# Patient Record
Sex: FEMALE | Race: WHITE | NOT HISPANIC OR LATINO | Employment: OTHER | URBAN - NONMETROPOLITAN AREA
[De-identification: names, ages, dates, MRNs, and addresses within clinical notes are randomized per-mention and may not be internally consistent; named-entity substitution may affect disease eponyms.]

---

## 2017-03-01 ENCOUNTER — HISTORY (OUTPATIENT)
Dept: FAMILY MEDICINE | Facility: OTHER | Age: 57
End: 2017-03-01

## 2017-03-01 ENCOUNTER — OFFICE VISIT - GICH (OUTPATIENT)
Dept: FAMILY MEDICINE | Facility: OTHER | Age: 57
End: 2017-03-01

## 2017-03-01 DIAGNOSIS — G47.00 INSOMNIA: ICD-10-CM

## 2017-03-01 DIAGNOSIS — J45.40 MODERATE PERSISTENT ASTHMA, UNCOMPLICATED: ICD-10-CM

## 2017-03-01 DIAGNOSIS — Z79.899 OTHER LONG TERM (CURRENT) DRUG THERAPY: ICD-10-CM

## 2017-03-01 LAB
HEMOGLOBIN: 14.4 G/DL (ref 12–16)
MCV RBC AUTO: 87 FL (ref 80–100)

## 2017-03-13 LAB
6-MONOACETYL MORPHINE - HISTORICAL: NORMAL NG/ML
AMPHETAMINE URINE - HISTORICAL: NORMAL NG/ML
BARBITURATE URINE - HISTORICAL: NORMAL NG/ML
BENZODIAZEPINE URINE - HISTORICAL: NORMAL NG/ML
BUPRENORPHRINE URINE - HISTORICAL: NORMAL NG/ML
COCAINE METAB URINE - HISTORICAL: NORMAL NG/ML
CREAT UR - HISTORICAL: 125 MG/DL
ETHYLGLUCURONIDE URINE - HISTORICAL: NORMAL NG/ML
FENTANYL URINE - HISTORICAL: NORMAL NG/ML
MASS SPECTROMETRY URINE - HISTORICAL: NORMAL
METHADONE URINE - HISTORICAL: NORMAL NG/ML
OPIATES URINE - HISTORICAL: NORMAL NG/ML
OXYCODONE URINE - HISTORICAL: NORMAL NG/ML
PH URINE - HISTORICAL: 6.4
PROPOXYPHENE URINE - HISTORICAL: NORMAL NG/ML
THC 50 URINE - HISTORICAL: NORMAL NG/ML
TRAMADOL - HISTORICAL: NORMAL NG/ML

## 2017-05-16 ENCOUNTER — COMMUNICATION - GICH (OUTPATIENT)
Dept: FAMILY MEDICINE | Facility: OTHER | Age: 57
End: 2017-05-16

## 2017-05-16 DIAGNOSIS — I10 ESSENTIAL (PRIMARY) HYPERTENSION: ICD-10-CM

## 2017-06-01 ENCOUNTER — AMBULATORY - GICH (OUTPATIENT)
Dept: FAMILY MEDICINE | Facility: OTHER | Age: 57
End: 2017-06-01

## 2017-06-01 DIAGNOSIS — I10 ESSENTIAL (PRIMARY) HYPERTENSION: ICD-10-CM

## 2017-06-01 DIAGNOSIS — R73.01 IMPAIRED FASTING GLUCOSE: ICD-10-CM

## 2017-06-01 DIAGNOSIS — Z79.899 OTHER LONG TERM (CURRENT) DRUG THERAPY: ICD-10-CM

## 2017-06-02 ENCOUNTER — COMMUNICATION - GICH (OUTPATIENT)
Dept: FAMILY MEDICINE | Facility: OTHER | Age: 57
End: 2017-06-02

## 2017-06-02 DIAGNOSIS — I10 ESSENTIAL (PRIMARY) HYPERTENSION: ICD-10-CM

## 2017-06-08 ENCOUNTER — AMBULATORY - GICH (OUTPATIENT)
Dept: FAMILY MEDICINE | Facility: OTHER | Age: 57
End: 2017-06-08

## 2017-06-15 ENCOUNTER — COMMUNICATION - GICH (OUTPATIENT)
Dept: FAMILY MEDICINE | Facility: OTHER | Age: 57
End: 2017-06-15

## 2017-06-15 DIAGNOSIS — J45.21 MILD INTERMITTENT ASTHMA WITH ACUTE EXACERBATION: ICD-10-CM

## 2017-06-15 DIAGNOSIS — J45.909 UNCOMPLICATED ASTHMA: ICD-10-CM

## 2017-06-19 ENCOUNTER — AMBULATORY - GICH (OUTPATIENT)
Dept: LAB | Facility: OTHER | Age: 57
End: 2017-06-19

## 2017-06-19 ENCOUNTER — AMBULATORY - GICH (OUTPATIENT)
Dept: SCHEDULING | Facility: OTHER | Age: 57
End: 2017-06-19

## 2017-06-19 ENCOUNTER — AMBULATORY - GICH (OUTPATIENT)
Dept: RADIOLOGY | Facility: OTHER | Age: 57
End: 2017-06-19

## 2017-06-19 ENCOUNTER — HOSPITAL ENCOUNTER (OUTPATIENT)
Dept: RADIOLOGY | Facility: OTHER | Age: 57
End: 2017-06-19
Attending: FAMILY MEDICINE

## 2017-06-19 DIAGNOSIS — R73.01 IMPAIRED FASTING GLUCOSE: ICD-10-CM

## 2017-06-19 DIAGNOSIS — I10 ESSENTIAL (PRIMARY) HYPERTENSION: ICD-10-CM

## 2017-06-19 DIAGNOSIS — Z12.31 ENCOUNTER FOR SCREENING MAMMOGRAM FOR MALIGNANT NEOPLASM OF BREAST: ICD-10-CM

## 2017-06-19 DIAGNOSIS — Z79.899 OTHER LONG TERM (CURRENT) DRUG THERAPY: ICD-10-CM

## 2017-06-19 LAB
A/G RATIO - HISTORICAL: 1.4 (ref 1–2)
ALBUMIN SERPL-MCNC: 4.2 G/DL (ref 3.5–5.7)
ALP SERPL-CCNC: 58 IU/L (ref 34–104)
ALT (SGPT) - HISTORICAL: 16 IU/L (ref 7–52)
ANION GAP - HISTORICAL: 11 (ref 5–18)
AST SERPL-CCNC: 15 IU/L (ref 13–39)
BACTERIA URINE: ABNORMAL BACTERIA/HPF
BILIRUB SERPL-MCNC: 0.5 MG/DL (ref 0.3–1)
BILIRUB UR QL: ABNORMAL
BUN SERPL-MCNC: 17 MG/DL (ref 7–25)
BUN/CREAT RATIO - HISTORICAL: 19
CALCIUM SERPL-MCNC: 9.6 MG/DL (ref 8.6–10.3)
CHLORIDE SERPLBLD-SCNC: 102 MMOL/L (ref 98–107)
CHOL/HDL RATIO - HISTORICAL: 3.08
CHOLESTEROL TOTAL: 197 MG/DL
CLARITY, URINE: CLEAR CLARITY
CO2 SERPL-SCNC: 26 MMOL/L (ref 21–31)
COLOR UR: YELLOW COLOR
CREAT SERPL-MCNC: 0.9 MG/DL (ref 0.7–1.3)
EPITHELIAL CELLS: ABNORMAL EPI/HPF
ESTIMATED AVERAGE GLUCOSE: 91 MG/DL
GFR IF NOT AFRICAN AMERICAN - HISTORICAL: >60 ML/MIN/1.73M2
GLOBULIN - HISTORICAL: 2.9 G/DL (ref 2–3.7)
GLUCOSE SERPL-MCNC: 126 MG/DL (ref 70–105)
GLUCOSE URINE: NEGATIVE MG/DL
HDLC SERPL-MCNC: 64 MG/DL (ref 23–92)
HEMOGLOBIN A1C MONITORING (POCT) - HISTORICAL: 4.8 % (ref 4–6.2)
KETONES UR QL: 15 MG/DL
LDLC SERPL CALC-MCNC: 100 MG/DL
LEUKOCYTE ESTERASE URINE: NEGATIVE
NITRITE UR QL STRIP: NEGATIVE
NON-HDL CHOLESTEROL - HISTORICAL: 133 MG/DL
OCCULT BLOOD,URINE - HISTORICAL: NEGATIVE
OTHER: ABNORMAL
PATIENT STATUS - HISTORICAL: ABNORMAL
PH UR: 6 [PH]
POTASSIUM SERPL-SCNC: 2.9 MMOL/L (ref 3.5–5.1)
PROT SERPL-MCNC: 7.1 G/DL (ref 6.4–8.9)
PROTEIN QUALITATIVE,URINE - HISTORICAL: NEGATIVE MG/DL
RBC - HISTORICAL: ABNORMAL /HPF
SODIUM SERPL-SCNC: 139 MMOL/L (ref 133–143)
SP GR UR STRIP: 1.02
TRIGL SERPL-MCNC: 166 MG/DL
UROBILINOGEN,QUALITATIVE - HISTORICAL: NORMAL EU/DL
WBC - HISTORICAL: ABNORMAL /HPF

## 2017-06-20 ENCOUNTER — AMBULATORY - GICH (OUTPATIENT)
Dept: RADIOLOGY | Facility: OTHER | Age: 57
End: 2017-06-20

## 2017-06-20 DIAGNOSIS — M53.3 SACROCOCCYGEAL DISORDERS, NOT ELSEWHERE CLASSIFIED: ICD-10-CM

## 2017-06-23 LAB
6-MONOACETYL MORPHINE - HISTORICAL: NORMAL NG/ML
AMPHETAMINE URINE - HISTORICAL: NORMAL NG/ML
BARBITURATE URINE - HISTORICAL: NORMAL NG/ML
BENZODIAZEPINE URINE - HISTORICAL: NORMAL NG/ML
BUPRENORPHRINE URINE - HISTORICAL: NORMAL NG/ML
COCAINE METAB URINE - HISTORICAL: NORMAL NG/ML
CREAT UR - HISTORICAL: 276 MG/DL
ETHYLGLUCURONIDE URINE - HISTORICAL: NORMAL NG/ML
FENTANYL URINE - HISTORICAL: NORMAL NG/ML
MASS SPECTROMETRY URINE - HISTORICAL: NORMAL
METHADONE URINE - HISTORICAL: NORMAL NG/ML
OPIATES URINE - HISTORICAL: NORMAL NG/ML
OXYCODONE URINE - HISTORICAL: NORMAL NG/ML
PH URINE - HISTORICAL: 6.1
PROPOXYPHENE URINE - HISTORICAL: NORMAL NG/ML
THC 50 URINE - HISTORICAL: NORMAL NG/ML
TRAMADOL - HISTORICAL: NORMAL NG/ML

## 2017-06-30 ENCOUNTER — AMBULATORY - GICH (OUTPATIENT)
Dept: FAMILY MEDICINE | Facility: OTHER | Age: 57
End: 2017-06-30

## 2017-06-30 DIAGNOSIS — E87.6 HYPOKALEMIA: ICD-10-CM

## 2017-07-07 ENCOUNTER — OFFICE VISIT - GICH (OUTPATIENT)
Dept: FAMILY MEDICINE | Facility: OTHER | Age: 57
End: 2017-07-07

## 2017-07-07 ENCOUNTER — HOSPITAL ENCOUNTER (OUTPATIENT)
Dept: RADIOLOGY | Facility: OTHER | Age: 57
End: 2017-07-07

## 2017-07-07 ENCOUNTER — AMBULATORY - GICH (OUTPATIENT)
Dept: LAB | Facility: OTHER | Age: 57
End: 2017-07-07

## 2017-07-07 DIAGNOSIS — E87.6 HYPOKALEMIA: ICD-10-CM

## 2017-07-07 DIAGNOSIS — N39.3 STRESS INCONTINENCE: ICD-10-CM

## 2017-07-07 DIAGNOSIS — J45.909 UNCOMPLICATED ASTHMA: ICD-10-CM

## 2017-07-07 DIAGNOSIS — M53.3 SACROCOCCYGEAL DISORDERS, NOT ELSEWHERE CLASSIFIED: ICD-10-CM

## 2017-07-07 DIAGNOSIS — J45.40 MODERATE PERSISTENT ASTHMA, UNCOMPLICATED: ICD-10-CM

## 2017-07-07 DIAGNOSIS — Z79.899 OTHER LONG TERM (CURRENT) DRUG THERAPY: ICD-10-CM

## 2017-07-07 DIAGNOSIS — Z71.89 OTHER SPECIFIED COUNSELING: Chronic | ICD-10-CM

## 2017-07-07 DIAGNOSIS — G43.809 OTHER MIGRAINE, NOT INTRACTABLE, WITHOUT STATUS MIGRAINOSUS: ICD-10-CM

## 2017-07-07 DIAGNOSIS — F33.40 RECURRENT MAJOR DEPRESSIVE DISORDER IN REMISSION (H): ICD-10-CM

## 2017-07-07 DIAGNOSIS — I10 ESSENTIAL (PRIMARY) HYPERTENSION: ICD-10-CM

## 2017-07-07 DIAGNOSIS — G47.00 INSOMNIA: ICD-10-CM

## 2017-07-07 DIAGNOSIS — J45.21 MILD INTERMITTENT ASTHMA WITH ACUTE EXACERBATION: ICD-10-CM

## 2017-07-07 DIAGNOSIS — T63.441A TOXIC EFFECT OF VENOM OF BEES, UNINTENTIONAL: ICD-10-CM

## 2017-07-07 DIAGNOSIS — Z00.00 ENCOUNTER FOR GENERAL ADULT MEDICAL EXAMINATION WITHOUT ABNORMAL FINDINGS: ICD-10-CM

## 2017-07-07 DIAGNOSIS — R73.01 IMPAIRED FASTING GLUCOSE: ICD-10-CM

## 2017-07-07 LAB — POTASSIUM SERPL-SCNC: 3.9 MMOL/L (ref 3.5–5.1)

## 2017-07-17 ENCOUNTER — HISTORY (OUTPATIENT)
Dept: UROLOGY | Facility: OTHER | Age: 57
End: 2017-07-17

## 2017-07-17 ENCOUNTER — OFFICE VISIT - GICH (OUTPATIENT)
Dept: UROLOGY | Facility: OTHER | Age: 57
End: 2017-07-17

## 2017-07-17 DIAGNOSIS — N39.46 MIXED INCONTINENCE: ICD-10-CM

## 2017-07-17 DIAGNOSIS — R31.9 HEMATURIA: ICD-10-CM

## 2017-07-20 ENCOUNTER — OFFICE VISIT - GICH (OUTPATIENT)
Dept: FAMILY MEDICINE | Facility: OTHER | Age: 57
End: 2017-07-20

## 2017-07-20 ENCOUNTER — HISTORY (OUTPATIENT)
Dept: FAMILY MEDICINE | Facility: OTHER | Age: 57
End: 2017-07-20

## 2017-07-20 DIAGNOSIS — R31.9 HEMATURIA: ICD-10-CM

## 2017-07-20 DIAGNOSIS — R39.89 OTHER SYMPTOMS AND SIGNS INVOLVING THE GENITOURINARY SYSTEM: ICD-10-CM

## 2017-07-20 LAB
BILIRUB UR QL: NEGATIVE
CLARITY, URINE: CLEAR CLARITY
COLOR UR: YELLOW COLOR
GLUCOSE URINE: NEGATIVE MG/DL
KETONES UR QL: NEGATIVE MG/DL
LEUKOCYTE ESTERASE URINE: NEGATIVE
NITRITE UR QL STRIP: NEGATIVE
OCCULT BLOOD,URINE - HISTORICAL: NEGATIVE
PH UR: 7.5 [PH]
PROTEIN QUALITATIVE,URINE - HISTORICAL: NEGATIVE MG/DL
SP GR UR STRIP: 1.01
UROBILINOGEN,QUALITATIVE - HISTORICAL: NORMAL EU/DL

## 2017-08-15 ENCOUNTER — AMBULATORY - GICH (OUTPATIENT)
Dept: UROLOGY | Facility: OTHER | Age: 57
End: 2017-08-15

## 2017-08-15 DIAGNOSIS — N39.46 MIXED INCONTINENCE: ICD-10-CM

## 2017-08-16 ENCOUNTER — AMBULATORY - GICH (OUTPATIENT)
Dept: UROLOGY | Facility: OTHER | Age: 57
End: 2017-08-16

## 2017-08-16 ENCOUNTER — HISTORY (OUTPATIENT)
Dept: UROLOGY | Facility: OTHER | Age: 57
End: 2017-08-16

## 2017-08-16 ENCOUNTER — HOSPITAL ENCOUNTER (OUTPATIENT)
Dept: RADIOLOGY | Facility: OTHER | Age: 57
End: 2017-08-16
Attending: UROLOGY

## 2017-08-16 DIAGNOSIS — R31.9 HEMATURIA: ICD-10-CM

## 2017-08-16 DIAGNOSIS — N39.46 MIXED INCONTINENCE: ICD-10-CM

## 2017-08-16 DIAGNOSIS — N39.3 STRESS INCONTINENCE: ICD-10-CM

## 2017-08-17 ENCOUNTER — COMMUNICATION - GICH (OUTPATIENT)
Dept: UROLOGY | Facility: OTHER | Age: 57
End: 2017-08-17

## 2017-09-26 ENCOUNTER — OFFICE VISIT - GICH (OUTPATIENT)
Dept: OBGYN | Facility: OTHER | Age: 57
End: 2017-09-26

## 2017-09-26 ENCOUNTER — HISTORY (OUTPATIENT)
Dept: OBGYN | Facility: OTHER | Age: 57
End: 2017-09-26

## 2017-09-26 DIAGNOSIS — N39.46 MIXED INCONTINENCE: ICD-10-CM

## 2017-09-26 DIAGNOSIS — N39.3 STRESS INCONTINENCE: ICD-10-CM

## 2017-09-27 ENCOUNTER — HISTORY (OUTPATIENT)
Dept: FAMILY MEDICINE | Facility: OTHER | Age: 57
End: 2017-09-27

## 2017-09-27 ENCOUNTER — OFFICE VISIT - GICH (OUTPATIENT)
Dept: FAMILY MEDICINE | Facility: OTHER | Age: 57
End: 2017-09-27

## 2017-09-27 DIAGNOSIS — E87.6 HYPOKALEMIA: ICD-10-CM

## 2017-09-27 DIAGNOSIS — J45.20 MILD INTERMITTENT ASTHMA, UNCOMPLICATED: ICD-10-CM

## 2017-09-27 DIAGNOSIS — M23.204 DERANGEMENT OF MEDIAL MENISCUS OF LEFT KNEE DUE TO OLD INJURY: ICD-10-CM

## 2017-09-27 DIAGNOSIS — Z01.818 ENCOUNTER FOR OTHER PREPROCEDURAL EXAMINATION: ICD-10-CM

## 2017-09-27 DIAGNOSIS — Z23 ENCOUNTER FOR IMMUNIZATION: ICD-10-CM

## 2017-09-27 DIAGNOSIS — N39.3 STRESS INCONTINENCE: ICD-10-CM

## 2017-09-27 DIAGNOSIS — E34.9 ENDOCRINE DISORDER: ICD-10-CM

## 2017-09-27 DIAGNOSIS — Z79.899 OTHER LONG TERM (CURRENT) DRUG THERAPY: ICD-10-CM

## 2017-09-27 DIAGNOSIS — I10 ESSENTIAL (PRIMARY) HYPERTENSION: ICD-10-CM

## 2017-09-27 LAB
ANION GAP - HISTORICAL: 11 (ref 5–18)
BUN SERPL-MCNC: 16 MG/DL (ref 7–25)
BUN/CREAT RATIO - HISTORICAL: 18
CALCIUM SERPL-MCNC: 9.1 MG/DL (ref 8.6–10.3)
CHLORIDE SERPLBLD-SCNC: 103 MMOL/L (ref 98–107)
CO2 SERPL-SCNC: 27 MMOL/L (ref 21–31)
CREAT SERPL-MCNC: 0.88 MG/DL (ref 0.7–1.3)
GFR IF NOT AFRICAN AMERICAN - HISTORICAL: >60 ML/MIN/1.73M2
GLUCOSE SERPL-MCNC: 107 MG/DL (ref 70–105)
POTASSIUM SERPL-SCNC: 3.3 MMOL/L (ref 3.5–5.1)
SODIUM SERPL-SCNC: 141 MMOL/L (ref 133–143)

## 2017-10-04 ENCOUNTER — AMBULATORY - GICH (OUTPATIENT)
Dept: LAB | Facility: OTHER | Age: 57
End: 2017-10-04

## 2017-10-04 ENCOUNTER — COMMUNICATION - GICH (OUTPATIENT)
Dept: FAMILY MEDICINE | Facility: OTHER | Age: 57
End: 2017-10-04

## 2017-10-04 DIAGNOSIS — E87.6 HYPOKALEMIA: ICD-10-CM

## 2017-10-04 LAB — POTASSIUM SERPL-SCNC: 4 MMOL/L (ref 3.5–5.1)

## 2017-10-13 ENCOUNTER — COMMUNICATION - GICH (OUTPATIENT)
Dept: FAMILY MEDICINE | Facility: OTHER | Age: 57
End: 2017-10-13

## 2017-10-13 DIAGNOSIS — E87.6 HYPOKALEMIA: ICD-10-CM

## 2017-10-18 ENCOUNTER — HISTORY (OUTPATIENT)
Dept: SURGERY | Facility: OTHER | Age: 57
End: 2017-10-18

## 2017-10-25 ENCOUNTER — HISTORY (OUTPATIENT)
Dept: SURGERY | Facility: OTHER | Age: 57
End: 2017-10-25

## 2017-10-25 ENCOUNTER — COMMUNICATION - GICH (OUTPATIENT)
Dept: FAMILY MEDICINE | Facility: OTHER | Age: 57
End: 2017-10-25

## 2017-10-25 ENCOUNTER — SURGERY (OUTPATIENT)
Dept: SURGERY | Facility: OTHER | Age: 57
End: 2017-10-25

## 2017-10-25 ENCOUNTER — HOSPITAL ENCOUNTER (OUTPATIENT)
Dept: SURGERY | Facility: OTHER | Age: 57
Discharge: HOME | End: 2017-10-25
Attending: OBSTETRICS & GYNECOLOGY | Admitting: OBSTETRICS & GYNECOLOGY

## 2017-10-25 DIAGNOSIS — N39.3 STRESS INCONTINENCE: ICD-10-CM

## 2017-10-25 DIAGNOSIS — J45.40 MODERATE PERSISTENT ASTHMA, UNCOMPLICATED: ICD-10-CM

## 2017-12-06 ENCOUNTER — AMBULATORY - GICH (OUTPATIENT)
Dept: PHYSICAL THERAPY | Facility: OTHER | Age: 57
End: 2017-12-06

## 2017-12-06 ENCOUNTER — AMBULATORY - GICH (OUTPATIENT)
Dept: SCHEDULING | Facility: OTHER | Age: 57
End: 2017-12-06

## 2017-12-07 ENCOUNTER — AMBULATORY - GICH (OUTPATIENT)
Dept: PHYSICAL THERAPY | Facility: OTHER | Age: 57
End: 2017-12-07

## 2017-12-07 DIAGNOSIS — M53.3 SACROCOCCYGEAL DISORDERS, NOT ELSEWHERE CLASSIFIED: ICD-10-CM

## 2017-12-07 DIAGNOSIS — M79.10 MYALGIA: ICD-10-CM

## 2017-12-22 ENCOUNTER — COMMUNICATION - GICH (OUTPATIENT)
Dept: FAMILY MEDICINE | Facility: OTHER | Age: 57
End: 2017-12-22

## 2017-12-22 DIAGNOSIS — G47.00 INSOMNIA: ICD-10-CM

## 2017-12-27 NOTE — PROGRESS NOTES
Patient Information     Patient Name MRN Sex Yolie Sahu [id number] Female 1960      Progress Notes by Rocio Reaves at 2017  9:21 AM     Author:  Rocio Reaves Service:  (none) Author Type:  (none)     Filed:  2017  9:21 AM Date of Service:  2017  9:21 AM Status:  Signed     :  Rocio Reaves            Falls Risk Criteria:    Age 65 and older or under age 4        Sensory deficits    Poor vision    Use of ambulatory aides    Impaired judgment    Unable to walk independently    Meets High Risk criteria for falls:  no

## 2017-12-27 NOTE — PROGRESS NOTES
Patient Information     Patient Name MRN Sex Yolie Sahu [id number] Female 1960      Progress Notes by Zaida Guillermo R.T. (ARRT) at 2017 11:55 AM     Author:  Zaida Guillermo R.T. (ARRT) Service:  (none) Author Type:  [employer] - Registered Radiologic Technologist     Filed:  2017 11:55 AM Date of Service:  2017 11:55 AM Status:  Signed     :  Zaida Guillermo R.T. (ARRT) (Formerly [facility], [facility] - Registered Radiologic Technologist)            RECOVERY TIME  You may experience numbness and/or relief of your pain for up to 4-6 hours after the injection.  Your usual symptoms may return the night of the procedure and may possible be more severe than usual a day or two following.  Please keep track of your pain over the next several days and report how long the relief lasts to the doctor who referred you for this procedure.    The beneficial effects of the steroids usually require 2 to 3 days to take effect, buy may take as long as 5 to 7 days.  If there is no change in the pain, then investigation can be focused on other possible sources of your pain.  In either case, the information is useful to the doctor who referred you for this procedure.    POSSIBLE SIDE EFFECTS  Facial flushing (redness), occasional low grade fevers of 99.5F or less, hiccups, insomnia, headaches, increased heart rate, abdominal cramping, and/or a bloating feeling are side effects of the steroid medications and will go away 3 to 4 days after the injection.    Diabetic Patients  The steroids you have received may significantly increase your blood sugar levels.  Monitor your blood sugar level closely (4-6 times per day) for a period of 4 days or until your blood sugar level normalizes.  If your blood sugar level elevates significantly or you experience confusion, dizziness, sweating, please notify our primary physician and make him/her aware that you have received steroids.

## 2017-12-27 NOTE — PROGRESS NOTES
"Patient Information     Patient Name MRN Sex     Yolie Bedoya [id number] Female 1960      Progress Notes by Vickie Alfaro NP at 2017 11:45 AM     Author:  Vickie Alfaro NP Service:  (none) Author Type:  PHYS- Nurse Practitioner     Filed:  2017  1:58 PM Encounter Date:  2017 Status:  Signed     :  Vickie Alfaro NP (PHYS- Nurse Practitioner)            HPI:    Yolie Bedoya is a 56 y.o. female who presents to clinic today for hematuria. She reports blood in urine and on toilet paper last night. She has had some frequency/mild burning. She was in urologist last week due to incontinence. No fevers. No back pain or abdominal pain. No n/v. She felt \"dizziness\" in afternoon last 2 days, has changed her eating habits lately, less intake. Hx of hysterectomy, complete.     Past Medical History:     Diagnosis  Date     Allergic rhinitis due to animal (cat) (dog) hair and dander 1970    aspen, birch, maple, oak, grass, dust mite, ragwee, cocklebur, mugwart, lambs quarter, pigweed, fungus, molds       Cholelithiasis      Chronic insomnia     contract signed 2014      Controlled substance agreement signed 2014    ambien #30/month      Menorrhagia     s/p CLARA      Obesity      Postmenopausal HRT (hormone replacement therapy)     started ERT      Pyelonephritis      Venous insufficiency      Past Surgical History:      Procedure  Laterality Date     COLONOSCOPY SCREENING  2004     because of change in bowel habits       COLONOSCOPY SCREENING  2014    Extensive diverticulosis throughout the colon - follow up 10 years       ENDOMETRIAL ABLATION  3/05     CLARA AND BSO  3/30/10    CLARA/BSO/Vasquez/Posterior Repair       WISDOM TEETH EXTRACTION       Social History       Substance Use Topics         Smoking status:   Never Smoker     Smokeless tobacco:   Never Used     Alcohol use   Yes      Comment: occ      Current Outpatient Prescriptions       Medication  Sig Dispense Refill     " albuterol HFA (VENTOLIN HFA) 90 mcg/actuation inhaler Inhale 2 Puffs by mouth every 4 hours if needed. 1 Inhaler 2     buPROPion (WELLBUTRIN XL) 150 mg Extended-Release tablet Take 1 tablet by mouth every morning. 90 tablet 3     Cetirizine (ZYRTEC) 10 mg cap Take 1 Tab by mouth once daily.       cholecalciferol (VITAMIN D) 1,000 unit capsule Take 1,000 Units by mouth once daily.       diphenhydrAMINE (BENADRYL) 25 mg capsule Take 2 capsules by mouth at bedtime if needed.  0     estradiol (ESTRACE) 1 mg tablet Take 0.5 tablets by mouth once daily. 45 tablet 3     fluticasone (50 mcg per actuation) nasal solution (FLONASE) Inhale 2 Sprays into both nostrils once daily. 1 Bottle 11     fluticasone-salmeterol (ADVAIR DISKUS) 250-50 mcg/Dose diskus inhaler Inhale 1 Puff by mouth every 12 hours. 1 Inhaler 11     hydroCHLOROthiazide 12.5 mg tablet Take 1 tablet by mouth once daily. 90 tablet 1     losartan-hydrochlorothiazide (HYZAAR) 100-25 mg tablet Take 1 tablet by mouth once daily. 90 tablet 0     montelukast (SINGULAIR) 10 mg tablet Take 1 tablet by mouth at bedtime. 90 tablet 0     multivitamin (MVI) tablet Take 1 tablet by mouth once daily.       polycarbophil (FIBERTAB) 625 mg tablet Take 1 tablet by mouth once daily.  0     SUMAtriptan NASAL (IMITREX) 20 mg/actuation nasal spray Inhale 1 Spray in the nostril(s) every 2 hours if needed for Migraine. 1 Bottle 11     triamcinolone (ARISTOCORT) 0.1 % ointment Apply  topically to affected area(s) 2 times daily. 1 Tube 0     zolpidem (AMBIEN) 10 mg tablet Take 1 tablet by mouth at bedtime. 30 tablet 5     No current facility-administered medications for this visit.      Medications have been reviewed by me and are current to the best of my knowledge and ability.    Allergies      Allergen   Reactions     Augmentin [Amoxicillin-Pot Clavulanate]  Rash     Doxycycline  Vomiting     Other [Unlisted Allergen (Include Detail In Comments)]  Stomach Upset and Vomiting      Narcotics      Sulfa (Sulfonamide Antibiotics)  Rash     Zaroxolyn [Metolazone]  *Unknown - Pt Doesn't Remember       ROS:  Pertinent positives and negatives are noted in HPI.    EXAM:  General appearance: well appearing female, in no acute distress  Respiratory: clear to auscultation bilaterally  Cardiac: RRR with no murmurs  Abdomen: soft, nontender, no masses or organomegally, no CVAT  Psychological: normal affect, alert and pleasant  Lab:   Results for orders placed or performed in visit on 07/20/17      URINALYSIS W REFLEX MICROSCOPIC IF POSITIVE      Result  Value Ref Range    COLOR                     Yellow Yellow Color    CLARITY                   Clear Clear Clarity    SPECIFIC GRAVITY,URINE    1.010 1.010, 1.015, 1.020, 1.025                    PH,URINE                  7.5 6.0, 7.0, 8.0, 5.5, 6.5, 7.5, 8.5                    UROBILINOGEN,QUALITATIVE  Normal Normal EU/dl    PROTEIN, URINE Negative Negative mg/dL    GLUCOSE, URINE Negative Negative mg/dL    KETONES,URINE             Negative Negative mg/dL    BILIRUBIN,URINE           Negative Negative                    OCCULT BLOOD,URINE        Negative Negative                    NITRITE                   Negative Negative                    LEUKOCYTE ESTERASE        Negative Negative                         ASSESSMENT/PLAN:    ICD-10-CM    1. Urinary problem R39.89 URINALYSIS W REFLEX MICROSCOPIC IF POSITIVE      URINALYSIS W REFLEX MICROSCOPIC IF POSITIVE      URINE CULTURE      URINE CULTURE   2. Hematuria R31.9 URINE CULTURE      URINE CULTURE   UA is negative today. She has had total hysterectomy so not vaginal bleeding. No trauma. No bleeding today. Will initiate UC to ensure no infection despite normal UA. F/u with further bleeding as needed. All questions were answered and she is in agreement with plan.     Patient Instructions   We will culture urine and follow up with these results  If you have any further bleeding let us know

## 2017-12-27 NOTE — PROGRESS NOTES
Patient Information     Patient Name MRN Sex     Yolie Bedoya [id number] Female 1960      Progress Notes by Trevin De MD at 2017  8:15 AM     Author:  Trevin De MD Service:  (none) Author Type:  Physician     Filed:  2017  9:45 AM Encounter Date:  2017 Status:  Signed     :  Trevin De MD (Physician)            SUBJECTIVE:    Yolie Bedoya is a 56 y.o. female who presents for discussion of surgery for stress incontinence.    HPI  She was worked up by Dr. Felder demonstrating significant stress urinary incontinence which has recurred. She had an abdominal hysterectomy by Dr. Matthews with Vasquez urethral suspension done at that time as well as posterior colporrhaphy.  She now is leaking with minimal exertion including use of stairs, cough, sneeze and laugh. It is fairly significant requiring clothing change and pad usage daily.    Allergies      Allergen   Reactions     Augmentin [Amoxicillin-Pot Clavulanate]  Rash     Doxycycline  Vomiting     Other [Unlisted Allergen (Include Detail In Comments)]  Stomach Upset and Vomiting     Narcotics      Sulfa (Sulfonamide Antibiotics)  Rash     Zaroxolyn [Metolazone]  *Unknown - Pt Doesn't Remember   ,   Current Outpatient Prescriptions on File Prior to Visit       Medication  Sig Dispense Refill     albuterol HFA (VENTOLIN HFA) 90 mcg/actuation inhaler Inhale 2 Puffs by mouth every 4 hours if needed. 1 Inhaler 2     buPROPion (WELLBUTRIN XL) 150 mg Extended-Release tablet Take 1 tablet by mouth every morning. 90 tablet 3     Cetirizine (ZYRTEC) 10 mg cap Take 1 Tab by mouth once daily.       cholecalciferol (VITAMIN D) 1,000 unit capsule Take 1,000 Units by mouth once daily.       diphenhydrAMINE (BENADRYL) 25 mg capsule Take 2 capsules by mouth at bedtime if needed.  0     estradiol (ESTRACE) 1 mg tablet Take 0.5 tablets by mouth once daily. 45 tablet 3     fluticasone (50 mcg per actuation) nasal solution (FLONASE) Inhale 2  Sprays into both nostrils once daily. 1 Bottle 11     fluticasone-salmeterol (ADVAIR DISKUS) 250-50 mcg/Dose diskus inhaler Inhale 1 Puff by mouth every 12 hours. 1 Inhaler 11     hydroCHLOROthiazide 12.5 mg tablet Take 1 tablet by mouth once daily. 90 tablet 1     losartan-hydrochlorothiazide (HYZAAR) 100-25 mg tablet Take 1 tablet by mouth once daily. 90 tablet 0     montelukast (SINGULAIR) 10 mg tablet Take 1 tablet by mouth at bedtime. 90 tablet 0     multivitamin (MVI) tablet Take 1 tablet by mouth once daily.       polycarbophil (FIBERTAB) 625 mg tablet Take 1 tablet by mouth once daily.  0     SUMAtriptan NASAL (IMITREX) 20 mg/actuation nasal spray Inhale 1 Spray in the nostril(s) every 2 hours if needed for Migraine. 1 Bottle 11     triamcinolone (ARISTOCORT) 0.1 % ointment Apply  topically to affected area(s) 2 times daily. 1 Tube 0     zolpidem (AMBIEN) 10 mg tablet Take 1 tablet by mouth at bedtime. 30 tablet 5     No current facility-administered medications on file prior to visit.    ,   Past Medical History:     Diagnosis  Date     Allergic rhinitis due to animal (cat) (dog) hair and dander 1970    aspen, birch, maple, oak, grass, dust mite, ragwee, cocklebur, mugwart, lambs quarter, pigweed, fungus, molds       Cholelithiasis      Chronic insomnia     contract signed 2/2014      Controlled substance agreement signed 2/11/2014    ambien #30/month      Menorrhagia     s/p CLARA      Obesity      Postmenopausal HRT (hormone replacement therapy) 2010    started ERT      Pyelonephritis      Venous insufficiency     and   Past Surgical History:      Procedure  Laterality Date     COLONOSCOPY SCREENING  6/2004     because of change in bowel habits       COLONOSCOPY SCREENING  5/14/2014    Extensive diverticulosis throughout the colon - follow up 10 years       ENDOMETRIAL ABLATION  3/05     CLARA AND BSO  3/30/10    CLARA/BSO/Vasquez/Posterior Repair       WISDOM TEETH EXTRACTION         REVIEW OF SYSTEMS:  Review  of Systems   Respiratory: Negative.    Cardiovascular: Negative.    Gastrointestinal: Negative.    Genitourinary: Negative.    Endo/Heme/Allergies: Negative.        OBJECTIVE:  /82  Pulse 78  Wt 89.8 kg (198 lb)  Breastfeeding? No  BMI 31.96 kg/m2    EXAM:   Physical Exam   Constitutional: She is well-developed, well-nourished, and in no distress.   Genitourinary:   Genitourinary Comments: She has leakage with cough and valsalva. Good anterior and posterior as well as apical vaginal support. She has urethral rotation with leakage.       ASSESSMENT/PLAN:    ICD-10-CM    1. Mixed incontinence N39.46 AMB CONSULT TO OB/GYN SURGEON   2. Stress incontinence N39.3 AMB CONSULT TO OB/GYN SURGEON        Plan:  After reviewing her UDS study showing good emptying and no evidence of Urgency, we discussed kegel exercises and plan to schedule for a Solyx mid-urethral sling. She will have a preop due to her asthma with her PCP, Yarely Miller MD.    TT: 30 min with over half in discussion of her surgery, risk, benefits and likely outcomes of surgery.

## 2017-12-27 NOTE — PROGRESS NOTES
Patient Information     Patient Name MRN Sex     Yolie Bedoya [id number] Female 1960      Progress Notes by Deny Felder MD at 2017  8:30 AM     Author:  Deny Felder MD Service:  (none) Author Type:  Physician     Filed:  2017  8:51 AM Encounter Date:  2017 Status:  Signed     :  Deny Felder MD (Physician)            Preprocedure diagnosis  Mixed incontinence    Postprocedure diagnosis  Stress incontinence    Procedure  1.  Cystometrogram, complex with valsalva UPP  2.  Uroflowmetry, complex  3.  EMG of anal sphincter  4.  PVR    Surgeon  Deny Felder MD    Anesthesia  None    Complications  None    Indications  Yolie Bedoya is a 56 y.o. female with history of mixed incontinence.  The patient presented yesterday for urodynamics and today to review results.  We discussed the risks and benefits of the procedure including the risks of hematuria and infection.    Operative summary  The patient was prepped and draped in a standard sterile fashion.   An air charged TDOC catheter was placed with a 7 Wallisian double-lumen urodynamic catherter.   An air charged TDOC abdominal catheter was placed in an atraumatic fashion.   An EMG electrode was placed at 3 and 9:00 of the jumana-anal region.     Current related medications  Anticholinergics  No    Alpha-blockers  No      Noninvasive uroflow and PVR  14mL/s   Qmax  62 mL    Voided volume  0 mL    PVR    Filling cystometrogram  40mL/min   Fill rate  14mL    First sensation  99mL     First desire  270mL    Strong desire  335mL    Imminent void/maximum cystometric capacity  2 cm of water   End filling pressure  335 ml/ 2 cm of water  Compliance calculated (normal > 12.5 ml/cm of water)  N/A    Detrusor overactivity  N/A    DLPP (detrusor leak point pressure)  31 and 142 cm of water VLPP (valsalva leak point pressure)    Voiding cystometrogram  At maximum capacity the patient then voided.    16cm of water   PdetQmax/Peak pressure  26  ml/s   Qmax  443 ml    Voided volume  0 mL    PVR    ^^^^^^^^^^^^^^^^^^^^^^^^^^^^^^^^^^^^^^^^^^^^^^^^^^^^^^^^^^^^^^^^^^^^^^^^^^^^^^^^^^^^^^^^^^^^^^^  Preprocedure diagnosis  Hematuria    Postprocedure diagnosis  Hematuria    Procedure  Flexible Cystourethroscopy    Surgeon  Deny Felder MD    Anesthesia  2% lidocaine jelly intraurethrally    Complications  None    Indications  56 y.o. female undergoing a flexible cystoscopy for the above mentioned indications.    Findings  Cystoscopic findings included a normal urethra.    The bladder appeared to be normal capacity.    There were no tumors, stones or foreign bodies.    The orifices were slit-shaped and in their normal location.    Procedure  The patient was placed in supine position and prepped and draped in sterile fashion with lidocaine jelly per urethra for anesthesia.    I passed a lubricated 14F flexible cystoscope through the urethra and into the bladder and the bladder was completely visualized.  The cystoscope was retroflexed and the bladder neck visualized.    The cystoscope was slowly withdrawn while visualizing the urethra and the procedure terminated.    The patient tolerated the procedure well.      ^^^^^^^^^^^^^^^^^^^^^^^^^^^^^^^^^^^^^^^^^^^^^^^^^^^^^^^^^^^^^^^^^^^^^^^^^^^^^^^^^^^^^^^^^^^^^^^    Results for SLAVA CRISTINA (MRN [id number]) as of 8/16/2017 08:33   6/19/2017 09:21   COLOR                     Yellow   CLARITY                   Clear   SPECIFIC GRAVITY,URINE    1.020   PH,URINE                  6.0   UROBILINOGEN,QUALITATIVE  Normal   PROTEIN, URINE Negative   GLUCOSE, URINE Negative   KETONES,URINE             15 (A)   BILIRUBIN,URINE           Abnormal (A)   OCCULT BLOOD,URINE        Negative   NITRITE                   Negative   LEUKOCYTE ESTERASE        Negative   RBC 6-10 (A)   WBC 3-5   BACTERIA Many (A)   EPITHELIAL CELLS Moderate (A)   OTHER Mucus Present       CT Urogram  8/16/2017  negative with pending report    Assessment  High  clinical bother stress incontinence per clinical history and confirmed on UDS  Hematuria- negative work up today  Low storage pressures, no detrusor contractions, empties well    Plan  Referral to Dr De to consider MUS            After discussing the results from today's urodynamic testing, CT imaging I spent an additional 10 minutes on this patient's visit (exclusive of separately billed services/procedures) and over half of this time was spent in face-to-face discussion regarding treatment options including emphasis on  risks and benefits of each, prognosis and importance of compliance.

## 2017-12-27 NOTE — PROGRESS NOTES
"Patient Information     Patient Name MRN Sex     Yolie Bedoya [id number] Female 1960      Progress Notes by Yarely Miller MD at 2017  9:45 AM     Author:  Yarely Miller MD Service:  (none) Author Type:  Physician     Filed:  2017  4:27 PM Encounter Date:  2017 Status:  Signed     :  Yarely Miller MD (Physician)            ----------------- PREOPERATIVE EXAM ------------------  2017    SUBJECTIVE:  Yolie Bedoya is a 56 y.o. female here for preoperative optimization.    I was asked to see Yolie Bedoya by Brittaney Jones and Kenisha for  preoperative evaluation due to hypertension.    Nursing Notes:   Haylee Estevez  2017 10:46 AM  Signed  Date of Surgery: 10/12/17 and 10/25/17  Type of Surgery: Left knee and bladder   Surgeon: Dr Jones and Dr De  Hospital:  [facility]    Fever/Chills or other infectious symptoms in past month: no  >10lb weight loss in past two months: no  O2 SAT: 99    Health Care Directive/Code status:  Full code   Hx of blood transfusions:   (NO)   Td up to date:  14  History of VRE/MRSA:  (NO)     Preoperative Evaluation: Obstructive Sleep Apnea screening    S: Snore -  Do you snore loudly? (louder than talking or loud enough to be heard through closed doors)(NO)  T: Tired - Do you often feel tired, fatigued, or sleepy during the daytime?(NO)  O: Observed - Has anyone ever observed you stop breathing during your sleep?(NO)  P: Pressure - Do you have or are you being treated for high blood pressure?(YES)  B: BMI - BMI greater than 35kg/m2?(NO)  A: Age - Age over 50 years old?(YES)  N: Neck - Neck circumference greater than 40 cm?(NO)  G: Gender - Gender: Male?(NO)    Total number of \"YES\" responses:  1    Scoring: Low risk of LO 0-2  At Risk of LO: >3 High Risk of LO: 5-8    Haylee J Kallinen LPN...................  2017   9:44 AM             HPI:  Yolie Bedoya is a 56 y.o. female presents at the " request of Brittaney Jones and Kenisha for preoperative evaluation due to hypertension. She takes losartan/hydrochlorothiazide 100/25 mg daily. The last time that she had lab monitoring related to this, she had hypokalemia and potassium was started. She reports that she has not been compliant with taking her potassium lately. She does not have chest pain, no palpitations. She has chronic lower extreme edema due to venous insufficiency, not due to cardiac disease.    She is having left knee arthroscopy done.  She had been diagnosed with a meniscal tear.  Her left knee hurts more now from the swelling than the meniscus that hurts more.  She had an injection done early in the summer which didn't seem to help.  This is affecting her walking, kneeling.    The patient's also scheduled for bladder surgery next month. She has had stress urinary incontinence for years.. Her bladder has gotten worse.  She is having larger amounts of leakage.  Stairs, walking, jumping will cause her to leak. She currently does not have burning, stinging, or hematuria.      Patient Active Problem List       Diagnosis  Date Noted     Hypokalemia  06/30/2017     Melanocytic nevus of chin  07/23/2014     Meniere disease  04/08/2014     Controlled substance agreement signed  02/11/2014     ambien #30/month        Recurrent major depression in remission (HC)  04/16/2013     ARTHRALGIA  08/01/2012     BACK PAIN  12/27/2010     INSOMNIA, PERSISTENT  12/27/2010     H R T- HORMONE REPLACEMENT THERAPY  03/27/2010     MIGRAINE HEADACHE  01/05/2010     LEG EDEMA, CHRONIC       ALLERGIC RHINITIS       OBESITY       DIVERTICULOSIS, COLON       IMPAIRED FASTING GLUCOSE       BREAST CANCER, FAMILY HX         Past Medical History:     Diagnosis  Date     Allergic rhinitis due to animal (cat) (dog) hair and dander 1970    aspen, birch, maple, oak, grass, dust mite, ragwee, cocklebur, mugwart, lambs quarter, pigweed, fungus, molds       Cholelithiasis      Chronic  insomnia     contract signed 2/2014      Controlled substance agreement signed 2/11/2014    ambien #30/month      HYPERTENSION      Menorrhagia     s/p CLARA      Obesity      Postmenopausal HRT (hormone replacement therapy) 2010    started ERT      Pyelonephritis      Venous insufficiency        Past Surgical History:      Procedure  Laterality Date     COLONOSCOPY SCREENING  6/2004     because of change in bowel habits       COLONOSCOPY SCREENING  5/14/2014    Extensive diverticulosis throughout the colon - follow up 10 years       ENDOMETRIAL ABLATION  3/05     CLARA AND BSO  3/30/10    CLARA/BSO/Avsquez/Posterior Repair       WISDOM TEETH EXTRACTION         Current Outpatient Prescriptions       Medication  Sig Dispense Refill     albuterol HFA (VENTOLIN HFA) 90 mcg/actuation inhaler Inhale 2 Puffs by mouth every 4 hours if needed. 1 Inhaler 2     buPROPion (WELLBUTRIN XL) 150 mg Extended-Release tablet Take 1 tablet by mouth every morning. 90 tablet 3     Cetirizine (ZYRTEC) 10 mg cap Take 1 Tab by mouth once daily.       cholecalciferol (VITAMIN D) 1,000 unit capsule Take 1,000 Units by mouth once daily.       diphenhydrAMINE (BENADRYL) 25 mg capsule Take 2 capsules by mouth at bedtime if needed.  0     estradiol (ESTRACE) 1 mg tablet Take 0.5 tablets by mouth once daily. 45 tablet 3     fluticasone (50 mcg per actuation) nasal solution (FLONASE) Inhale 2 Sprays into both nostrils once daily. 1 Bottle 11     fluticasone-salmeterol (ADVAIR DISKUS) 250-50 mcg/Dose diskus inhaler Inhale 1 Puff by mouth every 12 hours. 1 Inhaler 11     hydroCHLOROthiazide 12.5 mg tablet Take 1 tablet by mouth once daily. 90 tablet 3     losartan-hydrochlorothiazide (HYZAAR) 100-25 mg tablet Take 1 tablet by mouth once daily. 90 tablet 3     montelukast (SINGULAIR) 10 mg tablet Take 1 tablet by mouth at bedtime. 90 tablet 3     multivitamin (MVI) tablet Take 1 tablet by mouth once daily.       polycarbophil (FIBERTAB) 625 mg tablet Take 1  tablet by mouth once daily.  0     potassium chloride (KLOR-CON 10; K-TAB) 10 mEq Controlled-Release tablet Take 20 mEq by mouth once daily with a meal.  5     SUMAtriptan NASAL (IMITREX) 20 mg/actuation nasal spray Inhale 1 Spray in the nostril(s) every 2 hours if needed for Migraine. 1 Bottle 11     triamcinolone (ARISTOCORT) 0.1 % ointment Apply  topically to affected area(s) 2 times daily. 1 Tube 0     zolpidem (AMBIEN) 10 mg tablet Take 1 tablet by mouth at bedtime. 30 tablet 5     No current facility-administered medications for this visit.      Medications have been reviewed by me and are current to the best of my knowledge and ability.    Recent use of: Ibuprofen, she realizes she needs to stop this one week before her surgery.    Allergies:  Allergies      Allergen   Reactions     Augmentin [Amoxicillin-Pot Clavulanate]  Rash     Doxycycline  Vomiting     Other [Unlisted Allergen (Include Detail In Comments)]  Stomach Upset and Vomiting     Narcotics      Sulfa (Sulfonamide Antibiotics)  Rash     Zaroxolyn [Metolazone]  *Unknown - Pt Doesn't Remember        Immunizations:  Immunization History     Administered  Date(s) Administered     Influenza Virus, Unspecified 2010     Tdap 2014       Family History       Problem   Relation Age of Onset     Cancer-breast  Mother      Cancer  Mother      lung and uterine cancer;         Cancer-breast  Maternal Grandmother      Cancer  Sister 28     endometrial or cervical cancer,       Heart Disease  Brother      2 heart surgeries B 1965       Allergies  Sister       allergies/asthma       Other  Sister       lupus         Denies family hx of bleeding tendencies, anesthesia complications, or other problems with surgery.    Social History       Substance Use Topics         Smoking status:   Never Smoker     Smokeless tobacco:   Never Used     Alcohol use   Yes      Comment: occ        ROS:    Last use of albuterol was over a week ago.    Surgical:   "patient denies previous complications from prior surgeries including but not limited to prolonged bleeding, anesthesia complications, dysrhythmias, surgical wound infections, or prolonged hospital stay.  Cardiorespiratory: denies chest pain, palpitations, shortness of breath, cough. Most exertion in the past 2 weeks was walking upstairs.  Complete ROS otherwise negative except as noted in HPI.     -------------------------------------------------------------    PHYSICAL EXAM:  /76  Pulse 74  Temp 97.8  F (36.6  C) (Tympanic)  Ht 1.676 m (5' 6\")  Wt 89.1 kg (196 lb 6.4 oz)  SpO2 99%  Breastfeeding? No  BMI 31.7 kg/m2    EXAM:  General Appearance: Pleasant, alert, appropriate appearance for age. No acute distress  Head Exam: Normal. Normocephalic, atraumatic.  Eyes: PERRL, EOMI  Ears: Normal TM's bilaterally.   OroPharynx: Mucosa pink and moist. Dentition in good repair.  Neck: Supple, no masses or nodes, no lymphadenopathy.  No thyromegaly.  Lungs: Normal chest wall and respirations. Clear to auscultation, no wheezes or crackles.  Cardiovascular: Regular rate and rhythm. S1, S2, no murmurs.  Gastrointestinal: Soft, nontender, no abnormal masses or organomegaly. BS normal   Musculoskeletal: Left medial knee pain.  She has chronic, 2+ lower extremity edema bilaterally  Skin: no concerning or new rashes.  Neurologic Exam: CN 2-12 grossly intact.  Normal gait.  Symmetric DTRs, normal gross motor movement, tone, and coordination. No tremor.  Psychiatric Exam: Alert and oriented, appropriate affect.      EKG:  appears normal, NSR  Results for orders placed or performed in visit on 09/27/17      BASIC METABOLIC PANEL      Result  Value Ref Range    SODIUM 141 133 - 143 mmol/L    POTASSIUM 3.3 (L) 3.5 - 5.1 mmol/L    CHLORIDE 103 98 - 107 mmol/L    CO2,TOTAL 27 21 - 31 mmol/L    ANION GAP 11 5 - 18                    GLUCOSE 107 (H) 70 - 105 mg/dL    CALCIUM 9.1 8.6 - 10.3 mg/dL    BUN 16 7 - 25 mg/dL    " CREATININE 0.88 0.70 - 1.30 mg/dL    BUN/CREAT RATIO           18                    GFR if African American >60 >60 ml/min/1.73m2    GFR if not African American >60 >60 ml/min/1.73m2       ---------------------------------------------------------------    ASSESSEMENT AND PLAN:    Yolie was seen today for preoperative exam.    Diagnoses and all orders for this visit:    Preop examination  -     TX ADMIN VACC INITIAL    Degenerative tear of medial meniscus of left knee    Stress incontinence    Essential hypertension with goal blood pressure less than 140/90  -     losartan-hydrochlorothiazide (HYZAAR) 100-25 mg tablet; Take 1 tablet by mouth once daily.  -     hydroCHLOROthiazide 12.5 mg tablet; Take 1 tablet by mouth once daily.  -     EKG 12 LEAD UNIT PERFORMED  -     BASIC METABOLIC PANEL; Future  -     BASIC METABOLIC PANEL    Intermittent asthma, uncomplicated  -     montelukast (SINGULAIR) 10 mg tablet; Take 1 tablet by mouth at bedtime.    Controlled substance agreement signed    H R T- HORMONE REPLACEMENT THERAPY    Needs flu shot  -     FLU VACCINE => 3 YRS PF QUADRIVALENT IIV4 IM    Hypokalemia  -     POTASSIUM; Future        PRE OP RECOMMENDATIONS:    Patient was contacted with her potassium results. She's recently been noncompliant with taking her potassium. She will take this twice a day for the next 5 days, then have her potassium level rechecked.    No family history of problems with bleeding or anesthesia. Patient is able to tolerate greater than 4 METs of activity without any cardiopulmonary symptoms . No cardiopulmonary workup is neccessary for the current procedure. Please contact the office with any questions or concerns.    Pt with history of vomiting from oxycodone/percocet    Patient is on chronic pain medications (NO); however, she is on Ambien for insomnia. She is on a medication contract for this, but not for any narcotics. She was cautioned about the use of sleep medication and narcotic  pain medication in combination.  Patient is on antiplatlet/anticoagulation (NO)  Other medications that need adjustment perioperatively  - she will stop her estrogen replacement around the time of her knee surgery to decrease her thromboembolic risk.    Other:  Patient was advised to call our office and the surgical services with any change in condition or new symptoms if they were to develop between today and their surgical date; especially any cardiopulmonary symptoms or symptoms concerning for an infection.    Yarely Miller MD

## 2017-12-27 NOTE — PROGRESS NOTES
Patient Information     Patient Name MRN Sex Yolie Sahu [id number] Female 1960      Progress Notes by Zaida Guillermo R.T. (ARRT) at 2017 11:56 AM     Author:  Zaida Guillermo R.T. (ARRT) Service:  (none) Author Type:  [facility] - Registered Radiologic Technologist     Filed:  2017 11:56 AM Date of Service:  2017 11:56 AM Status:  Signed     :  Zaida Guillermo R.T. (ARRT) ([facility] - Registered Radiologic Technologist)            Falls Risk Criteria:    Age 65 and older or under age 4        Sensory deficits    Poor vision    Use of ambulatory aides    Impaired judgment    Unable to walk independently    Meets High Risk criteria for falls:  no

## 2017-12-27 NOTE — PROGRESS NOTES
Patient Information     Patient Name MRN Sex Yolie Sahu [id number] Female 1960      Progress Notes by Maribel Bone at 2017  7:48 AM     Author:  Maribel Bone Service:  (none) Author Type:  Other Clinical Staff     Filed:  2017  7:48 AM Date of Service:  2017  7:48 AM Status:  Signed     :  Maribel Bone (Other Clinical Staff)            IV Contrast- Discharge Instructions After Your CT Scan      The IV contrast you received today will be filtered from your bloodstream by your kidneys during the next 24 hours and pass from the body in urine.  You will not be aware of this process and your urine will not change in color.  To help this process you should drink at least 4 additional glasses of water or juice today.  This reduces stress on your kidneys.    Most contrast reactions are immediate.  Should you develop symptoms of concern after discharge, contact the department at the number below.  After hours you should contact your personal physician.  If you develop breathing distress or wheezing, call 911.

## 2017-12-28 NOTE — H&P
"Patient Information     Patient Name MRN Sex Yolie Sahu [id number] Female 1960      H&P (View-Only) by Yarely Miller MD at 2017  9:45 AM     Author:  Yarely Miller MD Service:  (none) Author Type:  Physician     Filed:  2017  4:27 PM Date of Service:  2017  9:45 AM Status:  Signed     :  Yarely Miller MD (Physician)            ----------------- PREOPERATIVE EXAM ------------------  2017    SUBJECTIVE:  Yolie Bedoya is a 56 y.o. female here for preoperative optimization.    I was asked to see Yolie Bedoya by Brittaney Jones and Kenisha for  preoperative evaluation due to hypertension.    Nursing Notes:   Haylee Estevez  2017 10:46 AM  Signed  Date of Surgery: 10/12/17 and 10/25/17  Type of Surgery: Left knee and bladder   Surgeon: Dr Jones and Dr De  Hospital:  [facility]    Fever/Chills or other infectious symptoms in past month: no  >10lb weight loss in past two months: no  O2 SAT: 99    Health Care Directive/Code status:  Full code   Hx of blood transfusions:   (NO)   Td up to date:  14  History of VRE/MRSA:  (NO)     Preoperative Evaluation: Obstructive Sleep Apnea screening    S: Snore -  Do you snore loudly? (louder than talking or loud enough to be heard through closed doors)(NO)  T: Tired - Do you often feel tired, fatigued, or sleepy during the daytime?(NO)  O: Observed - Has anyone ever observed you stop breathing during your sleep?(NO)  P: Pressure - Do you have or are you being treated for high blood pressure?(YES)  B: BMI - BMI greater than 35kg/m2?(NO)  A: Age - Age over 50 years old?(YES)  N: Neck - Neck circumference greater than 40 cm?(NO)  G: Gender - Gender: Male?(NO)    Total number of \"YES\" responses:  1    Scoring: Low risk of LO 0-2  At Risk of LO: >3 High Risk of LO: 5-8    Haylee Estevez LPN...................  2017   9:44 AM             HPI:  Yolie Bedoya is a 56 y.o. female presents " at the request of Brittaney Jones and Kenisha for preoperative evaluation due to hypertension. She takes losartan/hydrochlorothiazide 100/25 mg daily. The last time that she had lab monitoring related to this, she had hypokalemia and potassium was started. She reports that she has not been compliant with taking her potassium lately. She does not have chest pain, no palpitations. She has chronic lower extreme edema due to venous insufficiency, not due to cardiac disease.    She is having left knee arthroscopy done.  She had been diagnosed with a meniscal tear.  Her left knee hurts more now from the swelling than the meniscus that hurts more.  She had an injection done early in the summer which didn't seem to help.  This is affecting her walking, kneeling.    The patient's also scheduled for bladder surgery next month. She has had stress urinary incontinence for years.. Her bladder has gotten worse.  She is having larger amounts of leakage.  Stairs, walking, jumping will cause her to leak. She currently does not have burning, stinging, or hematuria.      Patient Active Problem List       Diagnosis  Date Noted     Hypokalemia  06/30/2017     Melanocytic nevus of chin  07/23/2014     Meniere disease  04/08/2014     Controlled substance agreement signed  02/11/2014     ambien #30/month        Recurrent major depression in remission (HC)  04/16/2013     ARTHRALGIA  08/01/2012     BACK PAIN  12/27/2010     INSOMNIA, PERSISTENT  12/27/2010     H R T- HORMONE REPLACEMENT THERAPY  03/27/2010     MIGRAINE HEADACHE  01/05/2010     LEG EDEMA, CHRONIC       ALLERGIC RHINITIS       OBESITY       DIVERTICULOSIS, COLON       IMPAIRED FASTING GLUCOSE       BREAST CANCER, FAMILY HX         Past Medical History:     Diagnosis  Date     Allergic rhinitis due to animal (cat) (dog) hair and dander 1970    aspen, birch, maple, oak, grass, dust mite, ragwee, cocklebur, mugwart, lambs quarter, pigweed, fungus, molds       Cholelithiasis       Chronic insomnia     contract signed 2/2014      Controlled substance agreement signed 2/11/2014    ambien #30/month      HYPERTENSION      Menorrhagia     s/p CLARA      Obesity      Postmenopausal HRT (hormone replacement therapy) 2010    started ERT      Pyelonephritis      Venous insufficiency        Past Surgical History:      Procedure  Laterality Date     COLONOSCOPY SCREENING  6/2004     because of change in bowel habits       COLONOSCOPY SCREENING  5/14/2014    Extensive diverticulosis throughout the colon - follow up 10 years       ENDOMETRIAL ABLATION  3/05     CLARA AND BSO  3/30/10    CLARA/BSO/Vasquez/Posterior Repair       WISDOM TEETH EXTRACTION         Current Outpatient Prescriptions       Medication  Sig Dispense Refill     albuterol HFA (VENTOLIN HFA) 90 mcg/actuation inhaler Inhale 2 Puffs by mouth every 4 hours if needed. 1 Inhaler 2     buPROPion (WELLBUTRIN XL) 150 mg Extended-Release tablet Take 1 tablet by mouth every morning. 90 tablet 3     Cetirizine (ZYRTEC) 10 mg cap Take 1 Tab by mouth once daily.       cholecalciferol (VITAMIN D) 1,000 unit capsule Take 1,000 Units by mouth once daily.       diphenhydrAMINE (BENADRYL) 25 mg capsule Take 2 capsules by mouth at bedtime if needed.  0     estradiol (ESTRACE) 1 mg tablet Take 0.5 tablets by mouth once daily. 45 tablet 3     fluticasone (50 mcg per actuation) nasal solution (FLONASE) Inhale 2 Sprays into both nostrils once daily. 1 Bottle 11     fluticasone-salmeterol (ADVAIR DISKUS) 250-50 mcg/Dose diskus inhaler Inhale 1 Puff by mouth every 12 hours. 1 Inhaler 11     hydroCHLOROthiazide 12.5 mg tablet Take 1 tablet by mouth once daily. 90 tablet 3     losartan-hydrochlorothiazide (HYZAAR) 100-25 mg tablet Take 1 tablet by mouth once daily. 90 tablet 3     montelukast (SINGULAIR) 10 mg tablet Take 1 tablet by mouth at bedtime. 90 tablet 3     multivitamin (MVI) tablet Take 1 tablet by mouth once daily.       polycarbophil (FIBERTAB) 625 mg tablet  Take 1 tablet by mouth once daily.  0     potassium chloride (KLOR-CON 10; K-TAB) 10 mEq Controlled-Release tablet Take 20 mEq by mouth once daily with a meal.  5     SUMAtriptan NASAL (IMITREX) 20 mg/actuation nasal spray Inhale 1 Spray in the nostril(s) every 2 hours if needed for Migraine. 1 Bottle 11     triamcinolone (ARISTOCORT) 0.1 % ointment Apply  topically to affected area(s) 2 times daily. 1 Tube 0     zolpidem (AMBIEN) 10 mg tablet Take 1 tablet by mouth at bedtime. 30 tablet 5     No current facility-administered medications for this visit.      Medications have been reviewed by me and are current to the best of my knowledge and ability.    Recent use of: Ibuprofen, she realizes she needs to stop this one week before her surgery.    Allergies:  Allergies      Allergen   Reactions     Augmentin [Amoxicillin-Pot Clavulanate]  Rash     Doxycycline  Vomiting     Other [Unlisted Allergen (Include Detail In Comments)]  Stomach Upset and Vomiting     Narcotics      Sulfa (Sulfonamide Antibiotics)  Rash     Zaroxolyn [Metolazone]  *Unknown - Pt Doesn't Remember        Immunizations:  Immunization History     Administered  Date(s) Administered     Influenza Virus, Unspecified 2010     Tdap 2014       Family History       Problem   Relation Age of Onset     Cancer-breast  Mother      Cancer  Mother      lung and uterine cancer;         Cancer-breast  Maternal Grandmother      Cancer  Sister 28     endometrial or cervical cancer,       Heart Disease  Brother      2 heart surgeries B 1965       Allergies  Sister       allergies/asthma       Other  Sister       lupus         Denies family hx of bleeding tendencies, anesthesia complications, or other problems with surgery.    Social History       Substance Use Topics         Smoking status:   Never Smoker     Smokeless tobacco:   Never Used     Alcohol use   Yes      Comment: occ        ROS:    Last use of albuterol was over a week ago.   "  Surgical:  patient denies previous complications from prior surgeries including but not limited to prolonged bleeding, anesthesia complications, dysrhythmias, surgical wound infections, or prolonged hospital stay.  Cardiorespiratory: denies chest pain, palpitations, shortness of breath, cough. Most exertion in the past 2 weeks was walking upstairs.  Complete ROS otherwise negative except as noted in HPI.     -------------------------------------------------------------    PHYSICAL EXAM:  /76  Pulse 74  Temp 97.8  F (36.6  C) (Tympanic)  Ht 1.676 m (5' 6\")  Wt 89.1 kg (196 lb 6.4 oz)  SpO2 99%  Breastfeeding? No  BMI 31.7 kg/m2    EXAM:  General Appearance: Pleasant, alert, appropriate appearance for age. No acute distress  Head Exam: Normal. Normocephalic, atraumatic.  Eyes: PERRL, EOMI  Ears: Normal TM's bilaterally.   OroPharynx: Mucosa pink and moist. Dentition in good repair.  Neck: Supple, no masses or nodes, no lymphadenopathy.  No thyromegaly.  Lungs: Normal chest wall and respirations. Clear to auscultation, no wheezes or crackles.  Cardiovascular: Regular rate and rhythm. S1, S2, no murmurs.  Gastrointestinal: Soft, nontender, no abnormal masses or organomegaly. BS normal   Musculoskeletal: Left medial knee pain.  She has chronic, 2+ lower extremity edema bilaterally  Skin: no concerning or new rashes.  Neurologic Exam: CN 2-12 grossly intact.  Normal gait.  Symmetric DTRs, normal gross motor movement, tone, and coordination. No tremor.  Psychiatric Exam: Alert and oriented, appropriate affect.      EKG:  appears normal, NSR  Results for orders placed or performed in visit on 09/27/17      BASIC METABOLIC PANEL      Result  Value Ref Range    SODIUM 141 133 - 143 mmol/L    POTASSIUM 3.3 (L) 3.5 - 5.1 mmol/L    CHLORIDE 103 98 - 107 mmol/L    CO2,TOTAL 27 21 - 31 mmol/L    ANION GAP 11 5 - 18                    GLUCOSE 107 (H) 70 - 105 mg/dL    CALCIUM 9.1 8.6 - 10.3 mg/dL    BUN 16 7 - 25 " mg/dL    CREATININE 0.88 0.70 - 1.30 mg/dL    BUN/CREAT RATIO           18                    GFR if African American >60 >60 ml/min/1.73m2    GFR if not African American >60 >60 ml/min/1.73m2       ---------------------------------------------------------------    ASSESSEMENT AND PLAN:    Yolie was seen today for preoperative exam.    Diagnoses and all orders for this visit:    Preop examination  -     AL ADMIN VACC INITIAL    Degenerative tear of medial meniscus of left knee    Stress incontinence    Essential hypertension with goal blood pressure less than 140/90  -     losartan-hydrochlorothiazide (HYZAAR) 100-25 mg tablet; Take 1 tablet by mouth once daily.  -     hydroCHLOROthiazide 12.5 mg tablet; Take 1 tablet by mouth once daily.  -     EKG 12 LEAD UNIT PERFORMED  -     BASIC METABOLIC PANEL; Future  -     BASIC METABOLIC PANEL    Intermittent asthma, uncomplicated  -     montelukast (SINGULAIR) 10 mg tablet; Take 1 tablet by mouth at bedtime.    Controlled substance agreement signed    H R T- HORMONE REPLACEMENT THERAPY    Needs flu shot  -     FLU VACCINE => 3 YRS PF QUADRIVALENT IIV4 IM    Hypokalemia  -     POTASSIUM; Future        PRE OP RECOMMENDATIONS:    Patient was contacted with her potassium results. She's recently been noncompliant with taking her potassium. She will take this twice a day for the next 5 days, then have her potassium level rechecked.    No family history of problems with bleeding or anesthesia. Patient is able to tolerate greater than 4 METs of activity without any cardiopulmonary symptoms . No cardiopulmonary workup is neccessary for the current procedure. Please contact the office with any questions or concerns.    Pt with history of vomiting from oxycodone/percocet    Patient is on chronic pain medications (NO); however, she is on Ambien for insomnia. She is on a medication contract for this, but not for any narcotics. She was cautioned about the use of sleep medication and  narcotic pain medication in combination.  Patient is on antiplatlet/anticoagulation (NO)  Other medications that need adjustment perioperatively  - she will stop her estrogen replacement around the time of her knee surgery to decrease her thromboembolic risk.    Other:  Patient was advised to call our office and the surgical services with any change in condition or new symptoms if they were to develop between today and their surgical date; especially any cardiopulmonary symptoms or symptoms concerning for an infection.    Yarely Miller MD

## 2017-12-28 NOTE — TELEPHONE ENCOUNTER
Patient Information     Patient Name MRN Sex Yolie Sahu [id number] Female 1960      Telephone Encounter by Arabella Steiner RN at 10/13/2017  9:02 AM     Author:  Arabella Steiner RN Service:  (none) Author Type:  NURS- Registered Nurse     Filed:  10/13/2017  9:05 AM Encounter Date:  10/13/2017 Status:  Signed     :  Arabella Steiner RN (NURS- Registered Nurse)            This is a Refill request from: [facility]  Name of Medication: potassium chloride (KLOR-CON 10; K-TAB) 10 mEq Controlled-Release tablet  TAKE 2 TABLETS BY MOUTH ONCE DAILY WITH A MEAL.  Quantity requested: 60  Last fill date: 8/3/17  Due for refill: yes  Last visit with YARELY GA was on: 2017 in [facility]  PCP:  Yarely Ga MD  Controlled Substance Agreement:  na   Diagnosis r/t this medication request: hypokalemia    Patient had PX 17 will a low K+ but note states had not been compliant with taking K+. Recheck of K+ was normal on 10/4/17. Unsure if patient should be back to 2 tablets daily or dose should be adjusted. To Yarely Ga MD      Unable to complete prescription refill per RN Medication Refill Policy.................... ARABELLA STEINER RN ....................  10/13/2017   9:02 AM

## 2017-12-28 NOTE — OR POSTOP
Patient Information     Patient Name MRN Sex     Yolie Bedoya [id number] Female 1960      OR PostOp by Safia Hartman RN at 10/25/2017  2:13 PM     Author:  Safia Hartman RN Service:  (none) Author Type:  NURS- Registered Nurse     Filed:  10/25/2017  2:15 PM Date of Service:  10/25/2017  2:13 PM Status:  Signed     :  Safia Hartman RN (NURS- Registered Nurse)            Discharge Note    Data:  Yolie Bedoya has been discharged home at 1308 via ambulatory accompanied by Registered Nurse and Family.      Action:  Written discharge/follow-up instructions were provided to patient. Prescriptions were written and sent with patient.  Belongings sent with patient. Medications from home sent with patient/family: Not Applicable  Equipment none .     Response:  Patient and Spouse verbalized understanding of discharge instructions, reason for discharge, and necessary follow-up appointments.

## 2017-12-28 NOTE — TELEPHONE ENCOUNTER
Patient Information     Patient Name MRN Sex Yolie Sahu [id number] Female 1960      Telephone Encounter by Haylee Estevez at 10/4/2017  3:57 PM     Author:  Haylee Estevez Service:  (none) Author Type:  (none)     Filed:  10/4/2017  3:58 PM Encounter Date:  10/4/2017 Status:  Signed     :  Haylee Estevez            See result note.    Haylee Estevez ....................  10/4/2017   3:58 PM

## 2017-12-28 NOTE — OR PREOP
Patient Information     Patient Name MRN Sex Yolie Sahu [id number] Female 1960      OR PreOp by Luna Villar RN at 10/25/2017 11:12 AM     Author:  Luna Villar RN Service:  (none) Author Type:  NURS- Registered Nurse     Filed:  10/25/2017 11:12 AM Date of Service:  10/25/2017 11:12 AM Status:  Signed     :  Luna Villar RN (NURS- Registered Nurse)            To OR at 1110.  Report given to EUGENIA Ontiveros.

## 2017-12-28 NOTE — TELEPHONE ENCOUNTER
Patient Information     Patient Name MRN Sex Yolie Sahu [id number] Female 1960      Telephone Encounter by Gabrielle Graf at 2017  3:39 PM     Author:  Gabrielle Graf Service:  (none) Author Type:  (none)     Filed:  2017  3:40 PM Encounter Date:  2017 Status:  Signed     :  Gabrielle Graf            Message sent to Internal Medicine.  Routing to [facility].  Gabrielle Graf LPN..................2017  3:39 PM

## 2017-12-28 NOTE — TELEPHONE ENCOUNTER
Patient Information     Patient Name MRN Sex Yolie Sahu [id number] Female 1960      Telephone Encounter by Susana Mesa RN at 2017 10:50 AM     Author:  Susana Mesa RN Service:  (none) Author Type:  NURS- Registered Nurse     Filed:  2017 10:53 AM Encounter Date:  2017 Status:  Signed     :  Susana Mesa RN (NURS- Registered Nurse)            Diuretic Combinations  Office visit in the past 12 months or per provider note.  Last visit with DAVID GA was on: 2017 in [facility]  Next visit with DAVID GA is on: 2017 in [facility]  Next visit with Family Practice is on: 2017 in [facility]  Lab test requirements:  Creatinine and Potassium annually, if ordering lab, order BMP.  CREATININE (mg/dL)    Date Value   2016 0.87     POTASSIUM (mmol/L)    Date Value   2016 3.5   Max refill for 12 months from last office visit or per provider note.  Due for exam.  Limited refill per protocol. Upcoming OV scheduled for 17.  Susana Mesa RN ........   2017    10:51 AM

## 2017-12-28 NOTE — PROGRESS NOTES
Patient Information     Patient Name MRN Sex Yolie Sahu [id number] Female 1960      Progress Notes by Yarely Miller MD at 2017 10:45 AM     Author:  Yarely Miller MD Service:  (none) Author Type:  Physician     Filed:  2017  5:05 PM Encounter Date:  2017 Status:  Signed     :  Yarely Miller MD (Physician)            SUBJECTIVE:    Yolie Bedoya is a 56 y.o. female who presents for her annual exam.    HPI: Yolie Bedoya is a 56 y.o. female presents for her annual exam.  Past medical history significant for hypertension, lower extremity edema, estrogen replacement, asthma, environmental allergies.  Last pap: has had hysterectomy   Immunizations:  Up to date   Mammogram done last month    Colon cancer screening up to date -     Last Lipids:  Chol: 197    2017  T    2017  HDL:   64    2017  LDL:  100    2017      PROBLEM LIST:  Patient Active Problem List     Diagnosis  Code     LEG EDEMA, CHRONIC R60.9     ALLERGIC RHINITIS J30.9     MIGRAINE HEADACHE G43.909     OBESITY E66.9     DIVERTICULOSIS, COLON K57.30     HYPERTENSION I10     IMPAIRED FASTING GLUCOSE R73.01     BREAST CANCER, FAMILY HX Z80.3     BACK PAIN M54.9     INSOMNIA, PERSISTENT G47.00     H R T- HORMONE REPLACEMENT THERAPY E34.9     ARTHRALGIA M25.50     Unspecified asthma, with exacerbation J45.901     Recurrent major depression in remission (HC) F33.40     Controlled substance agreement signed Z79.899     Meniere disease H81.09     Melanocytic nevus of chin D22.30     Hypokalemia E87.6     PAST MEDICAL HISTORY:  Past Medical History:     Diagnosis  Date     Allergic rhinitis due to animal (cat) (dog) hair and dander 1970    aspen, birch, maple, oak, grass, dust mite, ragwee, cocklebur, mugwart, lambs quarter, pigweed, fungus, molds       Cholelithiasis      Chronic insomnia     contract signed 2014      Controlled substance agreement signed  2014    ambien #30/month      Menorrhagia     s/p CLARA      Obesity      Postmenopausal HRT (hormone replacement therapy) 2010    started ERT      Pyelonephritis      Venous insufficiency      SURGICAL HISTORY:  Past Surgical History:      Procedure  Laterality Date     COLONOSCOPY SCREENING  2004     because of change in bowel habits       COLONOSCOPY SCREENING  2014    Extensive diverticulosis throughout the colon - follow up 10 years       ENDOMETRIAL ABLATION  3/05     CLARA AND BSO  3/30/10    CLARA/BSO/Vasquez/Posterior Repair       WISDOM TEETH EXTRACTION         SOCIAL HISTORY:  Social History     Social History        Marital status:       Spouse name: Carter     Number of children:  2     Years of education:  12+     Occupational History       retired      Social History Main Topics          Smoking status:   Never Smoker      Smokeless tobacco:   Never Used      Alcohol use   Yes      Comment: occ       Drug use:   No      Sexual activity:   Yes      Partners:  Male      Other Topics  Concern     Not on file      Social History Narrative     Patient is .  She is retired.    Two adult sons. Shashi and             FAMILY HISTORY:  Family History       Problem   Relation Age of Onset     Cancer-breast  Mother      Cancer  Mother      lung and uterine cancer;         Cancer-breast  Maternal Grandmother      Cancer  Sister 28     endometrial or cervical cancer,       Heart Disease  Brother      2 heart surgeries B 1965       Allergies  Sister       allergies/asthma       Other  Sister       lupus       CURRENT MEDICATIONS:   Current Outpatient Prescriptions       Medication  Sig Dispense Refill     albuterol HFA (VENTOLIN HFA) 90 mcg/actuation inhaler Inhale 2 Puffs by mouth every 4 hours if needed. 1 Inhaler 2     buPROPion (WELLBUTRIN XL) 150 mg Extended-Release tablet Take 1 tablet by mouth every morning. 90 tablet 3     Cetirizine (ZYRTEC) 10 mg cap Take 1 Tab by mouth once  daily.       cholecalciferol (VITAMIN D) 1,000 unit capsule Take 1,000 Units by mouth once daily.       diphenhydrAMINE (BENADRYL) 25 mg capsule Take 2 capsules by mouth at bedtime if needed.  0     estradiol (ESTRACE) 1 mg tablet Take 1 tablet by mouth once daily. 90 tablet 3     fluticasone (50 mcg per actuation) nasal solution (FLONASE) Inhale 2 Sprays into both nostrils once daily. 1 Bottle 11     fluticasone-salmeterol (ADVAIR DISKUS) 250-50 mcg/Dose diskus inhaler Inhale 1 Puff by mouth every 12 hours. 1 Inhaler 11     hydroCHLOROthiazide 12.5 mg tablet Take 1 tablet by mouth once daily. 90 tablet 1     losartan-hydrochlorothiazide (HYZAAR) 100-25 mg tablet Take 1 tablet by mouth once daily. 90 tablet 0     montelukast (SINGULAIR) 10 mg tablet Take 1 tablet by mouth at bedtime. 90 tablet 0     multivitamin (MVI) tablet Take 1 tablet by mouth once daily.       polycarbophil (FIBERTAB) 625 mg tablet Take 1 tablet by mouth once daily.  0     potassium chloride (KLOR-CON 10; K-TAB) 10 mEq Controlled-Release tablet Take 2 tablets by mouth once daily with a meal. 60 tablet 5     SUMAtriptan NASAL (IMITREX) 20 mg/actuation nasal spray Inhale 1 Spray in the nostril(s) every 2 hours if needed for Migraine. 1 Bottle 11     triamcinolone (ARISTOCORT) 0.1 % ointment Apply  topically to affected area(s) 2 times daily. 1 Tube 0     zolpidem (AMBIEN) 10 mg tablet Take 1 tablet by mouth at bedtime. 30 tablet 5     No current facility-administered medications for this visit.      Medications have been reviewed by me and are current to the best of my knowledge and ability.    ALLERGIES:  Augmentin [amoxicillin-pot clavulanate]; Doxycycline; Other [unlisted allergen (include detail in comments)]; Sulfa (sulfonamide antibiotics); and Zaroxolyn [metolazone]     REVIEW OF SYSTEMS:  General: denies any general problems.  Actively working on weight loss  Eyes: denies problems - is up to date   Ears/Nose/Throat: last dentist visit was  "in May, had a bridge done this winter  Cardiovascular: denies problems  Respiratory: asthma is well controlled, no breakthrough symptoms    Gastrointestinal: denies problems; colonoscopy is up to date - 2014     Genitourinary: is on estrace, tolerable vasomotor symptoms.  She has stress urinary incontinence, this is worsened with activities such as lifting, coughing, trying to exercise. About 7 years ago, she underwent hysterectomy and bladder suspension.  Musculoskeletal:  Right knee OA, and will get both SI joints injected today.    Had knee injection done - no better after that.    Skin:new spot on her chin    Neurologic: denies problems  Psychiatric: with Ambien, can sleep 6+ hours, closer to 8hrs, doesn't have weird behavior if she takes it in bed     within normal limits.  No history of substance abuse.  Endocrine: denies problems  Heme/Lymphatic: denies problems  Allergic/Immunologic: denies problems  PHQ Depression Screening 7/7/2017   Date of PHQ exam (doc flow) 7/7/2017   1. Lack of interest/pleasure 0 - Not at all   2. Feeling down/depressed 0 - Not at all   PHQ-2 TOTAL SCORE 0   3. Trouble sleeping -   4. Decreased energy -   5. Appetite change -   6. Feelings of failure -   7. Trouble concentrating -   8. Activity level -   9. Hurting yourself -   PHQ-9 TOTAL SCORE -   PHQ-9 Severity Level -   Functional Impairment -      OBJECTIVE:  /88  Pulse 80  Ht 1.664 m (5' 5.5\")  Wt 90.2 kg (198 lb 12.8 oz)  Breastfeeding? No  BMI 32.58 kg/m2   Wt Readings from Last 3 Encounters:    07/07/17 90.2 kg (198 lb 12.8 oz)   03/01/17 93.5 kg (206 lb 3.2 oz)   11/22/16 90.7 kg (200 lb)       EXAM:   General Appearance: Pleasant, alert, appropriate appearance for age. No acute distress  Head Exam: Normal. Normocephalic, atraumatic.  Eye Exam:  Normal external eye, conjunctiva, lids, cornea. ANT.  Ear Exam: Normal TM's bilaterally. Normal auditory canals and external ears. Non-tender.  Nose Exam: Normal " external nose, mucus membranes, and septum.  OroPharynx Exam:  Dental hygiene adequate. Normal buccal mucose. Normal pharynx.  Neck Exam:  Supple, no masses or nodes.  Thyroid Exam: No nodules or enlargement.  Chest/Respiratory Exam: Normal chest wall and respirations. Clear to auscultation.  Breast Exam: No dimpling, nipple retraction or discharge. No masses or nodes.  Cardiovascular Exam: Regular rate and rhythm. S1, S2, no murmur, click, gallop, or rubs.  Gastrointestinal Exam: Soft, non-tender, no masses or organomegaly.  Rectal Exam: Normal sphincter tone. No masses noted.  Genitourinary Exam Female: External genitalia, vulva and vagina appear normal. Bimanual exam reveals normal uterus and adnexa, nontender urethra and bladder.  Lymphatic Exam: Non-palpable nodes in neck, clavicular, axillary, or inguinal regions.  Musculoskeletal Exam: Back is straight and non-tender, full ROM of upper and lower extremities.  Foot Exam: Left and right foot: good pedal pulses, no lesions, nail hygiene good.  Skin: no rash or abnormalities  Neurologic Exam: Nonfocal, symmetric DTRs, normal gross motor, tone coordination and no tremor.  Psychiatric Exam: Alert and oriented - appropriate affect.    Results for orders placed or performed in visit on 06/19/17      DRUG SCREEN - PAIN MANAGEMENT - TOXASSURE      Result  Value Ref Range    6-MONOACETYL MORPHINE NEG NEG ng/mL    AMPHETAMINE URINE NEG <=500 ng/mL    BARBITURATE URINE NEG <=200 ng/mL    BENZODIAZEPINE URINE NEG <=200 ng/mL    BUPRENORPHRINE URINE NEG <=5 ng/mL    COCAINE METAB URINE NEG <=300 ng/mL    ETHYLGLUCURONIDE URINE NEG <=250 ng/mL    FENTANYL URINE NEG <=4 ng/mL    METHADONE URINE NEG <=300 ng/mL    OPIATES URINE NEG <=300 ng/mL    OXYCODONE URINE NEG <=100 ng/mL    PROPOXYPHENE URINE NEG <=300 ng/mL    THC 50 URINE NEG <=50 ng/mL    TRAMADOL NEG <=200 ng/mL    PH URINE 6.1 5.0 - 7.0    CREAT  >=20 mg/dL    MASS SPECTROMETRY URINE See Below    COMPLETE  METABOLIC PANEL      Result  Value Ref Range    SODIUM 139 133 - 143 mmol/L    POTASSIUM 2.9 (L) 3.5 - 5.1 mmol/L    CHLORIDE 102 98 - 107 mmol/L    CO2,TOTAL 26 21 - 31 mmol/L    ANION GAP 11 5 - 18                    GLUCOSE 126 (H) 70 - 105 mg/dL    CALCIUM 9.6 8.6 - 10.3 mg/dL    BUN 17 7 - 25 mg/dL    CREATININE 0.90 0.70 - 1.30 mg/dL    BUN/CREAT RATIO           19                    GFR if African American >60 >60 ml/min/1.73m2    GFR if not African American >60 >60 ml/min/1.73m2    ALBUMIN 4.2 3.5 - 5.7 g/dL    PROTEIN,TOTAL 7.1 6.4 - 8.9 g/dL    GLOBULIN                  2.9 2.0 - 3.7 g/dL    A/G RATIO 1.4 1.0 - 2.0                    BILIRUBIN,TOTAL 0.5 0.3 - 1.0 mg/dL    ALK PHOSPHATASE 58 34 - 104 IU/L    ALT (SGPT) 16 7 - 52 IU/L    AST (SGOT) 15 13 - 39 IU/L   LIPID PANEL      Result  Value Ref Range    CHOLESTEROL,TOTAL 197 <200 mg/dL    TRIGLYCERIDES 166 (H) <150 mg/dL    HDL CHOLESTEROL 64 23 - 92 mg/dL    NON-HDL CHOLESTEROL 133 <145 mg/dl    CHOL/HDL RATIO            3.08 <4.50                    LDL CHOLESTEROL 100 (H) <100 mg/dL    PATIENT STATUS            FASTING                   Hgb A1c      Result  Value Ref Range    HEMOGLOBIN A1C MONITORING (POCT) 4.8 4.0 - 6.2 %    ESTIMATED AVERAGE GLUCOSE  91 mg/dL   URINALYSIS W REFLEX MICROSCOPIC IF POSITIVE      Result  Value Ref Range    COLOR                     Yellow Yellow Color    CLARITY                   Clear Clear Clarity    SPECIFIC GRAVITY,URINE    1.020 1.010, 1.015, 1.020, 1.025                    PH,URINE                  6.0 6.0, 7.0, 8.0, 5.5, 6.5, 7.5, 8.5                    UROBILINOGEN,QUALITATIVE  Normal Normal EU/dl    PROTEIN, URINE Negative Negative mg/dL    GLUCOSE, URINE Negative Negative mg/dL    KETONES,URINE             15 (A) Negative mg/dL    BILIRUBIN,URINE           Abnormal (A) Negative                    OCCULT BLOOD,URINE        Negative Negative                    NITRITE                   Negative Negative                     LEUKOCYTE ESTERASE        Negative Negative                   URINALYSIS MICROSCOPIC      Result  Value Ref Range    RBC 6-10 (A) 0-2, None Seen /HPF    WBC 3-5 0-2, 3-5, None Seen /HPF    BACTERIA                  Many (A) None Seen, Rare, Occasional, Few Bacteria/HPF    EPITHELIAL CELLS          Moderate (A) None Seen, Few Epi/HPF    OTHER Mucus Present        ASSESSMENT/PLAN    ICD-10-CM    1. Physical exam, annual Z00.00    2. Controlled substance agreement signed Z79.899 CANCELED: DRUG SCREEN - PAIN MANAGEMENT - TOXASSURE   3. Hypokalemia E87.6 POTASSIUM   4. Impaired fasting glucose R73.01    5. HYPERTENSION I10 losartan-hydrochlorothiazide (HYZAAR) 100-25 mg tablet   6. INSOMNIA, PERSISTENT G47.00 zolpidem (AMBIEN) 10 mg tablet   7. Uncomplicated asthma, unspecified asthma severity J45.909 albuterol HFA (VENTOLIN HFA) 90 mcg/actuation inhaler      fluticasone (50 mcg per actuation) nasal solution (FLONASE)   8. Recurrent major depression in remission (HC) F33.40 buPROPion (WELLBUTRIN XL) 150 mg Extended-Release tablet   9. Moderate persistent asthma without complication J45.40 fluticasone-salmeterol (ADVAIR DISKUS) 250-50 mcg/Dose diskus inhaler   10. Essential hypertension with goal blood pressure less than 140/90 I10 hydroCHLOROthiazide 12.5 mg tablet   11. Mild intermittent asthma with acute exacerbation J45.21 montelukast (SINGULAIR) 10 mg tablet   12. Other type of migraine without status migrainosus G43.809 SUMAtriptan NASAL (IMITREX) 20 mg/actuation nasal spray   13. Bee sting reaction, accidental or unintentional, initial encounter T63.441A triamcinolone (ARISTOCORT) 0.1 % ointment   14. Counseling for estrogen replacement therapy Z71.89 estradiol (ESTRACE) 1 mg tablet   15. BELL (stress urinary incontinence, female) N39.3 AMB CONSULT TO UROLOGY     Ms. Monreal There is no height or weight on file to calculate BMI. This is out of the normal range for a 56 y.o. Normal range for ages 18+ is  between 18.5 and 24.9. To lose weight we reviewed risks and benefits of appropriate options such as diet, exercise, and medications. Patient's strategy will be  She will continue to work on her self-directed diet and exercise program.  BP Readings from Last 1 Encounters:03/01/17 : 126/80  Ms. Monreal blood pressure is out of the normal range for adults. Per JNC-8 guidelines normal adult blood pressure is < 120/80, pre-hypertensive is between 120/80 and 139/89, and hypertension is 140/90 or greater. Risks of hypertension were discussed. Patient's strategy will be increased activity and to recheck blood pressure in 12 months    1.  I have personally reviewed the labs listed above.  2.  SI joint injections being done today.  3. Referral to urology to discuss options for treatment of recurrent stress urinary incontinence.  4. We'll decrease estradiol to 0.5 mg daily. Goal be to have her off of estrogen within this next year.  5. Recheck of potassium today.  6. Most recent urine toxicology screen is normal. Refill of Ambien ×6 months. Long-term risks of this medication were reviewed.    Yarely Miller MD

## 2017-12-28 NOTE — TELEPHONE ENCOUNTER
Patient Information     Patient Name MRN Sex Yolie Sahu [id number] Female 1960      Telephone Encounter by Yarely Miller MD at 2017  7:50 AM     Author:  Yarely Miller MD Service:  (none) Author Type:  Physician     Filed:  2017  7:50 AM Encounter Date:  2017 Status:  Signed     :  Yarely Miller MD (Physician)            Orders are in.  Yarely Miller MD

## 2017-12-28 NOTE — OR ANESTHESIA
Patient Information     Patient Name MRN Sex     Yolie Bedoya [id number] Female 1960      OR Anesthesia by Ethel Maldonado MD at 10/25/2017  2:40 PM     Author:  Ethel Maldonado MD Service:  (none) Author Type:  PHYS- Anesthesiologist     Filed:  10/25/2017  2:41 PM Date of Service:  10/25/2017  2:40 PM Status:  Signed     :  Ethel Maldonado MD (PHYS- Anesthesiologist)            Anesthesia Post Operative Care Note    Name: Yolie Bedoya  MRN:   [id number]  :    1960       Procedure Done:  See Surgeon Note   Case Cancelled for Anesthetic Reason:  No      Anesthesia Technique    Anesthetic Type:  MAC       MAC Type:  NC     Oral Trauma:  No    Intraoperative Course   Hemodynamics:  Stable    Ventilation Normal:  Yes Lung Sounds:  Normal      PACU Course      Nondepolarizer Used: No    Reintubation:  No   Hemodynamics:  Stable      Hydration: Euvolemic   Temperature:  36.1 - 38.3      Mental Status:  Awake, alert, follows commands   Pain Management:  Adequate   Regional Block:  No   Anesthesia Complications:  None      Vital Signs:  Temp: 98  F (36.7  C)  Pulse: 78  BP: 118/67  Resp: 16  SpO2: 97 %    O2 Device: Room Air         Level of Nausea: Mild        Active Lines:  Patient Lines/Drains/Airways Status    Active Line     Name: Placement date: Placement time: Site: Days:    PERIPHERAL VAD Left Hand 20 10/25/17   1030   Hand   less than 1                Intake & Output:  Date  10/24/17 07 - 10/25/17 0659(Not Admitted)    10/25/17 07 - 10/26/17 0659      Shift  6341-0990 3268-7435 6501-2393 24 Hour Total 9747-8192 0530-4701 0226-9015 24 Hour Total   I  N  T  A  K  E   Shift Total           O  U  T  P  U  T   Urine     600   600      + I/O +   Straight Cath Urine     200   200      + I/O +   Voided Urine     400   400    Shift Total     600   600   NET      -600   -600   Weight (kg)      88.9 88.9 88.9 88.9         Labs:  No results for input(s): FC9REIZRHDS, [id number],  PHARTERIAL, RXP2XLPWCHCZ, F5ZYIBLNCRFC in the last 24 hours.    No results for input(s): MAGNESIUM in the last 24 hours.    No results for input(s): GLUCOSEMETER in the last 720 hours.        YARITZA MENDIOLA MD ....................  10/25/2017   2:40 PM

## 2017-12-28 NOTE — PROCEDURES
Patient Information     Patient Name MRN Sex     Yolie Bedoya [id number] Female 1960      Procedures by Trevin De MD at 10/25/2017 12:04 PM     Author:  Trevin De MD Service:  (none) Author Type:  Physician     Filed:  10/25/2017 12:07 PM Date of Service:  10/25/2017 12:04 PM Status:  Signed     :  Trevin De MD (Physician)        Pre-procedure Diagnoses:    1. Stress incontinence in female [N39.3]           Post-procedure Diagnoses:    1. Stress incontinence in female [N39.3]           Procedures:    1. NJ SLING OPER STRES INCONTINENCE [67579.0]               Gynecological Procedure Note  Preoperative Diagnosis:  Stress incontinence  Postoperative Diagnosis:  Same  Procedure:  Solyx mid-urethral sling  I requested Dr. Curry to assist with this procedure. Assistance was medically necessary in order to safely perform the procedure without increased blood loss or morbidity. Assistance was provided through positioning, instrumentation, and retraction of incisions for better visualization of underlying structures and cauterization for hemostasis, and use of additional expertise.  Surgeon:  Kenisha  Anesthesia:  MAC with local anesthetic  Findings:  None  Description of Operative Procedure:  After consent was obtained the patient was taken to the OR and placed under monitored anesthesia.  She was placed in Jason stirrups and sterilely prepped and draped.  A timeout was performed.  The suburethral mucosa was injected with 0.25% marcaine with epinephrine.  An incision was made in the mucosa about 2 cm in length and the periurethral space dissected to the obturator fascia bilaterally.  The Solyx device was anchored in the obturator fascia on the right and left traversing the mid urethra.  The device was appropriately tensioned.  The vaginal mucosa was close with 3-0 vicryl. A Beltrán catheter was placed and 200 ml of sterile saline instilled into the bladder.  The catheter was then removed.  No complications occurred.   The patient was taken to the PACU in stable condition after awakening from her sedation. EBL: 20 ml.    Trevin De MD FACOG  12:05 PM 10/25/2017

## 2017-12-28 NOTE — OR PREOP
Patient Information     Patient Name MRN Sex Yolie Sahu [id number] Female 1960      OR PreOp by Luna Villar RN at 10/25/2017 10:59 AM     Author:  Luna Villar RN Service:  (none) Author Type:  NURS- Registered Nurse     Filed:  10/25/2017 11:00 AM Date of Service:  10/25/2017 10:59 AM Status:  Signed     :  Luna Villar RN (NURS- Registered Nurse)            Patient had recent left knee surgery and is not allowed to bend left knee to 90 degrees

## 2017-12-28 NOTE — INTERVAL H&P NOTE
Patient Information     Patient Name MRN Sex Yolie Sahu [id number] Female 1960      Interval H&P Note by Trevin De MD at 10/25/2017 10:56 AM     Author:  Trevin De MD Service:  (none) Author Type:  Physician     Filed:  10/25/2017 10:56 AM Date of Service:  10/25/2017 10:56 AM Status:  Signed     :  Trevin De MD (Physician)            History and Physical Update    The history and physical has been reviewed and the patient has been examined.  There are no interim changes to the patient's history or physical condition.    Trevin De MD          Source Note     Author:  Yarely Miller MD Service:  (none) Author Type:  Physician    Filed:  2017  4:27 PM Date of Service:  2017  9:45 AM Status:  Signed    :  Yarely Miller MD (Physician)              ----------------- PREOPERATIVE EXAM ------------------  2017    SUBJECTIVE:  Yolie Bedoya is a 56 y.o. female here for preoperative optimization.    I was asked to see Yolie Bedoya by Brittaney Jones and Kenisha for  preoperative evaluation due to hypertension.    Nursing Notes:   Haylee Estevez  2017 10:46 AM  Signed  Date of Surgery: 10/12/17 and 10/25/17  Type of Surgery: Left knee and bladder   Surgeon: Dr Jones and Dr De  Hospital:  [facility]    Fever/Chills or other infectious symptoms in past month: no  >10lb weight loss in past two months: no  O2 SAT: 99    Health Care Directive/Code status:  Full code   Hx of blood transfusions:   (NO)   Td up to date:  14  History of VRE/MRSA:  (NO)     Preoperative Evaluation: Obstructive Sleep Apnea screening    S: Snore -  Do you snore loudly? (louder than talking or loud enough to be heard through closed doors)(NO)  T: Tired - Do you often feel tired, fatigued, or sleepy during the daytime?(NO)  O: Observed - Has anyone ever observed you stop breathing during your sleep?(NO)  P: Pressure - Do you have or are you being  "treated for high blood pressure?(YES)  B: BMI - BMI greater than 35kg/m2?(NO)  A: Age - Age over 50 years old?(YES)  N: Neck - Neck circumference greater than 40 cm?(NO)  G: Gender - Gender: Male?(NO)    Total number of \"YES\" responses:  1    Scoring: Low risk of LO 0-2  At Risk of LO: >3 High Risk of LO: 5-8    Haylee Estevez LPN...................  9/27/2017   9:44 AM             HPI:  Yolie Bedoya is a 56 y.o. female presents at the request of Brittaney Jones and Kenisha for preoperative evaluation due to hypertension. She takes losartan/hydrochlorothiazide 100/25 mg daily. The last time that she had lab monitoring related to this, she had hypokalemia and potassium was started. She reports that she has not been compliant with taking her potassium lately. She does not have chest pain, no palpitations. She has chronic lower extreme edema due to venous insufficiency, not due to cardiac disease.    She is having left knee arthroscopy done.  She had been diagnosed with a meniscal tear.  Her left knee hurts more now from the swelling than the meniscus that hurts more.  She had an injection done early in the summer which didn't seem to help.  This is affecting her walking, kneeling.    The patient's also scheduled for bladder surgery next month. She has had stress urinary incontinence for years.. Her bladder has gotten worse.  She is having larger amounts of leakage.  Stairs, walking, jumping will cause her to leak. She currently does not have burning, stinging, or hematuria.      Patient Active Problem List       Diagnosis  Date Noted     Hypokalemia  06/30/2017     Melanocytic nevus of chin  07/23/2014     Meniere disease  04/08/2014     Controlled substance agreement signed  02/11/2014     ambien #30/month        Recurrent major depression in remission (HC)  04/16/2013     ARTHRALGIA  08/01/2012     BACK PAIN  12/27/2010     INSOMNIA, PERSISTENT  12/27/2010     H R T- HORMONE REPLACEMENT THERAPY  03/27/2010     " MIGRAINE HEADACHE  01/05/2010     LEG EDEMA, CHRONIC       ALLERGIC RHINITIS       OBESITY       DIVERTICULOSIS, COLON       IMPAIRED FASTING GLUCOSE       BREAST CANCER, FAMILY HX         Past Medical History:     Diagnosis  Date     Allergic rhinitis due to animal (cat) (dog) hair and dander 1970    aspen, birch, maple, oak, grass, dust mite, ragwee, cocklebur, mugwart, lambs quarter, pigweed, fungus, molds       Cholelithiasis      Chronic insomnia     contract signed 2/2014      Controlled substance agreement signed 2/11/2014    ambien #30/month      HYPERTENSION      Menorrhagia     s/p CLARA      Obesity      Postmenopausal HRT (hormone replacement therapy) 2010    started ERT      Pyelonephritis      Venous insufficiency        Past Surgical History:      Procedure  Laterality Date     COLONOSCOPY SCREENING  6/2004     because of change in bowel habits       COLONOSCOPY SCREENING  5/14/2014    Extensive diverticulosis throughout the colon - follow up 10 years       ENDOMETRIAL ABLATION  3/05     CLARA AND BSO  3/30/10    CLARA/BSO/Vasquez/Posterior Repair       WISDOM TEETH EXTRACTION         Current Outpatient Prescriptions       Medication  Sig Dispense Refill     albuterol HFA (VENTOLIN HFA) 90 mcg/actuation inhaler Inhale 2 Puffs by mouth every 4 hours if needed. 1 Inhaler 2     buPROPion (WELLBUTRIN XL) 150 mg Extended-Release tablet Take 1 tablet by mouth every morning. 90 tablet 3     Cetirizine (ZYRTEC) 10 mg cap Take 1 Tab by mouth once daily.       cholecalciferol (VITAMIN D) 1,000 unit capsule Take 1,000 Units by mouth once daily.       diphenhydrAMINE (BENADRYL) 25 mg capsule Take 2 capsules by mouth at bedtime if needed.  0     estradiol (ESTRACE) 1 mg tablet Take 0.5 tablets by mouth once daily. 45 tablet 3     fluticasone (50 mcg per actuation) nasal solution (FLONASE) Inhale 2 Sprays into both nostrils once daily. 1 Bottle 11     fluticasone-salmeterol (ADVAIR DISKUS) 250-50 mcg/Dose diskus inhaler  Inhale 1 Puff by mouth every 12 hours. 1 Inhaler 11     hydroCHLOROthiazide 12.5 mg tablet Take 1 tablet by mouth once daily. 90 tablet 3     losartan-hydrochlorothiazide (HYZAAR) 100-25 mg tablet Take 1 tablet by mouth once daily. 90 tablet 3     montelukast (SINGULAIR) 10 mg tablet Take 1 tablet by mouth at bedtime. 90 tablet 3     multivitamin (MVI) tablet Take 1 tablet by mouth once daily.       polycarbophil (FIBERTAB) 625 mg tablet Take 1 tablet by mouth once daily.  0     potassium chloride (KLOR-CON 10; K-TAB) 10 mEq Controlled-Release tablet Take 20 mEq by mouth once daily with a meal.  5     SUMAtriptan NASAL (IMITREX) 20 mg/actuation nasal spray Inhale 1 Spray in the nostril(s) every 2 hours if needed for Migraine. 1 Bottle 11     triamcinolone (ARISTOCORT) 0.1 % ointment Apply  topically to affected area(s) 2 times daily. 1 Tube 0     zolpidem (AMBIEN) 10 mg tablet Take 1 tablet by mouth at bedtime. 30 tablet 5     No current facility-administered medications for this visit.      Medications have been reviewed by me and are current to the best of my knowledge and ability.    Recent use of: Ibuprofen, she realizes she needs to stop this one week before her surgery.    Allergies:  Allergies      Allergen   Reactions     Augmentin [Amoxicillin-Pot Clavulanate]  Rash     Doxycycline  Vomiting     Other [Unlisted Allergen (Include Detail In Comments)]  Stomach Upset and Vomiting     Narcotics      Sulfa (Sulfonamide Antibiotics)  Rash     Zaroxolyn [Metolazone]  *Unknown - Pt Doesn't Remember        Immunizations:  Immunization History     Administered  Date(s) Administered     Influenza Virus, Unspecified 2010     Tdap 2014       Family History       Problem   Relation Age of Onset     Cancer-breast  Mother      Cancer  Mother      lung and uterine cancer;         Cancer-breast  Maternal Grandmother      Cancer  Sister 28     endometrial or cervical cancer,       Heart Disease   "Brother      2 heart surgeries B 1965       Allergies  Sister       allergies/asthma       Other  Sister       lupus         Denies family hx of bleeding tendencies, anesthesia complications, or other problems with surgery.    Social History       Substance Use Topics         Smoking status:   Never Smoker     Smokeless tobacco:   Never Used     Alcohol use   Yes      Comment: occ        ROS:    Last use of albuterol was over a week ago.    Surgical:  patient denies previous complications from prior surgeries including but not limited to prolonged bleeding, anesthesia complications, dysrhythmias, surgical wound infections, or prolonged hospital stay.  Cardiorespiratory: denies chest pain, palpitations, shortness of breath, cough. Most exertion in the past 2 weeks was walking upstairs.  Complete ROS otherwise negative except as noted in HPI.     -------------------------------------------------------------    PHYSICAL EXAM:  /76  Pulse 74  Temp 97.8  F (36.6  C) (Tympanic)  Ht 1.676 m (5' 6\")  Wt 89.1 kg (196 lb 6.4 oz)  SpO2 99%  Breastfeeding? No  BMI 31.7 kg/m2    EXAM:  General Appearance: Pleasant, alert, appropriate appearance for age. No acute distress  Head Exam: Normal. Normocephalic, atraumatic.  Eyes: PERRL, EOMI  Ears: Normal TM's bilaterally.   OroPharynx: Mucosa pink and moist. Dentition in good repair.  Neck: Supple, no masses or nodes, no lymphadenopathy.  No thyromegaly.  Lungs: Normal chest wall and respirations. Clear to auscultation, no wheezes or crackles.  Cardiovascular: Regular rate and rhythm. S1, S2, no murmurs.  Gastrointestinal: Soft, nontender, no abnormal masses or organomegaly. BS normal   Musculoskeletal: Left medial knee pain.  She has chronic, 2+ lower extremity edema bilaterally  Skin: no concerning or new rashes.  Neurologic Exam: CN 2-12 grossly intact.  Normal gait.  Symmetric DTRs, normal gross motor movement, tone, and coordination. No tremor.  Psychiatric Exam: " Alert and oriented, appropriate affect.      EKG:  appears normal, NSR  Results for orders placed or performed in visit on 09/27/17      BASIC METABOLIC PANEL      Result  Value Ref Range    SODIUM 141 133 - 143 mmol/L    POTASSIUM 3.3 (L) 3.5 - 5.1 mmol/L    CHLORIDE 103 98 - 107 mmol/L    CO2,TOTAL 27 21 - 31 mmol/L    ANION GAP 11 5 - 18                    GLUCOSE 107 (H) 70 - 105 mg/dL    CALCIUM 9.1 8.6 - 10.3 mg/dL    BUN 16 7 - 25 mg/dL    CREATININE 0.88 0.70 - 1.30 mg/dL    BUN/CREAT RATIO           18                    GFR if African American >60 >60 ml/min/1.73m2    GFR if not African American >60 >60 ml/min/1.73m2       ---------------------------------------------------------------    ASSESSEMENT AND PLAN:    Yolie was seen today for preoperative exam.    Diagnoses and all orders for this visit:    Preop examination  -     [place] ADMIN VACC INITIAL    Degenerative tear of medial meniscus of left knee    Stress incontinence    Essential hypertension with goal blood pressure less than 140/90  -     losartan-hydrochlorothiazide (HYZAAR) 100-25 mg tablet; Take 1 tablet by mouth once daily.  -     hydroCHLOROthiazide 12.5 mg tablet; Take 1 tablet by mouth once daily.  -     EKG 12 LEAD UNIT PERFORMED  -     BASIC METABOLIC PANEL; Future  -     BASIC METABOLIC PANEL    Intermittent asthma, uncomplicated  -     montelukast (SINGULAIR) 10 mg tablet; Take 1 tablet by mouth at bedtime.    Controlled substance agreement signed    H R T- HORMONE REPLACEMENT THERAPY    Needs flu shot  -     FLU VACCINE => 3 YRS PF QUADRIVALENT IIV4 IM    Hypokalemia  -     POTASSIUM; Future        PRE OP RECOMMENDATIONS:    Patient was contacted with her potassium results. She's recently been noncompliant with taking her potassium. She will take this twice a day for the next 5 days, then have her potassium level rechecked.    No family history of problems with bleeding or anesthesia. Patient is able to tolerate greater than 4 METs of  activity without any cardiopulmonary symptoms . No cardiopulmonary workup is neccessary for the current procedure. Please contact the office with any questions or concerns.    Pt with history of vomiting from oxycodone/percocet    Patient is on chronic pain medications (NO); however, she is on Ambien for insomnia. She is on a medication contract for this, but not for any narcotics. She was cautioned about the use of sleep medication and narcotic pain medication in combination.  Patient is on antiplatlet/anticoagulation (NO)  Other medications that need adjustment perioperatively  - she will stop her estrogen replacement around the time of her knee surgery to decrease her thromboembolic risk.    Other:  Patient was advised to call our office and the surgical services with any change in condition or new symptoms if they were to develop between today and their surgical date; especially any cardiopulmonary symptoms or symptoms concerning for an infection.    Yarely Miller MD

## 2017-12-28 NOTE — PROGRESS NOTES
Patient Information     Patient Name MRN Sex     Yolie Bedoya [id number] Female 1960      Progress Notes by Zaida Guillermo R.T. (ARRT) at 2017 11:55 AM     Author:  Zaida Guill[facility] R.T. ([facility] Service:  (none) Author Type:  [facility] - Registered Radiologic Technologist     Filed:  2017 11:56 AM Date of Service:  2017 11:55 AM Status:  Signed     :  Zaida Guillermo R.T. (ARRT) ([facility] - Registered Radiologic Technologist)            [place] Protocol    A. Pre-procedure verification complete yes  1-relevant information / documentation available, reviewed and properly matched to the patient; 2-consent accurate and complete, 3-equipment and supplies available    B. Site marking complete Yes  Site marked if not in continuous attendance with patient    C. TIME OUT completed yes  Time Out was conducted just prior to starting procedure to verify the eight required elements: 1-patient identity, 2-consent accurate and complete, 3-position, 4-correct side/site marked (if applicable), 5-procedure, 6-relevant images / results properly labeled and displayed (if applicable), 7-antibiotics / irrigation fluids (if applicable), 8-safety precautions.    3

## 2017-12-28 NOTE — PROGRESS NOTES
Patient Information     Patient Name MRN Sex Yolie Sahu [id number] Female 1960      Progress Notes by Deny Felder MD at 2017 10:45 AM     Author:  Deny Felder MD Service:  (none) Author Type:  Physician     Filed:  2017 11:31 AM Encounter Date:  2017 Status:  Signed     :  Deny Felder MD (Physician)            I was asked to see this patient by Dr Miller and provide my opinion about the following:  Incontinence    Type of Visit  Consult    Chief Complaint  Incontinence    HPI  Ms. Bedoya is a 56 y.o. female who presents with incontinence.  Patient leaks urine about several times day.  Volume of incontinence is described as Moderate.  Overall, leaking urine interferes (bother score) with daily living approximately 8/10.    Patient uses 4 pads per day.  Onset was 10 Year(s) ago.  Patient denies: dysuria and gross hematuria.    Leakage of urine occurs with urgency? Yes  Leakage of urine occurs with valsalva? Yes  Leakage of urine occurs without sensation? Yes    Failed treatments:     None  Current treatments:     None    She recently had a UA collected and she did not have a UTI at the time.  She is a nonsmoker.  She has not had gross hematuria.      Past Medical History  She  has a past medical history of Allergic rhinitis due to animal (cat) (dog) hair and dander (1970); Cholelithiasis; Chronic insomnia; Controlled substance agreement signed (2014); Menorrhagia; Obesity; Postmenopausal HRT (hormone replacement therapy) (); Pyelonephritis; and Venous insufficiency.  Patient Active Problem List     Diagnosis  Code     LEG EDEMA, CHRONIC R60.9     ALLERGIC RHINITIS J30.9     MIGRAINE HEADACHE G43.909     OBESITY E66.9     DIVERTICULOSIS, COLON K57.30     HYPERTENSION I10     IMPAIRED FASTING GLUCOSE R73.01     BREAST CANCER, FAMILY HX Z80.3     BACK PAIN M54.9     INSOMNIA, PERSISTENT G47.00     H R T- HORMONE REPLACEMENT THERAPY E34.9     ARTHRALGIA  M25.50     Recurrent major depression in remission (HC) F33.40     Controlled substance agreement signed Z79.899     Meniere disease H81.09     Melanocytic nevus of chin D22.30     Hypokalemia E87.6       Past Surgical History  She  has a past surgical history that includes colonoscopy screening (2004); wisdom teeth extraction; endometrial ablation (3/05); stephen and bso (3/30/10); and colonoscopy screening (2014).    Medications  She has a current medication list which includes the following prescription(s): albuterol hfa, bupropion, cetirizine, cholecalciferol, diphenhydramine, estradiol, fluticasone (50 mcg per actuation) nasal, fluticasone-salmeterol, hydrochlorothiazide, losartan-hydrochlorothiazide, montelukast, multivitamin, calcium polycarbophil, sumatriptan nasal, triamcinolone, and zolpidem.    Allergies  Allergies      Allergen   Reactions     Augmentin [Amoxicillin-Pot Clavulanate]  Rash     Doxycycline  Vomiting     Other [Unlisted Allergen (Include Detail In Comments)]  Stomach Upset and Vomiting     Narcotics      Sulfa (Sulfonamide Antibiotics)  Rash     Zaroxolyn [Metolazone]  *Unknown - Pt Doesn't Remember       Social History  She  reports that she has never smoked. She has never used smokeless tobacco. She reports that she drinks alcohol. She reports that she does not use illicit drugs.  No drug abuse.    Family History  Family History       Problem   Relation Age of Onset     Cancer-breast  Mother      Cancer  Mother      lung and uterine cancer;         Cancer-breast  Maternal Grandmother      Cancer  Sister 28     endometrial or cervical cancer,       Heart Disease  Brother      2 heart surgeries B 1965       Allergies  Sister       allergies/asthma       Other  Sister       lupus         Review of Systems  I personally reviewed the ROS with the patient.    Nursing Notes:   Maribel Garcia  2017 10:51 AM  Signed  Patient presents to the clinic for consult on  incontinence.  Maribel Garcia LPN........................7/17/2017  10:46 AM    Review of Systems:    Weight loss:    No     Recent fever/chills:  No   Night sweats:   yes  Current skin rash:  No   Recent hair loss:  No  Heat intolerance:  No   Cold intolerance:  No  Chest pain:   No   Palpitations:   No  Shortness of breath:  yes   Wheezing:   yes  Constipation:    No   Diarrhea:   No   Nausea:   No   Vomiting:   No   Kidney/side pain:  No   Back pain:   yes  Frequent headaches:  No   Dizziness:     No  Leg swelling:   yes   Calf pain:    No    Parents, brothers or sisters with history of kidney cancer?   No  Parents, brothers or sisters with history of bladder cancer: No    Maribel Garcia LPN........................7/17/2017  10:47 AM      Physical Exam  Vitals:     07/17/17 1047   BP: 136/74   Pulse: 72   Weight: 88.3 kg (194 lb 9.6 oz)     Constitutional: NAD, WDWN  Head: NCAT  Eyes: Conjunctivae normal  Cardiovascular: Regular rate  Pulmonary/Chest: Respirations are even and non-labored bilaterally  Abdominal: Soft with no distension, tenderness, masses, guarding or CVA tenderness  Neurological: A + O x 3,  cranial nerves II-XII grossly intact  Extremities: SOUTH x 4, warm, no clubbing, no cyanosis  Skin: Pink, warm, dry with no rash  Psychiatric:  Normal mood and affect  Genitourinary: Nonpalpable bladder    Labs  CREATININE (mg/dL)    Date Value   06/19/2017 0.90     Recent Labs       06/19/17   0921   COLOR  Yellow   CLARITY  Clear   SPECGRAV  1.020   PHURINE  6.1  6.0   UROBILINOGEN  Normal   PROTEINUA  Negative       Recent Labs       06/19/17   0921   GLUCOSEUA  Negative   KETONESUA  15 A   BLOODUA  Negative   NITRITE  Negative   LEUKOCYTE  Negative     Lab Results      Component  Value Date/Time    WBCUA 3-5 06/19/2017 09:21 AM    RBCUA 6-10 (A) 06/19/2017 09:21 AM    BACTERIAUA Many (A) 06/19/2017 09:21 AM    EPITHELIALUA Moderate (A) 06/19/2017 09:21 AM       Post-Void Residual  A post-void residual was  measured by ultrasonic bladder scanner.  0 mL    Assessment  Ms. Bedoya is a 56 y.o. female with mixed incontinence and microhematuria.  I did recommend a workup for microhematuria and I explained the indications for the workup as well as the possible findings including a normal workup.    Her incontinence is next in clinically it seems she has a fairly balanced bother from urge and stress.  If she has significant stress incontinence on urodynamics I would recommend she see one the gynecologists for consideration of sling placement.  I did recommend urodynamics however prior to pursuing the next step in therapy given her symptoms.    Plan  UDS, CTU and cysto

## 2017-12-28 NOTE — TELEPHONE ENCOUNTER
Patient Information     Patient Name MRN Sex Yolie Sahu [id number] Female 1960      Telephone Encounter by Susana Mesa RN at 10/25/2017  1:20 PM     Author:  Susana Mesa RN Service:  (none) Author Type:  NURS- Registered Nurse     Filed:  10/25/2017  1:21 PM Encounter Date:  10/25/2017 Status:  Signed     :  Susana Mesa RN (NURS- Registered Nurse)            Pharmacy requesting a 90 day supply. Qty. 180. Prescription refilled per RN Medication Refill Policy.................... Susana Mesa RN ....................  10/25/2017   1:20 PM      Prescribing Provider: David Ga MD         Order Date: 2017  Ordered by: DAVID GA  Medication:fluticasone-salmeterol (ADVAIR DISKUS) 250-50 mcg/Dose diskus inhaler    Qty:1 Inhaler   Ref:11  Start:2017    End:              Route:Inhalation            AR:No   Class:eRx    Sig:Inhale 1 Puff by mouth every 12 hours.    Pharmacy:[id number] ([place]) [address]              [address]

## 2017-12-28 NOTE — PATIENT INSTRUCTIONS
Patient Information     Patient Name MRN Yolie Del Toro [id number] Female 1960      Patient Instructions by Yesenia Butler RN at 2017  8:30 AM     Author:  Yesenia Butler RN Service:  (none) Author Type:  NURS- Registered Nurse     Filed:  2017  8:18 AM Encounter Date:  2017 Status:  Signed     :  Yesenia Butler RN (NURS- Registered Nurse)            Home Care after Cystoscopy  Follow these guidelines for your care after your procedure.    Activity  No limitations    Bathing or showering  No limitations    Symptoms  You may notice some burning with urination but this usually resolves after 1-2 days.  You may also notice small amounts of blood in your urine.  Please increase water intake for the next few days to help with these symptoms.    Contacts  General Questions: [phone]  Appointments:  [phone]  Emergencies:  [phone]    When to call the clinic  If you develop any of the following symptoms please call the clinic immediately.  If the clinic is closed please be seen at an urgent care clinic or the Emergency Department.  - Burning with urination that worsens after 2 days  - Unable to urinate causing severe pelvic pain  - Fevers of greater than 101 degrees F  - Flank pain that is not responding to pain medication    Follow up  Please follow up as discussed at the appointment.

## 2017-12-28 NOTE — TELEPHONE ENCOUNTER
"Patient Information     Patient Name MRN Sex Yolie Sahu [id number] Female 1960      Telephone Encounter by Yesenia Butler RN at 2017  3:29 PM     Author:  Yesenia Butler RN Service:  (none) Author Type:  NURS- Registered Nurse     Filed:  2017  3:30 PM Encounter Date:  2017 Status:  Signed     :  Yesenia Butler RN (NURS- Registered Nurse)            Per Deny Felder MD: \"CT urogram final report was negative\"  Patient notified of this.  Yesenia Butler RN.........2017...3:30 PM         "

## 2017-12-28 NOTE — OR ANESTHESIA
Patient Information     Patient Name MRN Sex     Yolie Bedoya [id number] Female 1960      OR Anesthesia by Esther Aguilar CRNA at 10/25/2017 11:15 AM     Author:  Esther Aguilar CRNA Service:  (none) Author Type:  NURS- Nurse Anesthetist     Filed:  10/25/2017 11:15 AM Date of Service:  10/25/2017 11:15 AM Status:  Signed     :  Esther Aguilar CRNA (NURS- Nurse Anesthetist)                                                           ANESTHESIA ASSESSMENT    Date: 10/25/17 Time: 11:15 AM      Patient:  Yolie Bedoya    Procedure(s) (LRB):  SUSPENSION MID URETHRAL SLING (N/A)    Past Medical History:     Diagnosis  Date     Allergic rhinitis due to animal (cat) (dog) hair and dander     aspen, birch, maple, oak, grass, dust mite, ragwee, cocklebur, mugwart, lambs quarter, pigweed, fungus, molds       Cholelithiasis      Chronic insomnia     contract signed 2014      Controlled substance agreement signed 2014    ambien #30/month      HYPERTENSION      Menorrhagia     s/p CLARA      Obesity      PONV (postoperative nausea and vomiting)     nausea and severe vomiting with narcotics      Postmenopausal HRT (hormone replacement therapy)     started ERT      Pyelonephritis      Venous insufficiency        Past Surgical History:      Procedure  Laterality Date     COLONOSCOPY SCREENING  2004     because of change in bowel habits       COLONOSCOPY SCREENING  2014    Extensive diverticulosis throughout the colon - follow up 10 years       ENDOMETRIAL ABLATION  3/05     CLARA AND BSO  3/30/10    CLARA/BSO/Vasquez/Posterior Repair       WISDOM TEETH EXTRACTION         Family History       Problem   Relation Age of Onset     Cancer-breast  Mother      Cancer  Mother      lung and uterine cancer;         Cancer-breast  Maternal Grandmother      Cancer  Sister 28     endometrial or cervical cancer,       Heart Disease  Brother      2 heart surgeries B 1965       Allergies  Sister        allergies/asthma       Other  Sister       lupus         Patient Active Problem List     Diagnosis  Code     LEG EDEMA, CHRONIC R60.9     ALLERGIC RHINITIS J30.9     MIGRAINE HEADACHE G43.909     OBESITY E66.9     DIVERTICULOSIS, COLON K57.30     IMPAIRED FASTING GLUCOSE R73.01     BREAST CANCER, FAMILY HX Z80.3     BACK PAIN M54.9     INSOMNIA, PERSISTENT G47.00     H R T- HORMONE REPLACEMENT THERAPY E34.9     ARTHRALGIA M25.50     Recurrent major depression in remission (HC) F33.40     Controlled substance agreement signed Z79.899     Meniere disease H81.09     Melanocytic nevus of chin D22.30     Hypokalemia E87.6       Prescriptions Prior to Admission       Medication  Sig Dispense Refill     albuterol HFA (VENTOLIN HFA) 90 mcg/actuation inhaler Inhale 2 Puffs by mouth every 4 hours if needed. 1 Inhaler 2     buPROPion (WELLBUTRIN XL) 150 mg Extended-Release tablet Take 1 tablet by mouth every morning. 90 tablet 3     Cetirizine (ZYRTEC) 10 mg cap Take 1 Tab by mouth once daily.       cholecalciferol (VITAMIN D) 1,000 unit capsule Take 1,000 Units by mouth once daily.       diphenhydrAMINE (BENADRYL) 25 mg capsule Take 2 capsules by mouth at bedtime if needed.  0     estradiol (ESTRACE) 1 mg tablet Take 0.5 tablets by mouth once daily. 45 tablet 3     fluticasone (50 mcg per actuation) nasal solution (FLONASE) Inhale 2 Sprays into both nostrils once daily. 1 Bottle 11     fluticasone-salmeterol (ADVAIR DISKUS) 250-50 mcg/Dose diskus inhaler Inhale 1 Puff by mouth every 12 hours. 1 Inhaler 11     hydroCHLOROthiazide 12.5 mg tablet Take 1 tablet by mouth once daily. 90 tablet 3     HYDROcodone-acetaminophen, 5-325 mg, (NORCO) per tablet   0     losartan-hydrochlorothiazide (HYZAAR) 100-25 mg tablet Take 1 tablet by mouth once daily. 90 tablet 3     montelukast (SINGULAIR) 10 mg tablet Take 1 tablet by mouth at bedtime. 90 tablet 3     multivitamin (MVI) tablet Take 1 tablet by mouth once daily.       polycarbophil  (FIBERTAB) 625 mg tablet Take 1 tablet by mouth once daily.  0     potassium chloride (KLOR-CON 10; K-TAB) 10 mEq Controlled-Release tablet TAKE 2 TABLETS BY MOUTH ONCE DAILY WITH A MEAL. 180 tablet 3     potassium chloride (KLOR-CON 10; K-TAB) 10 mEq Controlled-Release tablet Take 20 mEq by mouth once daily with a meal.  5     promethazine (PHENERGAN) 25 mg tablet   0     SUMAtriptan NASAL (IMITREX) 20 mg/actuation nasal spray Inhale 1 Spray in the nostril(s) every 2 hours if needed for Migraine. 1 Bottle 11     triamcinolone (ARISTOCORT) 0.1 % ointment Apply  topically to affected area(s) 2 times daily. 1 Tube 0     zolpidem (AMBIEN) 10 mg tablet Take 1 tablet by mouth at bedtime. 30 tablet 5       Allergies:  Allergies      Allergen   Reactions     Augmentin [Amoxicillin-Pot Clavulanate]  Rash     Doxycycline  Vomiting     Other [Unlisted Allergen (Include Detail In Comments)]  Stomach Upset and Vomiting     Narcotics      Sulfa (Sulfonamide Antibiotics)  Rash     Zaroxolyn [Metolazone]  *Unknown - Pt Doesn't Remember       Review of Systems:  GERD: No  Chest pain: No  Shortness of breath: No  Recent fever: No  Poor exercise tolerance: No (recent knee scope, )  Bleeding tendency: No  Pregnant: No  Anesthesia Complications: PONV      History    Smoking Status      Never Smoker   Smokeless Tobacco      Never Used     Social History     Social History        Marital status:       Spouse name: Carter     Number of children:  2     Years of education:  12+     Occupational History       retired      Social History Main Topics          Smoking status:   Never Smoker      Smokeless tobacco:   Never Used      Alcohol use   Yes      Comment: occ       Drug use:   No      Sexual activity:   Yes      Partners:  Male      Other Topics  Concern     Not on file      Social History Narrative     Patient is .  She is retired.    Two adult sons. Shashi and               Physical Examination:  /78  Pulse 78   "Temp 97.2  F (36.2  C)  Resp 20  Ht 1.676 m (5' 6\")  Wt 88.9 kg (196 lb)  SpO2 97%  BMI 31.64 kg/m2 Body mass index is 31.64 kg/(m^2). Body surface area is 2.03 meters squared.  Dental Condition: Good     Mallampati Score (Airway): II  Cardiovascular: Normal  Pulmonary: Normal  Other: N/A    [facility]    No results for input(s): SODIUM, POTASSIUM, CHLORIDE, [id number], ANIONGAP, BUN, CREATININE, BUNCREARATIO, CALCIUM, GLUCOSE, GLUCOSEMETER, KETONES, MAGNESIUM, WBC, HGB, HCT, PLT, ABORH, RHTYPE, PREGURINE, BHCGQL, HCGBETAQUANT, INR in the last 72 hours.          Assessment/Plan:  ASA Class: II  Risk of dental injury discussed: Yes  NPO status confirmed: Yes  Anesthetic Plan: MAC (agrees to GA if needed)  Risk/Benefit/Alt discussed: Yes  Questions answered: Yes  Emergency Case?: No  Labs/ECG/Radiology Reviewed?: Yes      H&P Reviewed.  Patient Examined.      Provider Electronic Signature:  Esther Aguilar CRNA                "

## 2017-12-28 NOTE — PROGRESS NOTES
Patient Information     Patient Name MRN Sex Yolie Sahu [id number] Female 1960      Progress Notes by Maribel Bone at 2017  7:48 AM     Author:  Maribel Bone Service:  (none) Author Type:  Other Clinical Staff     Filed:  2017  7:49 AM Date of Service:  2017  7:48 AM Status:  Signed     :  Maribel Bone (Other Clinical Staff)            Falls Risk Criteria:    Age 65 and older or under age 4        Sensory deficits    Poor vision    Use of ambulatory aides    Impaired judgment    Unable to walk independently    Meets High Risk criteria for falls:  no

## 2017-12-28 NOTE — PATIENT INSTRUCTIONS
Patient Information     Patient Name MRN Sex Yolie Sahu [id number] Female 1960      Patient Instructions by Yarely Miller MD at 2017  9:45 AM     Author:  Yarely Miller MD Service:  (none) Author Type:  Physician     Filed:  2017 10:03 AM Encounter Date:  2017 Status:  Signed     :  Yarely Miller MD (Physician)            Ibuprofen, aleve and aspirin - be off for a week.      Do not take Ambien along with any narcotic pain medication.

## 2017-12-28 NOTE — PROGRESS NOTES
Patient Information     Patient Name MRN Sex Yolie Sahu [id number] Female 1960      Progress Notes by Maribel Bone at 2017  7:48 AM     Author:  Maribel Bone Service:  (none) Author Type:  Other Clinical Staff     Filed:  2017  7:48 AM Date of Service:  2017  7:48 AM Status:  Signed     :  Maribel Bone (Other Clinical Staff)            1.  Has the patient had a previous reaction to IV contrast? No    2.  Does the patient have kidney disease? No    3.  Is the patient on dialysis? No    If YES to any of these questions, exam will be reviewed with a Radiologist before administering contrast.

## 2017-12-29 NOTE — PATIENT INSTRUCTIONS
Patient Information     Patient Name MRN Yolie Del Toro [id number] Female 1960      Patient Instructions by Yarely Miller MD at 2017 10:45 AM     Author:  Yarely Miller MD Service:  (none) Author Type:  Physician     Filed:  2017  5:05 PM Encounter Date:  2017 Status:  Signed     :  Yarely Miller MD (Physician)            You are currently being prescribed a controlled substance.  You need to be aware of the risks of taking this medication.  By signing a medication contract, you accept these risks and side effects.      Any medical treatment is initially a trial, and that continued prescribing is based on evidence of benefit without an unacceptable risk. Understand that the goal of using this medication is to increase functional level. If your symptoms do not significantly decrease and/or function increase, the medication will be stopped.    Be aware that the use of such medicine has certain risks associated with it, including, but not limited to:  Sleepiness or drowsiness, lightheadedness, dizziness, confusion,allergic reaction, slowing of breathing rate, slowing of reflexes or reaction time, sexual dysfunction, physical dependence, tolerance to analgesia, addiction, withdrawal and the possibility that the medicine will not completely take care of your symptoms.  Usage is associate with a significantly increased risk of falls and other unintended injuries.    The overuse of this medication can result in serious health risks including respiratory depression or even death.  These risks are increased when the medication is combined with alcohol, narcotics, or other sedatives.    Having these medications prescribed for you also means that your accept the risk of possible intentional or accidental use by those in your home or others with whom you come in contact.  This includes their possible diversion of your medications, overdose or death.       This medication will be strictly monitored and all of my medications should be filled at the same pharmacy.  (Should the need arise to change pharmacies our office must be informed).    It is your responsibility to share that you are on a controlled substance contract with your other health care providers, including your dentist.

## 2017-12-29 NOTE — PATIENT INSTRUCTIONS
Patient Information     Patient Name MRN Sex Yolie Sahu [id number] Female 1960      Patient Instructions by Vickie Alfaro NP at 2017 11:45 AM     Author:  Vickie Alfaro NP Service:  (none) Author Type:  PHYS- Nurse Practitioner     Filed:  2017 12:21 PM Encounter Date:  2017 Status:  Signed     :  Vickie Alfaro NP (PHYS- Nurse Practitioner)            We will culture urine and follow up with these results  If you have any further bleeding let us know

## 2017-12-29 NOTE — H&P
"Patient Information     Patient Name MRN Sex Yolie Sahu [id number] Female 1960      H&P by Yarely Miller MD at 2017  9:45 AM     Author:  Yarely Miller MD Service:  (none) Author Type:  Physician     Filed:  2017  4:27 PM Encounter Date:  2017 Status:  Signed     :  Yarely Miller MD (Physician)            ----------------- PREOPERATIVE EXAM ------------------  2017    SUBJECTIVE:  Yolie Bedoya is a 56 y.o. female here for preoperative optimization.    I was asked to see Yolie Bedoya by Brittaney Jones and Kenisha for  preoperative evaluation due to hypertension.    Nursing Notes:   Haylee Estevez  2017 10:46 AM  Signed  Date of Surgery: 10/12/17 and 10/25/17  Type of Surgery: Left knee and bladder   Surgeon: Dr Jones and Dr De  Hospital:  [facility]    Fever/Chills or other infectious symptoms in past month: no  >10lb weight loss in past two months: no  O2 SAT: 99    Health Care Directive/Code status:  Full code   Hx of blood transfusions:   (NO)   Td up to date:  14  History of VRE/MRSA:  (NO)     Preoperative Evaluation: Obstructive Sleep Apnea screening    S: Snore -  Do you snore loudly? (louder than talking or loud enough to be heard through closed doors)(NO)  T: Tired - Do you often feel tired, fatigued, or sleepy during the daytime?(NO)  O: Observed - Has anyone ever observed you stop breathing during your sleep?(NO)  P: Pressure - Do you have or are you being treated for high blood pressure?(YES)  B: BMI - BMI greater than 35kg/m2?(NO)  A: Age - Age over 50 years old?(YES)  N: Neck - Neck circumference greater than 40 cm?(NO)  G: Gender - Gender: Male?(NO)    Total number of \"YES\" responses:  1    Scoring: Low risk of LO 0-2  At Risk of LO: >3 High Risk of LO: 5-8    Haylee Estevez LPN...................  2017   9:44 AM             HPI:  Yolie Bedoya is a 56 y.o. female presents at the request of " Brittaney Jones and Kenisha for preoperative evaluation due to hypertension. She takes losartan/hydrochlorothiazide 100/25 mg daily. The last time that she had lab monitoring related to this, she had hypokalemia and potassium was started. She reports that she has not been compliant with taking her potassium lately. She does not have chest pain, no palpitations. She has chronic lower extreme edema due to venous insufficiency, not due to cardiac disease.    She is having left knee arthroscopy done.  She had been diagnosed with a meniscal tear.  Her left knee hurts more now from the swelling than the meniscus that hurts more.  She had an injection done early in the summer which didn't seem to help.  This is affecting her walking, kneeling.    The patient's also scheduled for bladder surgery next month. She has had stress urinary incontinence for years.. Her bladder has gotten worse.  She is having larger amounts of leakage.  Stairs, walking, jumping will cause her to leak. She currently does not have burning, stinging, or hematuria.      Patient Active Problem List       Diagnosis  Date Noted     Hypokalemia  06/30/2017     Melanocytic nevus of chin  07/23/2014     Meniere disease  04/08/2014     Controlled substance agreement signed  02/11/2014     ambien #30/month        Recurrent major depression in remission (HC)  04/16/2013     ARTHRALGIA  08/01/2012     BACK PAIN  12/27/2010     INSOMNIA, PERSISTENT  12/27/2010     H R T- HORMONE REPLACEMENT THERAPY  03/27/2010     MIGRAINE HEADACHE  01/05/2010     LEG EDEMA, CHRONIC       ALLERGIC RHINITIS       OBESITY       DIVERTICULOSIS, COLON       IMPAIRED FASTING GLUCOSE       BREAST CANCER, FAMILY HX         Past Medical History:     Diagnosis  Date     Allergic rhinitis due to animal (cat) (dog) hair and dander 1970    aspen, birch, maple, oak, grass, dust mite, ragwee, cocklebur, mugwart, lambs quarter, pigweed, fungus, molds       Cholelithiasis      Chronic insomnia      contract signed 2/2014      Controlled substance agreement signed 2/11/2014    ambien #30/month      HYPERTENSION      Menorrhagia     s/p CLARA      Obesity      Postmenopausal HRT (hormone replacement therapy) 2010    started ERT      Pyelonephritis      Venous insufficiency        Past Surgical History:      Procedure  Laterality Date     COLONOSCOPY SCREENING  6/2004     because of change in bowel habits       COLONOSCOPY SCREENING  5/14/2014    Extensive diverticulosis throughout the colon - follow up 10 years       ENDOMETRIAL ABLATION  3/05     CLARA AND BSO  3/30/10    CLARA/BSO/Vasquez/Posterior Repair       WISDOM TEETH EXTRACTION         Current Outpatient Prescriptions       Medication  Sig Dispense Refill     albuterol HFA (VENTOLIN HFA) 90 mcg/actuation inhaler Inhale 2 Puffs by mouth every 4 hours if needed. 1 Inhaler 2     buPROPion (WELLBUTRIN XL) 150 mg Extended-Release tablet Take 1 tablet by mouth every morning. 90 tablet 3     Cetirizine (ZYRTEC) 10 mg cap Take 1 Tab by mouth once daily.       cholecalciferol (VITAMIN D) 1,000 unit capsule Take 1,000 Units by mouth once daily.       diphenhydrAMINE (BENADRYL) 25 mg capsule Take 2 capsules by mouth at bedtime if needed.  0     estradiol (ESTRACE) 1 mg tablet Take 0.5 tablets by mouth once daily. 45 tablet 3     fluticasone (50 mcg per actuation) nasal solution (FLONASE) Inhale 2 Sprays into both nostrils once daily. 1 Bottle 11     fluticasone-salmeterol (ADVAIR DISKUS) 250-50 mcg/Dose diskus inhaler Inhale 1 Puff by mouth every 12 hours. 1 Inhaler 11     hydroCHLOROthiazide 12.5 mg tablet Take 1 tablet by mouth once daily. 90 tablet 3     losartan-hydrochlorothiazide (HYZAAR) 100-25 mg tablet Take 1 tablet by mouth once daily. 90 tablet 3     montelukast (SINGULAIR) 10 mg tablet Take 1 tablet by mouth at bedtime. 90 tablet 3     multivitamin (MVI) tablet Take 1 tablet by mouth once daily.       polycarbophil (FIBERTAB) 625 mg tablet Take 1 tablet by  mouth once daily.  0     potassium chloride (KLOR-CON 10; K-TAB) 10 mEq Controlled-Release tablet Take 20 mEq by mouth once daily with a meal.  5     SUMAtriptan NASAL (IMITREX) 20 mg/actuation nasal spray Inhale 1 Spray in the nostril(s) every 2 hours if needed for Migraine. 1 Bottle 11     triamcinolone (ARISTOCORT) 0.1 % ointment Apply  topically to affected area(s) 2 times daily. 1 Tube 0     zolpidem (AMBIEN) 10 mg tablet Take 1 tablet by mouth at bedtime. 30 tablet 5     No current facility-administered medications for this visit.      Medications have been reviewed by me and are current to the best of my knowledge and ability.    Recent use of: Ibuprofen, she realizes she needs to stop this one week before her surgery.    Allergies:  Allergies      Allergen   Reactions     Augmentin [Amoxicillin-Pot Clavulanate]  Rash     Doxycycline  Vomiting     Other [Unlisted Allergen (Include Detail In Comments)]  Stomach Upset and Vomiting     Narcotics      Sulfa (Sulfonamide Antibiotics)  Rash     Zaroxolyn [Metolazone]  *Unknown - Pt Doesn't Remember        Immunizations:  Immunization History     Administered  Date(s) Administered     Influenza Virus, Unspecified 2010     Tdap 2014       Family History       Problem   Relation Age of Onset     Cancer-breast  Mother      Cancer  Mother      lung and uterine cancer;         Cancer-breast  Maternal Grandmother      Cancer  Sister 28     endometrial or cervical cancer,       Heart Disease  Brother      2 heart surgeries B 1965       Allergies  Sister       allergies/asthma       Other  Sister       lupus         Denies family hx of bleeding tendencies, anesthesia complications, or other problems with surgery.    Social History       Substance Use Topics         Smoking status:   Never Smoker     Smokeless tobacco:   Never Used     Alcohol use   Yes      Comment: occ        ROS:    Last use of albuterol was over a week ago.    Surgical:  patient  "denies previous complications from prior surgeries including but not limited to prolonged bleeding, anesthesia complications, dysrhythmias, surgical wound infections, or prolonged hospital stay.  Cardiorespiratory: denies chest pain, palpitations, shortness of breath, cough. Most exertion in the past 2 weeks was walking upstairs.  Complete ROS otherwise negative except as noted in HPI.     -------------------------------------------------------------    PHYSICAL EXAM:  /76  Pulse 74  Temp 97.8  F (36.6  C) (Tympanic)  Ht 1.676 m (5' 6\")  Wt 89.1 kg (196 lb 6.4 oz)  SpO2 99%  Breastfeeding? No  BMI 31.7 kg/m2    EXAM:  General Appearance: Pleasant, alert, appropriate appearance for age. No acute distress  Head Exam: Normal. Normocephalic, atraumatic.  Eyes: PERRL, EOMI  Ears: Normal TM's bilaterally.   OroPharynx: Mucosa pink and moist. Dentition in good repair.  Neck: Supple, no masses or nodes, no lymphadenopathy.  No thyromegaly.  Lungs: Normal chest wall and respirations. Clear to auscultation, no wheezes or crackles.  Cardiovascular: Regular rate and rhythm. S1, S2, no murmurs.  Gastrointestinal: Soft, nontender, no abnormal masses or organomegaly. BS normal   Musculoskeletal: Left medial knee pain.  She has chronic, 2+ lower extremity edema bilaterally  Skin: no concerning or new rashes.  Neurologic Exam: CN 2-12 grossly intact.  Normal gait.  Symmetric DTRs, normal gross motor movement, tone, and coordination. No tremor.  Psychiatric Exam: Alert and oriented, appropriate affect.      EKG:  appears normal, NSR  Results for orders placed or performed in visit on 09/27/17      BASIC METABOLIC PANEL      Result  Value Ref Range    SODIUM 141 133 - 143 mmol/L    POTASSIUM 3.3 (L) 3.5 - 5.1 mmol/L    CHLORIDE 103 98 - 107 mmol/L    CO2,TOTAL 27 21 - 31 mmol/L    ANION GAP 11 5 - 18                    GLUCOSE 107 (H) 70 - 105 mg/dL    CALCIUM 9.1 8.6 - 10.3 mg/dL    BUN 16 7 - 25 mg/dL    CREATININE 0.88 " 0.70 - 1.30 mg/dL    BUN/CREAT RATIO           18                    GFR if African American >60 >60 ml/min/1.73m2    GFR if not African American >60 >60 ml/min/1.73m2       ---------------------------------------------------------------    ASSESSEMENT AND PLAN:    Yolie was seen today for preoperative exam.    Diagnoses and all orders for this visit:    Preop examination  -     HI ADMIN VACC INITIAL    Degenerative tear of medial meniscus of left knee    Stress incontinence    Essential hypertension with goal blood pressure less than 140/90  -     losartan-hydrochlorothiazide (HYZAAR) 100-25 mg tablet; Take 1 tablet by mouth once daily.  -     hydroCHLOROthiazide 12.5 mg tablet; Take 1 tablet by mouth once daily.  -     EKG 12 LEAD UNIT PERFORMED  -     BASIC METABOLIC PANEL; Future  -     BASIC METABOLIC PANEL    Intermittent asthma, uncomplicated  -     montelukast (SINGULAIR) 10 mg tablet; Take 1 tablet by mouth at bedtime.    Controlled substance agreement signed    H R T- HORMONE REPLACEMENT THERAPY    Needs flu shot  -     FLU VACCINE => 3 YRS PF QUADRIVALENT IIV4 IM    Hypokalemia  -     POTASSIUM; Future        PRE OP RECOMMENDATIONS:    Patient was contacted with her potassium results. She's recently been noncompliant with taking her potassium. She will take this twice a day for the next 5 days, then have her potassium level rechecked.    No family history of problems with bleeding or anesthesia. Patient is able to tolerate greater than 4 METs of activity without any cardiopulmonary symptoms . No cardiopulmonary workup is neccessary for the current procedure. Please contact the office with any questions or concerns.    Pt with history of vomiting from oxycodone/percocet    Patient is on chronic pain medications (NO); however, she is on Ambien for insomnia. She is on a medication contract for this, but not for any narcotics. She was cautioned about the use of sleep medication and narcotic pain medication in  combination.  Patient is on antiplatlet/anticoagulation (NO)  Other medications that need adjustment perioperatively  - she will stop her estrogen replacement around the time of her knee surgery to decrease her thromboembolic risk.    Other:  Patient was advised to call our office and the surgical services with any change in condition or new symptoms if they were to develop between today and their surgical date; especially any cardiopulmonary symptoms or symptoms concerning for an infection.    Yarely Miller MD

## 2017-12-30 NOTE — NURSING NOTE
Patient Information     Patient Name MRN Sex Yolie Sahu [id number] Female 1960      Nursing Note by Berna Vazquez at 2017 11:45 AM     Author:  Berna Vazquez  Service:  (none) Author Type:  (none)     Filed:  2017 12:04 PM  Encounter Date:  2017 Status:  Addendum     :  Berna Vazquez        Related Notes: Original Note by Berna Vazquez filed at 2017 11:56 AM            Patient presents to clinic with concerns about hematuria. She states she noticed blood in her urine yesterday.  Berna VazquezLPN ....................  2017   11:55 AM

## 2017-12-30 NOTE — NURSING NOTE
Patient Information     Patient Name MRN Sex Yolie Sahu [id number] Female 1960      Nursing Note by Kimberly Soria at 2017 10:45 AM     Author:  Kimberly Soria Service:  (none) Author Type:  (none)     Filed:  2017 11:13 AM Encounter Date:  2017 Status:  Signed     :  Kimberly Soria            Patient is here today for a yearly physical, she would like to talk about a skin spot under her chin. Kimberly Soria LPN......................2017 10:50 AM

## 2017-12-30 NOTE — NURSING NOTE
Patient Information     Patient Name MRN Sex     Yolie Bedoya [id number] Female 1960      Nursing Note by Yesenia Butler RN at 2017  8:30 AM     Author:  Yesenia Butler RN Service:  (none) Author Type:  NURS- Registered Nurse     Filed:  2017  8:33 AM Encounter Date:  2017 Status:  Signed     :  Yesenai Butler RN (NURS- Registered Nurse)            Patient positioned in frog leg position prior to Deny Felder MD prepping patient with chlorhexidine gluconate cleanser.    [place] Protocol    A. Pre-procedure verification complete yes  1-relevant information / documentation available, reviewed and properly matched to the patient; 2-consent accurate and complete, 3-equipment and supplies available    B. Site marking complete N/A  Site marked if not in continuous attendance with patient    C. TIME OUT completed yes  Time Out was conducted just prior to starting procedure to verify the eight required elements: 1-patient identity, 2-consent accurate and complete, 3-position, 4-correct side/site marked (if applicable), 5-procedure, 6-relevant images / results properly labeled and displayed (if applicable), 7-antibiotics / irrigation fluids (if applicable), 8-safety precautions.    After procedure perineum area rinsed. Discharge instructions reviewed with patient. Patient verbalized understanding of discharge instructions and discharged ambulatory.

## 2017-12-30 NOTE — NURSING NOTE
"Patient Information     Patient Name MRN Sex Yolie Sahu [id number] Female 1960      Nursing Note by Haylee Estevez at 2017  9:45 AM     Author:  Haylee Estevez Service:  (none) Author Type:  (none)     Filed:  2017 10:46 AM Encounter Date:  2017 Status:  Signed     :  Haylee Estevez            Date of Surgery: 10/12/17 and 10/25/17  Type of Surgery: Left knee and bladder   Surgeon: Dr Jones and Dr De  Hospital:  [facility]    Fever/Chills or other infectious symptoms in past month: no  >10lb weight loss in past two months: no  O2 SAT: 99    Health Care Directive/Code status:  Full code   Hx of blood transfusions:   (NO)   Td up to date:  14  History of VRE/MRSA:  (NO)     Preoperative Evaluation: Obstructive Sleep Apnea screening    S: Snore -  Do you snore loudly? (louder than talking or loud enough to be heard through closed doors)(NO)  T: Tired - Do you often feel tired, fatigued, or sleepy during the daytime?(NO)  O: Observed - Has anyone ever observed you stop breathing during your sleep?(NO)  P: Pressure - Do you have or are you being treated for high blood pressure?(YES)  B: BMI - BMI greater than 35kg/m2?(NO)  A: Age - Age over 50 years old?(YES)  N: Neck - Neck circumference greater than 40 cm?(NO)  G: Gender - Gender: Male?(NO)    Total number of \"YES\" responses:  1    Scoring: Low risk of LO 0-2  At Risk of LO: >3 High Risk of LO: 5-8    Haylee Estevez LPN...................  2017   9:44 AM               "

## 2017-12-30 NOTE — NURSING NOTE
Patient Information     Patient Name MRN Sex Yolie Sahu [id number] Female 1960      Nursing Note by Yesenia Butler RN at 8/15/2017  8:30 AM     Author:  Yesenia Butler RN Service:  (none) Author Type:  NURS- Registered Nurse     Filed:  8/15/2017 10:49 AM Encounter Date:  8/15/2017 Status:  Signed     :  Yesenia Butler RN (NURS- Registered Nurse)            Patient is here for urodynamics testing.  Patient tolerated testing and will follow up with Deny Felder MD tomorrow in clinic.  Yesenia Butler RN.........8/15/2017...10:49 AM

## 2017-12-30 NOTE — NURSING NOTE
Patient Information     Patient Name MRN Sex Yolie Sahu [id number] Female 1960      Nursing Note by Maribel Garcia at 2017 10:45 AM     Author:  Maribel aGrcia Service:  (none) Author Type:  (none)     Filed:  2017 10:51 AM Encounter Date:  2017 Status:  Signed     :  Maribel Garcia            Patient presents to the clinic for consult on incontinence.  Maribel Garcia LPN........................2017  10:46 AM    Review of Systems:    Weight loss:    No     Recent fever/chills:  No   Night sweats:   yes  Current skin rash:  No   Recent hair loss:  No  Heat intolerance:  No   Cold intolerance:  No  Chest pain:   No   Palpitations:   No  Shortness of breath:  yes   Wheezing:   yes  Constipation:    No   Diarrhea:   No   Nausea:   No   Vomiting:   No   Kidney/side pain:  No   Back pain:   yes  Frequent headaches:  No   Dizziness:     No  Leg swelling:   yes   Calf pain:    No    Parents, brothers or sisters with history of kidney cancer?   No  Parents, brothers or sisters with history of bladder cancer: No    Maribel Garcia LPN........................2017  10:47 AM

## 2018-01-02 ENCOUNTER — AMBULATORY - GICH (OUTPATIENT)
Dept: PHYSICAL THERAPY | Facility: OTHER | Age: 58
End: 2018-01-02

## 2018-01-03 ENCOUNTER — HOSPITAL ENCOUNTER (OUTPATIENT)
Dept: PHYSICAL THERAPY | Facility: OTHER | Age: 58
Setting detail: THERAPIES SERIES
End: 2018-01-03

## 2018-01-03 DIAGNOSIS — M79.10 MYALGIA: ICD-10-CM

## 2018-01-03 DIAGNOSIS — M53.3 SACROCOCCYGEAL DISORDERS, NOT ELSEWHERE CLASSIFIED: ICD-10-CM

## 2018-01-03 NOTE — PROGRESS NOTES
Patient Information     Patient Name MRN Sex     Yolie Bedoya [id number] Female 1960      Progress Notes by Yarely Miller MD at 3/1/2017 10:00 AM     Author:  Yarely Miller MD Service:  (none) Author Type:  Physician     Filed:  3/1/2017 12:55 PM Encounter Date:  3/1/2017 Status:  Signed     :  Yarely Miller MD (Physician)            SUBJECTIVE:    Yolie Bedoya is a 56 y.o. female who presents for medication follow up.  Nursing Notes:   Pam Elias  3/1/2017 10:23 AM  Signed  Patient here today for medication management  Pam Elias LPN..............................3/1/2017  10:03 AM       HPI  Yolie Bedoya is a 56 y.o. female in for medication follow up.  She has been taking Ambien for this for several years. She can fall asleep within an hour, but her back will sometimes wake her up. She takes a shower to help with itching, then takes her medication and go to bed. She has taken it and sat on the couch, then spends the evening watching tv she hadn't intended to do so - maybe a little bit more so in the winter. No falls or injuries.   She did receive a prescription from her dentist for narcotics this summer.  Allergies      Allergen   Reactions     Augmentin [Amoxicillin-Pot Clavulanate]  Rash     Doxycycline  Vomiting     Other [Unlisted Allergen (Include Detail In Comments)]  Stomach Upset and Vomiting     Narcotics      Sulfa (Sulfonamide Antibiotics)  Rash     Zaroxolyn [Metolazone]  *Unknown - Pt Doesn't Remember   ,   Current Outpatient Prescriptions     Medication  Sig     albuterol HFA (VENTOLIN HFA) 90 mcg/actuation inhaler Inhale 2 Puffs by mouth every 4 hours if needed.     buPROPion (WELLBUTRIN XL) 150 mg Extended-Release tablet Take 1 tablet by mouth every morning.     calcium carbonate-vit D3, 600 mg-400 units, (CALCIUM PLUS) tablet Take 1 tablet by mouth once daily with a meal.     Cetirizine (ZYRTEC) 10 mg cap Take 1 Tab by mouth once daily.      cholecalciferol (VITAMIN D) 1,000 unit capsule Take 1,000 Units by mouth once daily.     diphenhydrAMINE (BENADRYL) 25 mg capsule Take 2 capsules by mouth at bedtime if needed.     estradiol (ESTRACE) 1 mg tablet Take 1 tablet by mouth once daily.     fluticasone (50 mcg per actuation) nasal solution (FLONASE) Inhale 2 Sprays into both nostrils once daily.     fluticasone (FLOVENT HFA) 110 mcg/Actuation inhaler Inhale 2 Puffs by mouth 2 times daily.     hydrochlorothiazide 12.5 mg tablet Take 1 tablet by mouth once daily.     losartan-hydrochlorothiazide (HYZAAR) 100-25 mg tablet Take 1 tablet by mouth once daily.     montelukast (SINGULAIR) 10 mg tablet Take 1 tablet by mouth at bedtime.     multivitamin (MVI) tablet Take 1 tablet by mouth once daily.     polycarbophil (FIBERTAB) 625 mg tablet Take 1 tablet by mouth once daily.     SUMAtriptan NASAL (IMITREX) 20 mg/actuation nasal spray Inhale 1 Spray in the nostril(s) every 2 hours if needed for Migraine.     triamcinolone (ARISTOCORT) 0.1 % ointment Apply  topically to affected area(s) 2 times daily.     zolpidem (AMBIEN) 10 mg tablet Take 1 tablet by mouth at bedtime if needed for Sleep.     No current facility-administered medications for this visit.      Medications have been reviewed by me and are current to the best of my knowledge and ability.  and   Past Medical History      Diagnosis   Date     Allergic rhinitis due to animal (cat) (dog) hair and dander  1970     aspen, birch, maple, oak, grass, dust mite, ragwee, cocklebur, mugwart, lambs quarter, pigweed, fungus, molds       Cholelithiasis       Chronic insomnia       contract signed 2/2014      Controlled substance agreement signed  2/11/2014     ambien #30/month      Menorrhagia       s/p CLARA      Obesity       Postmenopausal HRT (hormone replacement therapy)  2010     started ERT      Pyelonephritis       Venous insufficiency         REVIEW OF SYSTEMS:  Review of Systems   Respiratory: Positive  for cough and shortness of breath.         Worse this winter, worse after exertion. Not using the rescue inhaler much - 1-2 times a week.  Coughs worse at night. Notices lots more drainage.  Wheezing is every day.    With certain preservatives, she will feel it with her breathing, in her face.       Asthma Data 6/1/2015 9/20/2016   Date completed 6/1/2015 9/20/2016   Missed daily activities no = 0 no = 0   Wake at night no = 0 no = 0   Believe asthma in control yes = 0 yes = 0   WILDER use yes yes   Maximum WILDER use in 1 day 1-4 = 0 1-4 = 0   ATAQ score 0 = well controlled 0 = well controlled   Asthma ED visits in past 12 mos 0 0   Asthma hospitalizations in past 12 mos 0 0     States she is compliant with ehr asthma/allergy medications.    OBJECTIVE:  /80  Pulse 72  Wt 93.5 kg (206 lb 3.2 oz)  BMI 33.28 kg/m2    EXAM:   Physical Exam   HENT:   Head: Normocephalic and atraumatic.   Mild bilateral nasal congestion   Cardiovascular: Normal rate and regular rhythm.    No murmur heard.  Pulmonary/Chest: Effort normal and breath sounds normal. She has no wheezes.   Musculoskeletal: She exhibits edema.   1+ le edema - chronic   Lymphadenopathy:     She has no cervical adenopathy.   Psychiatric: Affect normal.   Nursing note and vitals reviewed.      PHQ Depression Screening 9/20/2016 3/1/2017   Date of PHQ exam (doc flow) 9/20/2016 3/1/2017   1. Lack of interest/pleasure 0 - Not at all 0 - Not at all   2. Feeling down/depressed 0 - Not at all 0 - Not at all   PHQ-2 TOTAL SCORE 0 0   3. Trouble sleeping - -   4. Decreased energy - -   5. Appetite change - -   6. Feelings of failure - -   7. Trouble concentrating - -   8. Activity level - -   9. Hurting yourself - -   PHQ-9 TOTAL SCORE - -   PHQ-9 Severity Level - -   Functional Impairment - -       ASSESSMENT/PLAN:    ICD-10-CM    1. Controlled substance agreement signed Z79.899 COMPLIANCE DRUG ANALYSIS      COMPLIANCE DRUG ANALYSIS   2. INSOMNIA, PERSISTENT G47.00  COMPLIANCE DRUG ANALYSIS      zolpidem (AMBIEN) 10 mg tablet      COMPLIANCE DRUG ANALYSIS   3. Moderate persistent asthma without complication J45.40 fluticasone-salmeterol (ADVAIR DISKUS) 250-50 mcg/Dose diskus inhaler      HEMOGLOBIN      HEMOGLOBIN        Plan:  1.  Contract updated today.   reviewed.  OME - NA  See pt ed handout for risk factor review.  Tox - 3/1/2017   Follow up in 6 months.    2.  Differential diagnosis of her respiratory symptoms as discussed with her. This could be inadequate control of her asthma, upper respiratory symptoms/allergies, cardiac, deconditioning, anemia. We'll check a hemoglobin today. If normal, will change from Flovent to Advair 250/50 one inhalation twice a day. If with this change, she is not noticing improvement in her symptoms consider pulmonary function testing, cardiac testing.    Yarely Miller MD     Results for orders placed or performed in visit on 03/01/17      HEMOGLOBIN      Result  Value Ref Range    HEMOGLOBIN                14.4 12.0 - 16.0 g/dL    MCV                       87 80 - 100 fL     Pt informed of normal hemoglobin results.  Yarely Miller MD

## 2018-01-03 NOTE — NURSING NOTE
Patient Information     Patient Name MRN Sex Yolie Sahu [id number] Female 1960      Nursing Note by Pam Elias at 3/1/2017 10:00 AM     Author:  Pam Elias Service:  (none) Author Type:  (none)     Filed:  3/1/2017 10:23 AM Encounter Date:  3/1/2017 Status:  Signed     :  Pam Elias            Patient here today for medication management  Pam Elias LPN..............................3/1/2017  10:03 AM

## 2018-01-03 NOTE — PATIENT INSTRUCTIONS
Patient Information     Patient Name MRN Yolie Del Toro [id number] Female 1960      Patient Instructions by Yarely Miller MD at 3/1/2017 10:00 AM     Author:  Yarely Miller MD  Service:  (none) Author Type:  Physician     Filed:  3/1/2017 10:41 AM  Encounter Date:  3/1/2017 Status:  Addendum     :  Yarely Miller MD (Physician)        Related Notes: Original Note by Yarely Miller MD (Physician) filed at 3/1/2017  9:04 AM            You are currently being prescribed a controlled substance.  You need to be aware of the risks of taking this medication.  By signing a medication contract, you accept these risks and side effects.      Any medical treatment is initially a trial, and that continued prescribing is based on evidence of benefit without an unacceptable risk. Understand that the goal of using this medication is to increase functional level. If your symptoms do not significantly decrease and/or function increase, the medication will be stopped.    Be aware that the use of such medicine has certain risks associated with it, including, but not limited to:  Sleepiness or drowsiness, lightheadedness, dizziness, confusion,allergic reaction, slowing of breathing rate, slowing of reflexes or reaction time, sexual dysfunction, physical dependence, tolerance to analgesia, addiction, withdrawal and the possibility that the medicine will not completely take care of your symptoms.  Usage is associate with a significantly increased risk of falls and other unintended injuries.    The overuse of this medication can result in serious health risks including respiratory depression or even death.  These risks are increased when the medication is combined with alcohol, narcotics, or other sedatives.    Having these medications prescribed for you also means that your accept the risk of possible intentional or accidental use by those in your home or others with  whom you come in contact.  This includes their possible diversion of your medications, overdose or death.      This medication will be strictly monitored and all of my medications should be filled at the same pharmacy.  (Should the need arise to change pharmacies our office must be informed).    It is your responsibility to share that you are on a controlled substance contract with your other health care providers, including your dentist.        For your breathing - add a long acting bronchodilator.

## 2018-01-05 NOTE — TELEPHONE ENCOUNTER
Patient Information     Patient Name MRN Sex Yolie Sahu [id number] Female 1960      Telephone Encounter by Susana Mesa RN at 2017  7:50 AM     Author:  Susana Mesa RN Service:  (none) Author Type:  NURS- Registered Nurse     Filed:  2017  7:56 AM Encounter Date:  2017 Status:  Signed     :  Susana Mesa RN (NURS- Registered Nurse)            Per pharmacy fax- [facility] says they do not have refills for HCTZ.   Request physician consideration to refill HCTZ 12.5 mg as requested by pharmacy. Patient will be due for labs to support this rx in 2017 per protocol, however due for follow up OV in 2017.     Diuretics (may be prescribed for edema)  Office visit in the past 12 months or per provider note.  Last visit with DAVID GA was on: 2017 in [facility]-Follow up in 6 months.   Next visit with DAVID GA is on: No future appointment listed with this provider  Lab testing requirements:  Creatinine and Potassium annually, if ordering lab, order BMP.  CREATININE (mg/dL)    Date Value   2016 0.87     POTASSIUM (mmol/L)    Date Value   2016 3.5     BP Readings from Last 4 Encounters:    17 126/80   16 176/92   16 140/90   16 122/70   Review last provider visit note.  If BP reviewed and plan is noted, can refill.  Max refill for 12 months from last office visit or per provider note.  Unable to complete prescription refill per RN Medication Refill Policy.................... Susana Mesa RN ....................  2017   7:54 AM

## 2018-01-08 ENCOUNTER — HOSPITAL ENCOUNTER (OUTPATIENT)
Dept: PHYSICAL THERAPY | Facility: OTHER | Age: 58
Setting detail: THERAPIES SERIES
End: 2018-01-08

## 2018-01-11 ENCOUNTER — HOSPITAL ENCOUNTER (OUTPATIENT)
Dept: PHYSICAL THERAPY | Facility: OTHER | Age: 58
Setting detail: THERAPIES SERIES
End: 2018-01-11

## 2018-01-18 ENCOUNTER — HOSPITAL ENCOUNTER (OUTPATIENT)
Dept: PHYSICAL THERAPY | Facility: OTHER | Age: 58
Setting detail: THERAPIES SERIES
End: 2018-01-18

## 2018-01-23 ENCOUNTER — HOSPITAL ENCOUNTER (OUTPATIENT)
Dept: PHYSICAL THERAPY | Facility: OTHER | Age: 58
Setting detail: THERAPIES SERIES
End: 2018-01-23

## 2018-01-24 ENCOUNTER — COMMUNICATION - GICH (OUTPATIENT)
Dept: FAMILY MEDICINE | Facility: OTHER | Age: 58
End: 2018-01-24

## 2018-01-24 DIAGNOSIS — G47.00 INSOMNIA: ICD-10-CM

## 2018-01-26 VITALS
BODY MASS INDEX: 31.57 KG/M2 | HEART RATE: 52 BPM | HEIGHT: 66 IN | HEIGHT: 66 IN | TEMPERATURE: 97.8 F | SYSTOLIC BLOOD PRESSURE: 110 MMHG | WEIGHT: 196.4 LBS | HEART RATE: 74 BPM | OXYGEN SATURATION: 99 % | BODY MASS INDEX: 31.57 KG/M2 | DIASTOLIC BLOOD PRESSURE: 76 MMHG | WEIGHT: 196.4 LBS | RESPIRATION RATE: 12 BRPM

## 2018-01-26 VITALS
SYSTOLIC BLOOD PRESSURE: 136 MMHG | BODY MASS INDEX: 31.89 KG/M2 | HEART RATE: 72 BPM | DIASTOLIC BLOOD PRESSURE: 80 MMHG | DIASTOLIC BLOOD PRESSURE: 74 MMHG | HEART RATE: 72 BPM | WEIGHT: 194.6 LBS | SYSTOLIC BLOOD PRESSURE: 126 MMHG | WEIGHT: 206.2 LBS | BODY MASS INDEX: 33.28 KG/M2

## 2018-01-26 VITALS
WEIGHT: 193.2 LBS | BODY MASS INDEX: 31.42 KG/M2 | SYSTOLIC BLOOD PRESSURE: 132 MMHG | RESPIRATION RATE: 18 BRPM | HEART RATE: 72 BPM | TEMPERATURE: 98.7 F | DIASTOLIC BLOOD PRESSURE: 70 MMHG

## 2018-01-26 VITALS — SYSTOLIC BLOOD PRESSURE: 132 MMHG | DIASTOLIC BLOOD PRESSURE: 82 MMHG | HEART RATE: 78 BPM | WEIGHT: 198 LBS

## 2018-01-26 VITALS
WEIGHT: 198.8 LBS | HEART RATE: 80 BPM | SYSTOLIC BLOOD PRESSURE: 138 MMHG | HEIGHT: 66 IN | BODY MASS INDEX: 31.95 KG/M2 | DIASTOLIC BLOOD PRESSURE: 88 MMHG

## 2018-01-26 VITALS — HEART RATE: 60 BPM | WEIGHT: 196 LBS | HEIGHT: 66 IN | BODY MASS INDEX: 31.5 KG/M2 | RESPIRATION RATE: 12 BRPM

## 2018-01-28 ENCOUNTER — HEALTH MAINTENANCE LETTER (OUTPATIENT)
Age: 58
End: 2018-01-28

## 2018-01-29 ENCOUNTER — AMBULATORY - GICH (OUTPATIENT)
Dept: FAMILY MEDICINE | Facility: OTHER | Age: 58
End: 2018-01-29

## 2018-01-29 DIAGNOSIS — R60.1 GENERALIZED EDEMA: ICD-10-CM

## 2018-01-29 DIAGNOSIS — Z79.899 OTHER LONG TERM (CURRENT) DRUG THERAPY: ICD-10-CM

## 2018-01-29 DIAGNOSIS — E87.6 HYPOKALEMIA: ICD-10-CM

## 2018-01-31 ENCOUNTER — COMMUNICATION - GICH (OUTPATIENT)
Dept: FAMILY MEDICINE | Facility: OTHER | Age: 58
End: 2018-01-31

## 2018-02-01 ENCOUNTER — HOSPITAL ENCOUNTER (OUTPATIENT)
Dept: PHYSICAL THERAPY | Facility: OTHER | Age: 58
Setting detail: THERAPIES SERIES
End: 2018-02-01

## 2018-02-01 PROCEDURE — 97112 NEUROMUSCULAR REEDUCATION: CPT | Mod: GP

## 2018-02-05 ENCOUNTER — OFFICE VISIT - GICH (OUTPATIENT)
Dept: FAMILY MEDICINE | Facility: OTHER | Age: 58
End: 2018-02-05

## 2018-02-05 ENCOUNTER — HISTORY (OUTPATIENT)
Dept: FAMILY MEDICINE | Facility: OTHER | Age: 58
End: 2018-02-05

## 2018-02-05 DIAGNOSIS — G47.00 INSOMNIA: ICD-10-CM

## 2018-02-05 ASSESSMENT — PATIENT HEALTH QUESTIONNAIRE - PHQ9: SUM OF ALL RESPONSES TO PHQ QUESTIONS 1-9: 1

## 2018-02-06 ENCOUNTER — HOSPITAL ENCOUNTER (OUTPATIENT)
Dept: PHYSICAL THERAPY | Facility: OTHER | Age: 58
Setting detail: THERAPIES SERIES
End: 2018-02-06

## 2018-02-06 PROCEDURE — 97110 THERAPEUTIC EXERCISES: CPT | Mod: GP

## 2018-02-06 PROCEDURE — 97112 NEUROMUSCULAR REEDUCATION: CPT | Mod: GP

## 2018-02-09 VITALS
DIASTOLIC BLOOD PRESSURE: 68 MMHG | HEART RATE: 76 BPM | WEIGHT: 208.6 LBS | BODY MASS INDEX: 33.67 KG/M2 | SYSTOLIC BLOOD PRESSURE: 136 MMHG | TEMPERATURE: 96.8 F

## 2018-02-10 ASSESSMENT — PATIENT HEALTH QUESTIONNAIRE - PHQ9: SUM OF ALL RESPONSES TO PHQ QUESTIONS 1-9: 1

## 2018-02-12 NOTE — PROGRESS NOTES
Patient Information     Patient Name MRN Sex     Yolie Bedoya [id number] Female 1960      Progress Notes by Elsy Mg PT at 2018 11:06 AM     Author:  Elsy Mg PT Service:  (none) Author Type:  PT- Physical Therapist     Filed:  2018 11:55 AM Date of Service:  2018 11:06 AM Status:  Signed     :  Elsy Mg PT (PT- Physical Therapist)            [facility]  Outpatient PT - Daily Note        Date of Service: 2018   Visit #3    Patient Name: Yolie Bedoya   YOB: 1960   Referring MD/Provider: CRISTO Najera  Diagnosis: sacroiliac joint pain, piriformis muscle pain  Treatment Diagnosis: low back pain, lumbar segmental dysfunctions,myofascial tightness, posture dysfunction, muscle weakness  Insurance: [facility]  Start of Service: 18  Certification Dates: Start of Service: 18   Medicare/MA Re-Cert Due: 18      Subjective        Pain Ratin = no pain, comfortable and  4 = Moderate Pain, (Aggravating, Grueling, Upsetting, Frustrating) / Location:  Right SI area    Left knee really doesn't like the hip flexor stretch. Right hip is a lot better. Left still wakes her. Slept till 7am the other day. Left hip area is sore.    Objective  Postural loading:   General Listening:  Left abdomen  Head:  symmetrical  Shoulders: L/L   Pelvis: R/R    Standing Flexion:  Stork: right  Hip Drop:  Seated Flexion:   Supine:    Pubes:   ASIS:   Leg length:  Prone:   PSIS:   Leg length:  Sacral Base:  NICK Posterior/Inferior:       Positional Facet Diagnoses:   ERS Right ERS Left Neutral  SB R/Rot Left Neutral  SB L/Rot Right FRS Right FRS Left   T10-11         T11-12         T12-L1         L1-2         L2-3         L3-4         L4-5         L5-S1         (* ERS = extended/rotated/sidebent; FRS = flexed/rotated/sidebent)    Today's Intervention:    Supine MFR left anterior hip/pelvis (fascia of toldt, psoas); release to left  femoral nerve  Prone left iliopsoas stretch with contract relax  Supine MFR left hip with log rolling    Home Exercise Program:  Hip flexor stretch  Low trunk rotation  ice  Assessment    Therapist Assessment:  Patient presents with chronic low back/SI pain that now includes left and right side. Receptin history of left plica removal and CLARA/BSO/Vasquez/rectocel repair and cystocele repair. Noted lumbar segmental dysfunctions, myofascial tightness, limited flexibility with hip flexors, muscle weakness with core and hips.           Goals:  Patient goal (time reference required): reduce pain, improved strength and ability to lift and carry grandchildren .     Long term goal: Patient is to self-manage symptoms and return to prior function in 8 weeks.       Functional goals:   Patient is to be independent in their Home Exercise Program in 8 weeks.  Patient is to tolerate walking with normal gait with 2/10 pain up to 30 minutes for aerobic exercise in 8 weeks.  Patient is to have improved spine mobility to 100 degrees to allow for improved dressing and self-cares with out pain in 8 weeks.  Patient is report the ability to sleep 6-8 hours without awakening due to pain in 8 weeks.  Patient is to display and maintain normal joint mobility/symmetry in the lumbar spine and pelvis to allow correct gait pattern with normal hip extension in 8 weeks.  Patient is to demonstrate ability to sit for 30 minutes to allow meal time and travel time in 8 weeks.  Patient is to report 2/10 pain during household activities/work tasks including dishes, laundry in 8 weeks.     Patient participated in goal selection and understand(s) the plan of care: Yes   Patient Potential for Achieving Desired Outcome:  Excellent    Response to Intervention:  Tolerated well      Plan    Treatment Plan / Targeted Outcomes:     Frequency:   16 visits     Duration of Treatment: 8 weeks    Planned Interventions:  Possible physical therapy interventions include but  are not limited to:   Home Exercise Program development  Therapeutic Exercise (ROM & Strengthening)  Manual Therapy  Neuromuscular Re-education  Ultrasound  Electrical Stimulation  Phonophoresis with Ketoprofen  Iontophoresis with Dexamethasone  Therapeutic Activities  Cold pack    Plan for next visit:  Advance HEP as indicated, manual therapy    Student or PTA has been instructed in and demonstrates skills necessary to carry out above stated treatment plan: Yes    Thank you for your referral to [facility].  Please call with any questions, concerns or comments.  [phone]

## 2018-02-12 NOTE — TELEPHONE ENCOUNTER
Patient Information     Patient Name MRN Sex Yolie Sahu [id number] Female 1960      Telephone Encounter by Susana Mesa RN at 2017  8:14 AM     Author:  Susana Mesa RN Service:  (none) Author Type:  NURS- Registered Nurse     Filed:  2017  8:21 AM Encounter Date:  2017 Status:  Signed     :  Susana Mesa RN (NURS- Registered Nurse)            Per pharmacy fax- patient is enrolled in our Rx med sync service to improve adherence. We are requesting a refill authorization in advance to ensure an active Rx is on file.     This is a Refill request from: [facility]  Name of Medication: Zolpidem (AMBIEN) 10 mg tablet  Quantity requested: 30 x 5   Last fill date: 17  Due for refill: 18  Last visit with DAVID GA was on: 2017  Controlled Substance Agreement:  17  Diagnosis r/t this medication request: Insomnia, Persistent     Unable to complete prescription refill per RN Medication Refill Policy.................... Susana Mesa RN ....................  2017   8:15 AM

## 2018-02-12 NOTE — PROGRESS NOTES
Patient Information     Patient Name MRN Sex     Yolie Bedoya [id number] Female 1960      Progress Notes by Elsy Mg PT at 2018 12:56 PM     Author:  Elsy Mg PT Service:  (none) Author Type:  PT- Physical Therapist     Filed:  2018  1:43 PM Date of Service:  2018 12:56 PM Status:  Signed     :  Elsy Mg PT (PT- Physical Therapist)            [facility]  Outpatient PT - Daily Note        Date of Service: 2018   Visit #2    Patient Name: Yolie Bedoya   YOB: 1960   Referring MD/Provider: CRISTO Najera  Diagnosis: sacroiliac joint pain, piriformis muscle pain  Treatment Diagnosis: low back pain, lumbar segmental dysfunctions,myofascial tightness, posture dysfunction, muscle weakness  Insurance: [facility]  Start of Service: 18  Certification Dates: Start of Service: 18   Medicare/MA Re-Cert Due: 18      Subjective        Pain Ratin = no pain, comfortable and  7 = Severe Pain, (Miserable, Gnawing, Fierce, Piercing) / Location:  Right SI area    Was stiff two days later. Knee is bothering her- zingers in knee. Waking at night around 3 am, feels sore.     Objective  Postural loading:   General Listening:  lumbar  Head:  symmetrical  Shoulders: L/L R/R  Pelvis: symmetrical    Standing Flexion:  Stork: right  Hip Drop:  Seated Flexion: right  Supine:    Pubes:   ASIS:   Leg length:  Prone:   PSIS:   Leg length:  Sacral Base:  NICK Posterior/Inferior: left, same with flex and ext      Positional Facet Diagnoses:   ERS Right ERS Left Neutral  SB R/Rot Left Neutral  SB L/Rot Right FRS Right FRS Left   T10-11         T11-12         T12-L1         L1-2         L2-3         L3-4 x x       L4-5         L5-S1         (* ERS = extended/rotated/sidebent; FRS = flexed/rotated/sidebent)    Today's Intervention:    Seated MET for ERS right and left L3  Prone MET for unilateral posterior nutated right sacrum  OSFM-  hepatoduodenal ligament  Release right sciatic nerve  Added in arms to [place] x10    Home Exercise Program:  Hip flexor stretch  Low trunk rotation  ice  Assessment    Therapist Assessment:  Patient presents with chronic low back/SI pain that now includes left and right side. Receptin history of left plica removal and CLARA/BSO/Vasquez/rectocel repair and cystocele repair. Noted lumbar segmental dysfunctions, myofascial tightness, limited flexibility with hip flexors, muscle weakness with core and hips.           Goals:  Patient goal (time reference required): reduce pain, improved strength and ability to lift and carry grandchildren .     Long term goal: Patient is to self-manage symptoms and return to prior function in 8 weeks.       Functional goals:   Patient is to be independent in their Home Exercise Program in 8 weeks.  Patient is to tolerate walking with normal gait with 2/10 pain up to 30 minutes for aerobic exercise in 8 weeks.  Patient is to have improved spine mobility to 100 degrees to allow for improved dressing and self-cares with out pain in 8 weeks.  Patient is report the ability to sleep 6-8 hours without awakening due to pain in 8 weeks.  Patient is to display and maintain normal joint mobility/symmetry in the lumbar spine and pelvis to allow correct gait pattern with normal hip extension in 8 weeks.  Patient is to demonstrate ability to sit for 30 minutes to allow meal time and travel time in 8 weeks.  Patient is to report 2/10 pain during household activities/work tasks including dishes, laundry in 8 weeks.     Patient participated in goal selection and understand(s) the plan of care: Yes   Patient Potential for Achieving Desired Outcome:  Excellent    Response to Intervention:  Reduce soreness with sacral palpation      Plan    Treatment Plan / Targeted Outcomes:     Frequency:   16 visits     Duration of Treatment: 8 weeks    Planned Interventions:  Possible physical therapy interventions  include but are not limited to:   Home Exercise Program development  Therapeutic Exercise (ROM & Strengthening)  Manual Therapy  Neuromuscular Re-education  Ultrasound  Electrical Stimulation  Phonophoresis with Ketoprofen  Iontophoresis with Dexamethasone  Therapeutic Activities  Cold pack    Plan for next visit:  Advance HEP as indicated, manual therapy    Student or PTA has been instructed in and demonstrates skills necessary to carry out above stated treatment plan: Yes    Thank you for your referral to [facility].  Please call with any questions, concerns or comments.  [phone]

## 2018-02-12 NOTE — INITIAL ASSESSMENTS
Patient Information     Patient Name MRN Sex     Yolie Bedoya [id number] Female 1960      Initial Assessments by Elsy Mg PT at 1/3/2018  1:02 PM     Author:  Elsy Mg PT Service:  (none) Author Type:  PT- Physical Therapist     Filed:  1/3/2018  2:20 PM Date of Service:  1/3/2018  1:02 PM Status:  Signed     :  Elsy Mg PT (PT- Physical Therapist)            [facility]  Outpatient PT - Initial Evaluation  Spine Evaluation         Date of Service: 1/3/2018     Patient Name: Yolie Bedoya   YOB: 1960   Referring MD/Provider: CRISTO Najera  Diagnosis: sacroiliac joint pain, piriformis muscle pain  Treatment Diagnosis: low back pain, lumbar segmental dysfunctions, myofascial tightness, posture dysfunction, muscle weakness  Insurance: [facility]  Start of Service: 18  Certification Dates: Start of Service: 18   Medicare/MA Re-Cert Due: 18    Living Situations:  Independent in Living Situation      Preadmission Functional Mobility: Independent  Precautions:  none  Cognition:  Oriented to Person, Place, and Time.     Were cultural / age or other special adaptations needed? No      Patient is a vulnerable adult: No      Patient is aware of diagnosis: Yes      Risks and benefits explained: Yes    Subjective      History of Injury: no injury   Date of injury or onset: long standing left sided pain, had injections for a couple of years. Now having pain on right and left. Injections no longer helping. Had knee surgery 10/17, plica removal; bladder sling 10/17. Pain in knee with turning foot, pivot.   Has pain with sitting, bending, doing dishes, folding laundry, static stance, pain wakes her at night.  Limited activity since last summer due to incontinence and knee pain.    Current Symptoms:  ?   Decreased Motion  Weakness   sit on something hard- pain in to left leg  Minimal urinary incontience since surgery  Pain Ratin  = no pain, comfortable and  9 = Very Severe Pain, (Dreadful, Overwhelming, Horrible, Agonizing) / Location:  Low back SI    Aggravation Factors: sitting, forward bent, static stance       Easing Factors: position changes     Subjective rating of current functional limitations:      Functional Activity No Difficulty Mild Difficulty Mod. Difficulty Severe Difficulty   Sleeping   x    Walking x      Lifting/Carrying   x    Bending   x    Sitting/Driving    1 Hour   x    Work Activities           Current Work Status:   Occupation: retired        Prior Level of Function:   Long standing low back pain, left sided, started about 50 years of age; now includes right side.     Previous Injury / Surgery:         Previous Treatment:    Pain Meds / Anti-inflammatory Meds    ?   Injections    ?   Chiropractic  Surgery      Diagnostics:       X-Ray       MRI        Results:NA    Current Medications:   ? Reviewed (see chart)    Drug Allergies:  ? Reviewed (see chart)  ?   Latex Allergy:    Significant PMHX:    Past Medical History:     Diagnosis  Date     Allergic rhinitis due to animal (cat) (dog) hair and dander 1970    aspen, birch, maple, oak, grass, dust mite, ragwee, cocklebur, mugwart, lambs quarter, pigweed, fungus, molds       Cholelithiasis      Chronic insomnia     contract signed 2/2014      Controlled substance agreement signed 2/11/2014    ambien #30/month      HYPERTENSION      Menorrhagia     s/p CLARA      Obesity      PONV (postoperative nausea and vomiting)     nausea and severe vomiting with narcotics      Postmenopausal HRT (hormone replacement therapy) 2010    started ERT      Pyelonephritis      Venous insufficiency      Past Surgical History:      Procedure  Laterality Date     COLONOSCOPY SCREENING  6/2004     because of change in bowel habits       COLONOSCOPY SCREENING  5/14/2014    Extensive diverticulosis throughout the colon - follow up 10 years       ENDOMETRIAL ABLATION  3/05     [place] SLING OPER STRES  INCONTINENCE  10/25/2017            CLARA AND BSO  3/30/10    CLARA/BSO/Avsquez/Posterior Repair       WISDOM TEETH EXTRACTION           Patient Goal: reduce pain, increase ability to lift and carry grand children, gain strength    Patient Specific Functional and Pain Scales (PSFS):    Clinician Instructions: Complete after the history and before the exam.    Initial Assessment: We want to know what 3 activities in your life you are unable to perform, or are having the most difficulty performing, as a result of your chief problem. Please list and score at least 3 activities that you are unable to perform, or having the most difficulty performing, because of your chief problem.   Patient Specific Activity Scoring Scheme (score one number for each activity):   Activity Score (0-10)  0= Unable to perform activity  10= Able to perform activity at same level as before injury or problem   1. Laundry  5/10   2. dishes 6/10   3. Sitting  6/10   4. sleep 6/10   5.  /10   Totals:  23/40 = 58 % ability which relates to 42% impairment    Patient verbally states that they understand that the information they have provided above is current and complete to the best of their knowledge.    Patient Specific Functional Scale Modifier Scale Conversion: (patient's modifier that correlates with pt's score on PSFS): 6-CK (40% Impaired).    G codes and Modifier taken from patient completing the PSFS:   Initial Primary G Code and Modifier:    Per the Patient's intake and/or assessment the Primary G Code is: Body Position .   The Patient's Impairment, Limitation or Restriction Modifier would be best described as: CK - 40% - 60% Impairment.   Goal Primary G Code and Modifier:    The Patient's G Code Goal would be: Body Position    The Patient's Impairment, Limitation or Restriction Modifier goal would be best described as: CJ - 20% - 40% Impairment.       Objective    Items left blank indicate that the test was inappropriate or not  meaningful at the time of evaluation and therefore not performed.    Fall Risk Screening:  No risk factors identified    Posture/Structural:   Hand Dominance:   _____Right  _____Left   Head and Neck Position:   Shoulders/scapulae: rounded   Thoracic spine: rigid   Scoliosis:    Lumbar lordosis:reduced   [place]:left high  PSIS:    Postural loading:  General Listening: lumbar spine, left kidney  Head:    Shoulders: L/L R/R  Pelvis:     Gait: limited trunk rotation, limited use of gluteals, limited hip extension    Range of Motion:   AROM Repetitive: Comments:   Flexion 70  Dysrhythmia:  ____ Yes/No     Extension -5     Left lateral flexion limited     Right lateral flexion limited     Right rotation (seated)      Left rotation (seated)      Standing Pelvic Clock        Lower extremity strength: Grade 5 equals normal    Left  Right  Comments:   L2 Hip flexion      L3 Knee extension      L4 Ankle dorsiflexion      L5 1st MTP extension      S1 Plantarflexion      S2 Knee flexion      Hip Abduction      Gluteus medius 3 3    Gluteus jong 3 3        Standing Tests: -   Right  Left Comments:   Standing flexion      Stork      Hip Drop      Pelvic Drop        Seated Tests:   Right Left Comments:   Seated flexion      Tissue fullness x  QL   NICK inferior/posterior   With flexion: ____better ____worse ____same     Seated SLR      Slump sit        Supine Tests:   Right Left Comments:   Pubes      ASIS      Leg length    Short      Long     Level x   Iliac Crest      Iliac Shear Test      Knees to Chest x x    SLR      Laseque      CED  x    FADIR  x        Prone Tests:   Right Left Comments:   Leg length   Short          Long          Level   PSIS   Superior    Inferior     Level   Ischial tuberosity   Superior    Inferior     Level   Sacrotuberous ligament  x    Sacral base   Anterior    Posterior   Level   NICK Posterior/inferior   With extension:  ___Better    ____Worse  ____Same     S-I Spring test    ____Transverse   ___Oblique   Iliopsoas tightness x x      Positional Facet Diagnoses:   ERS Right ERS Left Neutral  SB R/Rot Left Neutral  SB L/Rot Right FRS Right FRS Left   T10-11         T11-12         T12-L1         L1-2     x    L2-3         L3-4         L4-5         L5-S1  x   x    (* ERS = extended/rotated/sidebent; FRS = flexed/rotated/sidebent)    Palpation:  Tender left sciatic nerve, left sacrotuberous ligament    Today's Intervention:    seated muscle energy technique for FRS right L1-2, FRS right L5-S1, ERS left L5  Supine organ specific fascial mobilization (OSFM) to fascia of toilt, renal fascia, left renal vein  Proprioceptive organ facilitation (POF) left kidney  Modified jeimy stretch to bilateral hip flexors added to HEP  Hook lying low trunk rotation- added to HEP  Advised ice for soreness    Home Exercise Program:   Hip flexor stretch  Low trunk rotation  ice    Assessment    Therapist Assessment / Clinical Impression: Patient presents with chronic low back/SI pain that now includes left and right side. Receptin history of left plica removal and CLARA/BSO/Vasquez/rectocel repair and cystocele repair. Noted lumbar segmental dysfunctions, myofascial tightness, limited flexibility with hip flexors, muscle weakness with core and hips.    Functional Impairment(s): sitting, standing, bending, lifting/carrying, sleep:  low back pain, left posterior thigh pain      Prior Function:   Not Limited    Physical Impairment(s):       MUSCULOSKELETAL:  Deconditioned, Dysfunction, Loss of Motion, Muscle Tightness, Pain, Postural Problems and Weakness      Goals:  Patient goal (time reference required): reduce pain, improved strength and ability to lift and carry grandchildren .    Long term goal: Patient is to self-manage symptoms and return to prior function in 8 weeks.      Functional goals:   Patient is to be independent in their Home Exercise Program in 8 weeks.  Patient is to tolerate walking with normal gait  with 2/10 pain up to 30 minutes for aerobic exercise in 8 weeks.  Patient is to have improved spine mobility to 100 degrees to allow for improved dressing and self-cares with out pain in 8 weeks.  Patient is report the ability to sleep 6-8 hours without awakening due to pain in 8 weeks.  Patient is to display and maintain normal joint mobility/symmetry in the lumbar spine and pelvis to allow correct gait pattern with normal hip extension in 8 weeks.  Patient is to demonstrate ability to sit for 30 minutes to allow meal time and travel time in 8 weeks.  Patient is to report 2/10 pain during household activities/work tasks including dishes, laundry in 8 weeks.    Patient participated in goal selection and understand(s) the plan of care: Yes   Patient Potential for Achieving Desired Outcome:  Excellent    Response to Intervention:  Improved loading through left shoulder, level iliac crest height     Plan    Treatment Plan / Targeted Outcomes:     Frequency:   16 visits     Duration of Treatment: 8 weeks    Planned Interventions:  Possible physical therapy interventions include but are not limited to:   Home Exercise Program development  Therapeutic Exercise (ROM & Strengthening)  Manual Therapy  Neuromuscular Re-education  Ultrasound  Electrical Stimulation  Phonophoresis with Ketoprofen  Iontophoresis with Dexamethasone  Therapeutic Activities  Gait Training  Posture and Body Mechanics Education  Cold pack    Plan for next visit:  Manual therapy, advance HEP as indicated    Student or PTA has been instructed in and demonstrates skills necessary to carry out above stated treatment plan: Yes    Thank you for your referral to [facility].  Please call with any questions, concerns or comments.  [phone]    The signature, of the referring medical provider, on this document indicates certification of the above prescribed plan of care and is medically  necessary.          X____________________________________________________    Physician Signature                     Date  Time

## 2018-02-13 NOTE — PROGRESS NOTES
Patient Information     Patient Name MRN Sex     Yolie Bedoya [id number] Female 1960      Progress Notes by Celia Blevins PA-C at 2018  9:15 AM     Author:  Celia Blevins PA-C Service:  (none) Author Type:  PHYS- Physician Assistant     Filed:  2018  9:54 AM Encounter Date:  2018 Status:  Signed     :  Celia Blevins PA-C (PHYS- Physician Assistant)            Nursing Notes:   Maribel Garcia  2018  9:33 AM  Signed  Patient presents to the clinic for medication management and ambien refill.  Maribel Garcia LPN........................2018  9:16 AM      HPI:    Yolie Bedoya is a 57 y.o. female who presents for medication management and ambien refill. Hx insomnia. Been on med since . Works for 6 hours.  She states that the medication usually works unless she is really worked up about something. Helps her to fall asleep and stay asleep. She has been using the medication for many years. No side effects noted. No nightmares. No increased anxiety or depression. No suicidal or homicidal ideation.    Past Medical History:     Diagnosis  Date     Allergic rhinitis due to animal (cat) (dog) hair and dander 1970    aspen, birch, maple, oak, grass, dust mite, ragwee, cocklebur, mugwart, lambs quarter, pigweed, fungus, molds       Cholelithiasis      Chronic insomnia     contract signed 2014      Controlled substance agreement signed 2014    ambien #30/month      HYPERTENSION      Menorrhagia     s/p CLARA      Obesity      PONV (postoperative nausea and vomiting)     nausea and severe vomiting with narcotics      Postmenopausal HRT (hormone replacement therapy)     started ERT      Pyelonephritis      Venous insufficiency        Past Surgical History:      Procedure  Laterality Date     COLONOSCOPY SCREENING  2004     because of change in bowel habits       COLONOSCOPY SCREENING  2014    Extensive diverticulosis throughout the colon - follow up 10 years        ENDOMETRIAL ABLATION  3/05     KNEE ARTHROSCOPY Left 10/12/2017     [place] SLING OPER STRES INCONTINENCE  10/25/2017            CLARA AND BSO  3/30/10    CLARA/BSO/Vasquez/Posterior Repair       WISDOM TEETH EXTRACTION         Social History       Substance Use Topics         Smoking status:   Never Smoker     Smokeless tobacco:   Never Used     Alcohol use   Yes      Comment: occ        Current Outpatient Prescriptions       Medication  Sig Dispense Refill     acetaminophen-codeine, 300-30 mg, (TYLENOL #3) tablet Take 1-2 tablets by mouth every 4 hours if needed. Max acetaminophen dose: 4000mg in 24 hrs. 20 tablet 0     albuterol HFA (VENTOLIN HFA) 90 mcg/actuation inhaler Inhale 2 Puffs by mouth every 4 hours if needed. 1 Inhaler 2     buPROPion (WELLBUTRIN XL) 150 mg Extended-Release tablet Take 1 tablet by mouth every morning. 90 tablet 3     Cetirizine (ZYRTEC) 10 mg cap Take 1 Tab by mouth once daily.       cholecalciferol (VITAMIN D) 1,000 unit capsule Take 1,000 Units by mouth once daily.       diphenhydrAMINE (BENADRYL) 25 mg capsule Take 2 capsules by mouth at bedtime if needed.  0     estradiol (ESTRACE) 1 mg tablet Take 0.5 tablets by mouth once daily. 45 tablet 3     fluticasone (50 mcg per actuation) nasal solution (FLONASE) Inhale 2 Sprays into both nostrils once daily. 1 Bottle 11     fluticasone-salmeterol (ADVAIR DISKUS) 250-50 mcg/Dose diskus inhaler Inhale 1 Puff by mouth every 12 hours. 180 Each 2     hydroCHLOROthiazide 12.5 mg tablet Take 1 tablet by mouth once daily. 90 tablet 3     HYDROcodone-acetaminophen, 5-325 mg, (NORCO) per tablet   0     losartan-hydrochlorothiazide (HYZAAR) 100-25 mg tablet Take 1 tablet by mouth once daily. 90 tablet 3     montelukast (SINGULAIR) 10 mg tablet Take 1 tablet by mouth at bedtime. 90 tablet 3     multivitamin (MVI) tablet Take 1 tablet by mouth once daily.       nabumetone (RELAFEN) 500 mg tablet TAKE 1 (ONE) TABLET BY MOUTH TWO TIMES DAILY  1     polycarbophil  (FIBERTAB) 625 mg tablet Take 1 tablet by mouth once daily.  0     potassium chloride (KLOR-CON 10; K-TAB) 10 mEq Controlled-Release tablet TAKE 2 TABLETS BY MOUTH ONCE DAILY WITH A MEAL. 180 tablet 3     potassium chloride (KLOR-CON 10; K-TAB) 10 mEq Controlled-Release tablet Take 20 mEq by mouth once daily with a meal.  5     promethazine (PHENERGAN) 25 mg tablet   0     SUMAtriptan NASAL (IMITREX) 20 mg/actuation nasal spray Inhale 1 Spray in the nostril(s) every 2 hours if needed for Migraine. 1 Bottle 11     triamcinolone (ARISTOCORT) 0.1 % ointment Apply  topically to affected area(s) 2 times daily. 1 Tube 0     zolpidem (AMBIEN) 10 mg tablet Take 1 tablet by mouth at bedtime. 30 tablet 0     No current facility-administered medications for this visit.      Medications have been reviewed by me and are current to the best of my knowledge and ability.      Allergies      Allergen   Reactions     Augmentin [Amoxicillin-Pot Clavulanate]  Rash     Doxycycline  Vomiting     Other [Unlisted Allergen (Include Detail In Comments)]  Stomach Upset and Vomiting     Narcotics      Sulfa (Sulfonamide Antibiotics)  Rash     Zaroxolyn [Metolazone]  *Unknown - Pt Doesn't Remember       REVIEW OF SYSTEMS:  Refer to HPI.    EXAM:   Vitals:    /68 (Cuff Site: Right Arm, Position: Sitting, Cuff Size: Adult Regular)  Pulse 76  Temp 96.8  F (36  C) (Tympanic)  Wt 94.6 kg (208 lb 9.6 oz)  Breastfeeding? No  BMI 33.67 kg/m2    General Appearance: Pleasant, alert, appropriate appearance for age. No acute distress  Chest/Respiratory Exam: Normal chest wall and respirations. Clear to auscultation.  Cardiovascular Exam: Regular rate and rhythm. S1, S2, no murmur, click, gallop, or rubs.  Skin: no rash or abnormalities  Psychiatric Exam: Alert and oriented - appropriate affect.  General appearance: appropriately dressed and well groomed  Pt's manner is cooperative and engaged in interview, affect appropriate for situation and  matches verbal content and speech is fluent and normal volume and tone.  No SI or HI appreciated.    PHQ Depression Screen  Date of PHQ exam: 18  Over the last 2 weeks, how often have you been bothered by any of the following problems?  1. Little interest or pleasure in doing things: 0 - Not at all  2. Feeling down, depressed, or hopeless: 0 - Not at all  3. Trouble falling or staying asleep, or sleeping too much: 1 - Several days  4. Feeling tired or having little energy: 0 - Not at all  5. Poor appetite or overeatin - Not at all  6. Feeling bad about yourself - or that you are a failure or have let yourself or your family down: 0 - Not at all  7. Trouble concentrating on things, such as reading the newspaper or watching television: 0 - Not at all  8. Moving or speaking so slowly that other people could have noticed. Or the opposite - being so fidgety or restless that you have been moving around a lot more than usual: 0 - Not at all  9. Thoughts that you would be better off dead, or of hurting yourself in some way: 0 - Not at all    PHQ-9 TOTAL SCORE: 1  Depression Severity Level: none  If any answers were positive, how difficult have these problems made it for you to do your work, take care of things at home, or get along with other people: not difficult at all      ASSESSMENT AND PLAN:      ICD-10-CM    1. INSOMNIA, PERSISTENT G47.00 zolpidem (AMBIEN) 10 mg tablet       Insomnia: Refilled Ambien. No acute concerns at this time.  Return to clinic with change/worsening of symptoms.     Patient Instructions   Refilled Ambien.      Celia Blevins PA-C..................2018 9:31 AM

## 2018-02-13 NOTE — NURSING NOTE
Patient Information     Patient Name MRN Sex Yolie Sahu [id number] Female 1960      Nursing Note by Maribel Garcia at 2018  9:15 AM     Author:  Maribel Garcia Service:  (none) Author Type:  (none)     Filed:  2018  9:33 AM Encounter Date:  2018 Status:  Signed     :  Maribel Garcia            Patient presents to the clinic for medication management and ambien refill.  Maribel Garcia LPN........................2018  9:16 AM

## 2018-02-13 NOTE — TELEPHONE ENCOUNTER
Patient Information     Patient Name MRN Sex Yolie Sahu [id number] Female 1960      Telephone Encounter by Usha Schilling at 2018 12:05 PM     Author:  Usha Schilling Service:  (none) Author Type:  (none)     Filed:  2018 12:06 PM Encounter Date:  2018 Status:  Signed     :  Usha Schilling            [facility] did not have openings, patient given appointment with Court Blevins to discuss refill.  Toya Schilling LPN ....................  2018   12:05 PM

## 2018-02-13 NOTE — TELEPHONE ENCOUNTER
Patient Information     Patient Name MRN Sex Yolie Sahu [id number] Female 1960      Telephone Encounter by Yarely Cruz MD at 2018  4:01 PM     Author:  Yarely Cruz MD Service:  (none) Author Type:  Physician     Filed:  2018  4:01 PM Encounter Date:  2018 Status:  Signed     :  Yarely Cruz MD (Physician)            Second reminder to pt that appointment is needed.

## 2018-02-13 NOTE — TELEPHONE ENCOUNTER
Patient Information     Patient Name MRN Sex Yolie Sahu [id number] Female 1960      Telephone Encounter by Susana Mesa RN at 2018  9:51 AM     Author:  Susana Mesa RN Service:  (none) Author Type:  NURS- Registered Nurse     Filed:  2018 10:03 AM Encounter Date:  2018 Status:  Signed     :  Susana Mesa RN (NURS- Registered Nurse)            Per pharmacy fax- patient is enrolled in our Rx med sync service to improve adherence. We are requesting a refill authorization in advance to ensure an active Rx is on file.      Per PCP-Patient due for follow up. Refer to 17 refill. Patient contacted and states she is in [place] now and will call back sometime today and schedule an OV.   This is a Refill request from: [facility]  Name of Medication: Zolpidem (AMBIEN) 10 mg tablet  Quantity requested: 30  Last fill date: 17 per pharmacy  Last visit with DAVID GA was on: 2017   Controlled Substance Agreement: 2017  Diagnosis r/t this medication request: Insomnia     Unable to complete prescription refill per RN Medication Refill Policy.................... Susana Mesa RN ....................  2018   9:52 AM

## 2018-02-13 NOTE — PROGRESS NOTES
Patient Information     Patient Name MRN Sex     Yolie Bedoya [id number] Female 1960      Progress Notes by Elsy Mg PT at 2018 10:08 AM     Author:  Elsy Mg PT Service:  (none) Author Type:  PT- Physical Therapist     Filed:  2018 12:44 PM Date of Service:  2018 10:08 AM Status:  Signed     :  Elsy Mg PT (PT- Physical Therapist)            [facility]  Outpatient PT - Daily Note        Date of Service: 2018   Visit #7    Patient Name: Yolie Bedoya   YOB: 1960   Referring MD/Provider: CRISTO Najera  Diagnosis: sacroiliac joint pain, piriformis muscle pain  Treatment Diagnosis: low back pain, lumbar segmental dysfunctions,myofascial tightness, posture dysfunction, muscle weakness  Insurance: [facility]  Start of Service: 18  Certification Dates: Start of Service: 18   Medicare/[place] Re-Cert Due: 18      Subjective        Pain Ratin = no pain, comfortable and  3 = Mild Pain, (Bothersome, Annoying, Irritating, Nagging) / Location:  Right SI area    Did well after last session but sitting was terrible. 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 Hard to sit. Did get mm relaxers for up coming trip. Leaves tomorrow. Left knee still bothering her.    Objective  Postural loading:   General Listening:  Left lumbar  Head:  symmetrical  Shoulders: L/L R/R  Pelvis: R/R      Standing Flexion:   Stork:   Hip Drop:  Seated Flexion:   Supine:    Pubes:    ASIS:   Leg length:  Prone:   PSIS:   Leg length:  Sacral Base:  NICK Posterior/Inferior:     + FFT  + stork left  NICK posterior/inferior right, same with flex/ext    Positional Facet Diagnoses:   ERS Right ERS Left Neutral  SB R/Rot Left Neutral  SB L/Rot Right FRS Right FRS Left   T10-11         T11-12         T12-L1         L1-2         L2-3         L3-4         L4-5         L5-S1 x x       (* ERS = extended/rotated/sidebent; FRS = flexed/rotated/sidebent)    Today's  Intervention:   Seated MET for ERS right and left L5-S1  Prone MET for unilateral posterior nutated left sacrum  Prone hip flexor stretching    Still limited with loading through left knee and ankle. recoil mobs to tibial plateaus    Home Exercise Program:  Hip flexor stretch  Low trunk rotation  ice  Assessment    Therapist Assessment:  Patient presents with chronic low back/SI pain that now includes left and right side. Receptin history of left plica removal and CLARA/BSO/Vasquez/rectocel repair and cystocele repair. Noted lumbar segmental dysfunctions, myofascial tightness, limited flexibility with hip flexors, muscle weakness with core and hips.           Goals:  Patient goal (time reference required): reduce pain, improved strength and ability to lift and carry grandchildren .     Long term goal: Patient is to self-manage symptoms and return to prior function in 8 weeks.       Functional goals:   Patient is to be independent in their Home Exercise Program in 8 weeks.  Patient is to tolerate walking with normal gait with 2/10 pain up to 30 minutes for aerobic exercise in 8 weeks.  Patient is to have improved spine mobility to 100 degrees to allow for improved dressing and self-cares with out pain in 8 weeks.  Patient is report the ability to sleep 6-8 hours without awakening due to pain in 8 weeks.   Patient is to display and maintain normal joint mobility/symmetry in the lumbar spine and pelvis to allow correct gait pattern with normal hip extension in 8 weeks.  Patient is to demonstrate ability to sit for 30 minutes to allow meal time and travel time in 8 weeks.  Patient is to report 2/10 pain during household activities/work tasks including dishes, laundry in 8 weeks.     Patient participated in goal selection and understand(s) the plan of care: Yes   Patient Potential for Achieving Desired Outcome:  Excellent    Response to Intervention:  Tolerated well,improved loading through shoulders and  pelvis    Plan    Treatment Plan / Targeted Outcomes:     Frequency:   16 visits     Duration of Treatment: 8 weeks    Planned Interventions:  Possible physical therapy interventions include but are not limited to:   Home Exercise Program development  Therapeutic Exercise (ROM & Strengthening)  Manual Therapy  Neuromuscular Re-education  Ultrasound  Electrical Stimulation  Phonophoresis with Ketoprofen  Iontophoresis with Dexamethasone  Therapeutic Activities  Cold pack    Plan for next visit:  Advance HEP as indicated, manual therapy    Student or PTA has been instructed in and demonstrates skills necessary to carry out above stated treatment plan: Yes    Thank you for your referral to [facility].  Please call with any questions, concerns or comments.  [phone]

## 2018-02-13 NOTE — TELEPHONE ENCOUNTER
Patient Information     Patient Name MRN Sex Yolie Sahu [id number] Female 1960      Telephone Encounter by Yarely Cruz MD at 2018  8:11 PM     Author:  Yarely Cruz MD Service:  (none) Author Type:  Physician     Filed:  2018  8:11 PM Encounter Date:  2018 Status:  Signed     :  Yarely Cruz MD (Physician)            Order is in.  Yarely Miller MD

## 2018-02-13 NOTE — TELEPHONE ENCOUNTER
Patient Information     Patient Name MRN Sex Yolie Sahu [id number] Female 1960      Telephone Encounter by Yesenia Alfaro at 2018  8:09 AM     Author:  Yesenia Alfaro Service:  (none) Author Type:  (none)     Filed:  2018  8:12 AM Encounter Date:  2018 Status:  Signed     :  Yesenia Alfaro            After verifying patients name and date of birth with pt. Pt notified that she will need an appointment with  in order to get her medication refilled. Please see refill note for further documentation.  Yesenia Alfaro

## 2018-02-13 NOTE — PROGRESS NOTES
Patient Information     Patient Name MRN Sex     Yolie Bedoya [id number] Female 1960      Progress Notes by Elsy Mg PT at 2018  8:55 AM     Author:  Elsy Mg PT Service:  (none) Author Type:  PT- Physical Therapist     Filed:  2018  9:36 AM Date of Service:  2018  8:55 AM Status:  Signed     :  Elsy Mg PT (PT- Physical Therapist)            [facility]  Outpatient PT - Daily Note        Date of Service: 2018   Visit #5    Patient Name: Yolie Bedoya   YOB: 1960   Referring MD/Provider: CRISTO Najera  Diagnosis: sacroiliac joint pain, piriformis muscle pain  Treatment Diagnosis: low back pain, lumbar segmental dysfunctions,myofascial tightness, posture dysfunction, muscle weakness  Insurance: [facility]  Start of Service: 18  Certification Dates: Start of Service: 18   Medicare/[place] Re-Cert Due: 18      Subjective        Pain Ratin = no pain, comfortable and  3 = Mild Pain, (Bothersome, Annoying, Irritating, Nagging) / Location:  Right SI area    Was sore and limping from tissue work. Saturday, pain up to mid back, hard to straighten upright. better by .       Objective  Postural loading:   General Listening:  Left pelvis  Head:  symmetrical  Shoulders: L/L   Pelvis: symmetrical  Local listening:   Does not want to hold weight on left foot today    Standing Flexion: left  Stork:   Hip Drop:  Seated Flexion:   Supine:    Pubes: left superior   ASIS:   Leg length:  Prone:   PSIS:   Leg length:  Sacral Base:  NICK Posterior/Inferior:       Positional Facet Diagnoses:   ERS Right ERS Left Neutral  SB R/Rot Left Neutral  SB L/Rot Right FRS Right FRS Left   T10-11         T11-12         T12-L1         L1-2         L2-3         L3-4         L4-5         L5-S1  x   x    (* ERS = extended/rotated/sidebent; FRS = flexed/rotated/sidebent)    Today's Intervention:   MET for superior left pubic  reach  Instructed self pubic correction  MFR- left ilius, psoas, hip adducotrs   Side lying MET for FRS right L5, ERS left L5  Prone stretch to left hip flexors  Fascial re balancing in standing    Home Exercise Program:  Hip flexor stretch  Low trunk rotation  ice  Assessment    Therapist Assessment:  Patient presents with chronic low back/SI pain that now includes left and right side. Receptin history of left plica removal and CLARA/BSO/Vasquez/rectocel repair and cystocele repair. Noted lumbar segmental dysfunctions, myofascial tightness, limited flexibility with hip flexors, muscle weakness with core and hips.           Goals:  Patient goal (time reference required): reduce pain, improved strength and ability to lift and carry grandchildren .     Long term goal: Patient is to self-manage symptoms and return to prior function in 8 weeks.       Functional goals:   Patient is to be independent in their Home Exercise Program in 8 weeks.  Patient is to tolerate walking with normal gait with 2/10 pain up to 30 minutes for aerobic exercise in 8 weeks.  Patient is to have improved spine mobility to 100 degrees to allow for improved dressing and self-cares with out pain in 8 weeks.  Patient is report the ability to sleep 6-8 hours without awakening due to pain in 8 weeks.  Patient is to display and maintain normal joint mobility/symmetry in the lumbar spine and pelvis to allow correct gait pattern with normal hip extension in 8 weeks.  Patient is to demonstrate ability to sit for 30 minutes to allow meal time and travel time in 8 weeks.  Patient is to report 2/10 pain during household activities/work tasks including dishes, laundry in 8 weeks.     Patient participated in goal selection and understand(s) the plan of care: Yes   Patient Potential for Achieving Desired Outcome:  Excellent    Response to Intervention:  Tolerated well, able to load feet equally, improved mobility at L5 and pelvis      Plan    Treatment Plan /  Targeted Outcomes:     Frequency:   16 visits     Duration of Treatment: 8 weeks    Planned Interventions:  Possible physical therapy interventions include but are not limited to:   Home Exercise Program development  Therapeutic Exercise (ROM & Strengthening)  Manual Therapy  Neuromuscular Re-education  Ultrasound  Electrical Stimulation  Phonophoresis with Ketoprofen  Iontophoresis with Dexamethasone  Therapeutic Activities  Cold pack    Plan for next visit:  Advance HEP as indicated, manual therapy    Student or PTA has been instructed in and demonstrates skills necessary to carry out above stated treatment plan: Yes    Thank you for your referral to [facility].  Please call with any questions, concerns or comments.  [phone]

## 2018-02-13 NOTE — PATIENT INSTRUCTIONS
Patient Information     Patient Name MRN Sex Yolie Sahu [id number] Female 1960      Patient Instructions by Celia Blevins PA-C at 2018  9:15 AM     Author:  Celia Blevins PA-C Service:  (none) Author Type:  PHYS- Physician Assistant     Filed:  2018  9:52 AM Encounter Date:  2018 Status:  Signed     :  Celia Blevins PA-C (PHYS- Physician Assistant)            Refilled Ambien.

## 2018-02-13 NOTE — PROGRESS NOTES
Patient Information     Patient Name MRN Sex     Yolie Bedoya [id number] Female 1960      Progress Notes by Elsy Mg PT at 2018  2:36 PM     Author:  Elsy Mg PT Service:  (none) Author Type:  PT- Physical Therapist     Filed:  2018  8:53 AM Date of Service:  2018  2:36 PM Status:  Signed     :  Elsy Mg PT (PT- Physical Therapist)            [facility]  Outpatient PT - Daily Note        Date of Service: 2018   Visit #6    Patient Name: Yolie Bedoya   YOB: 1960   Referring MD/Provider: CRISTO Najera  Diagnosis: sacroiliac joint pain, piriformis muscle pain  Treatment Diagnosis: low back pain, lumbar segmental dysfunctions,myofascial tightness, posture dysfunction, muscle weakness  Insurance: [facility]  Start of Service: 18  Certification Dates: Start of Service: 18   Medicare/MA Re-Cert Due: 18      Subjective        Pain Ratin = no pain, comfortable and  3 = Mild Pain, (Bothersome, Annoying, Irritating, Nagging) / Location:  Right SI area    Had another bad day this weekend, hard to stand. Fell last week, slipped on ice. 18. Legs under car. Pain today in inner left knee/adductor, right SI.    Objective  Postural loading:   General Listening:  Left lumbar, neck, left leg  Head:  symmetrical  Shoulders: L/L R/R  Pelvis: L/L  Limited loading through left hip/lateral femoral condyle    Standing Flexion:   Stork:   Hip Drop:  Seated Flexion:   Supine:    Pubes: left superior   ASIS:   Leg length:  Prone:   PSIS:   Leg length:  Sacral Base:  NICK Posterior/Inferior:       Positional Facet Diagnoses:   ERS Right ERS Left Neutral  SB R/Rot Left Neutral  SB L/Rot Right FRS Right FRS Left   T10-11         T11-12         T12-L1         L1-2         L2-3         L3-4         L4-5     x    L5-S1      x   (* ERS = extended/rotated/sidebent; FRS = flexed/rotated/sidebent)  Also noted FRS left  C7    Today's Intervention:   Seated MET for FRS left L5, FRS right L4, FRS left C7  Recoil mobs to left lateral femoral condyle  MFR left adductors hip    Home Exercise Program:  Hip flexor stretch  Low trunk rotation  ice  Assessment    Therapist Assessment:  Patient presents with chronic low back/SI pain that now includes left and right side. Receptin history of left plica removal and CLARA/BSO/Vasquez/rectocel repair and cystocele repair. Noted lumbar segmental dysfunctions, myofascial tightness, limited flexibility with hip flexors, muscle weakness with core and hips.           Goals:  Patient goal (time reference required): reduce pain, improved strength and ability to lift and carry grandchildren .     Long term goal: Patient is to self-manage symptoms and return to prior function in 8 weeks.       Functional goals:   Patient is to be independent in their Home Exercise Program in 8 weeks.  Patient is to tolerate walking with normal gait with 2/10 pain up to 30 minutes for aerobic exercise in 8 weeks.  Patient is to have improved spine mobility to 100 degrees to allow for improved dressing and self-cares with out pain in 8 weeks.  Patient is report the ability to sleep 6-8 hours without awakening due to pain in 8 weeks.   Patient is to display and maintain normal joint mobility/symmetry in the lumbar spine and pelvis to allow correct gait pattern with normal hip extension in 8 weeks.  Patient is to demonstrate ability to sit for 30 minutes to allow meal time and travel time in 8 weeks.  Patient is to report 2/10 pain during household activities/work tasks including dishes, laundry in 8 weeks.     Patient participated in goal selection and understand(s) the plan of care: Yes   Patient Potential for Achieving Desired Outcome:  Excellent    Response to Intervention:  Tolerated well,improved loading through shoulders, left pelvis, left hip. Dysfunctions found today likely due to fall on ice last  week    Plan    Treatment Plan / Targeted Outcomes:     Frequency:   16 visits     Duration of Treatment: 8 weeks    Planned Interventions:  Possible physical therapy interventions include but are not limited to:   Home Exercise Program development  Therapeutic Exercise (ROM & Strengthening)  Manual Therapy  Neuromuscular Re-education  Ultrasound  Electrical Stimulation  Phonophoresis with Ketoprofen  Iontophoresis with Dexamethasone  Therapeutic Activities  Cold pack    Plan for next visit:  Advance HEP as indicated, manual therapy    Student or PTA has been instructed in and demonstrates skills necessary to carry out above stated treatment plan: Yes    Thank you for your referral to [facility].  Please call with any questions, concerns or comments.  [phone]

## 2018-02-13 NOTE — TELEPHONE ENCOUNTER
Patient Information     Patient Name MRN Sex Yolie Sahu [id number] Female 1960      Telephone Encounter by Haylee Estevez at 2018 12:56 PM     Author:  Haylee Estevez Service:  (none) Author Type:  (none)     Filed:  2018 12:56 PM Encounter Date:  2018 Status:  Signed     :  Haylee Estevez            Left message to call back.   Haylee Estevez LPN.................. 2018 12:56 PM

## 2018-02-13 NOTE — TELEPHONE ENCOUNTER
Patient Information     Patient Name MRN Sex Yolie Sahu [id number] Female 1960      Telephone Encounter by Usha Schilling at 2018  3:05 PM     Author:  Usha Schilling Service:  (none) Author Type:  (none)     Filed:  2018  3:08 PM Encounter Date:  2018 Status:  Signed     :  Usha Schilling            States she had a missed call from clinic, wondering if it was regarding her appointment with Court Blevins tomorrow, nothing noted regarding this.  Toya Schilling LPN ....................  2018   3:08 PM

## 2018-02-13 NOTE — TELEPHONE ENCOUNTER
Patient Information     Patient Name MRN Yolie Del Toro [id number] Female 1960      Telephone Encounter by Haylee Estevez at 2018  4:24 PM     Author:  Haylee Estevez Service:  (none) Author Type:  (none)     Filed:  2018  4:24 PM Encounter Date:  2018 Status:  Signed     :  Haylee Estevez            Left message to call back.   Haylee Estevez LPN.................. 2018 4:24 PM

## 2018-02-13 NOTE — PROGRESS NOTES
Patient Information     Patient Name MRN Sex     Yolie Bedoya [id number] Female 1960      Progress Notes by Elsy Mg PT at 2018 11:13 AM     Author:  Elsy Mg PT Service:  (none) Author Type:  PT- Physical Therapist     Filed:  2018 11:58 AM Date of Service:  2018 11:13 AM Status:  Signed     :  Elsy Mg PT (PT- Physical Therapist)            [facility]  Outpatient PT - Daily Note        Date of Service: 2018   Visit #4    Patient Name: Yolie Bedoya   YOB: 1960   Referring MD/Provider: CRISTO Najera  Diagnosis: sacroiliac joint pain, piriformis muscle pain  Treatment Diagnosis: low back pain, lumbar segmental dysfunctions,myofascial tightness, posture dysfunction, muscle weakness  Insurance: [facility]  Start of Service: 18  Certification Dates: Start of Service: 18   Medicare/[place] Re-Cert Due: 18      Subjective        Pain Ratin = no pain, comfortable and  4 = Moderate Pain, (Aggravating, Grueling, Upsetting, Frustrating) / Location:  Right SI area    Walking on the treadmill. Stiff early in the day. Still sleeping better. Left hip area- hurts when she wakes.    Objective  Postural loading:   General Listening:  Left abdomen  Head:  symmetrical  Shoulders: L/L   Pelvis: L/L stops at knee  Local listening: pancreas, jejunum    Standing Flexion:  Stork: right  Hip Drop:  Seated Flexion:   Supine:    Pubes:   ASIS:   Leg length:  Prone:   PSIS:   Leg length:  Sacral Base:  NICK Posterior/Inferior:       Positional Facet Diagnoses:   ERS Right ERS Left Neutral  SB R/Rot Left Neutral  SB L/Rot Right FRS Right FRS Left   T10-11         T11-12         T12-L1         L1-2         L2-3         L3-4         L4-5         L5-S1         (* ERS = extended/rotated/sidebent; FRS = flexed/rotated/sidebent)    Today's Intervention:   Recoil mobs to left lateral femoral condyle, medial tibial plateau  OSFM to  left renal fascia, mesenteric root to loops, loops to loops  POF pancreas, left kidney, jejunum  MFR- left ilius, psoas, hip adducotrs     Home Exercise Program:  Hip flexor stretch  Low trunk rotation  ice  Assessment    Therapist Assessment:  Patient presents with chronic low back/SI pain that now includes left and right side. Receptin history of left plica removal and CLARA/BSO/Vasquez/rectocel repair and cystocele repair. Noted lumbar segmental dysfunctions, myofascial tightness, limited flexibility with hip flexors, muscle weakness with core and hips.           Goals:  Patient goal (time reference required): reduce pain, improved strength and ability to lift and carry grandchildren .     Long term goal: Patient is to self-manage symptoms and return to prior function in 8 weeks.       Functional goals:   Patient is to be independent in their Home Exercise Program in 8 weeks.  Patient is to tolerate walking with normal gait with 2/10 pain up to 30 minutes for aerobic exercise in 8 weeks.  Patient is to have improved spine mobility to 100 degrees to allow for improved dressing and self-cares with out pain in 8 weeks.  Patient is report the ability to sleep 6-8 hours without awakening due to pain in 8 weeks.  Patient is to display and maintain normal joint mobility/symmetry in the lumbar spine and pelvis to allow correct gait pattern with normal hip extension in 8 weeks.  Patient is to demonstrate ability to sit for 30 minutes to allow meal time and travel time in 8 weeks.  Patient is to report 2/10 pain during household activities/work tasks including dishes, laundry in 8 weeks.     Patient participated in goal selection and understand(s) the plan of care: Yes   Patient Potential for Achieving Desired Outcome:  Excellent    Response to Intervention:  Tolerated well, soreness with MFR      Plan    Treatment Plan / Targeted Outcomes:     Frequency:   16 visits     Duration of Treatment: 8 weeks    Planned Interventions:   Possible physical therapy interventions include but are not limited to:   Home Exercise Program development  Therapeutic Exercise (ROM & Strengthening)  Manual Therapy  Neuromuscular Re-education  Ultrasound  Electrical Stimulation  Phonophoresis with Ketoprofen  Iontophoresis with Dexamethasone  Therapeutic Activities  Cold pack    Plan for next visit:  Advance HEP as indicated, manual therapy, [place]    Student or PTA has been instructed in and demonstrates skills necessary to carry out above stated treatment plan: Yes    Thank you for your referral to [facility].  Please call with any questions, concerns or comments.  [phone]

## 2018-02-13 NOTE — TELEPHONE ENCOUNTER
Patient Information     Patient Name MRN Sex Yolie Sahu [id number] Female 1960      Telephone Encounter by Usha Schilling at 2018 11:07 AM     Author:  Usha Schilling Service:  (none) Author Type:  (none)     Filed:  2018 11:07 AM Encounter Date:  2018 Status:  Signed     :  Usha Schilling notified patient needs to schedule appointment before further refills.  Toya Schilling LPN ....................  2018   11:07 AM

## 2018-02-19 ENCOUNTER — DOCUMENTATION ONLY (OUTPATIENT)
Dept: FAMILY MEDICINE | Facility: OTHER | Age: 58
End: 2018-02-19

## 2018-02-19 PROBLEM — N39.3 STRESS INCONTINENCE IN FEMALE: Status: ACTIVE | Noted: 2017-10-25

## 2018-02-19 PROBLEM — E66.9 OBESITY: Status: ACTIVE | Noted: 2018-02-19

## 2018-02-19 PROBLEM — E87.6 HYPOKALEMIA: Status: ACTIVE | Noted: 2017-06-30

## 2018-02-19 PROBLEM — K57.30 DIVERTICULAR DISEASE OF COLON: Status: ACTIVE | Noted: 2018-02-19

## 2018-02-19 PROBLEM — R73.01 IMPAIRED FASTING GLUCOSE: Status: ACTIVE | Noted: 2018-02-19

## 2018-02-19 PROBLEM — Z80.3 FAMILY HISTORY OF MALIGNANT NEOPLASM OF BREAST: Status: ACTIVE | Noted: 2018-02-19

## 2018-02-19 PROBLEM — J30.9 ALLERGIC RHINITIS: Status: ACTIVE | Noted: 2018-02-19

## 2018-02-19 PROBLEM — R60.9 EDEMA: Status: ACTIVE | Noted: 2018-02-19

## 2018-02-19 RX ORDER — ESTRADIOL 1 MG/1
0.5 TABLET ORAL DAILY
COMMUNITY
Start: 2017-07-07 | End: 2018-05-01

## 2018-02-19 RX ORDER — BUPROPION HYDROCHLORIDE 150 MG/1
1 TABLET ORAL EVERY MORNING
COMMUNITY
Start: 2017-07-07 | End: 2018-07-09

## 2018-02-19 RX ORDER — POTASSIUM CHLORIDE 750 MG/1
2 TABLET, EXTENDED RELEASE ORAL
COMMUNITY
Start: 2017-10-13 | End: 2018-07-09

## 2018-02-19 RX ORDER — HYDROCHLOROTHIAZIDE 12.5 MG/1
1 TABLET ORAL DAILY
COMMUNITY
Start: 2017-09-27 | End: 2018-07-09

## 2018-02-19 RX ORDER — DIPHENHYDRAMINE HCL 25 MG
2 CAPSULE ORAL AT BEDTIME
COMMUNITY
Start: 2014-04-08 | End: 2023-04-19

## 2018-02-19 RX ORDER — HYDROCODONE BITARTRATE AND ACETAMINOPHEN 5; 325 MG/1; MG/1
TABLET ORAL
COMMUNITY
Start: 2017-10-12 | End: 2018-05-01

## 2018-02-19 RX ORDER — ALBUTEROL SULFATE 90 UG/1
2 AEROSOL, METERED RESPIRATORY (INHALATION) EVERY 4 HOURS PRN
COMMUNITY
Start: 2017-07-07 | End: 2018-07-09

## 2018-02-19 RX ORDER — SUMATRIPTAN 20 MG/1
1 SPRAY NASAL
COMMUNITY
Start: 2017-07-07 | End: 2018-07-09

## 2018-02-19 RX ORDER — MONTELUKAST SODIUM 10 MG/1
1 TABLET ORAL AT BEDTIME
COMMUNITY
Start: 2017-09-27 | End: 2018-10-06

## 2018-02-19 RX ORDER — ZOLPIDEM TARTRATE 10 MG/1
1 TABLET ORAL AT BEDTIME
COMMUNITY
Start: 2017-12-22 | End: 2018-07-09

## 2018-02-19 RX ORDER — FLUTICASONE PROPIONATE 50 MCG
2 SPRAY, SUSPENSION (ML) NASAL DAILY
COMMUNITY
Start: 2017-07-07 | End: 2018-07-09

## 2018-02-19 RX ORDER — DIPHENOXYLATE HYDROCHLORIDE AND ATROPINE SULFATE 2.5; .025 MG/1; MG/1
1 TABLET ORAL DAILY
COMMUNITY
End: 2020-08-18

## 2018-02-19 RX ORDER — PROMETHAZINE HYDROCHLORIDE 25 MG/1
TABLET ORAL
COMMUNITY
Start: 2017-10-12 | End: 2018-07-09

## 2018-02-19 RX ORDER — POTASSIUM CHLORIDE 750 MG/1
20 TABLET, EXTENDED RELEASE ORAL
COMMUNITY
Start: 2017-08-03 | End: 2018-07-09

## 2018-02-19 RX ORDER — TRIAMCINOLONE ACETONIDE 1 MG/G
OINTMENT TOPICAL 2 TIMES DAILY
COMMUNITY
Start: 2017-07-07 | End: 2018-07-09

## 2018-02-19 RX ORDER — LOSARTAN POTASSIUM AND HYDROCHLOROTHIAZIDE 25; 100 MG/1; MG/1
1 TABLET ORAL DAILY
COMMUNITY
Start: 2017-09-27 | End: 2018-07-09

## 2018-02-20 ENCOUNTER — HOSPITAL ENCOUNTER (OUTPATIENT)
Dept: PHYSICAL THERAPY | Facility: OTHER | Age: 58
Setting detail: THERAPIES SERIES
End: 2018-02-20
Attending: NURSE PRACTITIONER
Payer: COMMERCIAL

## 2018-02-20 PROCEDURE — 40000185 ZZHC STATISTIC PT OUTPT VISIT

## 2018-02-20 PROCEDURE — 97112 NEUROMUSCULAR REEDUCATION: CPT | Mod: GP

## 2018-02-21 DIAGNOSIS — E87.6 HYPOKALEMIA: ICD-10-CM

## 2018-02-21 DIAGNOSIS — R60.9 EDEMA: ICD-10-CM

## 2018-02-21 DIAGNOSIS — Z79.899 CONTROLLED SUBSTANCE AGREEMENT SIGNED: Primary | ICD-10-CM

## 2018-02-22 ENCOUNTER — DOCUMENTATION ONLY (OUTPATIENT)
Dept: FAMILY MEDICINE | Facility: OTHER | Age: 58
End: 2018-02-22

## 2018-02-22 RX ORDER — CALCIUM POLYCARBOPHIL 625 MG 625 MG/1
1 TABLET ORAL DAILY
COMMUNITY
Start: 2014-04-08 | End: 2023-04-19

## 2018-03-08 ENCOUNTER — HOSPITAL ENCOUNTER (OUTPATIENT)
Dept: PHYSICAL THERAPY | Facility: OTHER | Age: 58
Setting detail: THERAPIES SERIES
End: 2018-03-08
Attending: NURSE PRACTITIONER
Payer: COMMERCIAL

## 2018-03-08 PROCEDURE — 97112 NEUROMUSCULAR REEDUCATION: CPT | Mod: GP

## 2018-03-08 PROCEDURE — 97110 THERAPEUTIC EXERCISES: CPT | Mod: GP

## 2018-03-08 PROCEDURE — 40000185 ZZHC STATISTIC PT OUTPT VISIT

## 2018-03-08 NOTE — PROGRESS NOTES
Outpatient Physical Therapy Progress Note     Patient: Yolie Bedoya  : 1960    Beginning/End Dates of Reporting Period:  1/3/18 to 3/8/2018    Referring Provider: CRISTO Najera    Therapy Diagnosis: Low back pain, lumbar segmental dysfunction, myofascial tightness, posture dysfunction, muscle weakness     Client Self Report: Had 2 days of really sore knee after last session, used ice, got better. Was at stove for  day, back was sore. Some days it's more difficulty to sit with left knee really flexed; likes to sit this way at night. Back is good today. Only 10 minutes of lying on left hip is tolerated.     Objective Measurements:  Objective Measure: tissue loading  Details: limited through left shoulder  Objective Measure: joint mobility  Details: noted FRS left L5     Goals:  Goal Identifier patient   Goal Description reduce pain, improve strength to lift grand children   Target Date 18   Date Met      Progress:     Goal Identifier HEP   Goal Description Patient to be independent with HEP   Target Date 18   Date Met      Progress:     Goal Identifier gait   Goal Description Patient to walk with normal gait pattern with 2/10 pain up to 30 minutes for aerobic exercise   Target Date 18   Date Met      Progress: Progressing with tolerance; less back pain, more limited from left knee     Goal Identifier mobility   Goal Description Patient to have 100 spine flexion for improved dressing and self cares   Target Date 18   Date Met   3/8/18   Progress:     Goal Identifier sleep   Goal Description Patient to sleep 6-8 hours without waking due to pain   Target Date 18   Date Met      Progress: still waking and tossing turning with discomfort     Goal Identifier sitting   Goal Description Patient to sit for 30 minutes to allow meal time and travel    Target Date 18   Date Met   3/08/18   Progress: was able to travel out of state     Goal Identifier joint mobility   Goal  Description paitnet to have normal joint mobility within lumbar spine and pelvis to allow correct gait pattern and hip extension   Target Date 04/23/18   Date Met      Progress: progressing, facet dysfunctions will still occur     Goal Identifier daily tasks   Goal Description Patient to report 2/10 pain during household activities including dishes, laundry   Target Date 04/23/18   Date Met      Progress: progressing.     Progress Toward Goals:   Progress this reporting period: improved joint mechanics. Will need to start working on core strength    Plan:  Continue therapy per current plan of care.    Discharge:  No

## 2018-03-27 ENCOUNTER — HOSPITAL ENCOUNTER (OUTPATIENT)
Dept: PHYSICAL THERAPY | Facility: OTHER | Age: 58
Setting detail: THERAPIES SERIES
End: 2018-03-27
Attending: NURSE PRACTITIONER
Payer: COMMERCIAL

## 2018-03-27 PROCEDURE — 40000185 ZZHC STATISTIC PT OUTPT VISIT

## 2018-03-27 PROCEDURE — 97112 NEUROMUSCULAR REEDUCATION: CPT | Mod: GP

## 2018-03-30 ENCOUNTER — HOSPITAL ENCOUNTER (OUTPATIENT)
Dept: PHYSICAL THERAPY | Facility: OTHER | Age: 58
Setting detail: THERAPIES SERIES
End: 2018-03-30
Attending: NURSE PRACTITIONER
Payer: COMMERCIAL

## 2018-03-30 PROCEDURE — 97112 NEUROMUSCULAR REEDUCATION: CPT | Mod: GP

## 2018-03-30 PROCEDURE — 40000185 ZZHC STATISTIC PT OUTPT VISIT

## 2018-04-11 ENCOUNTER — HOSPITAL ENCOUNTER (OUTPATIENT)
Dept: PHYSICAL THERAPY | Facility: OTHER | Age: 58
Setting detail: THERAPIES SERIES
End: 2018-04-11
Attending: NURSE PRACTITIONER
Payer: COMMERCIAL

## 2018-04-11 PROCEDURE — 97112 NEUROMUSCULAR REEDUCATION: CPT | Mod: GP

## 2018-04-11 PROCEDURE — 40000185 ZZHC STATISTIC PT OUTPT VISIT

## 2018-04-16 RX ORDER — ESTRADIOL 1 MG/1
0.5 TABLET ORAL DAILY
Qty: 28 TABLET | OUTPATIENT
Start: 2018-04-16

## 2018-04-16 NOTE — TELEPHONE ENCOUNTER
Filled 07/07/17 for a year. Pharmacy alerted. Unable to complete prescription refill per RNMedication Refill Policy.................... Susana Mesa ....................  4/16/2018   11:32 AM    estradiol (ESTRACE) 1 mg tablet 45 tablet 3 7/7/2017     Sig - Route: Take 0.5 tablets by mouth once daily. - Oral    Class: eRx    E-Prescribing Status: Receipt confirmed by pharmacy (7/7/2017 11:23 AM CDT)

## 2018-04-20 RX ORDER — ESTRADIOL 1 MG/1
TABLET ORAL
Qty: 45 TABLET | OUTPATIENT
Start: 2018-04-20

## 2018-04-20 NOTE — TELEPHONE ENCOUNTER
Filled 07/07/17 #45 x 3. Due 07/07/18. Pharmacy alerted.Unable to complete prescription refill per RNMedication Refill Policy.................... Susana Mesa ....................  4/20/2018   3:02 PM     estradiol (ESTRACE) 1 mg tablet 45 tablet 3 7/7/2017       Sig - Route: Take 0.5 tablets by mouth once daily. - Oral     Class: eRx     E-Prescribing Status: Receipt confirmed by pharmacy (7/7/2017 11:23 AM CDT)

## 2018-05-01 ENCOUNTER — TELEPHONE (OUTPATIENT)
Dept: FAMILY MEDICINE | Facility: OTHER | Age: 58
End: 2018-05-01

## 2018-05-01 ENCOUNTER — HOSPITAL ENCOUNTER (OUTPATIENT)
Dept: PHYSICAL THERAPY | Facility: OTHER | Age: 58
Setting detail: THERAPIES SERIES
End: 2018-05-01
Attending: NURSE PRACTITIONER
Payer: COMMERCIAL

## 2018-05-01 DIAGNOSIS — Z71.89 COUNSELING FOR ESTROGEN REPLACEMENT THERAPY: Primary | ICD-10-CM

## 2018-05-01 PROCEDURE — 97112 NEUROMUSCULAR REEDUCATION: CPT | Mod: GP

## 2018-05-01 PROCEDURE — 40000185 ZZHC STATISTIC PT OUTPT VISIT

## 2018-05-01 RX ORDER — ESTRADIOL 1 MG/1
0.5 TABLET ORAL DAILY
Qty: 45 TABLET | Refills: 3 | Status: SHIPPED | OUTPATIENT
Start: 2018-05-01 | End: 2018-07-09

## 2018-05-01 NOTE — TELEPHONE ENCOUNTER
Please check that she is only taking half a tablet.  That would account for why she is would be out of refills prior to this July.  Yarely Miller MD

## 2018-05-01 NOTE — TELEPHONE ENCOUNTER
"Patient calls, \" I was wondering why my Estrace was not refilled\". Record review indicates this medication was filled for a year 07/07/2017. Patient states she is out, \" I think I was taking a whole tab like I used to for the first refill'. Will route to PCP for consideration.     Estrace  LOV-02/05/2018    Unable to complete prescription refill per RNMedication Refill Policy.................... Susana Mesa ....................  5/1/2018   11:10 AM              "

## 2018-05-02 NOTE — TELEPHONE ENCOUNTER
Called patient with results after giving last name and date of birth.  Breezy Menjivar ..............5/2/2018 8:33 AM

## 2018-05-02 NOTE — TELEPHONE ENCOUNTER
Patient states with the first Rx she was taking a whole pill but the second and 3rd she was taking half the pills like ordered. Patient uses [facility]) Please advise.  Breezy Menjivar ..............5/2/2018 8:16 AM

## 2018-05-08 ENCOUNTER — HOSPITAL ENCOUNTER (OUTPATIENT)
Dept: PHYSICAL THERAPY | Facility: OTHER | Age: 58
Setting detail: THERAPIES SERIES
End: 2018-05-08
Attending: NURSE PRACTITIONER
Payer: COMMERCIAL

## 2018-05-08 PROCEDURE — 97140 MANUAL THERAPY 1/> REGIONS: CPT | Mod: GP

## 2018-05-08 PROCEDURE — 97112 NEUROMUSCULAR REEDUCATION: CPT | Mod: GP

## 2018-05-08 PROCEDURE — 40000185 ZZHC STATISTIC PT OUTPT VISIT

## 2018-05-22 ENCOUNTER — HOSPITAL ENCOUNTER (OUTPATIENT)
Dept: PHYSICAL THERAPY | Facility: OTHER | Age: 58
Setting detail: THERAPIES SERIES
End: 2018-05-22
Attending: NURSE PRACTITIONER
Payer: COMMERCIAL

## 2018-05-22 PROCEDURE — 97112 NEUROMUSCULAR REEDUCATION: CPT | Mod: GP

## 2018-05-22 PROCEDURE — 40000185 ZZHC STATISTIC PT OUTPT VISIT

## 2018-05-30 ENCOUNTER — HOSPITAL ENCOUNTER (OUTPATIENT)
Dept: PHYSICAL THERAPY | Facility: OTHER | Age: 58
Setting detail: THERAPIES SERIES
End: 2018-05-30
Attending: FAMILY MEDICINE
Payer: COMMERCIAL

## 2018-05-30 PROCEDURE — 40000185 ZZHC STATISTIC PT OUTPT VISIT

## 2018-05-30 PROCEDURE — 97112 NEUROMUSCULAR REEDUCATION: CPT | Mod: GP

## 2018-06-05 ENCOUNTER — MYC MEDICAL ADVICE (OUTPATIENT)
Dept: FAMILY MEDICINE | Facility: OTHER | Age: 58
End: 2018-06-05

## 2018-06-05 DIAGNOSIS — R73.01 IMPAIRED FASTING GLUCOSE: ICD-10-CM

## 2018-06-05 DIAGNOSIS — Z12.31 ENCOUNTER FOR SCREENING MAMMOGRAM FOR BREAST CANCER: ICD-10-CM

## 2018-06-05 DIAGNOSIS — R60.9 EDEMA, UNSPECIFIED TYPE: Primary | ICD-10-CM

## 2018-06-19 DIAGNOSIS — R73.01 IMPAIRED FASTING GLUCOSE: ICD-10-CM

## 2018-06-19 DIAGNOSIS — R60.9 EDEMA, UNSPECIFIED TYPE: ICD-10-CM

## 2018-06-19 DIAGNOSIS — N39.0 URINARY TRACT INFECTION WITH HEMATURIA, SITE UNSPECIFIED: ICD-10-CM

## 2018-06-19 DIAGNOSIS — R31.9 HEMATURIA, UNSPECIFIED TYPE: Primary | ICD-10-CM

## 2018-06-19 DIAGNOSIS — R31.9 URINARY TRACT INFECTION WITH HEMATURIA, SITE UNSPECIFIED: ICD-10-CM

## 2018-06-19 LAB
ALBUMIN SERPL-MCNC: 4.1 G/DL (ref 3.5–5.7)
ALBUMIN UR-MCNC: ABNORMAL MG/DL
ALP SERPL-CCNC: 51 U/L (ref 34–104)
ALT SERPL W P-5'-P-CCNC: 11 U/L (ref 7–52)
ANION GAP SERPL CALCULATED.3IONS-SCNC: 11 MMOL/L (ref 3–14)
APPEARANCE UR: CLEAR
AST SERPL W P-5'-P-CCNC: 14 U/L (ref 13–39)
BACTERIA #/AREA URNS HPF: ABNORMAL /HPF
BILIRUB SERPL-MCNC: 0.6 MG/DL (ref 0.3–1)
BILIRUB UR QL STRIP: ABNORMAL
BUN SERPL-MCNC: 13 MG/DL (ref 7–25)
CALCIUM SERPL-MCNC: 9.6 MG/DL (ref 8.6–10.3)
CHLORIDE SERPL-SCNC: 104 MMOL/L (ref 98–107)
CHOLEST SERPL-MCNC: 193 MG/DL
CO2 SERPL-SCNC: 25 MMOL/L (ref 21–31)
COLOR UR AUTO: YELLOW
CREAT SERPL-MCNC: 0.89 MG/DL (ref 0.6–1.2)
GFR SERPL CREATININE-BSD FRML MDRD: 65 ML/MIN/1.7M2
GLUCOSE SERPL-MCNC: 110 MG/DL (ref 70–105)
GLUCOSE UR STRIP-MCNC: NEGATIVE MG/DL
HBA1C MFR BLD: 5.6 % (ref 4–6)
HDLC SERPL-MCNC: 55 MG/DL (ref 23–92)
HGB UR QL STRIP: ABNORMAL
KETONES UR STRIP-MCNC: NEGATIVE MG/DL
LDLC SERPL CALC-MCNC: 116 MG/DL
LEUKOCYTE ESTERASE UR QL STRIP: ABNORMAL
MUCOUS THREADS #/AREA URNS LPF: PRESENT /LPF
NITRATE UR QL: NEGATIVE
NON-SQ EPI CELLS #/AREA URNS LPF: ABNORMAL /LPF
NONHDLC SERPL-MCNC: 138 MG/DL
PH UR STRIP: 6.5 PH (ref 5–7)
POTASSIUM SERPL-SCNC: 3.8 MMOL/L (ref 3.5–5.1)
PROT SERPL-MCNC: 6.5 G/DL (ref 6.4–8.9)
RBC #/AREA URNS AUTO: ABNORMAL /HPF
SODIUM SERPL-SCNC: 140 MMOL/L (ref 134–144)
SOURCE: ABNORMAL
SP GR UR STRIP: 1.01 (ref 1–1.03)
TRIGL SERPL-MCNC: 111 MG/DL
UROBILINOGEN UR STRIP-ACNC: 1 EU/DL (ref 0.2–1)
WBC #/AREA URNS AUTO: ABNORMAL /HPF

## 2018-06-19 PROCEDURE — 87086 URINE CULTURE/COLONY COUNT: CPT | Performed by: FAMILY MEDICINE

## 2018-06-19 PROCEDURE — 80053 COMPREHEN METABOLIC PANEL: CPT | Performed by: FAMILY MEDICINE

## 2018-06-19 PROCEDURE — 81001 URINALYSIS AUTO W/SCOPE: CPT | Performed by: FAMILY MEDICINE

## 2018-06-19 PROCEDURE — 36415 COLL VENOUS BLD VENIPUNCTURE: CPT | Performed by: FAMILY MEDICINE

## 2018-06-19 PROCEDURE — 80061 LIPID PANEL: CPT | Performed by: FAMILY MEDICINE

## 2018-06-19 PROCEDURE — 83036 HEMOGLOBIN GLYCOSYLATED A1C: CPT | Performed by: FAMILY MEDICINE

## 2018-06-19 PROCEDURE — 87088 URINE BACTERIA CULTURE: CPT | Performed by: FAMILY MEDICINE

## 2018-06-21 LAB
BACTERIA SPEC CULT: ABNORMAL
SPECIMEN SOURCE: ABNORMAL

## 2018-06-21 RX ORDER — CIPROFLOXACIN 250 MG/1
250 TABLET, FILM COATED ORAL 2 TIMES DAILY
Qty: 14 TABLET | Refills: 0 | Status: SHIPPED | OUTPATIENT
Start: 2018-06-21 | End: 2018-06-28

## 2018-06-21 NOTE — PROGRESS NOTES
Results of UC sent to me as doc of the day.  No note in the chart regarding symptoms or why test was ordered other than diagnosis associated with that of hematuria.  Assuming that the patient has symptoms, culture grew a pansensitive E. coli, so we will treat with Cipro 250 mg twice daily ×7 days      Roland Wang MD on 6/21/2018 at 12:35 PM

## 2018-06-21 NOTE — PROGRESS NOTES
Called patient with results after giving last name and date of birth.  Breezy Menjivar ..............6/21/2018 1:21 PM

## 2018-06-22 ENCOUNTER — TELEPHONE (OUTPATIENT)
Dept: FAMILY MEDICINE | Facility: OTHER | Age: 58
End: 2018-06-22

## 2018-06-22 DIAGNOSIS — R30.0 DYSURIA: Primary | ICD-10-CM

## 2018-06-22 RX ORDER — CIPROFLOXACIN 500 MG/1
500 TABLET, FILM COATED ORAL 2 TIMES DAILY
Qty: 6 TABLET | Refills: 0 | Status: SHIPPED | OUTPATIENT
Start: 2018-06-22 | End: 2018-07-09

## 2018-06-22 NOTE — TELEPHONE ENCOUNTER
Spoke with pharmacist. She states that they received a prescription yesterday for Cipro 250 mg BID x7 days from Roland Wang MD and today one for Cipro 500 mg BID x3 days. She is wondering which med to fill?        Haylee Estevez LPN.................. 6/22/2018 3:31 PM

## 2018-07-06 ENCOUNTER — HOSPITAL ENCOUNTER (OUTPATIENT)
Dept: MAMMOGRAPHY | Facility: OTHER | Age: 58
Discharge: HOME | End: 2018-07-06
Attending: FAMILY MEDICINE | Admitting: FAMILY MEDICINE
Payer: COMMERCIAL

## 2018-07-06 DIAGNOSIS — Z12.31 ENCOUNTER FOR SCREENING MAMMOGRAM FOR BREAST CANCER: ICD-10-CM

## 2018-07-06 PROCEDURE — 77063 BREAST TOMOSYNTHESIS BI: CPT

## 2018-07-09 ENCOUNTER — HOSPITAL ENCOUNTER (OUTPATIENT)
Dept: GENERAL RADIOLOGY | Facility: OTHER | Age: 58
Discharge: HOME | End: 2018-07-09
Attending: FAMILY MEDICINE | Admitting: FAMILY MEDICINE
Payer: COMMERCIAL

## 2018-07-09 ENCOUNTER — OFFICE VISIT (OUTPATIENT)
Dept: FAMILY MEDICINE | Facility: OTHER | Age: 58
End: 2018-07-09
Attending: FAMILY MEDICINE
Payer: COMMERCIAL

## 2018-07-09 VITALS
WEIGHT: 187 LBS | BODY MASS INDEX: 30.18 KG/M2 | SYSTOLIC BLOOD PRESSURE: 132 MMHG | HEART RATE: 76 BPM | DIASTOLIC BLOOD PRESSURE: 84 MMHG

## 2018-07-09 DIAGNOSIS — M79.89 SWELLING OF TOE OF LEFT FOOT: ICD-10-CM

## 2018-07-09 DIAGNOSIS — G47.00 INSOMNIA, PERSISTENT: ICD-10-CM

## 2018-07-09 DIAGNOSIS — J30.9 ALLERGIC RHINITIS, UNSPECIFIED CHRONICITY, UNSPECIFIED SEASONALITY, UNSPECIFIED TRIGGER: ICD-10-CM

## 2018-07-09 DIAGNOSIS — T75.3XXA MOTION SICKNESS, INITIAL ENCOUNTER: ICD-10-CM

## 2018-07-09 DIAGNOSIS — Z79.899 CONTROLLED SUBSTANCE AGREEMENT SIGNED: ICD-10-CM

## 2018-07-09 DIAGNOSIS — Z71.89 COUNSELING FOR ESTROGEN REPLACEMENT THERAPY: ICD-10-CM

## 2018-07-09 DIAGNOSIS — G43.819 OTHER MIGRAINE WITHOUT STATUS MIGRAINOSUS, INTRACTABLE: Primary | ICD-10-CM

## 2018-07-09 DIAGNOSIS — F33.40 RECURRENT MAJOR DEPRESSION IN REMISSION (H): ICD-10-CM

## 2018-07-09 DIAGNOSIS — I10 ESSENTIAL HYPERTENSION: ICD-10-CM

## 2018-07-09 DIAGNOSIS — J45.909 ASTHMA, UNSPECIFIED ASTHMA SEVERITY, UNSPECIFIED WHETHER COMPLICATED, UNSPECIFIED WHETHER PERSISTENT: ICD-10-CM

## 2018-07-09 DIAGNOSIS — R21 RASH: ICD-10-CM

## 2018-07-09 DIAGNOSIS — Z00.00 PHYSICAL EXAM, ANNUAL: Primary | ICD-10-CM

## 2018-07-09 PROCEDURE — 99212 OFFICE O/P EST SF 10 MIN: CPT | Mod: 25 | Performed by: FAMILY MEDICINE

## 2018-07-09 PROCEDURE — 99396 PREV VISIT EST AGE 40-64: CPT | Performed by: FAMILY MEDICINE

## 2018-07-09 PROCEDURE — 73660 X-RAY EXAM OF TOE(S): CPT | Mod: LT

## 2018-07-09 RX ORDER — HYDROCHLOROTHIAZIDE 12.5 MG/1
12.5 TABLET ORAL DAILY
Qty: 90 TABLET | Refills: 3 | Status: SHIPPED | OUTPATIENT
Start: 2018-07-09 | End: 2019-07-03

## 2018-07-09 RX ORDER — ZOLPIDEM TARTRATE 10 MG/1
10 TABLET ORAL AT BEDTIME
Qty: 30 TABLET | Refills: 5 | Status: SHIPPED | OUTPATIENT
Start: 2018-07-09 | End: 2018-12-21

## 2018-07-09 RX ORDER — BUPROPION HYDROCHLORIDE 150 MG/1
150 TABLET ORAL EVERY MORNING
Qty: 90 TABLET | Refills: 3 | Status: SHIPPED | OUTPATIENT
Start: 2018-07-09 | End: 2019-07-03

## 2018-07-09 RX ORDER — FLUTICASONE PROPIONATE 50 MCG
2 SPRAY, SUSPENSION (ML) NASAL DAILY
Qty: 1 BOTTLE | Refills: 3 | Status: SHIPPED | OUTPATIENT
Start: 2018-07-09 | End: 2018-07-09

## 2018-07-09 RX ORDER — LOSARTAN POTASSIUM AND HYDROCHLOROTHIAZIDE 25; 100 MG/1; MG/1
1 TABLET ORAL DAILY
Qty: 90 TABLET | Refills: 3 | Status: SHIPPED | OUTPATIENT
Start: 2018-07-09 | End: 2019-07-03

## 2018-07-09 RX ORDER — ALBUTEROL SULFATE 90 UG/1
2 AEROSOL, METERED RESPIRATORY (INHALATION) EVERY 4 HOURS PRN
Qty: 1 INHALER | Refills: 5 | Status: SHIPPED | OUTPATIENT
Start: 2018-07-09 | End: 2018-07-30

## 2018-07-09 RX ORDER — ESTRADIOL 1 MG/1
0.5 TABLET ORAL DAILY
Qty: 45 TABLET | Refills: 3 | Status: SHIPPED | OUTPATIENT
Start: 2018-07-09 | End: 2019-07-03

## 2018-07-09 RX ORDER — POTASSIUM CHLORIDE 750 MG/1
20 TABLET, EXTENDED RELEASE ORAL
Qty: 180 TABLET | Refills: 3 | Status: SHIPPED | OUTPATIENT
Start: 2018-07-09 | End: 2019-07-03

## 2018-07-09 RX ORDER — SCOLOPAMINE TRANSDERMAL SYSTEM 1 MG/1
PATCH, EXTENDED RELEASE TRANSDERMAL
Qty: 4 PATCH | Refills: 0 | Status: SHIPPED | OUTPATIENT
Start: 2018-07-09 | End: 2018-12-21

## 2018-07-09 RX ORDER — TRIAMCINOLONE ACETONIDE 1 MG/G
CREAM TOPICAL
Qty: 30 G | Refills: 1 | Status: SHIPPED | OUTPATIENT
Start: 2018-07-09 | End: 2020-06-18

## 2018-07-09 ASSESSMENT — PAIN SCALES - GENERAL: PAINLEVEL: NO PAIN (0)

## 2018-07-09 NOTE — PROGRESS NOTES
SUBJECTIVE:    Yolie Bedoya is a 57 year old female who presents for her annual exam.  Nursing Notes:   Haylee Estevez LPN  7/9/2018  2:01 PM  Unsigned  Patient presents to the clinic today for a px.    Haylee Estevez LPN.................. 7/9/2018 2:01 PM       HPI: Yolie Bedoya is a 57 year old female presents for her annual exam.  PMH significant for hypertension, obesity, allergies, chronic insomnia.    She is on Ambien, takes this most every night for chronic insomnia.  She does not have a history of other parasomnias.  No history of substance abuse.  She is not a concurrent narcotics.  She does not have a diagnosis of sleep apnea.  She had tried multiple other over-the-counter medications before starting on this.  She is due today also for six-month checkup of this and to update her contract.    Last pap: hysterectomy   Immunizations:  Up to date   Mammogram 7/6/18  Colon cancer screening 2014    Patient has an upcoming trip for which she is requesting motion sickness patches.  She has used these in the past without negative side effects.        PROBLEM LIST:  Patient Active Problem List   Diagnosis     Allergic rhinitis     Pain in joint     Backache     Family history of malignant neoplasm of breast     Controlled substance agreement signed     Diverticular disease of colon     Endocrine disorder     Hypokalemia     Impaired fasting glucose     Insomnia, persistent     Edema     Melanocytic nevus of face     Meniere's disease     Migraine headache     Obesity     Recurrent major depression in remission (H)     Stress incontinence in female     PAST MEDICAL HISTORY:  Past Medical History:   Diagnosis Date     Allergic rhinitis due to animal hair and dander     1970,aspen, birch, maple, oak, grass, dust mite, ragwee, cocklebur, mugwart, lambs quarter, pigweed, fungus, molds     Calculus of gallbladder without cholecystitis without obstruction     No Comments Provided     Essential (primary)  hypertension     No Comments Provided     Excessive and frequent menstruation with regular cycle     s/p CLARA     Hormone replacement therapy (postmenopausal)     ,started ERT     Obesity     No Comments Provided     Other long term (current) drug therapy     2014,ambien #30/month     Other specified postprocedural states     nausea and severe vomiting with narcotics     Psychophysiologic insomnia     contract signed 2014     Tubulo-interstitial nephritis     No Comments Provided     Venous insufficiency (chronic) (peripheral)     No Comments Provided     SURGICAL HISTORY:  Past Surgical History:   Procedure Laterality Date     ARTHROSCOPY KNEE      10/12/2017     COLONOSCOPY      2004, because of change in bowel habits     COLONOSCOPY      2014,Extensive diverticulosis throughout the colon - follow up 10 years     EXTRACTION(S) DENTAL      No Comments Provided     HYSTERECTOMY TOTAL ABDOMINAL, BILATERAL SALPINGO-OOPHORECTOMY, COMBINED      3/30/10,CLARA/BSO/Vasquez/Posterior Repair     LAPAROSCOPIC ABLATION ENDOMETRIOSIS      3/05     OTHER SURGICAL HISTORY      10/25/2017,02526.0,ME SLING OPER STRES INCONTINENCE       SOCIAL HISTORY:  Social History     Social History     Marital status:      Spouse name: Carter     Number of children: 2     Years of education: 12+     Occupational History     Not on file.     Social History Main Topics     Smoking status: Never Smoker     Smokeless tobacco: Never Used     Alcohol use Yes      Comment: Alcoholic Drinks/day: occ     Drug use: Not on file      Comment: Drug use: No     Sexual activity: Yes     Partners: Male     Other Topics Concern     Not on file     Social History Narrative    Patient is .  She is retired.    Two adult sons. Shashi and     FAMILY HISTORY:  Family History   Problem Relation Age of Onset     Breast Cancer Mother      Cancer-breast     Cancer Mother      Cancer,lung and uterine cancer;       Breast Cancer  Maternal Grandmother      Cancer-breast     Cancer Sister 28     Cancer,endometrial or cervical cancer,     HEART DISEASE Brother      Heart Disease,2 heart surgeries B 1965     Allergy (Severe) Sister      Allergies, allergies/asthma     Other - See Comments Sister       lupus     CURRENT MEDICATIONS:   Current Outpatient Prescriptions   Medication Sig Dispense Refill     albuterol (PROAIR HFA/PROVENTIL HFA/VENTOLIN HFA) 108 (90 Base) MCG/ACT Inhaler Inhale 2 puffs into the lungs every 4 hours as needed 1 Inhaler 5     buPROPion (WELLBUTRIN XL) 150 MG 24 hr tablet Take 1 tablet (150 mg) by mouth every morning 90 tablet 3     calcium polycarbophil (FIBERCON) 625 MG tablet Take 1 tablet by mouth daily       cetirizine HCl 10 MG CAPS Take 1 tablet by mouth daily       Cholecalciferol (VITAMIN D3) 1000 UNITS CAPS Take 1,000 Units by mouth daily       diphenhydrAMINE (BENADRYL) 25 MG capsule Take 2 capsules by mouth nightly as needed       estradiol (ESTRACE) 1 MG tablet Take 0.5 tablets (0.5 mg) by mouth daily 45 tablet 3     fluticasone-salmeterol (ADVAIR DISKUS) 250-50 MCG/DOSE diskus inhaler Inhale 1 puff into the lungs every 12 hours 120 Inhaler 3     hydrochlorothiazide 12.5 MG TABS tablet Take 1 tablet (12.5 mg) by mouth daily 90 tablet 3     losartan-hydrochlorothiazide (HYZAAR) 100-25 MG per tablet Take 1 tablet by mouth daily 90 tablet 3     montelukast (SINGULAIR) 10 MG tablet Take 1 tablet by mouth At Bedtime       Multiple Vitamin (MULTI-VITAMINS) TABS Take 1 tablet by mouth daily       potassium chloride (K-TAB,KLOR-CON) 10 MEQ tablet Take 2 tablets (20 mEq) by mouth daily with food 180 tablet 3     scopolamine (TRANSDERM) 72 hr patch Apply 1 patch to hairless area behind one ear at least 4 hours before travel.  Remove old patch and change every 3 days (72 hours). 4 patch 0     triamcinolone (KENALOG) 0.1 % cream Apply sparingly to affected area three times daily as needed 30 g 1     zolpidem (AMBIEN) 10  MG tablet Take 1 tablet (10 mg) by mouth At Bedtime 30 tablet 5     fluticasone (FLONASE) 50 MCG/ACT spray SPRAY 2 SPRAYS INTO BOTH NOSTRILS DAILY 48 g 1     SUMAtriptan (IMITREX) 20 MG/ACT nasal spray INHALE 1 SPRAY IN THE NOSTRIL(S) EVERY 2 HOURS IF NEEDED FOR MIGRAINE. 3 each 1     ALLERGIES:  Doxycycline; Metolazone; No clinical screening - see comments; Amoxicillin-pot clavulanate; and Sulfa drugs     REVIEW OF SYSTEMS:  General: denies any general problems. Working on wt loss.  Wt Readings from Last 3 Encounters:   07/09/18 187 lb (84.8 kg)   02/05/18 208 lb 9.6 oz (94.6 kg)   09/27/17 196 lb 6.4 oz (89.1 kg)     Eyes: right eye cataract  Ears/Nose/Throat: last dentist visit was 1.5 months ago    Cardiovascular: denies problems  Respiratory: notices that her breathing is better with wt loss  Gastrointestinal: denies problems; colonoscopy is up to date  ; no heartburn if she stays away from late night eating  Genitourinary: denies problems - her bladder is excellent now   Musculoskeletal: left knee that had surgery last fall is having pain symptoms 90% of the time, no longer in physical therapy  Skin: denies problems  Neurologic: denies problems  Psychiatric: ambien works most nights, with this can usually sleep 6+ hours ; if she gets wound up about something   Endocrine: denies problems  Heme/Lymphatic: denies problems  Allergic/Immunologic: denies problems    PHQ-9 SCORE 12/18/2015 6/2/2016 2/5/2018   Total Score 1 0 1       OBJECTIVE:  /84 (BP Location: Right arm, Patient Position: Sitting, Cuff Size: Adult Large)  Pulse 76  Wt 187 lb (84.8 kg)  Breastfeeding? No  BMI 30.18 kg/m2   EXAM:   General Appearance: Pleasant, alert, appropriate appearance for age. No acute distress  Head Exam: Normal. Normocephalic, atraumatic.  Eye Exam:  Normal external eye, conjunctiva,lids, cornea. ANT.  Ear Exam: Normal TM's bilaterally. Normal auditory canals and external ears. Non-tender.  Nose Exam: Normal  external nose, mucus membranes, and septum.  OroPharynx Exam:  Dental hygiene adequate. Normal buccal mucose. Normal pharynx.  Neck Exam:  Supple, no masses or nodes.  Thyroid Exam: No nodules or enlargement.  Chest/Respiratory Exam: Normal chest wall and respirations. Clear to auscultation.  Breast Exam: No dimpling, nipple retraction or discharge. No masses or nodes.  Cardiovascular Exam: Regular rate and rhythm. S1, S2, no murmur, click, gallop, or rubs.  Gastrointestinal Exam: Soft, non-tender, nomasses or organomegaly.  Lymphatic Exam: Non-palpable nodes in neck, clavicular, axillary, or inguinal regions.  Musculoskeletal Exam: Moderate medial knee pain; 1+ lower extremity edema, chronic  FootExam: Swelling of left second toe, mild tenderness.  Skin: Mildly erythematous, dry patchy rash back of her left hand, dryness around the ends of her fingers.  Neurologic Exam: Nonfocal, symmetric DTRs, normal gross motor, tone coordination and no tremor.  Psychiatric Exam: Alert and oriented - appropriate affect.    Results for orders placed or performed during the hospital encounter of 07/06/18   MA Screen Bilateral w/Librado    Narrative    EXAM: MA SCREENING BILATERAL W/ LIBRADO, 7/6/2018 10:10 AM    COMPARISONS: 6/19/2017 through 4/28/2014    HISTORY: ; Encounter for screening mammogram for breast cancer    BREAST DENSITY: Heterogeneously dense.    FINDINGS: No new architectural distortion, dominant masses or  suspicious microcalcifications are identified in either breast.  Scattered punctate and vascular calcifications are again present in  both breasts, slightly more conspicuous due to technique. No new  clustered microcalcifications are identified.      Impression    IMPRESSION: No radiographic evidence of malignancy in either breast.    BI-RADS CATEGORY: 2 - Benign.    RECOMMENDED FOLLOW-UP: Annual Mammography.      EUGENIE DONOVAN MD       Lab Results   Component Value Date    CHOL 193 06/19/2018     Lab Results    Component Value Date    HDL 55 06/19/2018     Lab Results   Component Value Date     06/19/2018     Lab Results   Component Value Date    TRIG 111 06/19/2018     Last Basic Metabolic Panel:  Lab Results   Component Value Date     06/19/2018      Lab Results   Component Value Date    POTASSIUM 3.8 06/19/2018     Lab Results   Component Value Date    CHLORIDE 104 06/19/2018     Lab Results   Component Value Date    MARCIA 9.6 06/19/2018     Lab Results   Component Value Date    CO2 25 06/19/2018     Lab Results   Component Value Date    BUN 13 06/19/2018     Lab Results   Component Value Date    CR 0.89 06/19/2018     Lab Results   Component Value Date     06/19/2018         ASSESSMENT/PLAN    ICD-10-CM    1. Physical exam, annual Z00.00    2. Insomnia, persistent G47.00 zolpidem (AMBIEN) 10 MG tablet   3. Recurrent major depression in remission (H) F33.40 buPROPion (WELLBUTRIN XL) 150 MG 24 hr tablet   4. Essential hypertension I10 losartan-hydrochlorothiazide (HYZAAR) 100-25 MG per tablet     potassium chloride (K-TAB,KLOR-CON) 10 MEQ tablet   5. Counseling for estrogen replacement therapy Z71.89 estradiol (ESTRACE) 1 MG tablet   6. Asthma, unspecified asthma severity, unspecified whether complicated, unspecified whether persistent J45.909 hydrochlorothiazide 12.5 MG TABS tablet     fluticasone-salmeterol (ADVAIR DISKUS) 250-50 MCG/DOSE diskus inhaler     albuterol (PROAIR HFA/PROVENTIL HFA/VENTOLIN HFA) 108 (90 Base) MCG/ACT Inhaler   7. Allergic rhinitis, unspecified chronicity, unspecified seasonality, unspecified trigger J30.9 DISCONTINUED: fluticasone (FLONASE) 50 MCG/ACT spray   8. Motion sickness, initial encounter T75.3XXA scopolamine (TRANSDERM) 72 hr patch   9. Rash R21 triamcinolone (KENALOG) 0.1 % cream   10. Swelling of toe of left foot M79.89 XR Toe Left G/E 2 Views   11. Controlled substance agreement signed Z79.899        1.  Controlled substance agreement contract is updated today  in regards to Ambien.  Encourage patient to cut down to 5 mg a night as able.  Patient instructions regarding risk of short and long-term use his medications provided for her.  2.  Depression is under adequate control with Wellbutrin.  3.  Patient is continue to work on weight loss.  This will help with lower extremity edema, hypertension.  4.  I have personally reviewed the labs listed above.  5.  X-ray of left toe to be obtained today.  Consider fracture, osteoarthritis, gout.  6.  Continue same care plan for allergic rhinitis and intermittent asthma.  7.  At this time, patient wishes to continue on estrogen replacement therapy.  Consider tapering over the next year.  8.  Patient has an upcoming trip and requests scopolamine/motion sickness patches for this.  9.  Patient's other healthcare maintenance is up-to-date.    Yarely Miller MD

## 2018-07-09 NOTE — MR AVS SNAPSHOT
After Visit Summary   7/9/2018    Yolie Bedoya    MRN: [id number]           Patient Information     Date Of Birth          1960        Visit Information        Provider Department      7/9/2018 2:00 PM Yarely Villagomez MD [facility] [place] [facility]        Today's Diagnoses     Physical exam, annual    -  1    Insomnia, persistent        Recurrent major depression in remission (H)        Essential hypertension        Counseling for estrogen replacement therapy        Asthma, unspecified asthma severity, unspecified whether complicated, unspecified whether persistent        Allergic rhinitis, unspecified chronicity, unspecified seasonality, unspecified trigger        Motion sickness, initial encounter        Rash        Swelling of toe of left foot        Controlled substance agreement signed           Follow-ups after your visit        Who to contact     If you have questions or need follow up information about today's clinic visit or your schedule please contact [facility] directly at [phone].  Normal or non-critical lab and imaging results will be communicated to you by [employer], letter or phone within 4 business days after the clinic has received the results. If you do not hear from us within 7 days, please contact the clinic through [employer]ikuhart or phone. If you have a critical or abnormal lab result, we will notify you by phone as soon as possible.  Submit refill requests through [employer] or call your pharmacy and they will forward the refill request to us. Please allow 3 business days for your refill to be completed.          Additional Information About Your Visit        MyChart Information     [employer] gives you secure access to your electronic health record. If you see a primary care provider, you can also send messages to your care team and make appointments. If you have questions, please call your primary care clinic.  If you do not have a primary  care provider, please call [phone] and they will assist you.        Care EveryWhere ID     This is your Care EveryWhere ID. This could be used by other organizations to access your [place] medical records  [id number]        Your Vitals Were     Pulse Breastfeeding? BMI (Body Mass Index)             76 No 30.18 kg/m2          Blood Pressure from Last 3 Encounters:   07/09/18 132/84   02/05/18 136/68   09/27/17 110/76    Weight from Last 3 Encounters:   07/09/18 187 lb (84.8 kg)   02/05/18 208 lb 9.6 oz (94.6 kg)   09/27/17 196 lb 6.4 oz (89.1 kg)                 Today's Medication Changes          These changes are accurate as of 7/9/18 11:59 PM.  If you have any questions, ask your nurse or doctor.               Start taking these medicines.        Dose/Directions    fluticasone 50 MCG/ACT spray   Commonly known as:  FLONASE   Used for:  Allergic rhinitis, unspecified chronicity, unspecified seasonality, unspecified trigger   Started by:  Yarely Villagomez MD        SPRAY 2 SPRAYS INTO BOTH NOSTRILS DAILY   Quantity:  48 g   Refills:  1       scopolamine 72 hr patch   Commonly known as:  TRANSDERM   Used for:  Motion sickness, initial encounter   Started by:  Yarely Villagomez MD        Apply 1 patch to hairless area behind one ear at least 4 hours before travel.  Remove old patch and change every 3 days (72 hours).   Quantity:  4 patch   Refills:  0       triamcinolone 0.1 % cream   Commonly known as:  KENALOG   Used for:  Rash   Replaces:  triamcinolone 0.1 % ointment   Started by:  Yarely Villagomez MD        Apply sparingly to affected area three times daily as needed   Quantity:  30 g   Refills:  1         These medicines have changed or have updated prescriptions.        Dose/Directions    potassium chloride 10 MEQ tablet   Commonly known as:  K-TAB,KLOR-CON   This may have changed:  Another medication with the same name was removed. Continue taking this medication, and  follow the directions you see here.   Used for:  Essential hypertension   Changed by:  Yarely Villagomez MD        Dose:  20 mEq   Take 2 tablets (20 mEq) by mouth daily with food   Quantity:  180 tablet   Refills:  3       SUMAtriptan 20 MG/ACT nasal spray   Commonly known as:  IMITREX   This may have changed:  See the new instructions.   Used for:  Other migraine without status migrainosus, intractable   Changed by:  Yarely Villagomez MD        INHALE 1 SPRAY IN THE NOSTRIL(S) EVERY 2 HOURS IF NEEDED FOR MIGRAINE.   Quantity:  3 each   Refills:  1         Stop taking these medicines if you haven't already. Please contact your care team if you have questions.     ciprofloxacin 500 MG tablet   Commonly known as:  CIPRO   Stopped by:  Yarely Villagomez MD           promethazine 25 MG tablet   Commonly known as:  PHENERGAN   Stopped by:  Yarely Villagomez MD           triamcinolone 0.1 % ointment   Commonly known as:  KENALOG   Replaced by:  triamcinolone 0.1 % cream   Stopped by:  Yarely Villagomez MD                Where to get your medicines      These medications were sent to [id number] ([employer] [facility] - [place], [place] - [address]  [address] [facility] [address]     Phone:  [phone]     albuterol 108 (90 Base) MCG/ACT Inhaler    buPROPion 150 MG 24 hr tablet    estradiol 1 MG tablet    fluticasone 50 MCG/ACT spray    fluticasone-salmeterol 250-50 MCG/DOSE diskus inhaler    hydrochlorothiazide 12.5 MG Tabs tablet    losartan-hydrochlorothiazide 100-25 MG per tablet    potassium chloride 10 MEQ tablet    scopolamine 72 hr patch    SUMAtriptan 20 MG/ACT nasal spray    triamcinolone 0.1 % cream         Some of these will need a paper prescription and others can be bought over the counter.  Ask your nurse if you have questions.     Bring a paper prescription for each of these medications     zolpidem 10 MG tablet                 Primary Care Provider Office Phone # Fax #    Yarely BASILIO Malcolm Damon [phone] [address]  [place] [address]        Equal Access to Services     CHELLEEDEL CANDY : Hadii elena bazzi reagano Corina, wasadeda luqadaha, qaybta kaalmada cande, jim celinein hayaan dixiebetty freedman laBruceisatu dietrich. So [facility] [phone].    ATENCIÓN: Si habla español, tiene a gillette disposición servicios gratuitos de asistencia lingüística. Llame al [phone].    We comply with applicable federal civil rights laws and [place] laws. We do not discriminate on the basis of race, color, national origin, age, disability, sex, sexual orientation, or gender identity.            Thank you!     Thank you for choosing [facility] AND [facility]  for your care. Our goal is always to provide you with excellent care. Hearing back from our patients is one way we can continue to improve our services. Please take a few minutes to complete the written survey that you may receive in the mail after your visit with us. Thank you!             Your Updated Medication List - Protect others around you: Learn how to safely use, store and throw away your medicines at www.disposemymeds.org.          This list is accurate as of 7/9/18 11:59 PM.  Always use your most recent med list.                   Brand Name Dispense Instructions for use Diagnosis    albuterol 108 (90 Base) MCG/ACT Inhaler    PROAIR HFA/PROVENTIL HFA/VENTOLIN HFA    1 Inhaler    Inhale 2 puffs into the lungs every 4 hours as needed    Asthma, unspecified asthma severity, unspecified whether complicated, unspecified whether persistent       buPROPion 150 MG 24 hr tablet    WELLBUTRIN XL    90 tablet    Take 1 tablet (150 mg) by mouth every morning    Recurrent major depression in remission (H)       calcium polycarbophil 625 MG tablet    FIBERCON     Take 1 tablet by mouth daily        cetirizine HCl 10 MG Caps      Take 1 tablet by mouth daily         diphenhydrAMINE 25 MG capsule    BENADRYL     Take 2 capsules by mouth nightly as needed        estradiol 1 MG tablet    ESTRACE    45 tablet    Take 0.5 tablets (0.5 mg) by mouth daily    Counseling for estrogen replacement therapy       fluticasone 50 MCG/ACT spray    FLONASE    48 g    SPRAY 2 SPRAYS INTO BOTH NOSTRILS DAILY    Allergic rhinitis, unspecified chronicity, unspecified seasonality, unspecified trigger       fluticasone-salmeterol 250-50 MCG/DOSE diskus inhaler    ADVAIR DISKUS    120 Inhaler    Inhale 1 puff into the lungs every 12 hours    Asthma, unspecified asthma severity, unspecified whether complicated, unspecified whether persistent       hydrochlorothiazide 12.5 MG Tabs tablet     90 tablet    Take 1 tablet (12.5 mg) by mouth daily    Asthma, unspecified asthma severity, unspecified whether complicated, unspecified whether persistent       losartan-hydrochlorothiazide 100-25 MG per tablet    HYZAAR    90 tablet    Take 1 tablet by mouth daily    Essential hypertension       montelukast 10 MG tablet    SINGULAIR     Take 1 tablet by mouth At Bedtime        MULTI-VITAMINS Tabs      Take 1 tablet by mouth daily        potassium chloride 10 MEQ tablet    K-TAB,KLOR-CON    180 tablet    Take 2 tablets (20 mEq) by mouth daily with food    Essential hypertension       scopolamine 72 hr patch    TRANSDERM    4 patch    Apply 1 patch to hairless area behind one ear at least 4 hours before travel.  Remove old patch and change every 3 days (72 hours).    Motion sickness, initial encounter       SUMAtriptan 20 MG/ACT nasal spray    IMITREX    3 each    INHALE 1 SPRAY IN THE NOSTRIL(S) EVERY 2 HOURS IF NEEDED FOR MIGRAINE.    Other migraine without status migrainosus, intractable       triamcinolone 0.1 % cream    KENALOG    30 g    Apply sparingly to affected area three times daily as needed    Rash       vitamin D3 1000 units Caps      Take 1,000 Units by mouth daily        zolpidem 10 MG tablet     AMBIEN    30 tablet    Take 1 tablet (10 mg) by mouth At Bedtime    Insomnia, persistent

## 2018-07-09 NOTE — NURSING NOTE
Patient presents to the clinic today for a px.    Haylee Estevez LPN.................. 7/9/2018 2:01 PM

## 2018-07-11 RX ORDER — FLUTICASONE PROPIONATE 50 MCG
SPRAY, SUSPENSION (ML) NASAL
Qty: 48 G | Refills: 1 | Status: SHIPPED | OUTPATIENT
Start: 2018-07-11 | End: 2019-03-24

## 2018-07-11 RX ORDER — SUMATRIPTAN 20 MG/1
SPRAY NASAL
Qty: 3 EACH | Refills: 1 | Status: SHIPPED | OUTPATIENT
Start: 2018-07-11 | End: 2018-12-21

## 2018-07-11 NOTE — TELEPHONE ENCOUNTER
Flonase-Per pharmacy-PT ALWAYS GETS 3 MONTH SUPPLY PLEASE SEND NEW RX FOR [id number].. THANK YOU  Imitrex  LOV-07/09/2018-physical     Prescription refilled per RN Medication RefillPolcydneyy.................... Susana Mesa ....................  7/11/2018   9:49 AM

## 2018-07-11 NOTE — ADDENDUM NOTE
Encounter addended by: Elsy Mg, PT on: 7/11/2018  1:28 PM<BR>     Actions taken: Pend clinical note, Sign clinical note, Episode resolved

## 2018-07-11 NOTE — PROGRESS NOTES
Outpatient Physical Therapy Discharge Note     Patient: Yolie Bedoya  : 1960    Beginning/End Dates of Reporting Period:  1/3/18 to 2018    Referring Provider: CRISTO Najera    Therapy Diagnosis: Low back pain, lumbar segmental dysfunction, myofascial tightness, posture dysfunction, muscle weakness       Client Self Report: Back is burning today and yesterday. Lots of climbing of rocks and out of boats over the weekend, knee feels it.     Objective Measurements:  Objective Measure: tissue loading  Details: limited through left shoulder, left pelvis  Objective Measure: joint mobility  Details: FRS left L5     Goals:  Goal Identifier patient   Goal Description reduce pain, improve strength to lift grand children   Target Date 18   Date Met      Progress:     Goal Identifier HEP   Goal Description Patient to be independent with HEP   Target Date 18   Date Met      Progress:     Goal Identifier gait   Goal Description Patient to walk with normal gait pattern with 2/10 pain up to 30 minutes for aerobic exercise   Target Date 18   Date Met      Progress:     Goal Identifier mobility   Goal Description Patient to have 100 spine flexion for improved dressing and self cares   Target Date 18   Date Met  18   Progress:     Goal Identifier sleep   Goal Description Patient to sleep 6-8 hours without waking due to pain   Target Date 18   Date Met      Progress:     Goal Identifier sitting   Goal Description Patient to sit for 30 minutes to allow meal time and travel    Target Date 18   Date Met  18   Progress:     Goal Identifier joint mobility   Goal Description paitnet to have normal joint mobility within lumbar spine and pelvis to allow correct gait pattern and hip extension   Target Date 18   Date Met      Progress:     Goal Identifier daily tasks   Goal Description Patient to report 2/10 pain during household activities including dishes, laundry    Target Date 04/23/18   Date Met      Progress:     Progress Toward Goals:   Progress this reporting period: Patient continues to have low back pain, likely due to continued issues with her knee, affecting her gait and mobility.     Plan:  Discharge from therapy.    Discharge:    Reason for Discharge: No further expectation of progress.    Equipment Issued: NA    Discharge Plan: Patient to continue home program. Referred back to provider

## 2018-07-30 ENCOUNTER — TELEPHONE (OUTPATIENT)
Dept: FAMILY MEDICINE | Facility: OTHER | Age: 58
End: 2018-07-30

## 2018-07-30 DIAGNOSIS — J45.909 ASTHMA, UNSPECIFIED ASTHMA SEVERITY, UNSPECIFIED WHETHER COMPLICATED, UNSPECIFIED WHETHER PERSISTENT: ICD-10-CM

## 2018-07-30 RX ORDER — ALBUTEROL SULFATE 90 UG/1
2 AEROSOL, METERED RESPIRATORY (INHALATION) EVERY 4 HOURS PRN
Qty: 3 INHALER | Refills: 3 | Status: SHIPPED | OUTPATIENT
Start: 2018-07-30 | End: 2019-08-12

## 2018-07-31 NOTE — TELEPHONE ENCOUNTER
Patient is requesting the ADVAIR be changed, so she can get refills sent for 3 months at a time. The inhaler would need to be dispensed for 180, with 3 refills.    Deisy Montilla LPN on 7/31/2018 at 9:53 AM

## 2018-10-06 DIAGNOSIS — J45.20 INTERMITTENT ASTHMA, UNCOMPLICATED: Primary | ICD-10-CM

## 2018-10-10 RX ORDER — MONTELUKAST SODIUM 10 MG/1
TABLET ORAL
Qty: 90 TABLET | Refills: 2 | Status: SHIPPED | OUTPATIENT
Start: 2018-10-10 | End: 2019-07-03

## 2018-10-10 NOTE — TELEPHONE ENCOUNTER
Montelukast  LOV-07/09/2018-Continue same care plan for allergic rhinitis and intermittent asthma.    Prescription refilled per RN Medication RefillPolicy.................... Susana Mesa ....................  10/10/2018   2:20 PM

## 2018-12-21 ENCOUNTER — OFFICE VISIT (OUTPATIENT)
Dept: FAMILY MEDICINE | Facility: OTHER | Age: 58
End: 2018-12-21
Attending: FAMILY MEDICINE
Payer: COMMERCIAL

## 2018-12-21 VITALS
HEART RATE: 96 BPM | SYSTOLIC BLOOD PRESSURE: 118 MMHG | RESPIRATION RATE: 18 BRPM | TEMPERATURE: 98.6 F | DIASTOLIC BLOOD PRESSURE: 70 MMHG

## 2018-12-21 DIAGNOSIS — J45.40 MODERATE PERSISTENT ASTHMA WITHOUT COMPLICATION: Primary | ICD-10-CM

## 2018-12-21 DIAGNOSIS — G47.00 INSOMNIA, PERSISTENT: ICD-10-CM

## 2018-12-21 DIAGNOSIS — Z23 NEEDS FLU SHOT: ICD-10-CM

## 2018-12-21 DIAGNOSIS — M17.12 PRIMARY OSTEOARTHRITIS OF LEFT KNEE: ICD-10-CM

## 2018-12-21 DIAGNOSIS — G43.819 OTHER MIGRAINE WITHOUT STATUS MIGRAINOSUS, INTRACTABLE: ICD-10-CM

## 2018-12-21 DIAGNOSIS — I10 ESSENTIAL HYPERTENSION: ICD-10-CM

## 2018-12-21 DIAGNOSIS — T75.3XXA MOTION SICKNESS, INITIAL ENCOUNTER: ICD-10-CM

## 2018-12-21 DIAGNOSIS — Z71.89 COUNSELING FOR ESTROGEN REPLACEMENT THERAPY: ICD-10-CM

## 2018-12-21 PROCEDURE — 90686 IIV4 VACC NO PRSV 0.5 ML IM: CPT | Performed by: FAMILY MEDICINE

## 2018-12-21 PROCEDURE — 90471 IMMUNIZATION ADMIN: CPT | Performed by: FAMILY MEDICINE

## 2018-12-21 PROCEDURE — 99214 OFFICE O/P EST MOD 30 MIN: CPT | Mod: 25 | Performed by: FAMILY MEDICINE

## 2018-12-21 RX ORDER — SUMATRIPTAN 20 MG/1
SPRAY NASAL
Qty: 3 EACH | Refills: 1 | Status: SHIPPED | OUTPATIENT
Start: 2018-12-21 | End: 2020-03-17

## 2018-12-21 RX ORDER — ZOLPIDEM TARTRATE 10 MG/1
10 TABLET ORAL AT BEDTIME
Qty: 30 TABLET | Refills: 5 | Status: SHIPPED | OUTPATIENT
Start: 2018-12-21 | End: 2019-06-05

## 2018-12-21 RX ORDER — SCOLOPAMINE TRANSDERMAL SYSTEM 1 MG/1
PATCH, EXTENDED RELEASE TRANSDERMAL
Qty: 4 PATCH | Refills: 0 | Status: SHIPPED | OUTPATIENT
Start: 2018-12-21 | End: 2020-08-18

## 2018-12-21 ASSESSMENT — PAIN SCALES - GENERAL: PAINLEVEL: NO PAIN (0)

## 2018-12-21 NOTE — PROGRESS NOTES
Nursing Notes:   Lea Hartmann LPN  12/21/2018 11:45 AM  Signed  Patient is here for medication check for Ambien. States is still working well.  Lea Hartmann LPN .............12/21/2018     11:29 AM        No LMP recorded. Patient has had a hysterectomy.  Medication Reconciliation: complete    Lea SANCHEZ ANDREA Hartmann  12/21/2018 11:36 AM        SUBJECTIVE:   CC:  Yolie Bedoya is a 58 year old female who presents to clinic today for the following health issues:  Medication follow up     HPI  Yolie Bedoya is a 58 year old female in for medication follow up.    She has persistent asthma.  She has albuterol, Advair, and Singulair for the symptoms.  She does also have Flonase and Benadryl.  She finds with visiting kids who now have animals that she will have more symptoms.  Uses her rescue inhaler 0-3 times a day - depends upon exposure but most days are zero.      She takes ambien for sleep.  She will still wake up if she has pain or if something is on her mind.  Has had one bad dream this year - but no other sleep related behavior.  If she takes it and doesn't go right to bed she will stay up and watch tv, eat something that she wouldn't have.    ERT:  Continues to have hot flashes and night sweats.  Most nights she will have this to the point that her hair is wet.  Nighttime is the main time that she has symptoms, but they are still present during the day.  Estrogen was cut by 50% last summer.  Changed mattress types within the past month.      Left knee osteoarthritis.  She has had an injection done.  Most of her pain is along the medial side.  It does impact some of her routines of activity.    Migraines:  Uses imitrex nasal spray.  Each time she has a headache she needs 2 doses to get rid of this.  Two boxes/12 doses lasts her a year.      Hypertension: She is on Hyzaar for this.  She also needs to take potassium supplement.  No chest pain.  She does have some chronic lower extremity edema, this been  present for more than 10 years.     Allergies   Allergen Reactions     Doxycycline Nausea and Vomiting     Metolazone Unknown     No Clinical Screening - See Comments GI Disturbance and Nausea and Vomiting     Narcotics     Amoxicillin-Pot Clavulanate Rash     Sulfa Drugs Rash     Current Outpatient Medications   Medication     albuterol (PROAIR HFA/PROVENTIL HFA/VENTOLIN HFA) 108 (90 Base) MCG/ACT Inhaler     buPROPion (WELLBUTRIN XL) 150 MG 24 hr tablet     calcium polycarbophil (FIBERCON) 625 MG tablet     cetirizine HCl 10 MG CAPS     Cholecalciferol (VITAMIN D3) 1000 UNITS CAPS     diphenhydrAMINE (BENADRYL) 25 MG capsule     estradiol (ESTRACE) 1 MG tablet     fluticasone (FLONASE) 50 MCG/ACT spray     fluticasone-salmeterol (ADVAIR DISKUS) 250-50 MCG/DOSE diskus inhaler     losartan-hydrochlorothiazide (HYZAAR) 100-25 MG per tablet     montelukast (SINGULAIR) 10 MG tablet     Multiple Vitamin (MULTI-VITAMINS) TABS     potassium chloride (K-TAB,KLOR-CON) 10 MEQ tablet     scopolamine (TRANSDERM) 1 MG/3DAYS 72 hr patch     SUMAtriptan (IMITREX) 20 MG/ACT nasal spray     triamcinolone (KENALOG) 0.1 % cream     zolpidem (AMBIEN) 10 MG tablet     hydrochlorothiazide 12.5 MG TABS tablet     No current facility-administered medications for this visit.       Past Medical History:   Diagnosis Date     Allergic rhinitis due to animal hair and dander     1970,aspen, birch, maple, oak, grass, dust mite, ragwee, cocklebur, mugwart, lambs quarter, pigweed, fungus, molds     Calculus of gallbladder without cholecystitis without obstruction     No Comments Provided     Essential (primary) hypertension     No Comments Provided     Excessive and frequent menstruation with regular cycle     s/p CLARA     Hormone replacement therapy (postmenopausal)     2010,started ERT     Obesity     No Comments Provided     Other long term (current) drug therapy     2/11/2014,ambien #30/month     Other specified postprocedural states     nausea  and severe vomiting with narcotics     Psychophysiologic insomnia     contract signed 2014     Tubulo-interstitial nephritis     No Comments Provided     Venous insufficiency (chronic) (peripheral)     No Comments Provided      Past Surgical History:   Procedure Laterality Date     ARTHROSCOPY KNEE      10/12/2017     COLONOSCOPY      2004, because of change in bowel habits     COLONOSCOPY      2014,Extensive diverticulosis throughout the colon - follow up 10 years     EXTRACTION(S) DENTAL      No Comments Provided     HYSTERECTOMY TOTAL ABDOMINAL, BILATERAL SALPINGO-OOPHORECTOMY, COMBINED      3/30/10,CLARA/BSO/Vasquez/Posterior Repair     LAPAROSCOPIC ABLATION ENDOMETRIOSIS      3/05     OTHER SURGICAL HISTORY      10/25/2017,93359.0,LA SLING OPER STRES INCONTINENCE     Family History   Problem Relation Age of Onset     Breast Cancer Mother         Cancer-breast     Cancer Mother         Cancer,lung and uterine cancer;       Breast Cancer Maternal Grandmother         Cancer-breast     Cancer Sister 28        Cancer,endometrial or cervical cancer,     Allergy (Severe) Sister         Allergies, allergies/asthma     Other - See Comments Sister          lupus     Heart Disease Brother         Heart Disease,2 heart surgeries B        Review of Systems   Constitutional: Negative.    Eyes: Negative.    Cardiovascular: Negative.    Genitourinary: Positive for urgency.   Musculoskeletal:        Left knee - has brace to wear, but is getting worse.  If she does something to make it worse then she will have three bad days.     Allergic/Immunologic: Positive for environmental allergies.   Neurological: Negative.    Psychiatric/Behavioral:        She feels that her mood symptoms are well controlled with Wellbutrin.        PHQ-2 Score:     PHQ-2 (  [employer]) 2018   Q1: Little interest or pleasure in doing things 0 0   Q2: Feeling down, depressed or hopeless 0 0   PHQ-2 Score 0 0          PHQ-9 SCORE 12/18/2015 6/2/2016 2/5/2018   PHQ-9 Total Score 1 0 1         OBJECTIVE:     /70 (BP Location: Right arm, Patient Position: Sitting, Cuff Size: Adult Regular)   Pulse 96   Temp 98.6  F (37  C) (Tympanic)   Resp 18   Breastfeeding? No   There is no height or weight on file to calculate BMI.  Physical Exam   Constitutional: She appears well-developed and well-nourished.   HENT:   Head: Normocephalic.   Neck: No thyromegaly present.   Cardiovascular: Normal rate and regular rhythm.   Pulmonary/Chest: Effort normal. She has no wheezes.   Abdominal: Soft.   Musculoskeletal:   1+ lower extremity edema   Skin: No rash noted.   Psychiatric: She has a normal mood and affect.   Nursing note and vitals reviewed.             ASSESSMENT/PLAN:       ICD-10-CM    1. Moderate persistent asthma without complication J45.40    2. Motion sickness, initial encounter T75.3XXA scopolamine (TRANSDERM) 1 MG/3DAYS 72 hr patch   3. Other migraine without status migrainosus, intractable G43.819 SUMAtriptan (IMITREX) 20 MG/ACT nasal spray   4. Insomnia, persistent G47.00 zolpidem (AMBIEN) 10 MG tablet   5. Needs flu shot Z23 HC FLU VAC PRESRV FREE QUAD SPLIT VIR 3+YRS IM   6. Primary osteoarthritis of left knee M17.12    7. Essential hypertension I10    8. Counseling for estrogen replacement therapy Z71.89             PLAN:  1.  Continue same asthma care plan.  Patient has good understanding of her environmental triggers.  2.  Flu vaccine is updated today.  3.  Continue with Imitrex as needed for headaches.  Based on past usage, she does not appear to be at increased risk for rebound headaches.  4.  Prescriptions given for scopolamine patch for upcoming travel.  5.  Discussed follow-up with orthopedics regarding left knee osteoarthritis.  Discussed potential timing of knee replacement.  6.  Blood pressure is at goal, continue Hyzaar.  7.  I had hoped at this point to be able to further decrease her estrogen  replacement.  However, she remains quite symptomatic 6 months post dose decrease.  8.  Refill of Ambien times 6 months is given.   is reviewed.  Discussed increased risk of habituation, parasomnias, injuries associated with this medication use.      DAVID RAE MD  [facility] AND [facility]    This note was created using voice recognition software and was screened for errors in transcription.

## 2018-12-21 NOTE — NURSING NOTE
Patient is here for medication check for Ambien. States is still working well.  Lea Hartmann LPN .............12/21/2018     11:29 AM        No LMP recorded. Patient has had a hysterectomy.  Medication Reconciliation: complete    Lea Hartmann LPN  12/21/2018 11:36 AM

## 2018-12-26 ASSESSMENT — ENCOUNTER SYMPTOMS
CONSTITUTIONAL NEGATIVE: 1
EYES NEGATIVE: 1
CARDIOVASCULAR NEGATIVE: 1
NEUROLOGICAL NEGATIVE: 1

## 2019-03-24 DIAGNOSIS — J30.9 ALLERGIC RHINITIS: ICD-10-CM

## 2019-03-26 RX ORDER — FLUTICASONE PROPIONATE 50 MCG
SPRAY, SUSPENSION (ML) NASAL
Qty: 48 G | Refills: 1 | Status: SHIPPED | OUTPATIENT
Start: 2019-03-26 | End: 2019-08-12

## 2019-03-26 NOTE — TELEPHONE ENCOUNTER
"Requested Prescriptions   Pending Prescriptions Disp Refills     fluticasone (FLONASE) 50 MCG/ACT nasal spray [Pharmacy Med Name: FLUTICASONE 50MCG/ACT SPRAY] 48 g 1     Sig: SPRAY 2 SPRAYS INTO BOTH NOSTRILS DAILY    Inhaled Steroids Protocol Passed - 3/24/2019  5:00 AM       Passed - Patient is age 12 or older       Passed - Recent (12 mo) or future (30 days) visit within the authorizing provider's specialty    Patient had office visit in the last 12 months or has a visit in the next 30 days with authorizing provider or within the authorizing provider's specialty.  See \"Patient Info\" tab in inbasket, or \"Choose Columns\" in Meds & Orders section of the refill encounter.             Passed - Medication is active on med list        LOV-12/21/2018  Prescription refilled per RN Medication RefillPolicy.................... Susana Mesa ....................  3/26/2019   11:46 AM        "

## 2019-06-03 DIAGNOSIS — G47.00 INSOMNIA, PERSISTENT: ICD-10-CM

## 2019-06-05 RX ORDER — ZOLPIDEM TARTRATE 10 MG/1
10 TABLET ORAL AT BEDTIME
Qty: 30 TABLET | Refills: 0 | Status: SHIPPED | OUTPATIENT
Start: 2019-06-05 | End: 2019-07-08

## 2019-06-05 NOTE — TELEPHONE ENCOUNTER
Patient notified needs appointment before future refills.  Brea Puentes LPN ...... 6/5/2019 2:35 PM

## 2019-06-05 NOTE — TELEPHONE ENCOUNTER
Refill Request for: Zolpidem (AMBIEN) 10 MG tablet   Received From: [id number]  Last Written Prescription Date: 12/21/18 for 30 tablets and 5 refills, local print  LOV: 12/21/18 with PCP  Next Appointment: No upcoming office visit on file during initial refill review with PCP  Protocol: No protocol available    Routing refill request to provider for review/approval because: Drug not on the [facility] refill protocol.       Esme Adan on 6/5/2019 at 8:20 AM

## 2019-07-03 DIAGNOSIS — J45.909 ASTHMA, UNSPECIFIED ASTHMA SEVERITY, UNSPECIFIED WHETHER COMPLICATED, UNSPECIFIED WHETHER PERSISTENT: ICD-10-CM

## 2019-07-03 DIAGNOSIS — F33.40 RECURRENT MAJOR DEPRESSION IN REMISSION (H): ICD-10-CM

## 2019-07-03 DIAGNOSIS — J45.20 INTERMITTENT ASTHMA, UNCOMPLICATED: ICD-10-CM

## 2019-07-03 DIAGNOSIS — Z71.89 COUNSELING FOR ESTROGEN REPLACEMENT THERAPY: ICD-10-CM

## 2019-07-03 DIAGNOSIS — G47.00 INSOMNIA, PERSISTENT: ICD-10-CM

## 2019-07-03 DIAGNOSIS — I10 ESSENTIAL HYPERTENSION: ICD-10-CM

## 2019-07-08 NOTE — TELEPHONE ENCOUNTER
Routing refill request to provider for review/approval because:  Drug not on the [facility] refill protocol     Labs not current:  Creat, potassium, asthma control test.    LOV: 12/21/18    Indu Curry RN on 7/8/2019 at 3:17 PM

## 2019-07-09 RX ORDER — POTASSIUM CHLORIDE 750 MG/1
20 TABLET, EXTENDED RELEASE ORAL
Qty: 180 TABLET | Refills: 1 | Status: SHIPPED | OUTPATIENT
Start: 2019-07-09 | End: 2019-08-12

## 2019-07-09 RX ORDER — MONTELUKAST SODIUM 10 MG/1
TABLET ORAL
Qty: 90 TABLET | Refills: 1 | Status: SHIPPED | OUTPATIENT
Start: 2019-07-09 | End: 2019-08-12

## 2019-07-09 RX ORDER — LOSARTAN POTASSIUM AND HYDROCHLOROTHIAZIDE 25; 100 MG/1; MG/1
1 TABLET ORAL DAILY
Qty: 90 TABLET | Refills: 1 | Status: SHIPPED | OUTPATIENT
Start: 2019-07-09 | End: 2019-08-12

## 2019-07-09 RX ORDER — ZOLPIDEM TARTRATE 10 MG/1
10 TABLET ORAL AT BEDTIME
Qty: 30 TABLET | Refills: 0 | Status: SHIPPED | OUTPATIENT
Start: 2019-07-09 | End: 2019-08-06

## 2019-07-09 RX ORDER — BUPROPION HYDROCHLORIDE 150 MG/1
150 TABLET ORAL EVERY MORNING
Qty: 90 TABLET | Refills: 1 | Status: SHIPPED | OUTPATIENT
Start: 2019-07-09 | End: 2019-08-12

## 2019-07-09 RX ORDER — ESTRADIOL 1 MG/1
0.5 TABLET ORAL DAILY
Qty: 45 TABLET | Refills: 1 | Status: SHIPPED | OUTPATIENT
Start: 2019-07-09 | End: 2019-08-12

## 2019-07-09 RX ORDER — HYDROCHLOROTHIAZIDE 12.5 MG/1
12.5 TABLET ORAL DAILY
Qty: 90 TABLET | Refills: 1 | Status: SHIPPED | OUTPATIENT
Start: 2019-07-09 | End: 2019-08-12

## 2019-08-05 ENCOUNTER — HOSPITAL ENCOUNTER (OUTPATIENT)
Dept: MAMMOGRAPHY | Facility: OTHER | Age: 59
Discharge: HOME | End: 2019-08-05
Attending: FAMILY MEDICINE | Admitting: FAMILY MEDICINE
Payer: COMMERCIAL

## 2019-08-05 DIAGNOSIS — Z12.39 BREAST SCREENING, UNSPECIFIED: ICD-10-CM

## 2019-08-05 PROCEDURE — 77063 BREAST TOMOSYNTHESIS BI: CPT

## 2019-08-06 DIAGNOSIS — G47.00 INSOMNIA, PERSISTENT: ICD-10-CM

## 2019-08-06 RX ORDER — ZOLPIDEM TARTRATE 10 MG/1
10 TABLET ORAL AT BEDTIME
Qty: 7 TABLET | Refills: 0 | Status: SHIPPED | OUTPATIENT
Start: 2019-08-06 | End: 2019-08-12

## 2019-08-06 NOTE — TELEPHONE ENCOUNTER
Refill Request for: Zolpidem (AMBIEN) 10 MG tablet   Received From: [facility] #[id number].   Last Written Prescription Date: 7/9/19 for 30 tablets and 0 refills  LOV: 12/21/18 with PCP  Next Appointment: 8/12/19 with PCP  Protocol: No available protocol     Routing refill request to provider for review/approval because: Drug not on the [facility] refill protocol.       Esme RODRÍGUEZN, RN on 8/6/2019 at 3:08 PM

## 2019-08-12 ENCOUNTER — OFFICE VISIT (OUTPATIENT)
Dept: FAMILY MEDICINE | Facility: OTHER | Age: 59
End: 2019-08-12
Attending: FAMILY MEDICINE
Payer: COMMERCIAL

## 2019-08-12 ENCOUNTER — APPOINTMENT (OUTPATIENT)
Dept: LAB | Facility: OTHER | Age: 59
End: 2019-08-12
Attending: FAMILY MEDICINE
Payer: COMMERCIAL

## 2019-08-12 VITALS
DIASTOLIC BLOOD PRESSURE: 82 MMHG | HEART RATE: 76 BPM | HEIGHT: 66 IN | BODY MASS INDEX: 33.43 KG/M2 | RESPIRATION RATE: 16 BRPM | TEMPERATURE: 97.7 F | WEIGHT: 208 LBS | SYSTOLIC BLOOD PRESSURE: 134 MMHG

## 2019-08-12 DIAGNOSIS — F33.40 RECURRENT MAJOR DEPRESSION IN REMISSION (H): ICD-10-CM

## 2019-08-12 DIAGNOSIS — J45.909 ASTHMA, UNSPECIFIED ASTHMA SEVERITY, UNSPECIFIED WHETHER COMPLICATED, UNSPECIFIED WHETHER PERSISTENT: ICD-10-CM

## 2019-08-12 DIAGNOSIS — J45.20 INTERMITTENT ASTHMA, UNCOMPLICATED: ICD-10-CM

## 2019-08-12 DIAGNOSIS — I10 ESSENTIAL HYPERTENSION: ICD-10-CM

## 2019-08-12 DIAGNOSIS — Z71.89 COUNSELING FOR ESTROGEN REPLACEMENT THERAPY: ICD-10-CM

## 2019-08-12 DIAGNOSIS — G47.00 INSOMNIA, PERSISTENT: ICD-10-CM

## 2019-08-12 DIAGNOSIS — Z00.00 PHYSICAL EXAM, ANNUAL: Primary | ICD-10-CM

## 2019-08-12 DIAGNOSIS — L57.0 ACTINIC KERATOSIS: ICD-10-CM

## 2019-08-12 DIAGNOSIS — J30.9 ALLERGIC RHINITIS, UNSPECIFIED SEASONALITY, UNSPECIFIED TRIGGER: ICD-10-CM

## 2019-08-12 LAB
ALBUMIN SERPL-MCNC: 4.5 G/DL (ref 3.5–5.7)
ALBUMIN UR-MCNC: NEGATIVE MG/DL
ALP SERPL-CCNC: 70 U/L (ref 34–104)
ALT SERPL W P-5'-P-CCNC: 13 U/L (ref 7–52)
ANION GAP SERPL CALCULATED.3IONS-SCNC: 9 MMOL/L (ref 3–14)
APPEARANCE UR: CLEAR
AST SERPL W P-5'-P-CCNC: 11 U/L (ref 13–39)
BILIRUB SERPL-MCNC: 0.5 MG/DL (ref 0.3–1)
BILIRUB UR QL STRIP: NEGATIVE
BUN SERPL-MCNC: 20 MG/DL (ref 7–25)
CALCIUM SERPL-MCNC: 9.8 MG/DL (ref 8.6–10.3)
CHLORIDE SERPL-SCNC: 104 MMOL/L (ref 98–107)
CHOLEST SERPL-MCNC: 262 MG/DL
CO2 SERPL-SCNC: 27 MMOL/L (ref 21–31)
COLOR UR AUTO: YELLOW
CREAT SERPL-MCNC: 0.91 MG/DL (ref 0.6–1.2)
GFR SERPL CREATININE-BSD FRML MDRD: 64 ML/MIN/{1.73_M2}
GLUCOSE SERPL-MCNC: 119 MG/DL (ref 70–105)
GLUCOSE UR STRIP-MCNC: NEGATIVE MG/DL
HDLC SERPL-MCNC: 76 MG/DL (ref 23–92)
HGB UR QL STRIP: NEGATIVE
KETONES UR STRIP-MCNC: NEGATIVE MG/DL
LDLC SERPL CALC-MCNC: 147 MG/DL
LEUKOCYTE ESTERASE UR QL STRIP: NEGATIVE
NITRATE UR QL: NEGATIVE
NONHDLC SERPL-MCNC: 186 MG/DL
PH UR STRIP: 6 PH (ref 5–9)
POTASSIUM SERPL-SCNC: 3.8 MMOL/L (ref 3.5–5.1)
PROT SERPL-MCNC: 7.3 G/DL (ref 6.4–8.9)
SODIUM SERPL-SCNC: 140 MMOL/L (ref 134–144)
SOURCE: NORMAL
SP GR UR STRIP: 1.02 (ref 1–1.03)
TRIGL SERPL-MCNC: 196 MG/DL
UROBILINOGEN UR STRIP-ACNC: 0.2 EU/DL (ref 0.2–1)

## 2019-08-12 PROCEDURE — 80307 DRUG TEST PRSMV CHEM ANLYZR: CPT | Mod: ZL | Performed by: FAMILY MEDICINE

## 2019-08-12 PROCEDURE — 36415 COLL VENOUS BLD VENIPUNCTURE: CPT | Mod: ZL | Performed by: FAMILY MEDICINE

## 2019-08-12 PROCEDURE — 80053 COMPREHEN METABOLIC PANEL: CPT | Mod: ZL | Performed by: FAMILY MEDICINE

## 2019-08-12 PROCEDURE — 81003 URINALYSIS AUTO W/O SCOPE: CPT | Mod: ZL | Performed by: FAMILY MEDICINE

## 2019-08-12 PROCEDURE — 99396 PREV VISIT EST AGE 40-64: CPT | Mod: 25 | Performed by: FAMILY MEDICINE

## 2019-08-12 PROCEDURE — 80061 LIPID PANEL: CPT | Mod: ZL | Performed by: FAMILY MEDICINE

## 2019-08-12 PROCEDURE — 17000 DESTRUCT PREMALG LESION: CPT | Performed by: FAMILY MEDICINE

## 2019-08-12 RX ORDER — FLUTICASONE PROPIONATE 50 MCG
SPRAY, SUSPENSION (ML) NASAL
Qty: 48 G | Refills: 1 | Status: SHIPPED | OUTPATIENT
Start: 2019-08-12 | End: 2020-03-18

## 2019-08-12 RX ORDER — MONTELUKAST SODIUM 10 MG/1
1 TABLET ORAL AT BEDTIME
Qty: 90 TABLET | Refills: 3 | Status: SHIPPED | OUTPATIENT
Start: 2019-08-12 | End: 2020-08-18

## 2019-08-12 RX ORDER — POTASSIUM CHLORIDE 750 MG/1
20 TABLET, EXTENDED RELEASE ORAL
Qty: 180 TABLET | Refills: 3 | Status: SHIPPED | OUTPATIENT
Start: 2019-08-12 | End: 2019-12-30

## 2019-08-12 RX ORDER — HYDROCHLOROTHIAZIDE 12.5 MG/1
12.5 TABLET ORAL DAILY
Qty: 90 TABLET | Refills: 3 | Status: SHIPPED | OUTPATIENT
Start: 2019-08-12 | End: 2020-08-18

## 2019-08-12 RX ORDER — ESTRADIOL 1 MG/1
0.5 TABLET ORAL DAILY
Qty: 45 TABLET | Refills: 3 | Status: SHIPPED | OUTPATIENT
Start: 2019-08-12 | End: 2020-02-18

## 2019-08-12 RX ORDER — ZOLPIDEM TARTRATE 10 MG/1
10 TABLET ORAL AT BEDTIME
Qty: 30 TABLET | Refills: 5 | Status: SHIPPED | OUTPATIENT
Start: 2019-08-12 | End: 2020-02-17

## 2019-08-12 RX ORDER — ALBUTEROL SULFATE 90 UG/1
2 AEROSOL, METERED RESPIRATORY (INHALATION) EVERY 4 HOURS PRN
Qty: 3 INHALER | Refills: 3 | Status: SHIPPED | OUTPATIENT
Start: 2019-08-12 | End: 2020-08-18

## 2019-08-12 RX ORDER — BUPROPION HYDROCHLORIDE 150 MG/1
150 TABLET ORAL EVERY MORNING
Qty: 90 TABLET | Refills: 3 | Status: SHIPPED | OUTPATIENT
Start: 2019-08-12 | End: 2020-08-18

## 2019-08-12 RX ORDER — LOSARTAN POTASSIUM AND HYDROCHLOROTHIAZIDE 25; 100 MG/1; MG/1
1 TABLET ORAL DAILY
Qty: 90 TABLET | Refills: 3 | Status: SHIPPED | OUTPATIENT
Start: 2019-08-12 | End: 2020-08-18

## 2019-08-12 ASSESSMENT — PAIN SCALES - GENERAL: PAINLEVEL: MILD PAIN (2)

## 2019-08-12 ASSESSMENT — PATIENT HEALTH QUESTIONNAIRE - PHQ9: SUM OF ALL RESPONSES TO PHQ QUESTIONS 1-9: 3

## 2019-08-12 ASSESSMENT — MIFFLIN-ST. JEOR: SCORE: 1540.23

## 2019-08-12 NOTE — LETTER
My Depression Action Plan  Name: Yolie Bedoya   Date of Birth 1960  Date: 8/12/2019    My doctor: Yarely Villagomez   My clinic: [facility]  [address]  [place] [id number] [phone]          GREEN    ZONE   Good Control    What it looks like:     Things are going generally well. You have normal up s and down s. You may even feel depressed from time to time, but bad moods usually last less than a day.   What you need to do:  1. Continue to care for yourself (see self care plan)  2. Check your depression survival kit and update it as needed  3. Follow your physician s recommendations including any medication.  4. Do not stop taking medication unless you consult with your physician first.           YELLOW         ZONE Getting Worse    What it looks like:     Depression is starting to interfere with your life.     It may be hard to get out of bed; you may be starting to isolate yourself from others.    Symptoms of depression are starting to last most all day and this has happened for several days.     You may have suicidal thoughts but they are not constant.   What you need to do:     1. Call your care team, your response to treatment will improve if you keep your care team informed of your progress. Yellow periods are signs an adjustment may need to be made.     2. Continue your self-care, even if you have to fake it!    3. Talk to someone in your support network    4. Open up your depression survival kit           RED    ZONE Medical Alert - Get Help    What it looks like:     Depression is seriously interfering with your life.     You may experience these or other symptoms: You can t get out of bed most days, can t work or engage in other necessary activities, you have trouble taking care of basic hygiene, or basic responsibilities, thoughts of suicide or death that will not go away, self-injurious behavior.     What you need to do:  1. Call your  care team and request a same-day appointment. If they are not available (weekends or after hours) call your local crisis line, emergency room or 911.            Depression Self Care Plan / Survival Kit    Self-Care for Depression  Here s the deal. Your body and mind are really not as separate as most people think.  What you do and think affects how you feel and how you feel influences what you do and think. This means if you do things that people who feel good do, it will help you feel better.  Sometimes this is all it takes.  There is also a place for medication and therapy depending on how severe your depression is, so be sure to consult with your medical provider and/ or Behavioral Health Consultant if your symptoms are worsening or not improving.     In order to better manage my stress, I will:    Exercise  Get some form of exercise, every day. This will help reduce pain and release endorphins, the  feel good  chemicals in your brain. This is almost as good as taking antidepressants!  This is not the same as joining a gym and then never going! (they count on that by the way ) It can be as simple as just going for a walk or doing some gardening, anything that will get you moving.      Hygiene   Maintain good hygiene (Get out of bed in the morning, Make your bed, Brush your teeth, Take a shower, and Get dressed like you were going to work, even if you are unemployed).  If your clothes don't fit try to get ones that do.    Diet  I will strive to eat foods that are good for me, drink plenty of water, and avoid excessive sugar, caffeine, alcohol, and other mood-altering substances.  Some foods that are helpful in depression are: complex carbohydrates, B vitamins, flaxseed, fish or fish oil, fresh fruits and vegetables.    Psychotherapy  I agree to participate in Individual Therapy (if recommended).    Medication  If prescribed medications, I agree to take them.  Missing doses can result in serious side effects.  I  understand that drinking alcohol, or other illicit drug use, may cause potential side effects.  I will not stop my medication abruptly without first discussing it with my provider.    Staying Connected With Others  I will stay in touch with my friends, family members, and my primary care provider/team.    Use your imagination  Be creative.  We all have a creative side; it doesn t matter if it s oil painting, sand castles, or mud pies! This will also kick up the endorphins.    Witness Beauty  (AKA stop and smell the roses) Take a look outside, even in mid-winter. Notice colors, textures. Watch the squirrels and birds.     Service to others  Be of service to others.  There is always someone else in need.  By helping others we can  get out of ourselves  and remember the really important things.  This also provides opportunities for practicing all the other parts of the program.    Humor  Laugh and be silly!  Adjust your TV habits for less news and crime-drama and more comedy.    Control your stress  Try breathing deep, massage therapy, biofeedback, and meditation. Find time to relax each day.     My support system    Clinic Contact:  Phone number:    Contact 1:  Phone number:    Contact 2:  Phone number:    Sabianist/:  Phone number:    Therapist:  Phone number:    Local crisis center:    Phone number:    Other community support:  Phone number:

## 2019-08-12 NOTE — PROGRESS NOTES
SUBJECTIVE:  Nursing Notes:   Haylee Estevez LPN  8/12/2019 10:00 AM  Signed  Patient presents to the clinic today for a med check.   Medication Reconciliation: complete     Haylee Estevez LPN.................. 8/12/2019 9:53 AM      Yolie Bedoya is a 58 year old female who presents for her annual exam.    HPI: Yolie Bedoya is a 58 year old female presents for her annual exam.    Ongoing medical history:  Hypertension, obesity, asthma, ERT, allergic rhinitis, chronic depression, chronic insomnia.  She's noticed more LE edema - she thinks this is due to her wt.    Feels that her asthma and allergy symptoms are stable.  Mood:  Feels that her life is good, but that of those around her are more stressful and that increases her anxiety (in particular related to her sisters)    Left knee osteoarthritis - has had Orthovisc injection 6 months ago which didn't help much but the spray used that day did help quite a bit. Looking at surgery coming up.    Spot on her chin/neck:  Came back after having it frozen off.      Last pap: has had hysterectomy   Immunizations:  Tetanus 2014  Mammogram done earlier this month  Colon cancer screening done 2014    Lab Results   Component Value Date    CHOL 193 06/19/2018     Lab Results   Component Value Date    HDL 55 06/19/2018     Lab Results   Component Value Date     06/19/2018     Lab Results   Component Value Date    TRIG 111 06/19/2018         PROBLEM LIST:  Patient Active Problem List   Diagnosis     Allergic rhinitis     Backache     Family history of malignant neoplasm of breast     Controlled substance agreement signed     Diverticular disease of colon     Endocrine disorder     Hypokalemia     Impaired fasting glucose     Insomnia, persistent     Edema     Melanocytic nevus of face     Meniere's disease     Migraine headache     Obesity     Recurrent major depression in remission (H)     Stress incontinence in female     PAST MEDICAL HISTORY:  Past Medical  History:   Diagnosis Date     Allergic rhinitis due to animal hair and dander     1970,aspen, birch, maple, oak, grass, dust mite, ragwee, cocklebur, mugwart, lambs quarter, pigweed, fungus, molds     Calculus of gallbladder without cholecystitis without obstruction     No Comments Provided     Essential (primary) hypertension     No Comments Provided     Excessive and frequent menstruation with regular cycle     s/p CLARA     Hormone replacement therapy (postmenopausal)     2010,started ERT     Obesity     No Comments Provided     Other long term (current) drug therapy     2/11/2014,ambien #30/month     Other specified postprocedural states     nausea and severe vomiting with narcotics     Psychophysiologic insomnia     contract signed 2/2014     Tubulo-interstitial nephritis     No Comments Provided     Venous insufficiency (chronic) (peripheral)     No Comments Provided     SURGICAL HISTORY:  Past Surgical History:   Procedure Laterality Date     ARTHROSCOPY KNEE      10/12/2017     COLONOSCOPY      6/2004, because of change in bowel habits     COLONOSCOPY  05/14/2014 5/14/2014,Extensive diverticulosis throughout the colon - follow up 10 years     EXTRACTION(S) DENTAL      No Comments Provided     HYSTERECTOMY TOTAL ABDOMINAL, BILATERAL SALPINGO-OOPHORECTOMY, COMBINED      3/30/10,CLARA/BSO/Vasquez/Posterior Repair     LAPAROSCOPIC ABLATION ENDOMETRIOSIS      3/05     OTHER SURGICAL HISTORY      10/25/2017,11932.0,MO SLING OPER STRES INCONTINENCE       SOCIAL HISTORY:  Social History     Socioeconomic History     Marital status:      Spouse name: Carter     Number of children: 2     Years of education: 12+     Highest education level: Not on file   Occupational History     Not on file   Social Needs     Financial resource strain: Not on file     Food insecurity:     Worry: Not on file     Inability: Not on file     Transportation needs:     Medical: Not on file     Non-medical: Not on file   Tobacco Use      Smoking status: Never Smoker     Smokeless tobacco: Never Used   Substance and Sexual Activity     Alcohol use: Yes     Comment: Alcoholic Drinks/day: occ     Drug use: No     Comment: Drug use: No     Sexual activity: Yes     Partners: Male   Lifestyle     Physical activity:     Days per week: Not on file     Minutes per session: Not on file     Stress: Not on file   Relationships     Social connections:     Talks on phone: Not on file     Gets together: Not on file     Attends Mandaeism service: Not on file     Active member of club or organization: Not on file     Attends meetings of clubs or organizations: Not on file     Relationship status: Not on file     Intimate partner violence:     Fear of current or ex partner: Not on file     Emotionally abused: Not on file     Physically abused: Not on file     Forced sexual activity: Not on file   Other Topics Concern     Not on file   Social History Narrative    Patient is .  She is retired.    Two adult sons. Zack and     FAMILY HISTORY:  Family History   Problem Relation Age of Onset     Breast Cancer Mother         Cancer-breast     Lung Cancer Mother         Cancer,lung and uterine cancer;       Uterine Cancer Mother      Breast Cancer Maternal Grandmother         Cancer-breast     Cervical Cancer Sister 28        endometrial or cervical cancer,     Allergy (Severe) Sister         Allergies, allergies/asthma     Other - See Comments Sister          lupus     Heart Disease Brother         Heart Disease,2 heart surgeries B 1965     CURRENT MEDICATIONS:   Current Outpatient Medications   Medication Sig Dispense Refill     albuterol (PROAIR HFA/PROVENTIL HFA/VENTOLIN HFA) 108 (90 Base) MCG/ACT inhaler Inhale 2 puffs into the lungs every 4 hours as needed 3 Inhaler 3     buPROPion (WELLBUTRIN XL) 150 MG 24 hr tablet Take 1 tablet (150 mg) by mouth every morning 90 tablet 3     calcium polycarbophil (FIBERCON) 625 MG tablet Take 1 tablet by mouth  daily       cetirizine HCl 10 MG CAPS Take 1 tablet by mouth daily       Cholecalciferol (VITAMIN D3) 1000 UNITS CAPS Take 1,000 Units by mouth daily       diphenhydrAMINE (BENADRYL) 25 MG capsule Take 2 capsules by mouth nightly as needed       estradiol (ESTRACE) 1 MG tablet Take 0.5 tablets (0.5 mg) by mouth daily 45 tablet 3     fluticasone (FLONASE) 50 MCG/ACT nasal spray SPRAY 2 SPRAYS INTO BOTH NOSTRILS DAILY 48 g 1     fluticasone-salmeterol (ADVAIR DISKUS) 250-50 MCG/DOSE inhaler INHALE 1 PUFF INTO THE LUNGS EVERY 12 HOURS 3 Inhaler 3     hydrochlorothiazide (HYDRODIURIL) 12.5 MG tablet Take 1 tablet (12.5 mg) by mouth daily 90 tablet 3     losartan-hydrochlorothiazide (HYZAAR) 100-25 MG tablet Take 1 tablet by mouth daily 90 tablet 3     montelukast (SINGULAIR) 10 MG tablet Take 1 tablet (10 mg) by mouth At Bedtime 90 tablet 3     Multiple Vitamin (MULTI-VITAMINS) TABS Take 1 tablet by mouth daily       potassium chloride ER (K-TAB/KLOR-CON) 10 MEQ CR tablet Take 2 tablets (20 mEq) by mouth daily with food 180 tablet 3     scopolamine (TRANSDERM) 1 MG/3DAYS 72 hr patch Apply 1 patch to hairless area behind one ear at least 4 hours before travel.  Remove old patch and change every 3 days (72 hours). 4 patch 0     SUMAtriptan (IMITREX) 20 MG/ACT nasal spray INHALE 1 SPRAY IN THE NOSTRIL(S) EVERY 2 HOURS IF NEEDED FOR MIGRAINE. 3 each 1     triamcinolone (KENALOG) 0.1 % cream Apply sparingly to affected area three times daily as needed 30 g 1     zolpidem (AMBIEN) 10 MG tablet Take 1 tablet (10 mg) by mouth At Bedtime 30 tablet 5     ALLERGIES:  Doxycycline; Metolazone; No clinical screening - see comments; Amoxicillin-pot clavulanate; and Sulfa drugs     REVIEW OF SYSTEMS:  General: Weight gain   Wt Readings from Last 4 Encounters:   08/12/19 94.3 kg (208 lb)   07/09/18 84.8 kg (187 lb)   02/05/18 94.6 kg (208 lb 9.6 oz)   09/27/17 89.1 kg (196 lb 6.4 oz)       Eyes: glasses  - left eye is changing  "  Ears/Nose/Throat:has mouth guard, has chipped off tooth   Cardiovascular: retains fluid easily   Respiratory: denies problems  Gastrointestinal: as she is swallowing, the first 1-2 bites/swallows hurts but not each meal, doesn't have to throw up, painful enough that she needs to take a break; colonoscopy is up to date, 2014   Genitourinary: bladder  - has sling - she does empty all the way   Musculoskeletal: left knee   Skin: right side of neck   Neurologic: denies problems  Psychiatric: stress eater, chronic poor sleep  Endocrine: Estrogen replacement, would like to continue post bladder surgery  Heme/Lymphatic: denies problems  Allergic/Immunologic: Feels that these are stable    PHQ-2 Score:     PHQ-2 ( 1999 Pfizer) 12/21/2018 7/9/2018   Q1: Little interest or pleasure in doing things 0 0   Q2: Feeling down, depressed or hopeless 0 0   PHQ-2 Score 0 0       No flowsheet data found.      PHQ-9 SCORE 6/2/2016 2/5/2018 8/12/2019   PHQ-9 Total Score 0 1 3       OBJECTIVE:  /82   Pulse 76   Temp 97.7  F (36.5  C) (Tympanic)   Resp 16   Ht 1.676 m (5' 6\")   Wt 94.3 kg (208 lb)   Breastfeeding? No   BMI 33.57 kg/m     EXAM:   General Appearance: Pleasant, alert, appropriate appearance for age. No acute distress  Head Exam: Normal. Normocephalic, atraumatic.  Eye Exam:  Normal external eye, conjunctiva,lids, cornea. ANT.  Ear Exam: Normal TM's bilaterally. Normal auditory canals and external ears. Non-tender.  Nose Exam: Normal external nose, mucus membranes, and septum.  OroPharynx Exam:  Dental hygieneadequate. Normal buccal mucose. Normal pharynx.  Neck Exam: Mole, see below  Thyroid Exam: No nodules or enlargement.  Chest/Respiratory Exam: Normal chest wall and respirations. Clear to auscultation.  Breast Exam: No dimpling, nipple retraction or discharge. No masses or nodes.  Cardiovascular Exam: Regular rate and rhythm. S1, S2, no murmur, click, gallop, or rubs.  1+ lower extremity " edema  Gastrointestinal Exam: Soft, non-tender, nomasses or organomegaly.  Musculoskeletal Exam: Right knee pain, medial  Foot Exam: Left and right foot: good pedal pulses, no lesions, nail hygiene good.  Skin: Raised keratotic lesion underside of her right chin.  This was treated with liquid nitrogen cryotherapy  Neurologic Exam: Nonfocal, symmetric DTRs, normal gross motor, tone coordination and no tremor.  Psychiatric Exam: Alert and oriented - appropriate affect.    ASSESSMENT/PLAN    ICD-10-CM    1. Physical exam, annual Z00.00    2. Insomnia, persistent G47.00 zolpidem (AMBIEN) 10 MG tablet     Drug  Screen Comprehensive , Urine with Reported Meds ([facility]) (Pain Care Package)   3. Essential hypertension I10 potassium chloride ER (K-TAB/KLOR-CON) 10 MEQ CR tablet     losartan-hydrochlorothiazide (HYZAAR) 100-25 MG tablet     Urinalysis w Reflex Microscopic If Positive     Comprehensive Metabolic Panel     Lipid Panel     Lipid Panel     Comprehensive Metabolic Panel   4. Intermittent asthma, uncomplicated J45.20 montelukast (SINGULAIR) 10 MG tablet   5. Asthma, unspecified asthma severity, unspecified whether complicated, unspecified whether persistent J45.909 hydrochlorothiazide (HYDRODIURIL) 12.5 MG tablet     albuterol (PROAIR HFA/PROVENTIL HFA/VENTOLIN HFA) 108 (90 Base) MCG/ACT inhaler     fluticasone-salmeterol (ADVAIR DISKUS) 250-50 MCG/DOSE inhaler   6. Counseling for estrogen replacement therapy Z71.89 estradiol (ESTRACE) 1 MG tablet   7. Recurrent major depression in remission (H) F33.40 buPROPion (WELLBUTRIN XL) 150 MG 24 hr tablet   8. Allergic rhinitis, unspecified seasonality, unspecified trigger J30.9 fluticasone (FLONASE) 50 MCG/ACT nasal spray   9. Actinic keratosis L57.0 DESTRUCT PREMALIGNANT LESION, FIRST       PLAN:  1.  Reviewed interval for mammogram screening.  2.  Flu vaccine recommended this fall, especially with her asthma history.  3.  Asthma currently adequately controlled, continue  same medication.  3.  Discussed relationship between lower exam edema and obesity.  She has had this edema for more than 20 years.  Likely also has component of venous insufficiency.  4.  Discussed estrogen replacement therapy, consider further dosage reduction, she is currently on 0.5 mg a day.  5.  Follow-up with orthopedics regarding right knee osteoarthritis and probable knee replacement surgery this is here.  6.  For her weight and concurrent mood symptoms, we discussed finding mindfulness act but he to help with her stress eating.  Weight loss will also help improve her knee osteoarthritis symptoms.  7.  Labs due today include lipid panel, metabolic panel, urinalysis.  8.  With freezing lesion on her neck today, if this does not improve, consider excision, consider use of fluorouracil.  9.  Continue same antidepressants at this time.  DAVID RAE MD on 8/12/2019 at 10:06 AM

## 2019-08-12 NOTE — NURSING NOTE
Patient presents to the clinic today for a med check.   Medication Reconciliation: complete     Haylee Estevez LPN.................. 8/12/2019 9:53 AM

## 2019-08-12 NOTE — PATIENT INSTRUCTIONS
You are currently being prescribed a controlled substance.  You need to be aware of the risks of taking this medication.      Any medical treatment is initially a trial, and that continued prescribing is based on evidence of benefit without an unacceptable risk. Understand that the goal ofusing this medication is to increase functional level. If your symptoms do not significantly decrease and/or function increase, the medication will be stopped.    Be aware that the use of such medicine has certain risks associated with it, including, but not limited to:  Sleepiness or drowsiness, lightheadedness, dizziness, confusion,allergic reaction, slowing of breathing rate, slowing of reflexes or reaction time, sexual dysfunction,physical dependence, tolerance to analgesia, addiction, withdrawal and the possibility that the medicine will not completely take care of your symptoms.  Usage is associate with a significantly increased risk of falls andother unintended injuries.    The overuse of this medication can result in serious health risks including respiratory depression or even death.  These risks are increased when the medication is combined with alcohol,narcotics, or other sedatives.  Use of sleep medication is associated with an overall shorter lifespan, even with appropriate use.    Having these medications prescribed for you also means that your accept the risk of possible intentional or accidental use by those in your home or others with whom you come in contact.  This includes their possible diversion of your medications, overdose or death.      This medication will be strictly monitored and all of my medications should be filled at the same pharmacy.  (Should the need arise to change pharmacies our office must be informed).    It is your responsibility to share that you are on a controlled substance contract with your other health care providers, including your dentist.    With this medication, a clinic visit every  6 months is required.  This appointment should be scheduled well in advance and is not a reason for an urgent, work in appointment.       SalonPas 4% lidocaine gel

## 2019-08-12 NOTE — LETTER
My Asthma Action Plan  Name: Yolie Bedoya   YOB: 1960  Date: 8/12/2019   My doctor: DAVID RAE MD   My clinic: [facility]        My Control Medicine: Singulair  My Rescue Medicine: Albuterol   My Asthma Severity: mild persistent  Avoid your asthma triggers: Patient is unaware of triggers               GREEN ZONE   Good Control    I feel good    No cough or wheeze    Can work, sleep and play without asthma symptoms       Take your asthma control medicine every day.     1. If exercise triggers your asthma, take your rescue medication    15 minutes before exercise or sports, and    During exercise if you have asthma symptoms  2. Spacer to use with inhaler: If you have a spacer, make sure to use it with your inhaler             YELLOW ZONE Getting Worse  I have ANY of these:    I do not feel good    Cough or wheeze    Chest feels tight    Wake up at night   1. Keep taking your Green Zone medications  2. Start taking your rescue medicine:    every 20 minutes for up to 1 hour. Then every 4 hours for 24-48 hours.  3. If you stay in the Yellow Zone for more than 12-24 hours, contact your doctor.  4. If you do not return to the Green Zone in 12-24 hours or you get worse, start taking your oral steroid medicine if prescribed by your provider.           RED ZONE Medical Alert - Get Help  I have ANY of these:    I feel awful    Medicine is not helping    Breathing getting harder    Trouble walking or talking    Nose opens wide to breathe       1. Take your rescue medicine NOW  2. If your provider has prescribed an oral steroid medicine, start taking it NOW  3. Call your doctor NOW  4. If you are still in the Red Zone after 20 minutes and you have not reached your doctor:    Take your rescue medicine again and    Call 911 or go to the emergency room right away    See your regular doctor within 2 weeks of an Emergency Room or Urgent Care visit for follow-up treatment.           Annual Reminders:  Meet with Asthma Educator,  Flu Shot in the Fall, consider Pneumonia Vaccination for patients with asthma (aged 19 and older).    Pharmacy: [id number] ([employer] [facility]) - [place], [place] - [facility]                      Asthma Triggers  How To Control Things That Make Your Asthma Worse    Triggers are things that make your asthma worse.  Look at the list below to help you find your triggers and what you can do about them.  You can help prevent asthma flare-ups by staying away from your triggers.      Trigger                                                          What you can do   Cigarette Smoke  Tobacco smoke can make asthma worse. Do not allow smoking in your home, car or around you.  Be sure no one smokes at a child s day care or school.  If you smoke, ask your health care provider for ways to help you quit.  Ask family members to quit too.  Ask your health care provider for a referral to Quit Plan to help you quit smoking, or call 0-396-692-PLAN.     Colds, Flu, Bronchitis  These are common triggers of asthma. Wash your hands often.  Don t touch your eyes, nose or mouth.  Get a flu shot every year.     Dust Mites  These are tiny bugs that live in cloth or carpet. They are too small to see. Wash sheets and blankets in hot water every week.   Encase pillows and mattress in dust mite proof covers.  Avoid having carpet if you can. If you have carpet, vacuum weekly.   Use a dust mask and HEPA vacuum.   Pollen and Outdoor Mold  Some people are allergic to trees, grass, or weed pollen, or molds. Try to keep your windows closed.  Limit time out doors when pollen count is high.   Ask you health care provider about taking medicine during allergy season.     Animal Dander  Some people are allergic to skin flakes, urine or saliva from pets with fur or feathers. Keep pets with fur or feathers out of your home.    If you can t keep the pet outdoors, then keep the pet out of your  bedroom.  Keep the bedroom door closed.  Keep pets off cloth furniture and away from stuffed toys.     Mice, Rats, and Cockroaches  Some people are allergic to the waste from these pests.   Cover food and garbage.  Clean up spills and food crumbs.  Store grease in the refrigerator.   Keep food out of the bedroom.   Indoor Mold  This can be a trigger if your home has high moisture. Fix leaking faucets, pipes, or other sources of water.   Clean moldy surfaces.  Dehumidify basement if it is damp and smelly.   Smoke, Strong Odors, and Sprays  These can reduce air quality. Stay away from strong odors and sprays, such as perfume, powder, hair spray, paints, smoke incense, paint, cleaning products, candles and new carpet.   Exercise or Sports  Some people with asthma have this trigger. Be active!  Ask your doctor about taking medicine before sports or exercise to prevent symptoms.    Warm up for 5-10 minutes before and after sports or exercise.     Other Triggers of Asthma  Cold air:  Cover your nose and mouth with a scarf.  Sometimes laughing or crying can be a trigger.  Some medicines and food can trigger asthma.

## 2019-08-13 ASSESSMENT — ASTHMA QUESTIONNAIRES: ACT_TOTALSCORE: 20

## 2019-08-17 LAB — PAIN DRUG SCR UR W RPTD MEDS: NORMAL

## 2019-08-27 ENCOUNTER — TELEPHONE (OUTPATIENT)
Dept: FAMILY MEDICINE | Facility: OTHER | Age: 59
End: 2019-08-27

## 2019-08-27 NOTE — TELEPHONE ENCOUNTER
Pt is calling to let [facility] know that they did not use novocain on her knee.  They used Ethylchoride mist spray

## 2019-08-27 NOTE — TELEPHONE ENCOUNTER
Ok - this is a topical cooling/freeze spray that typically lasts up to an hour.  If she'd like this used before upcoming wedding, I'd suggest having a marcaine (long acting novovcaine) injected at the same time.  Yarely Miller MD

## 2019-08-27 NOTE — TELEPHONE ENCOUNTER
"She is wondering how long is \"long lasting\"? She states wedding is in [place] on 9/7, and is sure when she should get the injection or how long it would last.     Haylee Estevez LPN.................. 8/27/2019 11:25 AM   "

## 2019-08-27 NOTE — TELEPHONE ENCOUNTER
Patient notified of below. She states she will talk with  and call back.    Haylee Estevez LPN.................. 8/27/2019 12:04 PM

## 2019-08-29 ENCOUNTER — TELEPHONE (OUTPATIENT)
Dept: FAMILY MEDICINE | Facility: OTHER | Age: 59
End: 2019-08-29

## 2019-08-29 NOTE — TELEPHONE ENCOUNTER
Patient was transferred to appointment line.   Haylee Estevez LPN.................. 8/29/2019 2:18 PM

## 2019-08-29 NOTE — TELEPHONE ENCOUNTER
Left message to call back.  Okay to use a 15 minute same day spot on 9/6/19.    Haylee Estevez LPN.................. 8/29/2019 1:44 PM

## 2019-08-29 NOTE — TELEPHONE ENCOUNTER
Patient said she was told to call back and get a work in for a knee injection on 9/6/19 with [facility].   Nevin Landis on 8/29/2019 at 11:59 AM

## 2019-09-03 ENCOUNTER — MYC MEDICAL ADVICE (OUTPATIENT)
Dept: FAMILY MEDICINE | Facility: OTHER | Age: 59
End: 2019-09-03

## 2019-09-04 ENCOUNTER — TELEPHONE (OUTPATIENT)
Dept: FAMILY MEDICINE | Facility: OTHER | Age: 59
End: 2019-09-04

## 2019-09-04 NOTE — TELEPHONE ENCOUNTER
Offered appointment for 130, she accepted. Appointment is scheduled.     Haylee Estevez LPN.................. 9/4/2019 4:56 PM

## 2019-09-04 NOTE — TELEPHONE ENCOUNTER
Patient called today stating she has spoken with [facility] via [facility] and her appointment on 9/6/19 @ 8:15am would be too soon in the day for the injection as it would possibly wear off before the wedding.  Patient is unable to take the 2:40 due to having to leave out of town for the wedding.    Schedules were checked with all other providers and nothing is open.  Is there any other time that this patient can be seen with you?  Or would you be willing to talk with another provider to see if they could get her in?    Please call patient at [phone] to discuss.      Thanks!  Lynette Bethea on 9/4/2019 at 12:42 PM

## 2019-09-04 NOTE — TELEPHONE ENCOUNTER
See note below. There is no openings Friday with another provider, that would be able to do a knee injection. Dio patient be worked in at a time that works for her?    Haylee Estevez LPN.................. 9/4/2019 4:23 PM    02-Mar-2018 20:40

## 2019-09-06 ENCOUNTER — OFFICE VISIT (OUTPATIENT)
Dept: FAMILY MEDICINE | Facility: OTHER | Age: 59
End: 2019-09-06
Attending: FAMILY MEDICINE
Payer: COMMERCIAL

## 2019-09-06 VITALS
BODY MASS INDEX: 33.57 KG/M2 | DIASTOLIC BLOOD PRESSURE: 78 MMHG | HEART RATE: 76 BPM | HEIGHT: 66 IN | TEMPERATURE: 98.6 F | RESPIRATION RATE: 16 BRPM | SYSTOLIC BLOOD PRESSURE: 128 MMHG

## 2019-09-06 DIAGNOSIS — M17.12 PRIMARY OSTEOARTHRITIS OF LEFT KNEE: Primary | ICD-10-CM

## 2019-09-06 PROCEDURE — 25000128 H RX IP 250 OP 636: Performed by: FAMILY MEDICINE

## 2019-09-06 PROCEDURE — 99212 OFFICE O/P EST SF 10 MIN: CPT | Mod: 25 | Performed by: FAMILY MEDICINE

## 2019-09-06 PROCEDURE — 20610 DRAIN/INJ JOINT/BURSA W/O US: CPT | Performed by: FAMILY MEDICINE

## 2019-09-06 RX ORDER — TRIAMCINOLONE ACETONIDE 40 MG/ML
40 INJECTION, SUSPENSION INTRA-ARTICULAR; INTRAMUSCULAR ONCE
Status: COMPLETED | OUTPATIENT
Start: 2019-09-06 | End: 2019-09-06

## 2019-09-06 RX ORDER — BUPIVACAINE HYDROCHLORIDE 5 MG/ML
50 INJECTION, SOLUTION PERINEURAL ONCE
Status: COMPLETED | OUTPATIENT
Start: 2019-09-06 | End: 2019-09-06

## 2019-09-06 RX ADMIN — BUPIVACAINE HYDROCHLORIDE 250 MG: 5 INJECTION, SOLUTION PERINEURAL at 13:45

## 2019-09-06 RX ADMIN — TRIAMCINOLONE ACETONIDE 40 MG: 40 INJECTION, SUSPENSION INTRA-ARTICULAR; INTRAMUSCULAR at 13:45

## 2019-09-06 ASSESSMENT — PAIN SCALES - GENERAL: PAINLEVEL: NO PAIN (1)

## 2019-09-06 NOTE — PROGRESS NOTES
Flu vaccine offered today and pt refused.      SUBJECTIVE:   CC:  Yolie Bedoya is a 58 year old female who presents to clinic today for the following health issues:  Left knee injectin.    HPI  Yolie Bedoya is a 58 year old female who presents for left knee injection.  She has osteoarthritis of her knees.  Previous injection done with orthopedics and lasted a brief time.  She felt that the ethyl chloride spray helped more than anything else.    She has a follow-up with orthopedics next week.  She is having her knee injected today due to a wedding this weekend.     Allergies   Allergen Reactions     Doxycycline Nausea and Vomiting     Metolazone Unknown     No Clinical Screening - See Comments GI Disturbance and Nausea and Vomiting     Narcotics     Amoxicillin-Pot Clavulanate Rash     Sulfa Drugs Rash     Current Outpatient Medications   Medication     albuterol (PROAIR HFA/PROVENTIL HFA/VENTOLIN HFA) 108 (90 Base) MCG/ACT inhaler     buPROPion (WELLBUTRIN XL) 150 MG 24 hr tablet     calcium polycarbophil (FIBERCON) 625 MG tablet     cetirizine HCl 10 MG CAPS     Cholecalciferol (VITAMIN D3) 1000 UNITS CAPS     diphenhydrAMINE (BENADRYL) 25 MG capsule     estradiol (ESTRACE) 1 MG tablet     fluticasone (FLONASE) 50 MCG/ACT nasal spray     fluticasone-salmeterol (ADVAIR DISKUS) 250-50 MCG/DOSE inhaler     hydrochlorothiazide (HYDRODIURIL) 12.5 MG tablet     losartan-hydrochlorothiazide (HYZAAR) 100-25 MG tablet     montelukast (SINGULAIR) 10 MG tablet     Multiple Vitamin (MULTI-VITAMINS) TABS     potassium chloride ER (K-TAB/KLOR-CON) 10 MEQ CR tablet     scopolamine (TRANSDERM) 1 MG/3DAYS 72 hr patch     SUMAtriptan (IMITREX) 20 MG/ACT nasal spray     triamcinolone (KENALOG) 0.1 % cream     zolpidem (AMBIEN) 10 MG tablet     No current facility-administered medications for this visit.           Review of Systems     PHQ-2 Score:     PHQ-2 ( 1999 [employer]) 9/6/2019 12/21/2018   Q1: Little interest or pleasure in  "doing things 0 0   Q2: Feeling down, depressed or hopeless 0 0   PHQ-2 Score 0 0         PHQ-9 SCORE 6/2/2016 2/5/2018 8/12/2019   PHQ-9 Total Score 0 1 3         OBJECTIVE:     /78 (BP Location: Right arm, Patient Position: Sitting, Cuff Size: Adult Large)   Pulse 76   Temp 98.6  F (37  C) (Tympanic)   Resp 16   Ht 1.676 m (5' 6\")   BMI 33.57 kg/m    Body mass index is 33.57 kg/m .  Physical Exam   Musculoskeletal:   Patient was noted to be limping down the hallway.   Nursing note and vitals reviewed.       INJECTION:  Informed consent obtained.  Location:  Left knee  Medications:  Kenalog 40 mg  Lidocaine without epinephrine:  1 ml  Marcaine without epinephrine:  1 ml  Prep: chloroprep  Bandaid placed.  Within one minute patient demonstrates improvement in pain and ROM.      ASSESSMENT/PLAN:       ICD-10-CM    1. Primary osteoarthritis of left knee M17.12 DRAIN/INJECT LARGE JOINT/BURSA            PLAN:  1.  Follow-up care instructions discussed.  Encouraged icing tonight and tomorrow before her event as able.  She will be following up with orthopedics for more definitive care.      DAVID RAE MD  [facility]    This note was created using voice recognition software and was screened for errors in transcription.    "

## 2019-09-06 NOTE — NURSING NOTE
Chief Complaint   Patient presents with     Musculoskeletal Problem     Injection in left knee     Patient presents to the clinic for an injection in the left knee  Medication Reconciliation: completed   Ananya Miranda LPN  9/6/2019 1:40 PM

## 2019-09-11 ENCOUNTER — TELEPHONE (OUTPATIENT)
Dept: FAMILY MEDICINE | Facility: OTHER | Age: 59
End: 2019-09-11

## 2019-09-12 NOTE — TELEPHONE ENCOUNTER
Appointment given for 9/27/19 at 9:30 am  Maribel Garcia LPN........................9/12/2019  12:26 PM

## 2019-09-12 NOTE — TELEPHONE ENCOUNTER
Patient states that her surgery is scheduled on 10/8/19.  She needs to have a preop and the surgery nurse told her to try to get in the week on 9/23/19.  Maribel Garcia LPN........................9/12/2019  12:24 PM

## 2019-09-27 ENCOUNTER — OFFICE VISIT (OUTPATIENT)
Dept: FAMILY MEDICINE | Facility: OTHER | Age: 59
End: 2019-09-27
Attending: FAMILY MEDICINE
Payer: COMMERCIAL

## 2019-09-27 ENCOUNTER — HOSPITAL ENCOUNTER (OUTPATIENT)
Dept: GENERAL RADIOLOGY | Facility: OTHER | Age: 59
Discharge: HOME | End: 2019-09-27
Attending: FAMILY MEDICINE | Admitting: FAMILY MEDICINE
Payer: COMMERCIAL

## 2019-09-27 VITALS
DIASTOLIC BLOOD PRESSURE: 80 MMHG | BODY MASS INDEX: 33.91 KG/M2 | WEIGHT: 211 LBS | TEMPERATURE: 97.8 F | HEART RATE: 79 BPM | OXYGEN SATURATION: 99 % | RESPIRATION RATE: 18 BRPM | SYSTOLIC BLOOD PRESSURE: 122 MMHG | HEIGHT: 66 IN

## 2019-09-27 DIAGNOSIS — E66.09 CLASS 1 OBESITY DUE TO EXCESS CALORIES WITHOUT SERIOUS COMORBIDITY WITH BODY MASS INDEX (BMI) OF 34.0 TO 34.9 IN ADULT: ICD-10-CM

## 2019-09-27 DIAGNOSIS — J45.40 MODERATE PERSISTENT ASTHMA WITHOUT COMPLICATION: ICD-10-CM

## 2019-09-27 DIAGNOSIS — M17.12 PRIMARY OSTEOARTHRITIS OF LEFT KNEE: ICD-10-CM

## 2019-09-27 DIAGNOSIS — Z01.818 PREOP GENERAL PHYSICAL EXAM: Primary | ICD-10-CM

## 2019-09-27 DIAGNOSIS — I10 ESSENTIAL HYPERTENSION: ICD-10-CM

## 2019-09-27 DIAGNOSIS — Z71.89 COUNSELING FOR ESTROGEN REPLACEMENT THERAPY: ICD-10-CM

## 2019-09-27 DIAGNOSIS — Z01.818 PREOP GENERAL PHYSICAL EXAM: ICD-10-CM

## 2019-09-27 DIAGNOSIS — G47.00 INSOMNIA, PERSISTENT: ICD-10-CM

## 2019-09-27 DIAGNOSIS — E66.811 CLASS 1 OBESITY DUE TO EXCESS CALORIES WITHOUT SERIOUS COMORBIDITY WITH BODY MASS INDEX (BMI) OF 34.0 TO 34.9 IN ADULT: ICD-10-CM

## 2019-09-27 LAB
ALBUMIN SERPL-MCNC: 4.2 G/DL (ref 3.5–5.7)
ALBUMIN UR-MCNC: NEGATIVE MG/DL
ALP SERPL-CCNC: 63 U/L (ref 34–104)
ALT SERPL W P-5'-P-CCNC: 11 U/L (ref 7–52)
ANION GAP SERPL CALCULATED.3IONS-SCNC: 7 MMOL/L (ref 3–14)
APPEARANCE UR: CLEAR
AST SERPL W P-5'-P-CCNC: 12 U/L (ref 13–39)
BILIRUB SERPL-MCNC: 0.5 MG/DL (ref 0.3–1)
BILIRUB UR QL STRIP: NEGATIVE
BUN SERPL-MCNC: 16 MG/DL (ref 7–25)
CALCIUM SERPL-MCNC: 9.6 MG/DL (ref 8.6–10.3)
CHLORIDE SERPL-SCNC: 106 MMOL/L (ref 98–107)
CO2 SERPL-SCNC: 28 MMOL/L (ref 21–31)
COLOR UR AUTO: YELLOW
CREAT SERPL-MCNC: 0.92 MG/DL (ref 0.6–1.2)
ERYTHROCYTE [DISTWIDTH] IN BLOOD BY AUTOMATED COUNT: 12.9 % (ref 10–15)
GFR SERPL CREATININE-BSD FRML MDRD: 63 ML/MIN/{1.73_M2}
GLUCOSE SERPL-MCNC: 109 MG/DL (ref 70–105)
GLUCOSE UR STRIP-MCNC: NEGATIVE MG/DL
HBA1C MFR BLD: 5.7 % (ref 4–6)
HCT VFR BLD AUTO: 39.3 % (ref 35–47)
HGB BLD-MCNC: 12.7 G/DL (ref 11.7–15.7)
HGB UR QL STRIP: NEGATIVE
INR PPP: 0.93 (ref 0–1.3)
KETONES UR STRIP-MCNC: NEGATIVE MG/DL
LEUKOCYTE ESTERASE UR QL STRIP: NEGATIVE
MCH RBC QN AUTO: 28.9 PG (ref 26.5–33)
MCHC RBC AUTO-ENTMCNC: 32.3 G/DL (ref 31.5–36.5)
MCV RBC AUTO: 90 FL (ref 78–100)
NITRATE UR QL: NEGATIVE
PH UR STRIP: 7.5 PH (ref 5–9)
PLATELET # BLD AUTO: 306 10E9/L (ref 150–450)
POTASSIUM SERPL-SCNC: 3.9 MMOL/L (ref 3.5–5.1)
PROT SERPL-MCNC: 7.1 G/DL (ref 6.4–8.9)
RBC # BLD AUTO: 4.39 10E12/L (ref 3.8–5.2)
SODIUM SERPL-SCNC: 141 MMOL/L (ref 134–144)
SOURCE: NORMAL
SP GR UR STRIP: 1.02 (ref 1–1.03)
UROBILINOGEN UR STRIP-ACNC: 0.2 EU/DL (ref 0.2–1)
WBC # BLD AUTO: 6.7 10E9/L (ref 4–11)

## 2019-09-27 PROCEDURE — 99214 OFFICE O/P EST MOD 30 MIN: CPT | Mod: 25 | Performed by: FAMILY MEDICINE

## 2019-09-27 PROCEDURE — 85610 PROTHROMBIN TIME: CPT | Mod: ZL | Performed by: FAMILY MEDICINE

## 2019-09-27 PROCEDURE — 90686 IIV4 VACC NO PRSV 0.5 ML IM: CPT | Performed by: FAMILY MEDICINE

## 2019-09-27 PROCEDURE — 85027 COMPLETE CBC AUTOMATED: CPT | Mod: ZL | Performed by: FAMILY MEDICINE

## 2019-09-27 PROCEDURE — 36415 COLL VENOUS BLD VENIPUNCTURE: CPT | Mod: ZL | Performed by: FAMILY MEDICINE

## 2019-09-27 PROCEDURE — 80053 COMPREHEN METABOLIC PANEL: CPT | Mod: ZL | Performed by: FAMILY MEDICINE

## 2019-09-27 PROCEDURE — 81003 URINALYSIS AUTO W/O SCOPE: CPT | Mod: ZL | Performed by: FAMILY MEDICINE

## 2019-09-27 PROCEDURE — 90471 IMMUNIZATION ADMIN: CPT | Performed by: FAMILY MEDICINE

## 2019-09-27 PROCEDURE — 83036 HEMOGLOBIN GLYCOSYLATED A1C: CPT | Mod: ZL | Performed by: FAMILY MEDICINE

## 2019-09-27 PROCEDURE — 93010 ELECTROCARDIOGRAM REPORT: CPT | Performed by: INTERNAL MEDICINE

## 2019-09-27 PROCEDURE — 71046 X-RAY EXAM CHEST 2 VIEWS: CPT

## 2019-09-27 ASSESSMENT — MIFFLIN-ST. JEOR: SCORE: 1553.84

## 2019-09-27 ASSESSMENT — PAIN SCALES - GENERAL: PAINLEVEL: MILD PAIN (2)

## 2019-09-27 NOTE — PROGRESS NOTES
[facility] AND [facility]  [address]  [place] [address]  [phone]  Dept: [phone]    PRE-OP EVALUATION:  Today's date: 2019    Yolie Bedoya (: 1960) presents for pre-operative evaluation assessment as requested by Dr. Jones.  She requires evaluation and anesthesia risk assessment prior to undergoing surgery/procedure for treatment of left knee osteoarthritis .    Proposed Surgery/ Procedure: left TKA  Date of Surgery/ Procedure: 10/18/19  Time of Surgery/ Procedure:   Hospital/Surgical Facility: [facility]    Primary Physician: Yarely Villagomez  Type of Anesthesia Anticipated: General    Patient has a Health Care Directive or Living Will:  NO    1. NO - Do you have a history of heart attack, stroke, stent, bypass or surgery on an artery in the head, neck, heart or legs?  2. YES - DO YOU EVER HAVE ANY PAIN OR DISCOMFORT IN YOUR CHEST? Tightness, allergic symptoms   3. NO - Do you have a history of  Heart Failure?  4. NO - Are you troubled by shortness of breath when: walking on the level, up a slight hill or at night?  5. NO - Do you currently have a cold, bronchitis or other respiratory infection?  6. NO - Do you have a cough, shortness of breath or wheezing?  7. NO - Do you sometimes get pains in the calves of your legs when you walk?  8. NO - Do you or anyone in your family have previous history of blood clots?  9. NO - Do you or does anyone in your family have a serious bleeding problem such as prolonged bleeding following surgeries or cuts?  10. YES - HAVE YOU EVER HAD PROBLEMS WITH ANEMIA OR BEEN TOLD TO TAKE IRON PILLS? When pregnant   11. NO - Have you had any abnormal blood loss such as black, tarry or bloody stools, or abnormal vaginal bleeding?  12. NO - Have you ever had a blood transfusion?  13. YES - HAVE YOU OR ANY OF YOUR RELATIVES EVER HAD PROBLEMS WITH ANESTHESIA? nausea   14. NO - Do you have sleep apnea, excessive snoring or daytime  drowsiness?  15. NO - Do you have any prosthetic heart valves?  16. NO - Do you have prosthetic joints?  17. NO - Is there any chance that you may be pregnant?      HPI:     HPI related to upcoming procedure: Yolie Bedoya is a 58 year old female is seen at the request of Dr. Jones for preoperative assessment prior to left knee replacement.  She is had progressive left knee symptoms for several years.  This is been treated conservatively, including injections.  Symptoms have progressed to the point of limping and significant activity restriction.  At times the knee will feel swollen.  She is no longer getting adequate relief with conservative treatment/oral medication.  She has had injections done in the knee, most recently about 3 weeks ago.      ASTHMA - Patient has a longstanding history of moderate  Asthma . Patient has been doing well overall noting occasional episodes of chest tightness related to allergic triggers.  She continues on medication regimen consisting of Singulair and Advair daily with albuterol as needed without adverse reactions or side effects. She has used her albuterol once in the past 5 days.     DEPRESSION - Patient has a long history of Depression of moderate severity requiring medication for control with recent symptoms being stable.. She has been on Wellbutrin for several years    HYPERTENSION - Patient has longstanding history of HTN , currently denies any symptoms referable to elevated blood pressure. Specifically denies chest pain, palpitations, dyspnea, orthopnea, PND.  She does have frequent, chronic LE edema. Blood pressure readings have been in normal range. Current medication regimen is as listed below. Patient denies any side effects of medication.     SLEEP PROBLEM - Patient has a longstanding history of insomnia.. Patient has tried OTC medications with limited success. She takes ambien for sleep      MEDICAL HISTORY:     Patient Active Problem List    Diagnosis Date Noted      Allergic rhinitis 02/19/2018     Priority: Medium     Family history of malignant neoplasm of breast 02/19/2018     Priority: Medium     Diverticular disease of colon 02/19/2018     Priority: Medium     Impaired fasting glucose 02/19/2018     Priority: Medium     Edema 02/19/2018     Priority: Medium     Obesity 02/19/2018     Priority: Medium     Stress incontinence in female 10/25/2017     Priority: Medium     Hypokalemia 06/30/2017     Priority: Medium     Melanocytic nevus of face 07/23/2014     Priority: Medium     Meniere's disease 04/08/2014     Priority: Medium     Controlled substance agreement signed 02/11/2014     Priority: Medium     Overview:   ambien #30/month       Recurrent major depression in remission (H) 04/16/2013     Priority: Medium     Backache 12/27/2010     Priority: Medium     Insomnia, persistent 12/27/2010     Priority: Medium     Endocrine disorder 03/27/2010     Priority: Medium     Migraine headache 01/05/2010     Priority: Medium      Past Medical History:   Diagnosis Date     Allergic rhinitis due to animal hair and dander     1970,aspen, birch, maple, oak, grass, dust mite, ragwee, cocklebur, mugwart, lambs quarter, pigweed, fungus, molds     Calculus of gallbladder without cholecystitis without obstruction     No Comments Provided     Essential (primary) hypertension     No Comments Provided     Excessive and frequent menstruation with regular cycle     s/p CLARA     Hormone replacement therapy (postmenopausal)     2010,started ERT     Obesity     No Comments Provided     Other long term (current) drug therapy     2/11/2014,ambien #30/month     Other specified postprocedural states     nausea and severe vomiting with narcotics     Psychophysiologic insomnia     contract signed 2/2014     Tubulo-interstitial nephritis     No Comments Provided     Venous insufficiency (chronic) (peripheral)     No Comments Provided     Past Surgical History:   Procedure Laterality Date     ARTHROSCOPY  KNEE      10/12/2017     COLONOSCOPY      6/2004, because of change in bowel habits     COLONOSCOPY  05/14/2014 5/14/2014,Extensive diverticulosis throughout the colon - follow up 10 years     EXTRACTION(S) DENTAL      No Comments Provided     HYSTERECTOMY TOTAL ABDOMINAL, BILATERAL SALPINGO-OOPHORECTOMY, COMBINED      3/30/10,CLARA/BSO/Vasquez/Posterior Repair     LAPAROSCOPIC ABLATION ENDOMETRIOSIS      3/05     OTHER SURGICAL HISTORY      10/25/2017,14594.0,[place] SLING OPER STRES INCONTINENCE     Current Outpatient Medications   Medication Sig Dispense Refill     albuterol (PROAIR HFA/PROVENTIL HFA/VENTOLIN HFA) 108 (90 Base) MCG/ACT inhaler Inhale 2 puffs into the lungs every 4 hours as needed 3 Inhaler 3     buPROPion (WELLBUTRIN XL) 150 MG 24 hr tablet Take 1 tablet (150 mg) by mouth every morning 90 tablet 3     calcium polycarbophil (FIBERCON) 625 MG tablet Take 1 tablet by mouth daily       cetirizine HCl 10 MG CAPS Take 1 tablet by mouth daily       Cholecalciferol (VITAMIN D3) 1000 UNITS CAPS Take 1,000 Units by mouth daily       diphenhydrAMINE (BENADRYL) 25 MG capsule Take 2 capsules by mouth nightly as needed       estradiol (ESTRACE) 1 MG tablet Take 0.5 tablets (0.5 mg) by mouth daily 45 tablet 3     fluticasone (FLONASE) 50 MCG/ACT nasal spray SPRAY 2 SPRAYS INTO BOTH NOSTRILS DAILY 48 g 1     fluticasone-salmeterol (ADVAIR DISKUS) 250-50 MCG/DOSE inhaler INHALE 1 PUFF INTO THE LUNGS EVERY 12 HOURS 3 Inhaler 3     hydrochlorothiazide (HYDRODIURIL) 12.5 MG tablet Take 1 tablet (12.5 mg) by mouth daily 90 tablet 3     losartan-hydrochlorothiazide (HYZAAR) 100-25 MG tablet Take 1 tablet by mouth daily 90 tablet 3     montelukast (SINGULAIR) 10 MG tablet Take 1 tablet (10 mg) by mouth At Bedtime 90 tablet 3     Multiple Vitamin (MULTI-VITAMINS) TABS Take 1 tablet by mouth daily       potassium chloride ER (K-TAB/KLOR-CON) 10 MEQ CR tablet Take 2 tablets (20 mEq) by mouth daily with food 180 tablet 3      "scopolamine (TRANSDERM) 1 MG/3DAYS 72 hr patch Apply 1 patch to hairless area behind one ear at least 4 hours before travel.  Remove old patch and change every 3 days (72 hours). 4 patch 0     SUMAtriptan (IMITREX) 20 MG/ACT nasal spray INHALE 1 SPRAY IN THE NOSTRIL(S) EVERY 2 HOURS IF NEEDED FOR MIGRAINE. 3 each 1     triamcinolone (KENALOG) 0.1 % cream Apply sparingly to affected area three times daily as needed 30 g 1     zolpidem (AMBIEN) 10 MG tablet Take 1 tablet (10 mg) by mouth At Bedtime 30 tablet 5     OTC products: Lidoderm patches    Allergies   Allergen Reactions     Doxycycline Nausea and Vomiting     Metolazone Unknown     No Clinical Screening - See Comments GI Disturbance and Nausea and Vomiting     Narcotics     Amoxicillin-Pot Clavulanate Rash     Sulfa Drugs Rash      Latex Allergy: NO    Social History     Tobacco Use     Smoking status: Never Smoker     Smokeless tobacco: Never Used   Substance Use Topics     Alcohol use: Yes     Comment: Alcoholic Drinks/day: occ     History   Drug Use No     Comment: Drug use: No       REVIEW OF SYSTEMS:   CONSTITUTIONAL: NEGATIVE for fever, chills, change in weight  INTEGUMENTARY/SKIN: NEGATIVE for worrisome rashes, moles or lesions  EYES: NEGATIVE for vision changes or irritation  ENT/MOUTH: NEGATIVE for ear, mouth and throat problems  RESP: See above  CV: She has hypertension.  She has chronic lower extremity edema  GI: NEGATIVE for nausea, abdominal pain, heartburn, or change in bowel habits  : History of overactive bladder  MUSCULOSKELETAL: See above  NEURO: NEGATIVE for weakness, dizziness or paresthesias  ENDOCRINE: NEGATIVE for temperature intolerance, skin/hair changes  HEME: NEGATIVE for bleeding problems  PSYCHIATRIC: NEGATIVE for recent changes in mood or affect    EXAM:   /80   Pulse 79   Temp 97.8  F (36.6  C) (Tympanic)   Resp 18   Ht 1.676 m (5' 6\")   Wt 95.7 kg (211 lb)   SpO2 99%   Breastfeeding? No   BMI 34.06 kg/m      " GENERAL APPEARANCE: healthy, alert and no distress     EYES: EOMI, PERRL     HENT: ear canals and TM's normal and nose and mouth without ulcers or lesions     NECK: no adenopathy, no asymmetry, masses, or scars and thyroid normal to palpation     RESP: lungs clear to auscultation - no rales, rhonchi or wheezes     CV: regular rates and rhythm, 1+ lower extremity edema     ABDOMEN:  soft, nontender, no HSM or masses and bowel sounds normal     MS: Moderately severe left medial knee tenderness, mild her symptoms laterally and posteriorly.  Abnormal tracking of patella.     SKIN: no suspicious lesions or rashes     NEURO: Normal strength and tone, sensory exam grossly normal, mentation intact and speech normal     PSYCH: mentation appears normal. and affect normal/bright      DIAGNOSTICS:   EKG: Normal Sinus Rhythm, unchanged from previous tracings    Chest xray is negative    Results for orders placed or performed in visit on 09/27/19   Urinalysis w Reflex Microscopic If Positive   Result Value Ref Range    Color Urine Yellow     Appearance Urine Clear     Glucose Urine Negative NEG^Negative mg/dL    Bilirubin Urine Negative NEG^Negative    Ketones Urine Negative NEG^Negative mg/dL    Specific Gravity Urine 1.020 1.000 - 1.030    Blood Urine Negative NEG^Negative    pH Urine 7.5 5.0 - 9.0 pH    Protein Albumin Urine Negative NEG^Negative mg/dL    Urobilinogen Urine 0.2 0.2 - 1.0 EU/dL    Nitrite Urine Negative NEG^Negative    Leukocyte Esterase Urine Negative NEG^Negative    Source Midstream Urine    Comprehensive Metabolic Panel   Result Value Ref Range    Sodium 141 134 - 144 mmol/L    Potassium 3.9 3.5 - 5.1 mmol/L    Chloride 106 98 - 107 mmol/L    Carbon Dioxide 28 21 - 31 mmol/L    Anion Gap 7 3 - 14 mmol/L    Glucose 109 (H) 70 - 105 mg/dL    Urea Nitrogen 16 7 - 25 mg/dL    Creatinine 0.92 0.60 - 1.20 mg/dL    GFR Estimate 63 >60 mL/min/[1.73_m2]    GFR Estimate If Black 76 >60 mL/min/[1.73_m2]    Calcium 9.6  8.6 - 10.3 mg/dL    Bilirubin Total 0.5 0.3 - 1.0 mg/dL    Albumin 4.2 3.5 - 5.7 g/dL    Protein Total 7.1 6.4 - 8.9 g/dL    Alkaline Phosphatase 63 34 - 104 U/L    ALT 11 7 - 52 U/L    AST 12 (L) 13 - 39 U/L   INR   Result Value Ref Range    INR 0.93 0 - 1.3   CBC W PLT No Diff   Result Value Ref Range    WBC 6.7 4.0 - 11.0 10e9/L    RBC Count 4.39 3.8 - 5.2 10e12/L    Hemoglobin 12.7 11.7 - 15.7 g/dL    Hematocrit 39.3 35.0 - 47.0 %    MCV 90 78 - 100 fl    MCH 28.9 26.5 - 33.0 pg    MCHC 32.3 31.5 - 36.5 g/dL    RDW 12.9 10.0 - 15.0 %    Platelet Count 306 150 - 450 10e9/L   Hemoglobin A1c   Result Value Ref Range    Hemoglobin A1C 5.7 4.0 - 6.0 %   EKG 12-LEAD CLIN[facility] READ    Impression    Normal EKG with a rate of 71.  Emiliano Verdugo MD           Recent Labs   Lab Test 08/12/19  1052 06/19/18  0839  03/01/17  1108   HGB  --   --   --  14.4    140   < >  --    POTASSIUM 3.8 3.8   < >  --    CR 0.91 0.89   < >  --    A1C  --  5.6  --   --     < > = values in this interval not displayed.        IMPRESSION:   Reason for surgery/procedure: Left knee osteoarthritis  Diagnosis/reason for consult: Asthma, hypertension    The proposed surgical procedure is considered INTERMEDIATE risk.    REVISED CARDIAC RISK INDEX  The patient has the following serious cardiovascular risks for perioperative complications such as (MI, PE, VFib and 3  AV Block):  No serious cardiac risks  INTERPRETATION: 1 risks: Class II (low risk - 0.9% complication rate)    The patient has the following additional risks for perioperative complications:  No identified additional risks      ICD-10-CM    1. Preop general physical exam Z01.818 XR Chest 2 Views   2. Primary osteoarthritis of left knee M17.12 EKG 12-LEAD CLINIC READ     CBC W PLT No Diff     INR     XR Chest 2 Views     Comprehensive Metabolic Panel     Urinalysis w Reflex Microscopic If Positive     Comprehensive Metabolic Panel     INR     CBC W PLT No Diff   3. Moderate persistent  asthma without complication J45.40 XR Chest 2 Views   4. Essential hypertension I10 EKG 12-LEAD CLIN[facility] READ     CBC W PLT No Diff     INR     XR Chest 2 Views     Comprehensive Metabolic Panel     Urinalysis w Reflex Microscopic If Positive     Comprehensive Metabolic Panel     INR     CBC W PLT No Diff   5. Insomnia, persistent G47.00    6. Counseling for estrogen replacement therapy Z71.89        RECOMMENDATIONS:       Pulmonary Risk  Incentive spirometry post op  Respiratory Therapy (Respiratory Care IP Consult)  post op  VTE prevention discussed, including concerns for ERT (patient should try to hold this perioperatively, knowing that she is likely to have additional vasomotor symptoms.)      --Patient is to take all scheduled medications on the day of surgery EXCEPT for modifications listed below.    APPROVAL GIVEN to proceed with proposed procedure, without further diagnostic evaluation       Signed Electronically by: DAVID RAE MD    Copy of this evaluation report is provided to requesting physician.    [facility] Preop Guidelines    Revised Cardiac Risk Index

## 2019-09-27 NOTE — NURSING NOTE
Patient presents to the clinic today for preop exam. Med rec complete. Haylee Estevez LPN.................. 9/27/2019 9:33 AM

## 2019-10-16 ENCOUNTER — OFFICE VISIT (OUTPATIENT)
Dept: FAMILY MEDICINE | Facility: OTHER | Age: 59
End: 2019-10-16
Attending: FAMILY MEDICINE
Payer: COMMERCIAL

## 2019-10-16 ENCOUNTER — HOSPITAL ENCOUNTER (OUTPATIENT)
Dept: PHYSICAL THERAPY | Facility: OTHER | Age: 59
Setting detail: THERAPIES SERIES
End: 2019-10-16
Payer: COMMERCIAL

## 2019-10-16 VITALS
TEMPERATURE: 97.9 F | DIASTOLIC BLOOD PRESSURE: 74 MMHG | OXYGEN SATURATION: 95 % | RESPIRATION RATE: 16 BRPM | SYSTOLIC BLOOD PRESSURE: 124 MMHG | HEART RATE: 97 BPM

## 2019-10-16 DIAGNOSIS — Z96.652 PRESENCE OF LEFT ARTIFICIAL KNEE JOINT: ICD-10-CM

## 2019-10-16 DIAGNOSIS — J45.40 MODERATE PERSISTENT ASTHMA WITHOUT COMPLICATION: ICD-10-CM

## 2019-10-16 DIAGNOSIS — Z96.652 STATUS POST TOTAL LEFT KNEE REPLACEMENT: ICD-10-CM

## 2019-10-16 DIAGNOSIS — J30.9 ALLERGIC RHINITIS, UNSPECIFIED SEASONALITY, UNSPECIFIED TRIGGER: Primary | ICD-10-CM

## 2019-10-16 PROCEDURE — 99213 OFFICE O/P EST LOW 20 MIN: CPT | Performed by: FAMILY MEDICINE

## 2019-10-16 PROCEDURE — 97161 PT EVAL LOW COMPLEX 20 MIN: CPT | Mod: GP | Performed by: PHYSICAL THERAPIST

## 2019-10-16 PROCEDURE — 97110 THERAPEUTIC EXERCISES: CPT | Mod: GP | Performed by: PHYSICAL THERAPIST

## 2019-10-16 RX ORDER — HYDROCODONE BITARTRATE AND ACETAMINOPHEN 10; 325 MG/1; MG/1
TABLET ORAL
Refills: 0 | COMMUNITY
Start: 2019-10-11 | End: 2020-02-18

## 2019-10-16 RX ORDER — PROMETHAZINE HYDROCHLORIDE 25 MG/1
TABLET ORAL
Refills: 0 | COMMUNITY
Start: 2019-10-11 | End: 2020-02-18

## 2019-10-16 ASSESSMENT — PAIN SCALES - GENERAL: PAINLEVEL: MODERATE PAIN (4)

## 2019-10-16 NOTE — PROGRESS NOTES
"Nursing Notes:   Modesta Kent LPN  10/16/2019 11:38 AM  Signed  Chief Complaint   Patient presents with     Cough     for a few days     She had a left TKA last Tuesday. She has had a cough for a few days. Yesterday her cough became productive .  Modesta Kent LPN..................10/16/2019   11:38 AM      Initial /74   Pulse 97   Temp 97.9  F (36.6  C) (Temporal)   Resp 16   SpO2 95%   Breastfeeding? No  Estimated body mass index is 34.06 kg/m  as calculated from the following:    Height as of 19: 1.676 m (5' 6\").    Weight as of 19: 95.7 kg (211 lb).  Medication Reconciliation: complete    Modesta Kent LPN    SUBJECTIVE:  Yolie Bedoya  is a 58 year old female who comes in today because of a cough been productive.  She had recent preop prior to left total knee arthroplasty.  She had that last week at [facility] with Dr. Jones.  She has moderate persistent asthma for which she takes Singulair and Advair daily and albuterol as a rescue inhaler.  She has a bit of cough.  She has some yellowish sputum. She has a lot of allergies.  No fever.  She doesn't really feel sick.  She was simply worried because of slight sputum production since she just had her knee done last week wanted to make sure she did not have an infection.    Past Medical, Family, and Social History reviewed and updated as noted below.   ROS is negative except as noted above       Allergies   Allergen Reactions     Doxycycline Nausea and Vomiting     Metolazone Unknown     No Clinical Screening - See Comments GI Disturbance and Nausea and Vomiting     Narcotics     Amoxicillin-Pot Clavulanate Rash     Sulfa Drugs Rash   ,   Family History   Problem Relation Age of Onset     Breast Cancer Mother         Cancer-breast     Lung Cancer Mother         Cancer,lung and uterine cancer;       Uterine Cancer Mother      Breast Cancer Maternal Grandmother         Cancer-breast     Cervical Cancer Sister 28        " endometrial or cervical cancer,     Allergy (Severe) Sister         Allergies, allergies/asthma     Other - See Comments Sister          lupus     Heart Disease Brother         Heart Disease,2 heart surgeries B 1965   ,   Current Outpatient Medications   Medication     albuterol (PROAIR HFA/PROVENTIL HFA/VENTOLIN HFA) 108 (90 Base) MCG/ACT inhaler     buPROPion (WELLBUTRIN XL) 150 MG 24 hr tablet     calcium polycarbophil (FIBERCON) 625 MG tablet     cetirizine HCl 10 MG CAPS     Cholecalciferol (VITAMIN D3) 1000 UNITS CAPS     diphenhydrAMINE (BENADRYL) 25 MG capsule     enoxaparin (LOVENOX) 40 MG/0.4ML syringe     fluticasone (FLONASE) 50 MCG/ACT nasal spray     fluticasone-salmeterol (ADVAIR DISKUS) 250-50 MCG/DOSE inhaler     hydrochlorothiazide (HYDRODIURIL) 12.5 MG tablet     HYDROcodone-acetaminophen (NORCO)  MG per tablet     losartan-hydrochlorothiazide (HYZAAR) 100-25 MG tablet     montelukast (SINGULAIR) 10 MG tablet     Multiple Vitamin (MULTI-VITAMINS) TABS     potassium chloride ER (K-TAB/KLOR-CON) 10 MEQ CR tablet     promethazine (PHENERGAN) 25 MG tablet     scopolamine (TRANSDERM) 1 MG/3DAYS 72 hr patch     SUMAtriptan (IMITREX) 20 MG/ACT nasal spray     triamcinolone (KENALOG) 0.1 % cream     zolpidem (AMBIEN) 10 MG tablet     estradiol (ESTRACE) 1 MG tablet     No current facility-administered medications for this visit.    ,   Past Medical History:   Diagnosis Date     Allergic rhinitis due to animal hair and dander     1970,aspen, birch, maple, oak, grass, dust mite, ragwee, cocklebur, mugwart, lambs quarter, pigweed, fungus, molds     Calculus of gallbladder without cholecystitis without obstruction     No Comments Provided     Essential (primary) hypertension     No Comments Provided     Excessive and frequent menstruation with regular cycle     s/p CLARA     Hormone replacement therapy (postmenopausal)     2010,started ERT     Obesity     No Comments Provided     Other long term (current)  drug therapy     2/11/2014,ambien #30/month     Other specified postprocedural states     nausea and severe vomiting with narcotics     Psychophysiologic insomnia     contract signed 2/2014     Tubulo-interstitial nephritis     No Comments Provided     Venous insufficiency (chronic) (peripheral)     No Comments Provided   ,   Patient Active Problem List    Diagnosis Date Noted     Moderate persistent asthma without complication 10/16/2019     Priority: Medium     Status post total left knee replacement 10/16/2019     Priority: Medium     Allergic rhinitis 02/19/2018     Priority: Medium     Family history of malignant neoplasm of breast 02/19/2018     Priority: Medium     Diverticular disease of colon 02/19/2018     Priority: Medium     Impaired fasting glucose 02/19/2018     Priority: Medium     Edema 02/19/2018     Priority: Medium     Obesity 02/19/2018     Priority: Medium     Stress incontinence in female 10/25/2017     Priority: Medium     Hypokalemia 06/30/2017     Priority: Medium     Melanocytic nevus of face 07/23/2014     Priority: Medium     Meniere's disease 04/08/2014     Priority: Medium     Controlled substance agreement signed 02/11/2014     Priority: Medium     Overview:   ambien #30/month       Recurrent major depression in remission (H) 04/16/2013     Priority: Medium     Backache 12/27/2010     Priority: Medium     Insomnia, persistent 12/27/2010     Priority: Medium     Endocrine disorder 03/27/2010     Priority: Medium     Migraine headache 01/05/2010     Priority: Medium   ,   Past Surgical History:   Procedure Laterality Date     ARTHROSCOPY KNEE      10/12/2017     COLONOSCOPY      6/2004, because of change in bowel habits     COLONOSCOPY  05/14/2014 5/14/2014,Extensive diverticulosis throughout the colon - follow up 10 years     EXTRACTION(S) DENTAL      No Comments Provided     HYSTERECTOMY TOTAL ABDOMINAL, BILATERAL SALPINGO-OOPHORECTOMY, COMBINED      3/30/10,CLARA/BSO/Vasquez/Posterior  Repair     LAPAROSCOPIC ABLATION ENDOMETRIOSIS      3/05     OTHER SURGICAL HISTORY      10/25/2017,79375.0,[place] SLING OPER STRES INCONTINENCE    and   Social History     Tobacco Use     Smoking status: Never Smoker     Smokeless tobacco: Never Used   Substance Use Topics     Alcohol use: Yes     Comment: Alcoholic Drinks/day: occ     OBJECTIVE:  /74   Pulse 97   Temp 97.9  F (36.6  C) (Temporal)   Resp 16   SpO2 95%   Breastfeeding? No    EXAM:  Alert and cooperative, no distress.  Affect is appropriate.  He is not dyspneic.  Nose is clear.  Sinuses are nontender to palpation and transilluminate well.  TMs are clear.  Throat is clear.  Neck is supple at adenopathy.  Lungs are clear, no rales rhonchi or wheezes are heard.  Cardiac RRR without murmur.  Left total knee arthroplasty incision appears to be healing well and staples are still in place.  ASSESSMENT/Plan :    Yolie was seen today for cough.    Diagnoses and all orders for this visit:    Allergic rhinitis, unspecified seasonality, unspecified trigger    Moderate persistent asthma without complication    Status post total left knee replacement      Does not appear to have any evidence of infection.  She was reassured.  We will continue with her current medications for allergies and asthma.  If symptoms worsen or she becomes ill, will should come back in for reevaluation.    Roland Wang MD

## 2019-10-16 NOTE — NURSING NOTE
"Chief Complaint   Patient presents with     Cough     for a few days     She had a left TKA last Tuesday. She has had a cough for a few days. Yesterday her cough became productive .  Modesta Kent LPN..................10/16/2019   11:38 AM      Initial /74   Pulse 97   Temp 97.9  F (36.6  C) (Temporal)   Resp 16   SpO2 95%   Breastfeeding? No  Estimated body mass index is 34.06 kg/m  as calculated from the following:    Height as of 9/27/19: 1.676 m (5' 6\").    Weight as of 9/27/19: 95.7 kg (211 lb).  Medication Reconciliation: complete    Modesta Kent LPN  "

## 2019-10-16 NOTE — PROGRESS NOTES
10/16/19 0900   General Information   Type of Visit Initial OP Ortho PT Evaluation   Start of Care Date 10/16/19   Referring Physician Dr. Jones   Orders Evaluate and Treat   Date of Order 10/09/19   Certification Required? No   Medical Diagnosis History of arthroplasty of left knee   Surgical/Medical history reviewed Yes   Precautions/Limitations no known precautions/limitations   Weight-Bearing Status - LLE weight-bearing as tolerated   Body Part(s)   Body Part(s) Knee   Presentation and Etiology   Pertinent history of current problem (include personal factors and/or comorbidities that impact the POC) DOS: 10/8/19;  Has been working on HEP 2x/day (heel dig, SLR with assist, heel slides seated, passive extension hang, ankle pumps, quad sets, gluteal sets, heel slides supine, abduction supine, and SAQ).      Impairments A. Pain;C. Swelling;D. Decreased ROM;E. Decreased flexibility;F. Decreased strength and endurance;G. Impaired balance   Functional Limitations perform activities of daily living;perform required work activities   Symptom Location worst along medial side of left knee   How/Where did it occur From insidious onset;From Degenerative Joint Disease   Onset date of current episode/exacerbation 10/08/19   Chronicity New   Pain rating (0-10 point scale) Best (/10);Worst (/10)   Best (/10) 0/10   Worst (/10) 5/10   Pain quality C. Aching;A. Sharp   Frequency of pain/symptoms A. Constant   Pain/symptoms exacerbated by G. Certain positions  (prolonged positioning)   Pain/symptoms eased by E. Changing positions;H. Cold;I. OTC medication(s)  (hydrocodone (3x/day), tylenol prn)   Progression of symptoms since onset: Improved   Current Level of Function   Patient role/employment history F. Retired   Living environment House/townhome   Home/community accessibility one level   Current equipment-Gait/Locomotion Front wheeled walker   Fall Risk Screen   Fall screen completed by PT   Have you fallen 2 or more times in  the past year? Yes   Have you fallen and had an injury in the past year? No   Is patient a fall risk? Yes  (fell due to left knee giving out)   Knee Objective Findings   Side (if bilateral, select both right and left) Right;Left   Observation Joint Line circumference:  R = 38.5 cm   L = 42 cm   Gait/Locomotion Currently walking with FWW.  Does have a SEC at home (will bring Friday).   Right Knee Extension PROM +1   Right Knee Flexion AROM 127   Right Hamstring Flexibility 56 SLR   Right Quadricep Flexibility 115 (prone)   Left Knee Extension PROM -14   Left Knee Flexion AROM 57   Left Hamstring Flexibility NT   Left Quadricep Flexibility 70 (prone)   Planned Therapy Interventions   Planned Therapy Interventions balance training;gait training;joint mobilization;manual therapy;neuromuscular re-education;ROM;strengthening;stretching   Planned Modality Interventions   Planned Modality Interventions Cryotherapy   Clinical Impression   Criteria for Skilled Therapeutic Interventions Met yes, treatment indicated   PT Diagnosis impaired mobility   Influenced by the following impairments impaired ROM, flexibility, strength, balance; Pain; Edema   Functional limitations due to impairments transfers, walking, stairs, sleeping, positional tolerance   Clinical Presentation Evolving/Changing   Clinical Presentation Rationale clinical observation   Clinical Decision Making (Complexity) Low complexity   Therapy Frequency 2 times/Week   Predicted Duration of Therapy Intervention (days/wks) 12 weeks   Risk & Benefits of therapy have been explained Yes   Patient, Family & other staff in agreement with plan of care Yes   ORTHO GOALS   PT Ortho Eval Goals 1;2;3;4;5;6   Ortho Goal 1   Goal Identifier edema   Goal Description Patient will demonstrate decreased edema from 42 cm at joint line to 40 cm or less for improved ROM.   Target Date 10/30/19   Ortho Goal 2   Goal Identifier ROM   Goal Description Patient will demonstrate increased  left knee flexion from 57 degrees to 80 degrees or better (supine active) for improved dressing and transfers.   Target Date 11/13/19   Ortho Goal 3   Goal Identifier ROM   Goal Description Patient will demonstrate increased left knee extension from -14 degrees to -8 degrees or better for increased tolerance to supine positioning, standing, and walking.   Target Date 11/27/19   Ortho Goal 4   Goal Identifier sleep   Goal Description Patient will report minimal to no sleep disruption due to left knee pain.   Target Date 11/27/19   Ortho Goal 5   Goal Identifier walking   Goal Description Patient will demonstrate ability to safely walk with no assistive device for improved home and community mobility.   Target Date 12/11/19   Ortho Goal 6   Goal Identifier strength   Goal Description Patient will demonstrate 4+/5 or better left knee extension for improved ability to walk up/down steps, and complete sit-stand transfers with no difficulty.   Target Date 01/08/20   Total Evaluation Time   PT Halle Low Complexity Minutes (73292) 20

## 2019-10-18 ENCOUNTER — HOSPITAL ENCOUNTER (OUTPATIENT)
Dept: PHYSICAL THERAPY | Facility: OTHER | Age: 59
Setting detail: THERAPIES SERIES
End: 2019-10-18
Payer: COMMERCIAL

## 2019-10-18 PROCEDURE — 97110 THERAPEUTIC EXERCISES: CPT | Mod: GP | Performed by: PHYSICAL THERAPIST

## 2019-10-18 PROCEDURE — 97016 VASOPNEUMATIC DEVICE THERAPY: CPT | Mod: GP | Performed by: PHYSICAL THERAPIST

## 2019-10-23 ENCOUNTER — HOSPITAL ENCOUNTER (OUTPATIENT)
Dept: PHYSICAL THERAPY | Facility: OTHER | Age: 59
Setting detail: THERAPIES SERIES
End: 2019-10-23
Payer: COMMERCIAL

## 2019-10-23 PROCEDURE — 97110 THERAPEUTIC EXERCISES: CPT | Mod: GP | Performed by: PHYSICAL THERAPIST

## 2019-10-25 ENCOUNTER — HOSPITAL ENCOUNTER (OUTPATIENT)
Dept: PHYSICAL THERAPY | Facility: OTHER | Age: 59
Setting detail: THERAPIES SERIES
End: 2019-10-25
Payer: COMMERCIAL

## 2019-10-25 PROCEDURE — 97110 THERAPEUTIC EXERCISES: CPT | Mod: GP | Performed by: PHYSICAL THERAPIST

## 2019-10-28 ENCOUNTER — HOSPITAL ENCOUNTER (OUTPATIENT)
Dept: PHYSICAL THERAPY | Facility: OTHER | Age: 59
Setting detail: THERAPIES SERIES
End: 2019-10-28
Payer: COMMERCIAL

## 2019-10-28 PROCEDURE — 97110 THERAPEUTIC EXERCISES: CPT | Mod: GP

## 2019-10-31 ENCOUNTER — HOSPITAL ENCOUNTER (OUTPATIENT)
Dept: PHYSICAL THERAPY | Facility: OTHER | Age: 59
Setting detail: THERAPIES SERIES
End: 2019-10-31
Payer: COMMERCIAL

## 2019-10-31 PROCEDURE — 97110 THERAPEUTIC EXERCISES: CPT | Mod: GP | Performed by: PHYSICAL THERAPIST

## 2019-11-05 ENCOUNTER — HOSPITAL ENCOUNTER (OUTPATIENT)
Dept: PHYSICAL THERAPY | Facility: OTHER | Age: 59
Setting detail: THERAPIES SERIES
End: 2019-11-05
Payer: COMMERCIAL

## 2019-11-05 PROCEDURE — 97110 THERAPEUTIC EXERCISES: CPT | Mod: GP | Performed by: PHYSICAL THERAPIST

## 2019-11-05 NOTE — PROGRESS NOTES
Outpatient Physical Therapy Progress Note     Patient: Yolie Bedoya  : 1960    Beginning/End Dates of Reporting Period:  10/16/2019 to 2019    Referring Provider: Dr. Jones    Therapy Diagnosis: History of arthroplasty of left knee         Client Self Report: Patient reports sleep is getting better overall.  Still some disruption.  Knee has been a little more warm and maybe a little swollen today.  Not sure what may have aggravated things.    Objective Measurements:  Objective Measure: pain rating (L knee)  Details: 1/10    Objective Measure: Joint line circumference:  10/16: R = 38.5 cm   L = 42 cm        10/25: L = 41 cm        :  L = 42 cm    Objective Measure: Passive extension:  10/16 = -14       10/25 = -8       = -9    Objective Measure: Active flexion (supine):  10/16 = 57        10/25 = 75        = 85       Goals:  Goal Identifier edema   Goal Description Patient will demonstrate decreased edema from 42 cm at joint line to 40 cm or less for improved ROM.   Target Date 10/30/19   Date Met      Progress:  Inconsistent     Goal Identifier ROM   Goal Description Patient will demonstrate increased left knee flexion from 57 degrees to 80 degrees or better (supine active) for improved dressing and transfers.   Target Date 19   Date Met  19   Progress:  Goal met     Goal Identifier ROM   Goal Description Patient will demonstrate increased left knee extension from -14 degrees to -8 degrees or better for increased tolerance to supine positioning, standing, and walking.   Target Date 19   Date Met      Progress:  Progress made (-9)     Goal Identifier sleep   Goal Description Patient will report minimal to no sleep disruption due to left knee pain.   Target Date 19   Date Met  19   Progress:  Goal met     Goal Identifier walking   Goal Description Patient will demonstrate ability to safely walk with no assistive device for improved home and community mobility.    Target Date 12/11/19   Date Met  11/05/19   Progress:  Goal met     Goal Identifier strength   Goal Description Patient will demonstrate 4+/5 or better left knee extension for improved ability to walk up/down steps, and complete sit-stand transfers with no difficulty.   Target Date 01/08/20   Date Met      Progress:  Progress made       Progress Toward Goals:   Progress this reporting period: Improved ROM.  Continued limitation and gait asymmetry.   Inconsistent edema.  Improved sleep overall.      Plan:  Continue therapy per current plan of care.    Discharge:  No

## 2019-11-07 ENCOUNTER — HOSPITAL ENCOUNTER (OUTPATIENT)
Dept: PHYSICAL THERAPY | Facility: OTHER | Age: 59
Setting detail: THERAPIES SERIES
End: 2019-11-07
Payer: COMMERCIAL

## 2019-11-07 PROCEDURE — 97110 THERAPEUTIC EXERCISES: CPT | Mod: GP

## 2019-11-12 ENCOUNTER — HOSPITAL ENCOUNTER (OUTPATIENT)
Dept: PHYSICAL THERAPY | Facility: OTHER | Age: 59
Setting detail: THERAPIES SERIES
End: 2019-11-12
Payer: COMMERCIAL

## 2019-11-12 PROCEDURE — 97110 THERAPEUTIC EXERCISES: CPT | Mod: GP | Performed by: PHYSICAL THERAPIST

## 2019-11-15 ENCOUNTER — HOSPITAL ENCOUNTER (OUTPATIENT)
Dept: PHYSICAL THERAPY | Facility: OTHER | Age: 59
Setting detail: THERAPIES SERIES
End: 2019-11-15
Payer: COMMERCIAL

## 2019-11-15 PROCEDURE — 97110 THERAPEUTIC EXERCISES: CPT | Mod: GP | Performed by: PHYSICAL THERAPIST

## 2019-11-18 ENCOUNTER — HOSPITAL ENCOUNTER (OUTPATIENT)
Dept: PHYSICAL THERAPY | Facility: OTHER | Age: 59
Setting detail: THERAPIES SERIES
End: 2019-11-18
Payer: COMMERCIAL

## 2019-11-18 PROCEDURE — 97110 THERAPEUTIC EXERCISES: CPT | Mod: GP | Performed by: PHYSICAL THERAPIST

## 2019-11-21 ENCOUNTER — HOSPITAL ENCOUNTER (OUTPATIENT)
Dept: PHYSICAL THERAPY | Facility: OTHER | Age: 59
Setting detail: THERAPIES SERIES
End: 2019-11-21
Payer: COMMERCIAL

## 2019-11-21 PROCEDURE — 97110 THERAPEUTIC EXERCISES: CPT | Mod: GP | Performed by: PHYSICAL THERAPIST

## 2019-11-21 NOTE — PROGRESS NOTES
Outpatient Physical Therapy Progress Note     Patient: Yolie Bedoya  : 1960    Beginning/End Dates of Reporting Period:  10/16/2019 to 2019    Referring Provider: Dr. Jones    Diagnosis: History of arthroplasty of left knee         Client Self Report: Patient states her follow up was rescheduled for 19.  Continues to have to concentrate on walking mechanics.  Variable pain levels.  Variable exercise performance at home depending on daily events.      Objective Measurements:  Objective Measure: pain rating (L knee)  Details: 0-4/10 with daily activity    Objective Measure: Joint line circumference:  10/16: R = 38.5 cm   L = 42 cm        10/25: L = 41 cm        :  L = 42 cm  : NT    Objective Measure: Passive extension:  10/16 = -14       10/25 = -8       = -9        = -8    Objective Measure: Active flexion (supine):  10/16 = 57        10/25 = 75        = 85       = 105       Goals:  Goal Identifier edema   Goal Description Patient will demonstrate decreased edema from 42 cm at joint line to 40 cm or less for improved ROM.   Target Date 10/30/19   Date Met      Progress:     Goal Identifier ROM   Goal Description Patient will demonstrate increased left knee flexion from 57 degrees to 80 degrees or better (supine active) for improved dressing and transfers.   Target Date 19   Date Met  19   Progress:  Goal met     Goal Identifier ROM   Goal Description Patient will demonstrate increased left knee extension from -14 degrees to -8 degrees or better for increased tolerance to supine positioning, standing, and walking.   Target Date 19   Date Met  19   Progress:  Goal met     Goal Identifier sleep   Goal Description Patient will report minimal to no sleep disruption due to left knee pain.   Target Date 19   Date Met  19   Progress:  Goal met     Goal Identifier walking   Goal Description Patient will demonstrate ability to  safely walk with no assistive device for improved home and community mobility.   Target Date 12/11/19   Date Met  11/05/19   Progress:  Goal met     Goal Identifier strength   Goal Description Patient will demonstrate 4+/5 or better left knee extension for improved ability to walk up/down steps, and complete sit-stand transfers with no difficulty.   Target Date 01/08/20   Date Met      Progress:           Progress Toward Goals:   Progress this reporting period: Consistent progression of knee flexion.  Knee extension has not changed much over the past several weeks.            Plan:  Continue therapy per current plan of care.  Current certification through 1/8/2019.  Plan to transition from ROM to focusing more on strengthening.    Discharge:  No

## 2019-11-27 ENCOUNTER — HOSPITAL ENCOUNTER (OUTPATIENT)
Dept: PHYSICAL THERAPY | Facility: OTHER | Age: 59
Setting detail: THERAPIES SERIES
End: 2019-11-27
Payer: COMMERCIAL

## 2019-11-27 PROCEDURE — 97110 THERAPEUTIC EXERCISES: CPT | Mod: GP | Performed by: PHYSICAL THERAPIST

## 2019-11-29 ENCOUNTER — HOSPITAL ENCOUNTER (OUTPATIENT)
Dept: PHYSICAL THERAPY | Facility: OTHER | Age: 59
Setting detail: THERAPIES SERIES
End: 2019-11-29
Payer: COMMERCIAL

## 2019-11-29 PROCEDURE — 97110 THERAPEUTIC EXERCISES: CPT | Mod: GP | Performed by: PHYSICAL THERAPIST

## 2019-12-02 ENCOUNTER — HOSPITAL ENCOUNTER (OUTPATIENT)
Dept: PHYSICAL THERAPY | Facility: OTHER | Age: 59
Setting detail: THERAPIES SERIES
End: 2019-12-02
Payer: COMMERCIAL

## 2019-12-02 PROCEDURE — 97110 THERAPEUTIC EXERCISES: CPT | Mod: GP | Performed by: PHYSICAL THERAPIST

## 2019-12-05 ENCOUNTER — HOSPITAL ENCOUNTER (OUTPATIENT)
Dept: PHYSICAL THERAPY | Facility: OTHER | Age: 59
Setting detail: THERAPIES SERIES
End: 2019-12-05
Payer: COMMERCIAL

## 2019-12-05 PROCEDURE — 97110 THERAPEUTIC EXERCISES: CPT | Mod: GP | Performed by: PHYSICAL THERAPIST

## 2019-12-10 ENCOUNTER — HOSPITAL ENCOUNTER (OUTPATIENT)
Dept: PHYSICAL THERAPY | Facility: OTHER | Age: 59
Setting detail: THERAPIES SERIES
End: 2019-12-10
Payer: COMMERCIAL

## 2019-12-10 PROCEDURE — 97110 THERAPEUTIC EXERCISES: CPT | Mod: GP | Performed by: PHYSICAL THERAPIST

## 2019-12-12 ENCOUNTER — HOSPITAL ENCOUNTER (OUTPATIENT)
Dept: PHYSICAL THERAPY | Facility: OTHER | Age: 59
Setting detail: THERAPIES SERIES
End: 2019-12-12
Payer: COMMERCIAL

## 2019-12-12 PROCEDURE — 97110 THERAPEUTIC EXERCISES: CPT | Mod: GP | Performed by: PHYSICAL THERAPIST

## 2019-12-12 PROCEDURE — 97140 MANUAL THERAPY 1/> REGIONS: CPT | Mod: GP | Performed by: PHYSICAL THERAPIST

## 2019-12-17 ENCOUNTER — HOSPITAL ENCOUNTER (OUTPATIENT)
Dept: PHYSICAL THERAPY | Facility: OTHER | Age: 59
Setting detail: THERAPIES SERIES
End: 2019-12-17
Payer: COMMERCIAL

## 2019-12-17 PROCEDURE — 97110 THERAPEUTIC EXERCISES: CPT | Mod: GP | Performed by: PHYSICAL THERAPIST

## 2019-12-19 ENCOUNTER — HOSPITAL ENCOUNTER (OUTPATIENT)
Dept: PHYSICAL THERAPY | Facility: OTHER | Age: 59
Setting detail: THERAPIES SERIES
End: 2019-12-19
Payer: COMMERCIAL

## 2019-12-19 PROCEDURE — 97110 THERAPEUTIC EXERCISES: CPT | Mod: GP | Performed by: PHYSICAL THERAPIST

## 2019-12-23 ENCOUNTER — HOSPITAL ENCOUNTER (OUTPATIENT)
Dept: PHYSICAL THERAPY | Facility: OTHER | Age: 59
Setting detail: THERAPIES SERIES
End: 2019-12-23
Payer: COMMERCIAL

## 2019-12-23 PROCEDURE — 97140 MANUAL THERAPY 1/> REGIONS: CPT | Mod: GP | Performed by: PHYSICAL THERAPIST

## 2019-12-23 PROCEDURE — 97110 THERAPEUTIC EXERCISES: CPT | Mod: GP | Performed by: PHYSICAL THERAPIST

## 2019-12-23 NOTE — PROGRESS NOTES
Outpatient Physical Therapy Progress Note     Patient: Yolie Bedoya  : 1960    Beginning/End Dates of Reporting Period:  10/16/2019 to 2019  Current certification through 2020    Referring Provider: Dr. Jones    Diagnosis: History of arthoplasty of left knee         Client Self Report:      Objective Measurements:  Objective Measure: pain rating (L knee)  Details: 3/10 at night, 0-1/10 during the daytime    Objective Measure: Joint line circumference:  10/16: R = 38.5 cm   L = 42 cm        10/25: L = 41 cm         = 42 cm         = 41.5 cm    Objective Measure: Passive extension:  10/16 = -14       10/25 = -8       = -9        = -8         = -10        = -10    Objective Measure: Active flexion (supine):  10/16 = 57        10/25 = 75       11 = 85       = 105        = 109       = 110             Goals:  Goal Identifier edema   Goal Description Patient will demonstrate decreased edema from 42 cm at joint line to 40 cm or less for improved ROM.   Target Date 10/30/19   Date Met      Progress:     Goal Identifier ROM   Goal Description Patient will demonstrate increased left knee flexion from 57 degrees to 80 degrees or better (supine active) for improved dressing and transfers.   Target Date 19   Date Met  19   Progress:  Goal met     Goal Identifier ROM   Goal Description Patient will demonstrate increased left knee extension from -14 degrees to -8 degrees or better for increased tolerance to supine positioning, standing, and walking.   Target Date 19   Date Met      Progress:     Goal Identifier sleep   Goal Description Patient will report minimal to no sleep disruption due to left knee pain.   Target Date 19   Date Met  19   Progress:  Goal met     Goal Identifier walking   Goal Description Patient will demonstrate ability to safely walk with no assistive device for improved home and community mobility.   Target Date  12/11/19   Date Met  11/05/19   Progress:  Goal met     Goal Identifier strength   Goal Description Patient will demonstrate 4+/5 or better left knee extension for improved ability to walk up/down steps, and complete sit-stand transfers with no difficulty.   Target Date 01/08/20   Date Met  12/23/19   Progress:  Goal met       Progress Toward Goals:   Progress this reporting period: Improved strength and stability over the past several weeks with patient demonstrating improved exercise tolerance and consistent progression of resistance and repetitions.  Continued work on ROM and trial of IASTM for improved left knee extension.          Plan:  Continue therapy per current plan of care.    Discharge:  No

## 2019-12-26 ENCOUNTER — HOSPITAL ENCOUNTER (OUTPATIENT)
Dept: PHYSICAL THERAPY | Facility: OTHER | Age: 59
Setting detail: THERAPIES SERIES
End: 2019-12-26
Payer: COMMERCIAL

## 2019-12-26 PROCEDURE — 97110 THERAPEUTIC EXERCISES: CPT | Mod: GP | Performed by: PHYSICAL THERAPIST

## 2019-12-30 ENCOUNTER — HOSPITAL ENCOUNTER (OUTPATIENT)
Dept: PHYSICAL THERAPY | Facility: OTHER | Age: 59
Setting detail: THERAPIES SERIES
End: 2019-12-30
Payer: COMMERCIAL

## 2019-12-30 DIAGNOSIS — I10 ESSENTIAL HYPERTENSION: ICD-10-CM

## 2019-12-30 PROCEDURE — 97110 THERAPEUTIC EXERCISES: CPT | Mod: GP

## 2019-12-30 RX ORDER — POTASSIUM CHLORIDE 750 MG/1
20 TABLET, EXTENDED RELEASE ORAL
Qty: 180 TABLET | Refills: 0 | Status: SHIPPED | OUTPATIENT
Start: 2019-12-30 | End: 2020-08-18

## 2019-12-30 NOTE — TELEPHONE ENCOUNTER
[facility] sent Rx request for the following:      Potassium chloride ER 10 mEq CR tablet      Last Prescription Date:   8/12/19  Last Fill Qty/Refills:         180, R-3    Last Office Visit:              9/27/19   Future Office visit:           None noted    Passed protocol  Prescription approved per [facility].  Gabrielle Mckeon RN............................ 12/30/2019 2:09 PM

## 2020-01-02 ENCOUNTER — HOSPITAL ENCOUNTER (OUTPATIENT)
Dept: PHYSICAL THERAPY | Facility: OTHER | Age: 60
Setting detail: THERAPIES SERIES
End: 2020-01-02
Payer: COMMERCIAL

## 2020-01-02 PROCEDURE — 97110 THERAPEUTIC EXERCISES: CPT | Mod: GP | Performed by: PHYSICAL THERAPIST

## 2020-01-06 ENCOUNTER — HOSPITAL ENCOUNTER (OUTPATIENT)
Dept: PHYSICAL THERAPY | Facility: OTHER | Age: 60
Setting detail: THERAPIES SERIES
End: 2020-01-06
Payer: COMMERCIAL

## 2020-01-06 PROCEDURE — 97110 THERAPEUTIC EXERCISES: CPT | Mod: GP | Performed by: PHYSICAL THERAPIST

## 2020-01-06 PROCEDURE — 97140 MANUAL THERAPY 1/> REGIONS: CPT | Mod: GP | Performed by: PHYSICAL THERAPIST

## 2020-01-08 ENCOUNTER — HOSPITAL ENCOUNTER (OUTPATIENT)
Dept: PHYSICAL THERAPY | Facility: OTHER | Age: 60
Setting detail: THERAPIES SERIES
End: 2020-01-08
Payer: COMMERCIAL

## 2020-01-08 PROCEDURE — 97110 THERAPEUTIC EXERCISES: CPT | Mod: GP | Performed by: PHYSICAL THERAPIST

## 2020-01-08 PROCEDURE — 97140 MANUAL THERAPY 1/> REGIONS: CPT | Mod: GP | Performed by: PHYSICAL THERAPIST

## 2020-01-08 NOTE — PROGRESS NOTES
Outpatient Physical Therapy Discharge Note     Patient: Yolie Bedoya  : 1960    Beginning/End Dates of Reporting Period:  10/16/19 to 2020    Referring Provider: Dr. Jones    Diagnosis: History of arthroplasty of left knee         Client Self Report:  Patient reports she is satisfied with her progress, but has mixed feelings about being done with therapy.  Concerns about doing things on her own and continued progression.    Objective Measurements:  Objective Measure: pain rating (L knee)  Details: 0-3/10    Objective Measure: Joint line circumference:  10/16: R = 38.5 cm    L = 42 cm        10/25:    L = 41 cm        :    L = 42 cm        :   L = 41.5 cm       :   L = 41 cm    Objective Measure: Passive extension:  10/16 = -14       10/25 = -8       = -9        = -8         = -10        = -10       = -8    Objective Measure: Active flexion (supine):  10/16 = 57        10/25 = 75        = 85       = 105        = 109       = 110         = 110          Goals:  Goal Identifier edema   Goal Description Patient will demonstrate decreased edema from 42 cm at joint line to 40 cm or less for improved ROM.   Target Date 20   Date Met      Progress:  variable     Goal Identifier ROM   Goal Description Patient will demonstrate increased left knee flexion from 57 degrees to 80 degrees or better (supine active) for improved dressing and transfers.   Target Date 20   Date Met  20   Progress:  Goal met     Goal Identifier ROM   Goal Description Patient will demonstrate increased left knee extension from -14 degrees to -8 degrees or better for increased tolerance to supine positioning, standing, and walking.   Target Date 20   Date Met  20   Progress:  Goal met         Progress Toward Goals:   Patient has met ROM goals for flexion and extension.  Minimal change in extension ROM since late October and minimal change in flexion ROM since  early December 2019.  Strength has continued to consistently improve, demonstrating increased tolerance for weight and repetition.  Patient reports minimal to no limitations with daily tasks.            Plan:  Discharge from therapy.    Discharge:    Reason for Discharge: Patient has met all goals.  No further expectation of progress.  Able to continue with Home Exercise Program.    Equipment Issued: none    Discharge Plan: Patient to continue home program.  Encouraged patient to call with any questions or concerns.  Can return to physical therapy if she notes a significant decline in function.

## 2020-01-16 ENCOUNTER — TELEPHONE (OUTPATIENT)
Dept: FAMILY MEDICINE | Facility: OTHER | Age: 60
End: 2020-01-16

## 2020-01-27 ENCOUNTER — TELEPHONE (OUTPATIENT)
Dept: FAMILY MEDICINE | Facility: OTHER | Age: 60
End: 2020-01-27

## 2020-01-27 DIAGNOSIS — T75.3XXA MOTION SICKNESS, INITIAL ENCOUNTER: Primary | ICD-10-CM

## 2020-01-27 NOTE — TELEPHONE ENCOUNTER
Reason for call: Medication or medication refill    Name of medication requested: needs motion sickness patches, will be gone for 9 days    Are you out of the medication? yes    What pharmacy do you use? [facility] [place]    Preferred method for responding to this message: Telephone Call    Phone number patient can be reached at: Cell number on file:    Telephone Information:   Mobile [phone]       If we cannot reach you directly, may we leave a detailed response at the number you provided? Yes

## 2020-01-28 RX ORDER — SCOLOPAMINE TRANSDERMAL SYSTEM 1 MG/1
1 PATCH, EXTENDED RELEASE TRANSDERMAL
Qty: 3 PATCH | Refills: 1 | Status: SHIPPED | OUTPATIENT
Start: 2020-01-28 | End: 2020-02-18

## 2020-01-28 NOTE — TELEPHONE ENCOUNTER
Patient states she has two trips planned and is requesting motion sickness medication.  Please send to [facility] pharmacy.    Gabrielle Graf LPN............1/28/2020 9:36 AM

## 2020-02-11 DIAGNOSIS — G47.00 INSOMNIA, PERSISTENT: ICD-10-CM

## 2020-02-11 NOTE — TELEPHONE ENCOUNTER
Refill request for Zolpidem 10 mg tab from [facility], last office visit 10/16/2019,ast px with pcp on 9/27/2019, last refill 8/12/2019.  Medication not on RN refill protocol, routing to pcp to address refill.  Unable to complete prescription refill per RN Medication Refill Policy. Lisa Mo, RN 2/11/2020 2:41 PM

## 2020-02-17 RX ORDER — ZOLPIDEM TARTRATE 10 MG/1
TABLET ORAL
Qty: 30 TABLET | Refills: 0 | Status: SHIPPED | OUTPATIENT
Start: 2020-02-17 | End: 2020-02-18

## 2020-02-17 NOTE — PROGRESS NOTES
SUBJECTIVE:   CC:  Yolie Bedoya is a 59 year old female who presents to clinic today for the following health issues:  Medication follow up     HPI  Yolie Bedoya is a 59 year old female who presents for medication follow up.    She has been taking Ambien nearly every night for several years to help with sleep.  Persistent insomnia related to menopause symptoms and chronic psychogenic insomnia.  She does not have a history of sleepwalking, sleep talking, or other parasomnias.    Hypertension - states from the pharmacy she was told that hyzaar will no longer be made and she is now taking Cozaar and hydrochlorothiazide.  She denies any side effects with this change in place.    She is now off of estrace.  This was stopped around the time of her knee replacement surgery.  She continues to have some hot flashes and night sweats, but not worth it going back on.    Asthma:  When she travels, finds she needs to use her rescue inhaler.  When she uses albuterol, it works well.  Consistent with using advair.       Allergies   Allergen Reactions     Doxycycline Nausea and Vomiting     Metolazone Unknown     No Clinical Screening - See Comments GI Disturbance and Nausea and Vomiting     Narcotics     Amoxicillin-Pot Clavulanate Rash     Sulfa Drugs Rash     Current Outpatient Medications   Medication     albuterol (PROAIR HFA/PROVENTIL HFA/VENTOLIN HFA) 108 (90 Base) MCG/ACT inhaler     buPROPion (WELLBUTRIN XL) 150 MG 24 hr tablet     calcium polycarbophil (FIBERCON) 625 MG tablet     cetirizine HCl 10 MG CAPS     Cholecalciferol (VITAMIN D3) 1000 UNITS CAPS     diphenhydrAMINE (BENADRYL) 25 MG capsule     fluticasone (FLONASE) 50 MCG/ACT nasal spray     fluticasone-salmeterol (ADVAIR DISKUS) 250-50 MCG/DOSE inhaler     hydrochlorothiazide (HYDRODIURIL) 12.5 MG tablet     losartan-hydrochlorothiazide (HYZAAR) 100-25 MG tablet     montelukast (SINGULAIR) 10 MG tablet     Multiple Vitamin (MULTI-VITAMINS) TABS      potassium chloride ER (K-TAB/KLOR-CON) 10 MEQ CR tablet     scopolamine (TRANSDERM) 1 MG/3DAYS 72 hr patch     SUMAtriptan (IMITREX) 20 MG/ACT nasal spray     triamcinolone (KENALOG) 0.1 % cream     zolpidem (AMBIEN) 10 MG tablet     No current facility-administered medications for this visit.       Past Medical History:   Diagnosis Date     Allergic rhinitis due to animal hair and dander     1970,aspen, birch, maple, oak, grass, dust mite, ragwee, cocklebur, mugwart, lambs quarter, pigweed, fungus, molds     Calculus of gallbladder without cholecystitis without obstruction     No Comments Provided     Essential (primary) hypertension     No Comments Provided     Excessive and frequent menstruation with regular cycle     s/p CLARA     Hormone replacement therapy (postmenopausal)     2010,started ERT     Obesity     No Comments Provided     Other long term (current) drug therapy     2/11/2014,ambien #30/month     Other specified postprocedural states     nausea and severe vomiting with narcotics     Psychophysiologic insomnia     contract signed 2/2014     Tubulo-interstitial nephritis     No Comments Provided     Venous insufficiency (chronic) (peripheral)     No Comments Provided      Past Surgical History:   Procedure Laterality Date     ARTHROSCOPY KNEE  10/12/2017    10/12/2017     COLONOSCOPY  06/2004     because of change in bowel habits     COLONOSCOPY  05/14/2014 5/14/2014,Extensive diverticulosis throughout the colon - follow up 10 years     EXTRACTION(S) DENTAL      No Comments Provided     HYSTERECTOMY TOTAL ABDOMINAL, BILATERAL SALPINGO-OOPHORECTOMY, COMBINED  03/30/2010    CLARA/BSO/Vasquez/Posterior Repair     LAPAROSCOPIC ABLATION ENDOMETRIOSIS      3/05     left total knee arthroplasty Left 10/2019    Dr. Jones.  Alice     OTHER SURGICAL HISTORY      10/25/2017,71165.0,[place] SLING OPER STRES INCONTINENCE     Family History   Problem Relation Age of Onset     Breast Cancer Mother         Cancer-breast      "Lung Cancer Mother         Cancer,lung and uterine cancer;       Uterine Cancer Mother      Breast Cancer Maternal Grandmother         Cancer-breast     Cervical Cancer Sister 28        endometrial or cervical cancer,     Allergy (Severe) Sister         Allergies, allergies/asthma     Other - See Comments Sister          lupus     Heart Disease Brother         Heart Disease,2 heart surgeries B        Review of Systems   Constitutional: Negative.    Respiratory:        Asthma controlled   Endocrine:        Prediabetes   Musculoskeletal:        Left knee is s/p TKA - doing well.  Told she won't get full extension back again.    Right knee x-ray last fall was ok    Neurological:        After surgery, had problems with focusing, reading and working on mindfulness practice    Psychiatric/Behavioral: Positive for sleep disturbance.        PHQ-2 Score:     PHQ-2 (  [employer]) 10/16/2019 2019   Q1: Little interest or pleasure in doing things (No Data) 0   Q2: Feeling down, depressed or hopeless - 0   PHQ-2 Score - 0     No flowsheet data found.          PHQ-9 SCORE 2018   PHQ-9 Total Score 1 3 4         OBJECTIVE:     /80 (BP Location: Right arm, Patient Position: Sitting, Cuff Size: Adult Large)   Pulse 93   Temp 96.9  F (36.1  C) (Tympanic)   Resp 18   Ht (P) 1.676 m (5' 6\")   Wt 94.8 kg (209 lb)   SpO2 97%   BMI (P) 33.73 kg/m    Body mass index is 33.73 kg/m  (pended).  Physical Exam  Vitals signs and nursing note reviewed.   Constitutional:       Appearance: Normal appearance.   Musculoskeletal:      Left lower leg: Edema present.      Comments: Trace to 1+ left lower extremity edema, this is different than on the right.  No calf tenderness.  Her left knee scar is well-healed.   Neurological:      Mental Status: She is alert.            Results for orders placed or performed in visit on 20   Hemoglobin A1c     Status: None   Result Value Ref Range    " Hemoglobin A1C 5.4 4.0 - 6.0 %         ASSESSMENT/PLAN:       ICD-10-CM    1. Insomnia, persistent G47.00 zolpidem (AMBIEN) 10 MG tablet   2. Essential hypertension I10    3. Prediabetes R73.03 Hemoglobin A1c     Hemoglobin A1c            PLAN:  1.  Tox screen done 8/2019.   reviewed.  Included on this are her postoperative narcotic prescriptions from orthopedics.  Patient ed on short and long term risks of sleep medication - including increased risk of death associated with use of this medication.  2.  Blood pressure is currently well controlled with change from combination to to single dose medications.  3.  A1c has improved over the past year.  Discussed and reviewed goals for diabetes prevention.    Follow-up in 6 months      DAVID RAE MD  [facility] AND [facility]    This note was created using voice recognition software and was screened for errors in transcription.

## 2020-02-17 NOTE — PATIENT INSTRUCTIONS
You are currently being prescribed a controlled substance.  You need to be aware of the risks of taking this medication.  By signing a medication contract, you accept these risks and side effects.      Any medical treatment is initially a trial, and that continued prescribing is based on evidence of benefit without an unacceptable risk. Understand that the goal ofusing this medication is to increase functional level. If your symptoms do not significantly decrease and/or function increase, the medication will be stopped.    Be aware that the use of such medicine has certain risksassociated with it, including, but not limited to:  Sleepiness or drowsiness, lightheadedness, dizziness, confusion,allergic reaction, slowing of breathing rate, slowing of reflexes or reaction time, sexual dysfunction,physical dependence, tolerance to analgesia, addiction, withdrawal and the possibility that the medicine will not completely take care of your symptoms.  Usage is associate with a significantly increased risk of falls andother unintended injuries.    The overuse of this medication can result in serious health risks including respiratory depression or even death.  These risks are increased when the medication is combined with alcohol,narcotics, or other sedatives.    Having these medications prescribed for you also means that your accept the risk of possible intentional oraccidental use by those in your home or others with whom you come in contact.  This includes their possible diversion of your medications, overdose or death.      This medication will be strictly monitored and all of my medications should be filled at the same pharmacy.  (Should the need arise to change pharmacies our office must be informed).    It is your responsibility to share that you are on a controlled substance contract with your other health care providers, including your dentist.

## 2020-02-18 ENCOUNTER — OFFICE VISIT (OUTPATIENT)
Dept: FAMILY MEDICINE | Facility: OTHER | Age: 60
End: 2020-02-18
Attending: FAMILY MEDICINE
Payer: COMMERCIAL

## 2020-02-18 VITALS
DIASTOLIC BLOOD PRESSURE: 80 MMHG | WEIGHT: 209 LBS | SYSTOLIC BLOOD PRESSURE: 120 MMHG | OXYGEN SATURATION: 97 % | BODY MASS INDEX: 33.73 KG/M2 | HEART RATE: 93 BPM | RESPIRATION RATE: 18 BRPM | TEMPERATURE: 96.9 F

## 2020-02-18 DIAGNOSIS — R73.03 PREDIABETES: ICD-10-CM

## 2020-02-18 DIAGNOSIS — I10 ESSENTIAL HYPERTENSION: ICD-10-CM

## 2020-02-18 DIAGNOSIS — G47.00 INSOMNIA, PERSISTENT: Primary | ICD-10-CM

## 2020-02-18 LAB — HBA1C MFR BLD: 5.4 % (ref 4–6)

## 2020-02-18 PROCEDURE — 36415 COLL VENOUS BLD VENIPUNCTURE: CPT | Mod: ZL | Performed by: FAMILY MEDICINE

## 2020-02-18 PROCEDURE — 83036 HEMOGLOBIN GLYCOSYLATED A1C: CPT | Mod: ZL | Performed by: FAMILY MEDICINE

## 2020-02-18 PROCEDURE — 99214 OFFICE O/P EST MOD 30 MIN: CPT | Performed by: FAMILY MEDICINE

## 2020-02-18 RX ORDER — ZOLPIDEM TARTRATE 10 MG/1
10 TABLET ORAL AT BEDTIME
Qty: 30 TABLET | Refills: 5 | Status: SHIPPED | OUTPATIENT
Start: 2020-02-18 | End: 2020-08-18

## 2020-02-18 ASSESSMENT — PATIENT HEALTH QUESTIONNAIRE - PHQ9: SUM OF ALL RESPONSES TO PHQ QUESTIONS 1-9: 4

## 2020-02-18 ASSESSMENT — ENCOUNTER SYMPTOMS
CONSTITUTIONAL NEGATIVE: 1
ENDOCRINE COMMENTS: PREDIABETES
SLEEP DISTURBANCE: 1

## 2020-02-18 ASSESSMENT — MIFFLIN-ST. JEOR: SCORE: 1539.77

## 2020-02-19 ASSESSMENT — ASTHMA QUESTIONNAIRES: ACT_TOTALSCORE: 18

## 2020-03-13 DIAGNOSIS — G43.819 OTHER MIGRAINE WITHOUT STATUS MIGRAINOSUS, INTRACTABLE: ICD-10-CM

## 2020-03-17 RX ORDER — SUMATRIPTAN 20 MG/1
SPRAY NASAL
Qty: 18 EACH | Refills: 2 | Status: SHIPPED | OUTPATIENT
Start: 2020-03-17 | End: 2021-03-29

## 2020-03-17 NOTE — TELEPHONE ENCOUNTER
"Requested Prescriptions   Pending Prescriptions Disp Refills     SUMAtriptan (IMITREX) 20 MG/ACT nasal spray 18 each      Sig: INHALE 1 SPRAY IN THE NOSTRIL(S) EVERY 2 HOURS IF NEEDED FOR MIGRAINE.       Serotonin Agonists Failed - 3/13/2020  9:57 AM        Failed - Serotonin Agonist request needs review.     Please review patient's record. If patient has had 8 or more treatments in the past month, please forward to provider.          Passed - Blood pressure under 140/90 in past 12 months     BP Readings from Last 3 Encounters:   02/18/20 120/80   10/16/19 124/74   09/27/19 122/80                 Passed - Recent (12 mo) or future (30 days) visit within the authorizing provider's specialty     Patient has had an office visit with the authorizing provider or a provider within the authorizing providers department within the previous 12 mos or has a future within next 30 days. See \"Patient Info\" tab in inbasket, or \"Choose Columns\" in Meds & Orders section of the refill encounter.              Passed - Medication is active on med list        Passed - Patient is age 18 or older        Passed - No active pregnancy on record        Passed - No positive pregnancy test in past 12 months           Last Written Prescription Date:  12/21/2018  Last Fill Quantity: 3,   # refills: 1  Last Office Visit: 02/18/2020  Future Office visit:  None noted.  Unable to complete prescription refill per RN medication refill policy. Will route to provider for review and consideration.               "

## 2020-06-18 DIAGNOSIS — R21 RASH: ICD-10-CM

## 2020-06-18 RX ORDER — TRIAMCINOLONE ACETONIDE 1 MG/G
CREAM TOPICAL
Qty: 30 G | Refills: 1 | Status: SHIPPED | OUTPATIENT
Start: 2020-06-18 | End: 2020-08-18

## 2020-07-20 ENCOUNTER — MYC MEDICAL ADVICE (OUTPATIENT)
Dept: FAMILY MEDICINE | Facility: OTHER | Age: 60
End: 2020-07-20

## 2020-08-17 ENCOUNTER — TELEPHONE (OUTPATIENT)
Dept: FAMILY MEDICINE | Facility: OTHER | Age: 60
End: 2020-08-17

## 2020-08-17 DIAGNOSIS — R73.03 PREDIABETES: ICD-10-CM

## 2020-08-17 DIAGNOSIS — I10 ESSENTIAL HYPERTENSION: Primary | ICD-10-CM

## 2020-08-17 NOTE — TELEPHONE ENCOUNTER
Patient would like to get labs done before her PX appointment 08/18/2020. She needs orders placed for this. Deisy Blackburn on 8/17/2020 at 10:03 AM

## 2020-08-18 ENCOUNTER — HOSPITAL ENCOUNTER (OUTPATIENT)
Dept: MAMMOGRAPHY | Facility: OTHER | Age: 60
End: 2020-08-18
Attending: FAMILY MEDICINE
Payer: COMMERCIAL

## 2020-08-18 ENCOUNTER — OFFICE VISIT (OUTPATIENT)
Dept: FAMILY MEDICINE | Facility: OTHER | Age: 60
End: 2020-08-18
Attending: FAMILY MEDICINE
Payer: COMMERCIAL

## 2020-08-18 VITALS
WEIGHT: 194 LBS | TEMPERATURE: 99.9 F | DIASTOLIC BLOOD PRESSURE: 80 MMHG | HEIGHT: 66 IN | BODY MASS INDEX: 31.18 KG/M2 | RESPIRATION RATE: 20 BRPM | SYSTOLIC BLOOD PRESSURE: 112 MMHG | HEART RATE: 96 BPM

## 2020-08-18 DIAGNOSIS — R73.03 PREDIABETES: ICD-10-CM

## 2020-08-18 DIAGNOSIS — E66.09 CLASS 1 OBESITY DUE TO EXCESS CALORIES WITH SERIOUS COMORBIDITY AND BODY MASS INDEX (BMI) OF 31.0 TO 31.9 IN ADULT: ICD-10-CM

## 2020-08-18 DIAGNOSIS — J45.40 MODERATE PERSISTENT ASTHMA WITHOUT COMPLICATION: ICD-10-CM

## 2020-08-18 DIAGNOSIS — Z00.00 PHYSICAL EXAM, ANNUAL: Primary | ICD-10-CM

## 2020-08-18 DIAGNOSIS — F33.40 RECURRENT MAJOR DEPRESSION IN REMISSION (H): ICD-10-CM

## 2020-08-18 DIAGNOSIS — M17.12 PRIMARY OSTEOARTHRITIS OF LEFT KNEE: ICD-10-CM

## 2020-08-18 DIAGNOSIS — I10 ESSENTIAL HYPERTENSION: ICD-10-CM

## 2020-08-18 DIAGNOSIS — G47.00 INSOMNIA, PERSISTENT: ICD-10-CM

## 2020-08-18 DIAGNOSIS — J30.9 ALLERGIC RHINITIS, UNSPECIFIED SEASONALITY, UNSPECIFIED TRIGGER: ICD-10-CM

## 2020-08-18 DIAGNOSIS — L98.8 SKIN MACULE: ICD-10-CM

## 2020-08-18 DIAGNOSIS — R21 RASH: ICD-10-CM

## 2020-08-18 DIAGNOSIS — E66.811 CLASS 1 OBESITY DUE TO EXCESS CALORIES WITH SERIOUS COMORBIDITY AND BODY MASS INDEX (BMI) OF 31.0 TO 31.9 IN ADULT: ICD-10-CM

## 2020-08-18 DIAGNOSIS — Z12.31 VISIT FOR SCREENING MAMMOGRAM: ICD-10-CM

## 2020-08-18 LAB
ALBUMIN SERPL-MCNC: 4.3 G/DL (ref 3.5–5.7)
ALBUMIN UR-MCNC: NEGATIVE MG/DL
ALP SERPL-CCNC: 77 U/L (ref 34–104)
ALT SERPL W P-5'-P-CCNC: 13 U/L (ref 7–52)
ANION GAP SERPL CALCULATED.3IONS-SCNC: 8 MMOL/L (ref 3–14)
APPEARANCE UR: CLEAR
AST SERPL W P-5'-P-CCNC: 14 U/L (ref 13–39)
BILIRUB SERPL-MCNC: 0.6 MG/DL (ref 0.3–1)
BILIRUB UR QL STRIP: NEGATIVE
BUN SERPL-MCNC: 14 MG/DL (ref 7–25)
CALCIUM SERPL-MCNC: 9.9 MG/DL (ref 8.6–10.3)
CHLORIDE SERPL-SCNC: 103 MMOL/L (ref 98–107)
CHOLEST SERPL-MCNC: 223 MG/DL
CO2 SERPL-SCNC: 29 MMOL/L (ref 21–31)
COLOR UR AUTO: YELLOW
CREAT SERPL-MCNC: 0.91 MG/DL (ref 0.6–1.2)
CRP SERPL-MCNC: 0.9 MG/L
GFR SERPL CREATININE-BSD FRML MDRD: 63 ML/MIN/{1.73_M2}
GLUCOSE SERPL-MCNC: 108 MG/DL (ref 70–105)
GLUCOSE UR STRIP-MCNC: NEGATIVE MG/DL
HBA1C MFR BLD: 5.7 % (ref 4–6)
HDLC SERPL-MCNC: 65 MG/DL (ref 23–92)
HGB UR QL STRIP: NEGATIVE
KETONES UR STRIP-MCNC: NEGATIVE MG/DL
LDLC SERPL CALC-MCNC: 131 MG/DL
LEUKOCYTE ESTERASE UR QL STRIP: NEGATIVE
NITRATE UR QL: NEGATIVE
NONHDLC SERPL-MCNC: 158 MG/DL
PH UR STRIP: 7 PH (ref 5–7)
POTASSIUM SERPL-SCNC: 3.7 MMOL/L (ref 3.5–5.1)
PROT SERPL-MCNC: 7.4 G/DL (ref 6.4–8.9)
SODIUM SERPL-SCNC: 140 MMOL/L (ref 134–144)
SOURCE: NORMAL
SP GR UR STRIP: 1.02 (ref 1–1.03)
TRIGL SERPL-MCNC: 134 MG/DL
TSH SERPL DL<=0.05 MIU/L-ACNC: 1.97 IU/ML (ref 0.34–5.6)
UROBILINOGEN UR STRIP-MCNC: NORMAL MG/DL (ref 0–2)

## 2020-08-18 PROCEDURE — 36415 COLL VENOUS BLD VENIPUNCTURE: CPT | Mod: ZL | Performed by: FAMILY MEDICINE

## 2020-08-18 PROCEDURE — 99213 OFFICE O/P EST LOW 20 MIN: CPT | Mod: 25 | Performed by: FAMILY MEDICINE

## 2020-08-18 PROCEDURE — 80307 DRUG TEST PRSMV CHEM ANLYZR: CPT | Mod: ZL | Performed by: FAMILY MEDICINE

## 2020-08-18 PROCEDURE — 77067 SCR MAMMO BI INCL CAD: CPT

## 2020-08-18 PROCEDURE — 84443 ASSAY THYROID STIM HORMONE: CPT | Mod: ZL | Performed by: FAMILY MEDICINE

## 2020-08-18 PROCEDURE — 81003 URINALYSIS AUTO W/O SCOPE: CPT | Mod: ZL | Performed by: FAMILY MEDICINE

## 2020-08-18 PROCEDURE — 86140 C-REACTIVE PROTEIN: CPT | Mod: ZL | Performed by: FAMILY MEDICINE

## 2020-08-18 PROCEDURE — 99396 PREV VISIT EST AGE 40-64: CPT | Performed by: FAMILY MEDICINE

## 2020-08-18 PROCEDURE — 80053 COMPREHEN METABOLIC PANEL: CPT | Mod: ZL | Performed by: FAMILY MEDICINE

## 2020-08-18 PROCEDURE — 83036 HEMOGLOBIN GLYCOSYLATED A1C: CPT | Mod: ZL | Performed by: FAMILY MEDICINE

## 2020-08-18 PROCEDURE — 80061 LIPID PANEL: CPT | Mod: ZL | Performed by: FAMILY MEDICINE

## 2020-08-18 RX ORDER — LOSARTAN POTASSIUM 100 MG/1
TABLET ORAL
COMMUNITY
Start: 2020-07-02 | End: 2020-08-18

## 2020-08-18 RX ORDER — MONTELUKAST SODIUM 10 MG/1
1 TABLET ORAL AT BEDTIME
Qty: 90 TABLET | Refills: 3 | Status: SHIPPED | OUTPATIENT
Start: 2020-08-18 | End: 2021-03-29

## 2020-08-18 RX ORDER — POTASSIUM CHLORIDE 750 MG/1
20 TABLET, EXTENDED RELEASE ORAL
Qty: 180 TABLET | Refills: 3 | Status: SHIPPED | OUTPATIENT
Start: 2020-08-18 | End: 2021-03-29

## 2020-08-18 RX ORDER — BUPROPION HYDROCHLORIDE 150 MG/1
150 TABLET ORAL EVERY MORNING
Qty: 90 TABLET | Refills: 3 | Status: SHIPPED | OUTPATIENT
Start: 2020-08-18 | End: 2021-03-29

## 2020-08-18 RX ORDER — FLUTICASONE PROPIONATE 50 MCG
2 SPRAY, SUSPENSION (ML) NASAL DAILY
Qty: 48 G | Refills: 3 | Status: SHIPPED | OUTPATIENT
Start: 2020-08-18 | End: 2021-03-29

## 2020-08-18 RX ORDER — HYDROCHLOROTHIAZIDE 12.5 MG/1
12.5 TABLET ORAL DAILY
Qty: 90 TABLET | Refills: 3 | Status: SHIPPED | OUTPATIENT
Start: 2020-08-18 | End: 2021-09-05

## 2020-08-18 RX ORDER — LOSARTAN POTASSIUM 100 MG/1
100 TABLET ORAL DAILY
Qty: 90 TABLET | Refills: 3 | Status: SHIPPED | OUTPATIENT
Start: 2020-08-18 | End: 2021-03-29

## 2020-08-18 RX ORDER — HYDROCHLOROTHIAZIDE 25 MG/1
25 TABLET ORAL DAILY
Qty: 90 TABLET | Refills: 3 | Status: SHIPPED | OUTPATIENT
Start: 2020-08-18 | End: 2021-09-05

## 2020-08-18 RX ORDER — ZOLPIDEM TARTRATE 10 MG/1
10 TABLET ORAL AT BEDTIME
Qty: 30 TABLET | Refills: 5 | Status: SHIPPED | OUTPATIENT
Start: 2020-08-18 | End: 2021-03-29

## 2020-08-18 RX ORDER — PHENTERMINE HYDROCHLORIDE 15 MG/1
15 CAPSULE ORAL EVERY MORNING
Qty: 90 CAPSULE | Refills: 0 | Status: SHIPPED | OUTPATIENT
Start: 2020-08-18 | End: 2021-03-29

## 2020-08-18 RX ORDER — ALBUTEROL SULFATE 90 UG/1
2 AEROSOL, METERED RESPIRATORY (INHALATION) EVERY 4 HOURS PRN
Qty: 3 INHALER | Refills: 3 | Status: SHIPPED | OUTPATIENT
Start: 2020-08-18 | End: 2021-10-09

## 2020-08-18 RX ORDER — HYDROCHLOROTHIAZIDE 25 MG/1
TABLET ORAL
COMMUNITY
Start: 2020-07-02 | End: 2020-08-18

## 2020-08-18 RX ORDER — MULTIPLE VITAMINS W/ MINERALS TAB 9MG-400MCG
1 TAB ORAL
COMMUNITY
End: 2023-04-19

## 2020-08-18 RX ORDER — TRIAMCINOLONE ACETONIDE 1 MG/G
CREAM TOPICAL
Qty: 30 G | Refills: 1 | Status: SHIPPED | OUTPATIENT
Start: 2020-08-18 | End: 2021-11-27

## 2020-08-18 ASSESSMENT — PATIENT HEALTH QUESTIONNAIRE - PHQ9: SUM OF ALL RESPONSES TO PHQ QUESTIONS 1-9: 0

## 2020-08-18 ASSESSMENT — PAIN SCALES - GENERAL: PAINLEVEL: NO PAIN (0)

## 2020-08-18 ASSESSMENT — MIFFLIN-ST. JEOR: SCORE: 1471.73

## 2020-08-18 NOTE — PATIENT INSTRUCTIONS
Results for orders placed or performed in visit on 08/18/20   TSH     Status: None   Result Value Ref Range    Thyrotropin 1.97 0.34 - 5.60 IU/mL   Lipid Panel     Status: Abnormal   Result Value Ref Range    Cholesterol 223 (H) <200 mg/dL    Triglycerides 134 <150 mg/dL    HDL Cholesterol 65 23 - 92 mg/dL    LDL Cholesterol Calculated 131 (H) <100 mg/dL    Non HDL Cholesterol 158 (H) <130 mg/dL   Comprehensive metabolic panel     Status: Abnormal   Result Value Ref Range    Sodium 140 134 - 144 mmol/L    Potassium 3.7 3.5 - 5.1 mmol/L    Chloride 103 98 - 107 mmol/L    Carbon Dioxide 29 21 - 31 mmol/L    Anion Gap 8 3 - 14 mmol/L    Glucose 108 (H) 70 - 105 mg/dL    Urea Nitrogen 14 7 - 25 mg/dL    Creatinine 0.91 0.60 - 1.20 mg/dL    GFR Estimate 63 >60 mL/min/[1.73_m2]    GFR Estimate If Black 77 >60 mL/min/[1.73_m2]    Calcium 9.9 8.6 - 10.3 mg/dL    Bilirubin Total 0.6 0.3 - 1.0 mg/dL    Albumin 4.3 3.5 - 5.7 g/dL    Protein Total 7.4 6.4 - 8.9 g/dL    Alkaline Phosphatase 77 34 - 104 U/L    ALT 13 7 - 52 U/L    AST 14 13 - 39 U/L   Hemoglobin A1c     Status: None   Result Value Ref Range    Hemoglobin A1C 5.7 4.0 - 6.0 %   UA reflex to Microscopic and Culture     Status: None    Specimen: Midstream Urine   Result Value Ref Range    Color Urine Yellow     Appearance Urine Clear     Glucose Urine Negative NEG^Negative mg/dL    Bilirubin Urine Negative NEG^Negative    Ketones Urine Negative NEG^Negative mg/dL    Specific Gravity Urine 1.022 1.003 - 1.035    Blood Urine Negative NEG^Negative    pH Urine 7.0 5.0 - 7.0 pH    Protein Albumin Urine Negative NEG^Negative mg/dL    Urobilinogen mg/dL Normal 0.0 - 2.0 mg/dL    Nitrite Urine Negative NEG^Negative    Leukocyte Esterase Urine Negative NEG^Negative    Source Midstream Urine      Why We Sleep - Stephen Bustamante     After done camping, change ambien to a half tablet    Phentermine in the morning.      Dermatology appointment     .

## 2020-08-18 NOTE — PROGRESS NOTES
SUBJECTIVE:  Nursing Notes:   Haylee Estevez LPN  8/18/2020  3:42 PM  Signed  Patient presents to the clinic today for a px.   Med rec complete.  Haylee Herb MENDEZ.................. 8/18/2020 3:28 PM       Yolie Bedoya is a 59 year old female who presents for her annual exam.    HPI: Yolie Bedoya is a 59 year old female presents for her annual exam.  Last pap: hysterectomy   Immunizations:  Discussed flu this fall   Mammogram today  Colon cancer screening 2014    Since March she's been doing NOOM.  She had lost about 20# with this.    With this, she was looking at portions and doing more consistent walking.  She thinks about food frequently.  Through the day she feels like she is planning for the next meal.  With NOOM, she is logging in how much/what she is eating.      Left LKA last year.  She has night pain most nights. With activity, she will get some swelling. With this her knee will feel stiff, but not unstable.  No redness.  She is 11 months out with this.  The mechanics of the knee works well.  She had two follow ups since surgery - the additional was follow up was due to ROM.      Lab Results   Component Value Date    A1C 5.7 08/18/2020    A1C 5.4 02/18/2020    A1C 5.7 09/27/2019    A1C 5.6 06/19/2018     She has been on ambien for years for insomnia.  If she is upset, she will still wake up at night.  She takes this right before going to bed and is asleep within half an hour.  She doesn't go a night without taking it now.      Lab Results   Component Value Date    CHOL 262 08/12/2019     Lab Results   Component Value Date    HDL 76 08/12/2019     Lab Results   Component Value Date     08/12/2019     Lab Results   Component Value Date    TRIG 196 08/12/2019     No results found for: CHOLHDLRATIO    PROBLEM LIST:  Patient Active Problem List   Diagnosis     Allergic rhinitis     Backache     Family history of malignant neoplasm of breast     Controlled substance agreement signed      Diverticular disease of colon     Endocrine disorder     Hypokalemia     Impaired fasting glucose     Insomnia, persistent     Edema     Melanocytic nevus of face     Meniere's disease     Migraine headache     Obesity     Recurrent major depression in remission (H)     Stress incontinence in female     Moderate persistent asthma without complication     Status post total left knee replacement     PAST MEDICAL HISTORY:  Past Medical History:   Diagnosis Date     Allergic rhinitis due to animal hair and dander     1970,aspen, birch, maple, oak, grass, dust mite, ragwee, cocklebur, mugwart, lambs quarter, pigweed, fungus, molds     Calculus of gallbladder without cholecystitis without obstruction     No Comments Provided     Essential (primary) hypertension     No Comments Provided     Excessive and frequent menstruation with regular cycle     s/p CLARA     Hormone replacement therapy (postmenopausal)     2010,started ERT     Obesity     No Comments Provided     Other long term (current) drug therapy     2/11/2014,ambien #30/month     Other specified postprocedural states     nausea and severe vomiting with narcotics     Psychophysiologic insomnia     contract signed 2/2014     Tubulo-interstitial nephritis     No Comments Provided     Venous insufficiency (chronic) (peripheral)     No Comments Provided     SURGICAL HISTORY:  Past Surgical History:   Procedure Laterality Date     ARTHROSCOPY KNEE  10/12/2017    10/12/2017     BLADDER SURGERY  10/25/2017     SLING OPER STRES INCONTINENCE     COLONOSCOPY  06/2004     because of change in bowel habits     COLONOSCOPY  05/14/2014 5/14/2014,Extensive diverticulosis throughout the colon - follow up 10 years     EXTRACTION(S) DENTAL      No Comments Provided     HYSTERECTOMY TOTAL ABDOMINAL, BILATERAL SALPINGO-OOPHORECTOMY, COMBINED  03/30/2010    CLARA/BSO/Vasquez/Posterior Repair     LAPAROSCOPIC ABLATION ENDOMETRIOSIS      3/05     left total knee arthroplasty Left 10/2019     Dr. Jones.  [place]       SOCIAL HISTORY:  Social History     Socioeconomic History     Marital status:      Spouse name: Carter     Number of children: 2     Years of education: 12+     Highest education level: Not on file   Occupational History     Not on file   Social Needs     Financial resource strain: Not on file     Food insecurity     Worry: Not on file     Inability: Not on file     Transportation needs     Medical: Not on file     Non-medical: Not on file   Tobacco Use     Smoking status: Never Smoker     Smokeless tobacco: Never Used   Substance and Sexual Activity     Alcohol use: Yes     Comment: Alcoholic Drinks/day: occ     Drug use: No     Comment: Drug use: No     Sexual activity: Yes     Partners: Male   Lifestyle     Physical activity     Days per week: Not on file     Minutes per session: Not on file     Stress: Not on file   Relationships     Social connections     Talks on phone: Not on file     Gets together: Not on file     Attends Buddhist service: Not on file     Active member of club or organization: Not on file     Attends meetings of clubs or organizations: Not on file     Relationship status: Not on file     Intimate partner violence     Fear of current or ex partner: Not on file     Emotionally abused: Not on file     Physically abused: Not on file     Forced sexual activity: Not on file   Other Topics Concern     Not on file   Social History Narrative    Patient is .  She is retired.    Two adult sons. Shashi and     FAMILY HISTORY:  Family History   Problem Relation Age of Onset     Breast Cancer Mother         Cancer-breast     Lung Cancer Mother         Cancer,lung and uterine cancer;       Uterine Cancer Mother      Breast Cancer Maternal Grandmother         Cancer-breast     Cervical Cancer Sister 28        endometrial or cervical cancer,     Allergy (Severe) Sister         Allergies, allergies/asthma     Other - See Comments Sister          lupus      Heart Disease Brother         Heart Disease,2 heart surgeries B 1965     CURRENT MEDICATIONS:   Current Outpatient Medications   Medication Sig Dispense Refill     albuterol (PROAIR HFA/PROVENTIL HFA/VENTOLIN HFA) 108 (90 Base) MCG/ACT inhaler Inhale 2 puffs into the lungs every 4 hours as needed 3 Inhaler 3     buPROPion (WELLBUTRIN XL) 150 MG 24 hr tablet Take 1 tablet (150 mg) by mouth every morning 90 tablet 3     calcium polycarbophil (FIBERCON) 625 MG tablet Take 1 tablet by mouth daily       cetirizine HCl 10 MG CAPS Take 1 tablet by mouth daily       Cholecalciferol (VITAMIN D3) 1000 UNITS CAPS Take 1,000 Units by mouth daily       diphenhydrAMINE (BENADRYL) 25 MG capsule Take 2 capsules by mouth nightly as needed       fluticasone (FLONASE) 50 MCG/ACT nasal spray Spray 2 sprays into both nostrils daily 48 g 3     fluticasone-salmeterol (ADVAIR DISKUS) 250-50 MCG/DOSE inhaler INHALE 1 PUFF INTO THE LUNGS EVERY 12 HOURS 3 Inhaler 3     hydrochlorothiazide (HYDRODIURIL) 12.5 MG tablet Take 1 tablet (12.5 mg) by mouth daily 90 tablet 3     hydrochlorothiazide (HYDRODIURIL) 25 MG tablet Take 1 tablet (25 mg) by mouth daily 90 tablet 3     losartan (COZAAR) 100 MG tablet Take 1 tablet (100 mg) by mouth daily 90 tablet 3     montelukast (SINGULAIR) 10 MG tablet Take 1 tablet (10 mg) by mouth At Bedtime 90 tablet 3     multivitamin w/minerals (MULTI-VITAMIN) tablet Take 1 tablet by mouth       phentermine (ADIPEX-P) 15 MG capsule Take 1 capsule (15 mg) by mouth every morning 90 capsule 0     potassium chloride ER (K-TAB/KLOR-CON) 10 MEQ CR tablet Take 2 tablets (20 mEq) by mouth daily with food 180 tablet 3     SUMAtriptan (IMITREX) 20 MG/ACT nasal spray INHALE 1 SPRAY IN THE NOSTRIL(S) EVERY 2 HOURS IF NEEDED FOR MIGRAINE. 18 each 2     triamcinolone (KENALOG) 0.1 % external cream Apply sparingly to affected area three times daily as needed 30 g 1     zolpidem (AMBIEN) 10 MG tablet Take 1 tablet (10 mg) by mouth  "At Bedtime 30 tablet 5     ALLERGIES:  Doxycycline; Metolazone; No clinical screening - see comments; Amoxicillin-pot clavulanate; and Sulfa drugs     REVIEW OF SYSTEMS:  General: denies any general problems.  Eyes: denies problems  Ears/Nose/Throat: last dentist visit  - was due in March    Cardiovascular: denies problems  Respiratory: denies problems  Gastrointestinal: denies problems; colonoscopy is up to date    Genitourinary: denies problems  Musculoskeletal: left knee replacement last year  - see above; some left achilles zingers   Skin: Previously frozen macule on her chin has recurred  Neurologic: denies problems  Psychiatric: denies problems  Endocrine: denies problems  Heme/Lymphatic: denies problems  Allergic/Immunologic: symptoms are controlled     PHQ-2 Score:     PHQ-2 ( 1999 Pf[employer]) 10/16/2019 9/6/2019   Q1: Little interest or pleasure in doing things (No Data) 0   Q2: Feeling down, depressed or hopeless - 0   PHQ-2 Score - 0       No flowsheet data found.      PHQ-9 SCORE 8/12/2019 2/18/2020 8/18/2020   PHQ-9 Total Score 3 4 0       OBJECTIVE:  /80   Pulse 96   Temp 99.9  F (37.7  C) (Tympanic)   Resp 20   Ht 1.676 m (5' 6\")   Wt 88 kg (194 lb)   Breastfeeding No   BMI 31.31 kg/m     EXAM:   General Appearance: Pleasant, alert, appropriate appearance for age. No acute distress  Head Exam: Normal. Normocephalic, atraumatic.  Eye Exam:  Normal external eye, conjunctiva,lids, cornea. ANT.  Ear Exam: Normal TM's bilaterally. Normal auditory canals and external ears. Non-tender.  Neck Exam:  Supple, no masses or nodes.  Thyroid Exam: No nodules or enlargement.  Chest/Respiratory Exam: Normal chest wall and respirations. Clear to auscultation.  Cardiovascular Exam: Regular rate and rhythm. S1, S2, no murmur, click, gallop, or rubs.  Gastrointestinal Exam: Soft, non-tender, nomasses or organomegaly.  Lymphatic Exam: tr- 1+ :E dary,a  Musculoskeletal Exam: Left knee surgical scar is well-healed. "  She has some medial joint line tenderness.  With stretching of the medial compartment, this increases pain.  Foot Exam: Left and right foot: good pedal pulses, no lesions, nail hygiene good.  Skin: Raised flesh-colored macule on the underside of her chin  Neurologic Exam: Nonfocal, symmetric DTRs, normal gross motor, tone coordination and no tremor.  Psychiatric Exam: Alert and oriented - appropriate affect.  Results for orders placed or performed during the hospital encounter of 08/18/20   MA Screen Bilateral w/Librado     Status: None    Narrative    EXAM: MA SCREENING BILATERAL W/ LIBRADO, 8/18/2020 3:29 PM    COMPARISONS: 8/5/2019 through 6/1/2015    HISTORY:59 years  Female  YES  Visit for screening mammogram    FINDINGS: No suspicious mass or microcalcification is seen in either  breast.    BREAST DENSITY: Heterogeneously dense.      Impression    IMPRESSION: BI-RADS CATEGORY: 1 -  Negative.    RECOMMENDED FOLLOW-UP: Annual Mammography.      JAVI GUZMAN MD   Results for orders placed or performed in visit on 08/18/20   CRP inflammation     Status: Abnormal   Result Value Ref Range    CRP Inflammation 0.9 (H) <0.5 mg/L   Results for orders placed or performed in visit on 08/18/20   TSH     Status: None   Result Value Ref Range    Thyrotropin 1.97 0.34 - 5.60 IU/mL   Lipid Panel     Status: Abnormal   Result Value Ref Range    Cholesterol 223 (H) <200 mg/dL    Triglycerides 134 <150 mg/dL    HDL Cholesterol 65 23 - 92 mg/dL    LDL Cholesterol Calculated 131 (H) <100 mg/dL    Non HDL Cholesterol 158 (H) <130 mg/dL   Comprehensive metabolic panel     Status: Abnormal   Result Value Ref Range    Sodium 140 134 - 144 mmol/L    Potassium 3.7 3.5 - 5.1 mmol/L    Chloride 103 98 - 107 mmol/L    Carbon Dioxide 29 21 - 31 mmol/L    Anion Gap 8 3 - 14 mmol/L    Glucose 108 (H) 70 - 105 mg/dL    Urea Nitrogen 14 7 - 25 mg/dL    Creatinine 0.91 0.60 - 1.20 mg/dL    GFR Estimate 63 >60 mL/min/[1.73_m2]    GFR Estimate If  Black 77 >60 mL/min/[1.73_m2]    Calcium 9.9 8.6 - 10.3 mg/dL    Bilirubin Total 0.6 0.3 - 1.0 mg/dL    Albumin 4.3 3.5 - 5.7 g/dL    Protein Total 7.4 6.4 - 8.9 g/dL    Alkaline Phosphatase 77 34 - 104 U/L    ALT 13 7 - 52 U/L    AST 14 13 - 39 U/L   Hemoglobin A1c     Status: None   Result Value Ref Range    Hemoglobin A1C 5.7 4.0 - 6.0 %   UA reflex to Microscopic and Culture     Status: None    Specimen: Midstream Urine   Result Value Ref Range    Color Urine Yellow     Appearance Urine Clear     Glucose Urine Negative NEG^Negative mg/dL    Bilirubin Urine Negative NEG^Negative    Ketones Urine Negative NEG^Negative mg/dL    Specific Gravity Urine 1.022 1.003 - 1.035    Blood Urine Negative NEG^Negative    pH Urine 7.0 5.0 - 7.0 pH    Protein Albumin Urine Negative NEG^Negative mg/dL    Urobilinogen mg/dL Normal 0.0 - 2.0 mg/dL    Nitrite Urine Negative NEG^Negative    Leukocyte Esterase Urine Negative NEG^Negative    Source Midstream Urine          ASSESSMENT/PLAN    ICD-10-CM    1. Physical exam, annual  Z00.00    2. Essential hypertension  I10 potassium chloride ER (K-TAB/KLOR-CON) 10 MEQ CR tablet     hydrochlorothiazide (HYDRODIURIL) 25 MG tablet     losartan (COZAAR) 100 MG tablet   3. Prediabetes  R73.03 phentermine (ADIPEX-P) 15 MG capsule   4. Moderate persistent asthma without complication  J45.40    5. Insomnia, persistent  G47.00 zolpidem (AMBIEN) 10 MG tablet   6. Allergic rhinitis, unspecified seasonality, unspecified trigger  J30.9 fluticasone (FLONASE) 50 MCG/ACT nasal spray   7. Recurrent major depression in remission (H)  F33.40 buPROPion (WELLBUTRIN XL) 150 MG 24 hr tablet   8. Rash  R21 triamcinolone (KENALOG) 0.1 % external cream   9. Primary osteoarthritis of left knee  M17.12 CRP inflammation     CRP inflammation   10. Skin macule  L98.8 DERMATOLOGY REFERRAL   11. Class 1 obesity due to excess calories with serious comorbidity and body mass index (BMI) of 31.0 to 31.9 in adult  E66.09      "Z68.31        PLAN:  1.  I have personally reviewed the labs listed above.  2.  With mildly elevated CRP and ongoing knee symptoms, I recommend that she follow-up with orthopedics.  3.  Referral to dermatology to discuss skin lesion removal.  4.  Continue same treatment at this time for hypertension.  5.  Asthma currently under adequate control, continue same care plan.  Flu vaccine this fall recommended.  6.  Chronic depression, overall well controlled.  Continue with Wellbutrin  mg daily.  7.   is reviewed.  We discussed risks with long-term use of sleep medication.  In particular, we discussed dependence on medication and overall decreased sleep quality although sleep quantity may seem to be improved.  Discussed increased association with diabetes, Alzheimer's, obesity with poor sleep quality.  I did encourage her to read \"Why We Sleep\".  Charting in September, I recommend that she decrease Ambien to 5 mg a day and an additional 3 months, decreased to 2.5 mg a day.  8.  Patient had previously contacted the clinic regarding weight loss medication.  We discussed the limitations of this medication versus typical use.  Prescription x3 months for phentermine 15 mg every morning is given.  In particular though, if this should worsen her sleep, it should be discontinued.  Also, she has headaches, racing heart/palpitations, it should be discontinued.  Discussed risk of rebound weight gain upon discontinuation.    Follow-up in 3 months.  DAVID RAE MD     "

## 2020-08-18 NOTE — NURSING NOTE
Patient presents to the clinic today for a px.   Med rec complete.  Haylee Estevez LPN.................. 8/18/2020 3:28 PM

## 2020-08-19 ASSESSMENT — ASTHMA QUESTIONNAIRES: ACT_TOTALSCORE: 23

## 2020-08-23 LAB — PAIN DRUG SCR UR W RPTD MEDS: NORMAL

## 2020-09-17 ENCOUNTER — HOSPITAL ENCOUNTER (OUTPATIENT)
Dept: GENERAL RADIOLOGY | Facility: OTHER | Age: 60
Discharge: HOME | End: 2020-09-17
Attending: NURSE PRACTITIONER | Admitting: FAMILY MEDICINE
Payer: COMMERCIAL

## 2020-09-17 DIAGNOSIS — M46.1 SI JOINT ARTHRITIS (H): ICD-10-CM

## 2020-09-17 PROCEDURE — 25500064 ZZH RX 255 OP 636: Performed by: RADIOLOGY

## 2020-09-17 PROCEDURE — 25000128 H RX IP 250 OP 636: Performed by: RADIOLOGY

## 2020-09-17 PROCEDURE — 27096 INJECT SACROILIAC JOINT: CPT | Mod: LT

## 2020-09-17 PROCEDURE — 25000125 ZZHC RX 250: Performed by: RADIOLOGY

## 2020-09-17 RX ORDER — LIDOCAINE HYDROCHLORIDE 10 MG/ML
10 INJECTION, SOLUTION INFILTRATION; PERINEURAL ONCE
Status: COMPLETED | OUTPATIENT
Start: 2020-09-17 | End: 2020-09-17

## 2020-09-17 RX ORDER — BUPIVACAINE HYDROCHLORIDE 5 MG/ML
3 INJECTION, SOLUTION EPIDURAL; INTRACAUDAL ONCE
Status: COMPLETED | OUTPATIENT
Start: 2020-09-17 | End: 2020-09-17

## 2020-09-17 RX ORDER — TRIAMCINOLONE ACETONIDE 40 MG/ML
40 INJECTION, SUSPENSION INTRA-ARTICULAR; INTRAMUSCULAR ONCE
Status: COMPLETED | OUTPATIENT
Start: 2020-09-17 | End: 2020-09-17

## 2020-09-17 RX ADMIN — LIDOCAINE HYDROCHLORIDE 2 ML: 10 INJECTION, SOLUTION INFILTRATION; PERINEURAL at 14:38

## 2020-09-17 RX ADMIN — BUPIVACAINE HYDROCHLORIDE 3 ML: 5 INJECTION, SOLUTION EPIDURAL; INTRACAUDAL at 14:38

## 2020-09-17 RX ADMIN — TRIAMCINOLONE ACETONIDE 40 MG: 40 INJECTION, SUSPENSION INTRA-ARTICULAR; INTRAMUSCULAR at 14:39

## 2020-09-17 RX ADMIN — IOHEXOL 2 ML: 240 INJECTION, SOLUTION INTRATHECAL; INTRAVASCULAR; INTRAVENOUS; ORAL at 14:38

## 2020-10-02 ENCOUNTER — E-VISIT (OUTPATIENT)
Dept: FAMILY MEDICINE | Facility: OTHER | Age: 60
End: 2020-10-02
Payer: COMMERCIAL

## 2020-10-02 DIAGNOSIS — N93.9 VAGINAL BLEEDING: Primary | ICD-10-CM

## 2020-10-06 ENCOUNTER — OFFICE VISIT (OUTPATIENT)
Dept: OBGYN | Facility: OTHER | Age: 60
End: 2020-10-06
Attending: FAMILY MEDICINE
Payer: COMMERCIAL

## 2020-10-06 VITALS
HEART RATE: 88 BPM | BODY MASS INDEX: 31.31 KG/M2 | SYSTOLIC BLOOD PRESSURE: 122 MMHG | WEIGHT: 194 LBS | DIASTOLIC BLOOD PRESSURE: 70 MMHG

## 2020-10-06 DIAGNOSIS — N93.9 VAGINAL BLEEDING: ICD-10-CM

## 2020-10-06 PROCEDURE — 99202 OFFICE O/P NEW SF 15 MIN: CPT | Performed by: OBSTETRICS & GYNECOLOGY

## 2020-10-06 ASSESSMENT — PAIN SCALES - GENERAL: PAINLEVEL: NO PAIN (0)

## 2020-10-06 NOTE — PROGRESS NOTES
CC: 1 episode of vaginal spotting  HPI:  Yolie is a 59 year old female who presents for evaluation of an episode of vaginal bleeding last week.  It was bright red and light on 2 different occasions a day apart.  She has history of abdominal hysterectomy with Vasquez procedure remotely and more recently 3 years ago had a mid urethral sling placed.  Her urinary symptoms are much improved.  She noted she awoke from sleep the night before the bleeding with discomfort in the low anterior pelvis.  She has history of renal infections but currently no dysuria or flank pain.  She had sex the week prior to the occurrence of the bleeding.    No LMP recorded. Patient has had a hysterectomy.    OB History   No obstetric history on file.     Past Medical History:   Diagnosis Date     Allergic rhinitis due to animal hair and dander     1970,aspen, birch, maple, oak, grass, dust mite, ragwee, cocklebur, mugwart, lambs quarter, pigweed, fungus, molds     Calculus of gallbladder without cholecystitis without obstruction     No Comments Provided     Essential (primary) hypertension     No Comments Provided     Excessive and frequent menstruation with regular cycle     s/p CLARA     Hormone replacement therapy (postmenopausal)     2010,started ERT     Obesity     No Comments Provided     Other long term (current) drug therapy     2/11/2014,ambien #30/month     Other specified postprocedural states     nausea and severe vomiting with narcotics     Psychophysiologic insomnia     contract signed 2/2014     Tubulo-interstitial nephritis     No Comments Provided     Venous insufficiency (chronic) (peripheral)     No Comments Provided     Past Surgical History:   Procedure Laterality Date     ARTHROSCOPY KNEE  10/12/2017    10/12/2017     BLADDER SURGERY  10/25/2017     SLING OPER STRES INCONTINENCE     COLONOSCOPY  06/2004     because of change in bowel habits     COLONOSCOPY  05/14/2014 5/14/2014,Extensive diverticulosis throughout the  colon - follow up 10 years     EXTRACTION(S) DENTAL      No Comments Provided     HYSTERECTOMY TOTAL ABDOMINAL, BILATERAL SALPINGO-OOPHORECTOMY, COMBINED  2010    CLARA/BSO/Vasquez/Posterior Repair     LAPAROSCOPIC ABLATION ENDOMETRIOSIS      3/05     left total knee arthroplasty Left 10/2019    Dr. Jones.  [place]     Social History     Socioeconomic History     Marital status:      Spouse name: Carter     Number of children: 2     Years of education: 12+     Highest education level: Not on file   Occupational History     Not on file   Social Needs     Financial resource strain: Not on file     Food insecurity     Worry: Not on file     Inability: Not on file     Transportation needs     Medical: Not on file     Non-medical: Not on file   Tobacco Use     Smoking status: Never Smoker     Smokeless tobacco: Never Used   Substance and Sexual Activity     Alcohol use: Yes     Comment: Alcoholic Drinks/day: occ     Drug use: No     Comment: Drug use: No     Sexual activity: Yes     Partners: Male   Lifestyle     Physical activity     Days per week: Not on file     Minutes per session: Not on file     Stress: Not on file   Relationships     Social connections     Talks on phone: Not on file     Gets together: Not on file     Attends Christian service: Not on file     Active member of club or organization: Not on file     Attends meetings of clubs or organizations: Not on file     Relationship status: Not on file     Intimate partner violence     Fear of current or ex partner: Not on file     Emotionally abused: Not on file     Physically abused: Not on file     Forced sexual activity: Not on file   Other Topics Concern     Not on file   Social History Narrative    Patient is .  She is retired.    Two adult sons. Shashi and     Family History   Problem Relation Age of Onset     Breast Cancer Mother         Cancer-breast     Lung Cancer Mother         Cancer,lung and uterine cancer;       Uterine  Cancer Mother      Breast Cancer Maternal Grandmother         Cancer-breast     Cervical Cancer Sister 28        endometrial or cervical cancer,     Allergy (Severe) Sister         Allergies, allergies/asthma     Other - See Comments Sister          lupus     Heart Disease Brother         Heart Disease,2 heart surgeries B 1965     Current Outpatient Medications   Medication     albuterol (PROAIR HFA/PROVENTIL HFA/VENTOLIN HFA) 108 (90 Base) MCG/ACT inhaler     buPROPion (WELLBUTRIN XL) 150 MG 24 hr tablet     calcium polycarbophil (FIBERCON) 625 MG tablet     cetirizine HCl 10 MG CAPS     Cholecalciferol (VITAMIN D3) 1000 UNITS CAPS     diphenhydrAMINE (BENADRYL) 25 MG capsule     fluticasone (FLONASE) 50 MCG/ACT nasal spray     fluticasone-salmeterol (ADVAIR DISKUS) 250-50 MCG/DOSE inhaler     hydrochlorothiazide (HYDRODIURIL) 12.5 MG tablet     hydrochlorothiazide (HYDRODIURIL) 25 MG tablet     losartan (COZAAR) 100 MG tablet     montelukast (SINGULAIR) 10 MG tablet     multivitamin w/minerals (MULTI-VITAMIN) tablet     phentermine (ADIPEX-P) 15 MG capsule     potassium chloride ER (K-TAB/KLOR-CON) 10 MEQ CR tablet     SUMAtriptan (IMITREX) 20 MG/ACT nasal spray     triamcinolone (KENALOG) 0.1 % external cream     zolpidem (AMBIEN) 10 MG tablet     No current facility-administered medications for this visit.      Allergies   Allergen Reactions     Doxycycline Nausea and Vomiting     Metolazone Unknown     No Clinical Screening - See Comments GI Disturbance and Nausea and Vomiting     Narcotics     Amoxicillin-Pot Clavulanate Rash     Sulfa Drugs Rash     /70 (BP Location: Right arm)   Pulse 88   Wt 88 kg (194 lb)   BMI 31.31 kg/m      REVIEW OF SYSTEMS  General: negative  GI: negative  : negative    Exam:  Constitutional: healthy, alert, active and no distress  Pelvic: Normal external genitalia, slightly patulous urethra, well supported anterior vagina and vaginal cuff, no rectocele, on digital exam I  am unable to palpate exposed mesh material, but lateral to the urethra adjacent to the obturator foramen I am able to palpate the edge of the sling any submucous location.  It is slightly tender on the left side.    Lab: No results found for any visits on 10/06/20.    ASSESSMENT/PLAN :  1. Vaginal bleeding      No current evidence of sling erosion, we did postulate that this could have been a fissure happening after intercourse, or possibly an abrasion from post elimination cleansing.  I have asked her to call back if her symptoms recur.        Trevin De MD FACOG  2:33 PM 10/6/2020

## 2020-11-03 ENCOUNTER — TRANSFERRED RECORDS (OUTPATIENT)
Dept: HEALTH INFORMATION MANAGEMENT | Facility: OTHER | Age: 60
End: 2020-11-03

## 2020-11-10 ENCOUNTER — TELEPHONE (OUTPATIENT)
Dept: FAMILY MEDICINE | Facility: OTHER | Age: 60
End: 2020-11-10

## 2020-11-10 DIAGNOSIS — I10 ESSENTIAL HYPERTENSION: Primary | ICD-10-CM

## 2020-11-10 DIAGNOSIS — R73.03 PREDIABETES: ICD-10-CM

## 2020-11-10 DIAGNOSIS — G47.00 INSOMNIA, PERSISTENT: ICD-10-CM

## 2020-11-10 PROBLEM — C44.310 BCC (BASAL CELL CARCINOMA), FACE: Status: ACTIVE | Noted: 2020-01-01

## 2020-12-09 ENCOUNTER — MYC MEDICAL ADVICE (OUTPATIENT)
Dept: FAMILY MEDICINE | Facility: OTHER | Age: 60
End: 2020-12-09

## 2020-12-28 DIAGNOSIS — R73.03 PREDIABETES: ICD-10-CM

## 2020-12-28 RX ORDER — PHENTERMINE HYDROCHLORIDE 15 MG/1
15 CAPSULE ORAL EVERY MORNING
Qty: 90 CAPSULE | OUTPATIENT
Start: 2020-12-28

## 2020-12-28 NOTE — TELEPHONE ENCOUNTER
"Contacted patient to remind her that she is due for a visit for refills of this medication. Patient nick. \" I am not taking it anymore. I don't think I requested a refill. I knew I had to have a visit for this but my son has cancer and I am going down to the [facility]. No appointments for me right now\". Writer will refuse this medication and route to PCP as fyi.   Phentermine (ADIPEX-P) 15 MG capsule  Last Written Prescription Date: 08/18/2020. Due 11/18/2020  Last Fill Quantity: 90,   # refills: 0  Last Office Visit: 08/18/[id number] in 3 months.  Future Office visit:       Unable to complete prescription refill per RNMedication Refill Policy.................... Susana Mesa RN ....................  12/28/2020   3:37 PM            "

## 2021-01-25 ENCOUNTER — MYC MEDICAL ADVICE (OUTPATIENT)
Dept: FAMILY MEDICINE | Facility: OTHER | Age: 61
End: 2021-01-25

## 2021-02-07 DIAGNOSIS — G47.00 INSOMNIA, PERSISTENT: ICD-10-CM

## 2021-02-08 RX ORDER — ZOLPIDEM TARTRATE 10 MG/1
10 TABLET ORAL AT BEDTIME
Qty: 30 TABLET | OUTPATIENT
Start: 2021-02-08

## 2021-02-08 NOTE — TELEPHONE ENCOUNTER
[id number] ([facility] of [place] sent Rx request for the following:      Requested Prescriptions   Pending Prescriptions Disp Refills     zolpidem (AMBIEN) 10 MG tablet [Pharmacy Med Name: ZOLPIDEM 10MG TABLET] 30 tablet      Sig: TAKE 1 TABLET (10 MG) BY MOUTH AT BEDTIME       There is no refill protocol information for this order        Last Prescription Date:   8/18/2020  Last Fill Qty/Refills:         30, R-5    Last Office Visit:              8/18/2020 ( Malcolm Damon)   Future Office visit:           None noted.     Routing refill request to provider for review/approval because:  Drug not on the [facility], [facility] or [facility] refill protocol or controlled substance.    Deisy Mathias RN ....................  2/8/2021   2:29 PM

## 2021-02-16 DIAGNOSIS — G47.00 INSOMNIA, PERSISTENT: ICD-10-CM

## 2021-02-16 RX ORDER — ZOLPIDEM TARTRATE 10 MG/1
10 TABLET ORAL AT BEDTIME
Qty: 30 TABLET | Refills: 5 | OUTPATIENT
Start: 2021-02-16

## 2021-02-16 NOTE — TELEPHONE ENCOUNTER
Please call patient.  Controlled substances are filled during office visits only.  This is long term medication and part of that is that she schedule every 6 month follow up visits.  I do see that she has an appointment next month - patient and pharmacy should have been aware with her last refill that clinic follow up was needed.  Yarely Miller MD

## 2021-02-16 NOTE — TELEPHONE ENCOUNTER
Disp Refills Start End AR   zolpidem (AMBIEN) 10 MG tablet 30 tablet 5 8/18/2020  No   Sig - Route: Take 1 tablet (10 mg) by mouth At Bedtime - Oral       LOV: 8/18/2020  Future Office visit:    Next 5 appointments (look out 90 days)    Mar 17, 2021 10:00 AM  Kadi Donovan with Yarely Damon MD  [facility] [address]  [facility] [address]  [phone]         Routing refill request to provider for review/approval because:  Drug not on the [facility] or [facility] refill protocol or controlled substance    Requested Prescriptions   Pending Prescriptions Disp Refills     zolpidem (AMBIEN) 10 MG tablet 30 tablet 5     Sig: Take 1 tablet (10 mg) by mouth At Bedtime       There is no refill protocol information for this order      Unable to complete prescription refill per RN Medication Refill Policy.................... Kristi Rod RN ....................  2/16/2021   3:10 PM

## 2021-02-16 NOTE — TELEPHONE ENCOUNTER
Made attempt to relay message from PCP to patient. Left message. Waiting on call back. Kristi Rod RN  ....................  2/16/2021   3:53 PM

## 2021-03-17 ENCOUNTER — TELEPHONE (OUTPATIENT)
Dept: FAMILY MEDICINE | Facility: OTHER | Age: 61
End: 2021-03-17

## 2021-03-17 DIAGNOSIS — I10 ESSENTIAL HYPERTENSION: ICD-10-CM

## 2021-03-17 DIAGNOSIS — G47.00 INSOMNIA, PERSISTENT: ICD-10-CM

## 2021-03-17 DIAGNOSIS — R73.03 PREDIABETES: ICD-10-CM

## 2021-03-17 LAB
ALBUMIN SERPL-MCNC: 4.6 G/DL (ref 3.5–5.7)
ALP SERPL-CCNC: 71 U/L (ref 34–104)
ALT SERPL W P-5'-P-CCNC: 13 U/L (ref 7–52)
ANION GAP SERPL CALCULATED.3IONS-SCNC: 8 MMOL/L (ref 3–14)
AST SERPL W P-5'-P-CCNC: 14 U/L (ref 13–39)
BILIRUB SERPL-MCNC: 0.6 MG/DL (ref 0.3–1)
BUN SERPL-MCNC: 20 MG/DL (ref 7–25)
CALCIUM SERPL-MCNC: 10 MG/DL (ref 8.6–10.3)
CHLORIDE SERPL-SCNC: 103 MMOL/L (ref 98–107)
CO2 SERPL-SCNC: 27 MMOL/L (ref 21–31)
CREAT SERPL-MCNC: 0.94 MG/DL (ref 0.6–1.2)
DEPRECATED CALCIDIOL+CALCIFEROL SERPL-MC: 49.5 NG/ML
GFR SERPL CREATININE-BSD FRML MDRD: 61 ML/MIN/{1.73_M2}
GLUCOSE SERPL-MCNC: 108 MG/DL (ref 70–105)
HBA1C MFR BLD: 5.4 % (ref 4–6)
POTASSIUM SERPL-SCNC: 3.6 MMOL/L (ref 3.5–5.1)
PROT SERPL-MCNC: 7.6 G/DL (ref 6.4–8.9)
SODIUM SERPL-SCNC: 138 MMOL/L (ref 134–144)

## 2021-03-17 PROCEDURE — 83036 HEMOGLOBIN GLYCOSYLATED A1C: CPT | Mod: ZL | Performed by: FAMILY MEDICINE

## 2021-03-17 PROCEDURE — 82306 VITAMIN D 25 HYDROXY: CPT | Mod: ZL | Performed by: FAMILY MEDICINE

## 2021-03-17 PROCEDURE — 80307 DRUG TEST PRSMV CHEM ANLYZR: CPT | Mod: ZL | Performed by: FAMILY MEDICINE

## 2021-03-17 PROCEDURE — 80053 COMPREHEN METABOLIC PANEL: CPT | Mod: ZL | Performed by: FAMILY MEDICINE

## 2021-03-17 PROCEDURE — 36415 COLL VENOUS BLD VENIPUNCTURE: CPT | Mod: ZL | Performed by: FAMILY MEDICINE

## 2021-03-17 NOTE — TELEPHONE ENCOUNTER
"Labs are ordered, so I transferred her to the appointment desk to get a \"lab only\" for today.  Shital Reyes CMA([facility])..................3/17/2021   9:32 AM   "

## 2021-03-17 NOTE — TELEPHONE ENCOUNTER
Please call the patient with lab orders.  She is already fasting for her apt.  That was canceled for today, and she still wants to do her labs today.       Celia Lanier on 3/17/2021 at 9:21 AM

## 2021-03-19 LAB
CREAT UR-MCNC: 100 MG/DL
RPT COMMENT: NORMAL

## 2021-03-25 ENCOUNTER — TRANSFERRED RECORDS (OUTPATIENT)
Dept: HEALTH INFORMATION MANAGEMENT | Facility: OTHER | Age: 61
End: 2021-03-25

## 2021-03-29 ENCOUNTER — OFFICE VISIT (OUTPATIENT)
Dept: FAMILY MEDICINE | Facility: OTHER | Age: 61
End: 2021-03-29
Attending: FAMILY MEDICINE
Payer: COMMERCIAL

## 2021-03-29 VITALS
HEART RATE: 76 BPM | BODY MASS INDEX: 34.39 KG/M2 | WEIGHT: 214 LBS | TEMPERATURE: 98.5 F | SYSTOLIC BLOOD PRESSURE: 136 MMHG | DIASTOLIC BLOOD PRESSURE: 84 MMHG | RESPIRATION RATE: 18 BRPM | HEIGHT: 66 IN | OXYGEN SATURATION: 98 %

## 2021-03-29 DIAGNOSIS — J30.9 ALLERGIC RHINITIS, UNSPECIFIED SEASONALITY, UNSPECIFIED TRIGGER: ICD-10-CM

## 2021-03-29 DIAGNOSIS — R73.03 PREDIABETES: ICD-10-CM

## 2021-03-29 DIAGNOSIS — F33.40 RECURRENT MAJOR DEPRESSION IN REMISSION (H): ICD-10-CM

## 2021-03-29 DIAGNOSIS — M17.12 PRIMARY OSTEOARTHRITIS OF LEFT KNEE: ICD-10-CM

## 2021-03-29 DIAGNOSIS — G43.819 OTHER MIGRAINE WITHOUT STATUS MIGRAINOSUS, INTRACTABLE: ICD-10-CM

## 2021-03-29 DIAGNOSIS — I10 ESSENTIAL HYPERTENSION: ICD-10-CM

## 2021-03-29 DIAGNOSIS — E66.811 CLASS 1 OBESITY DUE TO EXCESS CALORIES WITH SERIOUS COMORBIDITY AND BODY MASS INDEX (BMI) OF 31.0 TO 31.9 IN ADULT: ICD-10-CM

## 2021-03-29 DIAGNOSIS — J45.40 MODERATE PERSISTENT ASTHMA WITHOUT COMPLICATION: ICD-10-CM

## 2021-03-29 DIAGNOSIS — G47.00 INSOMNIA, PERSISTENT: Primary | ICD-10-CM

## 2021-03-29 DIAGNOSIS — E66.09 CLASS 1 OBESITY DUE TO EXCESS CALORIES WITH SERIOUS COMORBIDITY AND BODY MASS INDEX (BMI) OF 31.0 TO 31.9 IN ADULT: ICD-10-CM

## 2021-03-29 PROCEDURE — 99214 OFFICE O/P EST MOD 30 MIN: CPT | Performed by: FAMILY MEDICINE

## 2021-03-29 RX ORDER — FLUTICASONE PROPIONATE 50 MCG
2 SPRAY, SUSPENSION (ML) NASAL DAILY
Qty: 48 G | Refills: 3 | Status: SHIPPED | OUTPATIENT
Start: 2021-03-29 | End: 2022-04-18

## 2021-03-29 RX ORDER — LOSARTAN POTASSIUM 100 MG/1
100 TABLET ORAL DAILY
Qty: 90 TABLET | Refills: 3 | Status: SHIPPED | OUTPATIENT
Start: 2021-03-29 | End: 2022-04-18

## 2021-03-29 RX ORDER — ZOLPIDEM TARTRATE 10 MG/1
10 TABLET ORAL AT BEDTIME
Qty: 30 TABLET | Refills: 5 | Status: SHIPPED | OUTPATIENT
Start: 2021-03-29 | End: 2021-10-09

## 2021-03-29 RX ORDER — BUPROPION HYDROCHLORIDE 300 MG/1
300 TABLET ORAL EVERY MORNING
Qty: 90 TABLET | Refills: 3 | Status: SHIPPED | OUTPATIENT
Start: 2021-03-29 | End: 2021-05-27

## 2021-03-29 RX ORDER — MONTELUKAST SODIUM 10 MG/1
1 TABLET ORAL AT BEDTIME
Qty: 90 TABLET | Refills: 3 | Status: SHIPPED | OUTPATIENT
Start: 2021-03-29 | End: 2022-04-18

## 2021-03-29 RX ORDER — SUMATRIPTAN 20 MG/1
1 SPRAY NASAL PRN
Qty: 18 EACH | Refills: 2 | Status: SHIPPED | OUTPATIENT
Start: 2021-03-29 | End: 2022-04-18

## 2021-03-29 RX ORDER — POTASSIUM CHLORIDE 750 MG/1
20 TABLET, EXTENDED RELEASE ORAL
Qty: 180 TABLET | Refills: 3 | Status: SHIPPED | OUTPATIENT
Start: 2021-03-29 | End: 2022-04-18

## 2021-03-29 ASSESSMENT — PAIN SCALES - GENERAL: PAINLEVEL: NO PAIN (0)

## 2021-03-29 ASSESSMENT — MIFFLIN-ST. JEOR: SCORE: 1549.51

## 2021-03-29 NOTE — NURSING NOTE
"Chief Complaint   Patient presents with     RECHECK     med     Patient presented to the clinic for a medication follow up.    Initial /84 (BP Location: Right arm, Patient Position: Sitting, Cuff Size: Adult Large)   Pulse 76   Temp 98.5  F (36.9  C) (Tympanic)   Resp 18   Ht 1.664 m (5' 5.5\")   Wt 97.1 kg (214 lb)   SpO2 98%   Breastfeeding No   BMI 35.07 kg/m   Estimated body mass index is 35.07 kg/m  as calculated from the following:    Height as of this encounter: 1.664 m (5' 5.5\").    Weight as of this encounter: 97.1 kg (214 lb).         Medication Reconciliation: Complete      Rozina Hagen LPN   "

## 2021-03-29 NOTE — PROGRESS NOTES
"Nursing Notes:   Rozina Hagen LPN  3/29/2021  3:20 PM  Signed  Chief Complaint   Patient presents with     RECHECK     med     Patient presented to the clinic for a medication follow up.    Initial /84 (BP Location: Right arm, Patient Position: Sitting, Cuff Size: Adult Large)   Pulse 76   Temp 98.5  F (36.9  C) (Tympanic)   Resp 18   Ht 1.664 m (5' 5.5\")   Wt 97.1 kg (214 lb)   SpO2 98%   Breastfeeding No   BMI 35.07 kg/m   Estimated body mass index is 35.07 kg/m  as calculated from the following:    Height as of this encounter: 1.664 m (5' 5.5\").    Weight as of this encounter: 97.1 kg (214 lb).         Medication Reconciliation: Complete      Rozina Hagen LPN        SUBJECTIVE:   CC:  Yolie Bedoya is a 60 year old female who presents to clinic today for the following health issues:  Medication follow up     HPI  Yolie Bedoya is a 60 year old female who presents for medication follow up.      Chronic insomnia: For this, she takes Ambien 5 mg at bedtime.  We have been trying to wean her down/off of this due to knowing that she likely has tolerance to the medication and that quality of sleep is not as good with this as natural sleep.  She watches her fitbit, she typically gets 1-2 hours of deep sleep.  REM time seems to be good.  It is her light sleep that is the lowest.  It typically registers about 2 hrs of being awake.       Prediabetes: Last year, she had done quite a bit of work with losing weight, had lost 20 pounds.  This winter, in particular during her son's illness, she has gained this back.  She has been eating more, not participating in activity.  She is worried about this change and how it also relates to osteoarthritis in her legs/knees.  Lab Results   Component Value Date    A1C 5.4 03/17/2021    A1C 5.7 08/18/2020    A1C 5.4 02/18/2020    A1C 5.7 09/27/2019    A1C 5.6 06/19/2018     Hypertension: She has taken losartan and hydrochlorothiazide for management of this.   "   Allergies   Allergen Reactions     Doxycycline Nausea and Vomiting     Metolazone Unknown     No Clinical Screening - See Comments GI Disturbance and Nausea and Vomiting     Narcotics     Amoxicillin-Pot Clavulanate Rash     Sulfa Drugs Rash     Current Outpatient Medications   Medication     albuterol (PROAIR HFA/PROVENTIL HFA/VENTOLIN HFA) 108 (90 Base) MCG/ACT inhaler     buPROPion (WELLBUTRIN XL) 300 MG 24 hr tablet     calcium polycarbophil (FIBERCON) 625 MG tablet     cetirizine HCl 10 MG CAPS     Cholecalciferol (VITAMIN D3) 1000 UNITS CAPS     diphenhydrAMINE (BENADRYL) 25 MG capsule     fluticasone (FLONASE) 50 MCG/ACT nasal spray     fluticasone-salmeterol (ADVAIR DISKUS) 250-50 MCG/DOSE inhaler     hydrochlorothiazide (HYDRODIURIL) 12.5 MG tablet     hydrochlorothiazide (HYDRODIURIL) 25 MG tablet     losartan (COZAAR) 100 MG tablet     montelukast (SINGULAIR) 10 MG tablet     multivitamin w/minerals (MULTI-VITAMIN) tablet     potassium chloride ER (K-TAB/KLOR-CON) 10 MEQ CR tablet     SUMAtriptan (IMITREX) 20 MG/ACT nasal spray     triamcinolone (KENALOG) 0.1 % external cream     zolpidem (AMBIEN) 10 MG tablet     No current facility-administered medications for this visit.       Past Medical History:   Diagnosis Date     Allergic rhinitis due to animal hair and dander     1970,aspen, birch, maple, oak, grass, dust mite, ragwee, cocklebur, mugwart, lambs quarter, pigweed, fungus, molds     BCC (basal cell carcinoma), face 2020    Mohs     Calculus of gallbladder without cholecystitis without obstruction     No Comments Provided     Essential (primary) hypertension     No Comments Provided     Excessive and frequent menstruation with regular cycle     s/p CLARA     Hormone replacement therapy (postmenopausal)     2010,started ERT     Obesity     No Comments Provided     Other long term (current) drug therapy     2/11/2014,ambien #30/month     Other specified postprocedural states     nausea and severe  "vomiting with narcotics     Psychophysiologic insomnia     contract signed 2/2014     Tubulo-interstitial nephritis     No Comments Provided     Venous insufficiency (chronic) (peripheral)     No Comments Provided      Past Surgical History:   Procedure Laterality Date     ARTHROSCOPY KNEE  10/12/2017    10/12/2017     BLADDER SURGERY  10/25/2017     SLING OPER STRES INCONTINENCE     COLONOSCOPY  06/2004     because of change in bowel habits     COLONOSCOPY  05/14/2014 5/14/2014,Extensive diverticulosis throughout the colon - follow up 10 years     EXTRACTION(S) DENTAL      No Comments Provided     HYSTERECTOMY TOTAL ABDOMINAL, BILATERAL SALPINGO-OOPHORECTOMY, COMBINED  03/30/2010    CLARA/BSO/Vasquez/Posterior Repair     LAPAROSCOPIC ABLATION ENDOMETRIOSIS      3/05     left total knee arthroplasty Left 10/2019    Dr. Jones.  [place]       Review of Systems more vertigo - some more allergy symptoms; this will get to where she needs to hold on to walls;  With associated headaches; using more Imitrex and always needs to use two doses.  This winter they were occurring much more often.    Asthma - stable  Osteoarthritis  - history of TKA 10/19; doing biking for exercise    PHQ-2 Score:     PHQ-2 ( 1999 [employer]) 3/29/2021 10/6/2020   Q1: Little interest or pleasure in doing things 0 0   Q2: Feeling down, depressed or hopeless 0 0   PHQ-2 Score 0 0         PHQ-9 SCORE 8/12/2019 2/18/2020 8/18/2020   PHQ-9 Total Score 3 4 0     No flowsheet data found.          OBJECTIVE:     /84 (BP Location: Right arm, Patient Position: Sitting, Cuff Size: Adult Large)   Pulse 76   Temp 98.5  F (36.9  C) (Tympanic)   Resp 18   Ht 1.664 m (5' 5.5\")   Wt 97.1 kg (214 lb)   SpO2 98%   Breastfeeding No   BMI 35.07 kg/m    Wt Readings from Last 3 Encounters:   03/29/21 97.1 kg (214 lb)   10/06/20 88 kg (194 lb)   08/18/20 88 kg (194 lb)       Body mass index is 35.07 kg/m .  Physical Exam  Vitals signs and nursing note reviewed. "   Constitutional:       Appearance: Normal appearance. She is obese.   Musculoskeletal:      Right lower leg: Edema present.      Left lower leg: Edema present.   Neurological:      Mental Status: She is alert.   Psychiatric:      Comments: Nervous, anxious          No results found for any visits on 03/29/21.  Results for SLAVA CRISTINA (MRN [id number]) as of 3/29/2021 05:51   Ref. Range 3/17/2021 13:07   Sodium Latest Ref Range: 134 - 144 mmol/L 138   Potassium Latest Ref Range: 3.5 - 5.1 mmol/L 3.6   Chloride Latest Ref Range: 98 - 107 mmol/L 103   Carbon Dioxide Latest Ref Range: 21 - 31 mmol/L 27   Urea Nitrogen Latest Ref Range: 7 - 25 mg/dL 20   Creatinine Latest Ref Range: 0.60 - 1.20 mg/dL 0.94   GFR Estimate Latest Ref Range: >60 mL/min/1.73_m2 61   GFR Estimate If Black Latest Ref Range: >60 mL/min/1.73_m2 74   Calcium Latest Ref Range: 8.6 - 10.3 mg/dL 10.0   Anion Gap Latest Ref Range: 3 - 14 mmol/L 8   Albumin Latest Ref Range: 3.5 - 5.7 g/dL 4.6   Protein Total Latest Ref Range: 6.4 - 8.9 g/dL 7.6   Bilirubin Total Latest Ref Range: 0.3 - 1.0 mg/dL 0.6   Alkaline Phosphatase Latest Ref Range: 34 - 104 U/L 71   ALT Latest Ref Range: 7 - 52 U/L 13   AST Latest Ref Range: 13 - 39 U/L 14   Hemoglobin A1C Latest Ref Range: 4.0 - 6.0 % 5.4   Vitamin D Total Latest Units: ng/mL 49.5   Glucose Latest Ref Range: 70 - 105 mg/dL 108 (H)       ASSESSMENT/PLAN:     1. Insomnia, persistent    2. Prediabetes    3. Essential hypertension    4. Moderate persistent asthma without complication    5. Primary osteoarthritis of left knee    6. Recurrent major depression in remission (H)    7. Other migraine without status migrainosus, intractable    8. Allergic rhinitis, unspecified seasonality, unspecified trigger    9. Class 1 obesity due to excess calories with serious comorbidity and body mass index (BMI) of 31.0 to 31.9 in adult        Assessment & Plan   Problem List Items Addressed This Visit        Nervous and  Auditory    Migraine headache    Relevant Medications    buPROPion (WELLBUTRIN XL) 300 MG 24 hr tablet    SUMAtriptan (IMITREX) 20 MG/ACT nasal spray       Respiratory    Allergic rhinitis    Relevant Medications    fluticasone (FLONASE) 50 MCG/ACT nasal spray    fluticasone-salmeterol (ADVAIR DISKUS) 250-50 MCG/DOSE inhaler    montelukast (SINGULAIR) 10 MG tablet    Moderate persistent asthma without complication    Relevant Medications    fluticasone (FLONASE) 50 MCG/ACT nasal spray    fluticasone-salmeterol (ADVAIR DISKUS) 250-50 MCG/DOSE inhaler    montelukast (SINGULAIR) 10 MG tablet       Digestive    Obesity       Behavioral    Recurrent major depression in remission (H)    Relevant Medications    buPROPion (WELLBUTRIN XL) 300 MG 24 hr tablet       Other    Insomnia, persistent - Primary    Relevant Medications    zolpidem (AMBIEN) 10 MG tablet      Other Visit Diagnoses     Prediabetes        Essential hypertension        Relevant Medications    losartan (COZAAR) 100 MG tablet    potassium chloride ER (K-TAB/KLOR-CON) 10 MEQ CR tablet    Primary osteoarthritis of left knee              1.  I have personally reviewed the labs listed above.  2.  Over 6-month period of time, have cut back on Ambien refills to x4, instead of x6.  Continue to work on cutting back on this and to use sleep retraining, nonpharmacologic measures to help with sleep quantity and quality.  3.  With current increased mood symptoms and concerns about weight management, will increase Wellbutrin to 300 mg daily.  With this medication change, potential side effects, especially as pertains to sleep, discussed and reviewed.  4.  Blood pressure is at goal.  5.  Asthma with adequate control.  6.  With weight management, discussed the role that her current stress, mood, negative self messaging has on her weight.  With also having prediabetes, she may have some benefit from Ozempic or Rybelsus.  Some general information regarding this medication  was provided for her.    Patient Instructions   ozempic  rybelsus     65 degrees  - bedroom temp        PDMP Review       Value Time User    State PDMP site checked  Yes 3/29/2021 11:14 PM Yarely Villagomez MD            Follow up in 3-6 months    {Provider  Link to MDM Help Grid :15      YARELY RAE MD  [facility]    This note was created using voice recognition software and was screened for errors in transcription.

## 2021-05-27 ENCOUNTER — TELEPHONE (OUTPATIENT)
Dept: FAMILY MEDICINE | Facility: OTHER | Age: 61
End: 2021-05-27

## 2021-05-27 DIAGNOSIS — F33.40 RECURRENT MAJOR DEPRESSION IN REMISSION (H): ICD-10-CM

## 2021-05-27 RX ORDER — BUPROPION HYDROCHLORIDE 150 MG/1
300 TABLET ORAL EVERY MORNING
Qty: 180 TABLET | Refills: 3 | Status: SHIPPED | OUTPATIENT
Start: 2021-05-27 | End: 2021-06-02

## 2021-05-27 NOTE — TELEPHONE ENCOUNTER
PCJ-patient has a question about the dosing on her medication Wellbutrin XL      Please call and advise    Thank You  Marquita Mckee on 5/27/2021 at 2:27 PM

## 2021-05-27 NOTE — TELEPHONE ENCOUNTER
Was taking 150 mg (2 tabs) until ran out, is now taking 1 tab of 300 mg and has been dizzy since starting about 5 days ago.  Felt fine on the 2 tabs of 150 mg tabs and is doing well on this dose. Can she take 2 of the 150 mg? If so will need a new rx sent in.  Brea Puentes LPN ...... 5/27/2021 4:04 PM

## 2021-05-28 NOTE — TELEPHONE ENCOUNTER
After verifying patient's name and date of birth, patient was given the below information.  Gaviota Cotton....5/28/2021 8:28 AM

## 2021-06-01 ENCOUNTER — TELEPHONE (OUTPATIENT)
Dept: FAMILY MEDICINE | Facility: OTHER | Age: 61
End: 2021-06-01

## 2021-06-01 NOTE — TELEPHONE ENCOUNTER
Patient states her pharmacy faxed over a PA request for Bupropion. They have not heard anything in regards to a denial or approval. Patient was informed that it can take 1-2 weeks for a PA response.     Keira Almanza CMA on 6/1/2021 at 1:07 PM

## 2021-06-01 NOTE — TELEPHONE ENCOUNTER
PCJ-Patient was looking for a PA for a medication     [facility]    Please call and advise  Marquita Mckee on 6/1/2021 at 11:52 AM

## 2021-06-02 ENCOUNTER — TELEPHONE (OUTPATIENT)
Dept: FAMILY MEDICINE | Facility: OTHER | Age: 61
End: 2021-06-02

## 2021-06-02 DIAGNOSIS — F33.40 RECURRENT MAJOR DEPRESSION IN REMISSION (H): Primary | ICD-10-CM

## 2021-06-02 RX ORDER — BUPROPION HYDROCHLORIDE 300 MG/1
300 TABLET ORAL EVERY MORNING
Qty: 90 TABLET | Refills: 3 | Status: CANCELLED | OUTPATIENT
Start: 2021-06-02

## 2021-06-02 RX ORDER — BUPROPION HYDROCHLORIDE 150 MG/1
150 TABLET, EXTENDED RELEASE ORAL 2 TIMES DAILY
Qty: 60 TABLET | Refills: 11 | Status: SHIPPED | OUTPATIENT
Start: 2021-06-02 | End: 2021-10-09

## 2021-06-02 NOTE — TELEPHONE ENCOUNTER
Fax received from [facility] stating PA for Bupropion 150 mg tablets- 2 tablets daily- has been denied. Reason being there is a 300 mg tablet patient can take 1 tablet daily with. Bupropion 300 mg tablets- 1 tablet daily- pending. Please sign if ok.     Keira Almanza CMA on 6/2/2021 at 3:09 PM

## 2021-06-02 NOTE — TELEPHONE ENCOUNTER
Patient informed PA was denied. She is wondering if there is something else she can try. She does not want to pay out-of-pocket for the 150 mg tablets (2 tablets daily) and she cannot take 1 full tablet daily of the 300 mg dose as she states it makes her feel icky.     Keira Almanza, CMA on 6/2/2021 at 4:56 PM

## 2021-06-02 NOTE — TELEPHONE ENCOUNTER
Insurance should cover the SR formulation.  This is shorter acting than the XL, but is meant to be taken twice a day.  Prescription for this is sent.  Yarely Miller MD

## 2021-09-04 DIAGNOSIS — I10 ESSENTIAL HYPERTENSION: ICD-10-CM

## 2021-09-05 RX ORDER — HYDROCHLOROTHIAZIDE 12.5 MG/1
12.5 TABLET ORAL DAILY
Qty: 90 TABLET | Refills: 2 | Status: SHIPPED | OUTPATIENT
Start: 2021-09-05 | End: 2022-04-18

## 2021-09-05 RX ORDER — HYDROCHLOROTHIAZIDE 25 MG/1
25 TABLET ORAL DAILY
Qty: 90 TABLET | Refills: 2 | Status: SHIPPED | OUTPATIENT
Start: 2021-09-05 | End: 2022-04-18

## 2021-09-07 ENCOUNTER — HOSPITAL ENCOUNTER (OUTPATIENT)
Dept: MAMMOGRAPHY | Facility: OTHER | Age: 61
Discharge: HOME | End: 2021-09-07
Attending: FAMILY MEDICINE | Admitting: FAMILY MEDICINE
Payer: COMMERCIAL

## 2021-09-07 DIAGNOSIS — Z12.31 VISIT FOR SCREENING MAMMOGRAM: ICD-10-CM

## 2021-09-07 PROCEDURE — 77063 BREAST TOMOSYNTHESIS BI: CPT

## 2021-09-28 DIAGNOSIS — G47.00 INSOMNIA, PERSISTENT: ICD-10-CM

## 2021-09-30 RX ORDER — ZOLPIDEM TARTRATE 10 MG/1
10 TABLET ORAL AT BEDTIME
Qty: 30 TABLET | Refills: 5 | OUTPATIENT
Start: 2021-09-30

## 2021-09-30 NOTE — TELEPHONE ENCOUNTER
Disp Refills Start End AR   zolpidem (AMBIEN) 10 MG tablet 30 tablet 5 3/29/2021  No   Sig - Route: Take 1 tablet (10 mg) by mouth At Bedtime - Oral       LOV: 3/29/2021  Future Office visit: No future appointment scheduled at this time.     Routing refill request to provider for review/approval because:  Drug not on the [facility], [facility] or [facility] refill protocol or controlled substance    Requested Prescriptions   Pending Prescriptions Disp Refills     zolpidem (AMBIEN) 10 MG tablet 30 tablet 5     Sig: Take 1 tablet (10 mg) by mouth At Bedtime       There is no refill protocol information for this order        Unable to complete prescription refill per RN Medication Refill Policy.................... Kristi Rod RN ....................  9/30/2021   2:57 PM

## 2021-10-01 RX ORDER — ZOLPIDEM TARTRATE 10 MG/1
10 TABLET ORAL AT BEDTIME
Qty: 12 TABLET | Refills: 0 | OUTPATIENT
Start: 2021-10-01

## 2021-10-01 NOTE — TELEPHONE ENCOUNTER
Patient contacted and informed of below. She scheduled an appointment for 10/13/2021. She is wondering if she can get a refill up to her appointment. RX for 12 tablets pending.     Keira Almanza CMA on 10/1/2021 at 4:26 PM

## 2021-10-05 DIAGNOSIS — G47.00 INSOMNIA, PERSISTENT: ICD-10-CM

## 2021-10-06 NOTE — TELEPHONE ENCOUNTER
sent Rx request for the following:     Requested Prescriptions   Pending Prescriptions Disp Refills     zolpidem (AMBIEN) 10 MG tablet 30 tablet 5     Sig: Take 1 tablet (10 mg) by mouth At Bedtime          Last Prescription Date:   3/29/2021  Last Fill Qty/Refills:         30, R-5    Last Office Visit:              3/29/2021  Future Office visit:           10/26/2021    Routing refill request to provider for review/approval because:  Drug not on the [facility] refill protocol      There is no refill protocol information for this order        Unable to complete prescription refill per RN Medication Refill Policy.................... Marita Ramirez RN ....................  10/6/2021   2:40 PM

## 2021-10-07 NOTE — TELEPHONE ENCOUNTER
[address] [facility] Infectious Disease  Report of Consultation    Raul Reyes Patient Status:  Inpatient    10/23/1978 MRN [id number]   Location [facility] 5SW/SE Attending Vonnie Huerta, [address] [facility] Day # 2 PCP Ivette Patient called and needs a pre op with in the week of September 23 the [facility] nurse asked her to request this time frame. If you could please call and try and work her in that would be great thank you   Peyton Wang on 9/11/2019 at 4:35 PM     acetaminophen (TYLENOL) tab 650 mg, 650 mg, Oral, Q6H PRN  •  Heparin Sodium (Porcine) 5000 UNIT/ML injection 5,000 Units, 5,000 Units, Subcutaneous, Q8H DORINA  •  ketorolac tromethamine (TORADOL) 30 MG/ML injection 30 mg, 30 mg, Intravenous, Q6H PRN  •  ond 84 20 99 %       Intake/Output:  No intake/output data recorded. Physical Exam:   General: Awake, alert, non-tox and in NAD. Head: Normocephalic, without obvious abnormality, atraumatic. Eyes: Conjunctivae/corneas clear. No scleral icterus.   No con weeks of IV antibiotics to aggressively treat this infection process. Continue zosyn but will add vancomycin given MRSA risk factors present.     Patient becomes angry and frustrated that he is advised to stay another day to get PICC and arrange outpatient

## 2021-10-08 DIAGNOSIS — G47.00 INSOMNIA, PERSISTENT: ICD-10-CM

## 2021-10-08 RX ORDER — ZOLPIDEM TARTRATE 10 MG/1
10 TABLET ORAL AT BEDTIME
Qty: 30 TABLET | Refills: 5 | OUTPATIENT
Start: 2021-10-08

## 2021-10-08 RX ORDER — ZOLPIDEM TARTRATE 10 MG/1
10 TABLET ORAL AT BEDTIME
Qty: 30 TABLET | Refills: 5 | Status: CANCELLED | OUTPATIENT
Start: 2021-10-08

## 2021-10-09 ENCOUNTER — VIRTUAL VISIT (OUTPATIENT)
Dept: FAMILY MEDICINE | Facility: OTHER | Age: 61
End: 2021-10-09
Attending: FAMILY MEDICINE
Payer: COMMERCIAL

## 2021-10-09 ENCOUNTER — HEALTH MAINTENANCE LETTER (OUTPATIENT)
Age: 61
End: 2021-10-09

## 2021-10-09 DIAGNOSIS — E66.811 CLASS 1 OBESITY DUE TO EXCESS CALORIES WITH SERIOUS COMORBIDITY AND BODY MASS INDEX (BMI) OF 31.0 TO 31.9 IN ADULT: ICD-10-CM

## 2021-10-09 DIAGNOSIS — G47.00 INSOMNIA, PERSISTENT: Primary | ICD-10-CM

## 2021-10-09 DIAGNOSIS — F33.40 RECURRENT MAJOR DEPRESSION IN REMISSION (H): ICD-10-CM

## 2021-10-09 DIAGNOSIS — I10 ESSENTIAL HYPERTENSION: ICD-10-CM

## 2021-10-09 DIAGNOSIS — E66.09 CLASS 1 OBESITY DUE TO EXCESS CALORIES WITH SERIOUS COMORBIDITY AND BODY MASS INDEX (BMI) OF 31.0 TO 31.9 IN ADULT: ICD-10-CM

## 2021-10-09 DIAGNOSIS — R73.03 PREDIABETES: ICD-10-CM

## 2021-10-09 DIAGNOSIS — J45.40 MODERATE PERSISTENT ASTHMA WITHOUT COMPLICATION: ICD-10-CM

## 2021-10-09 PROCEDURE — 99214 OFFICE O/P EST MOD 30 MIN: CPT | Mod: 95 | Performed by: FAMILY MEDICINE

## 2021-10-09 RX ORDER — ZOLPIDEM TARTRATE 10 MG/1
10 TABLET ORAL AT BEDTIME
Qty: 30 TABLET | Refills: 5 | Status: SHIPPED | OUTPATIENT
Start: 2021-10-09 | End: 2022-03-13

## 2021-10-09 RX ORDER — ALBUTEROL SULFATE 90 UG/1
2 AEROSOL, METERED RESPIRATORY (INHALATION) EVERY 4 HOURS PRN
Qty: 2 EACH | Refills: 11 | Status: SHIPPED | OUTPATIENT
Start: 2021-10-09 | End: 2023-04-19

## 2021-10-09 RX ORDER — BUPROPION HYDROCHLORIDE 150 MG/1
300 TABLET ORAL EVERY MORNING
Qty: 180 TABLET | Refills: 3 | Status: SHIPPED | OUTPATIENT
Start: 2021-10-09 | End: 2022-04-18

## 2021-10-09 NOTE — PROGRESS NOTES
"Subjective     Yolie Bedoya is a 60 year old female who is being evaluated via a billable telephone visit.      The patient has been notified of following:     \"This telephone visit will be conducted via a call between you and your physician/provider. We have found that certain health care needs can be provided without the need for a physical exam.  This service lets us provide the care you need with a short phone conversation.  If a prescription is necessary we can send it directly to your pharmacy.  If lab work is needed we can place an order for that and you can then stop by our lab to have the test done at a later time.    If during the course of the call the physician/provider feels a telephone visit is not appropriate, you will not be charged for this service.\"     Patient has given verbal consent for Telephone visit?  Yes    Yolie Bedoya complains of   Chief Complaint   Patient presents with     Recheck Medication       Yolie Bedoya is assessed via telephone visit with the following concerns:      Insomnia: Patient has used Ambien for several years for chronic insomnia.  Nonpharmacologic measures have not been successful.  He has not had issues with falls or injuries.  No known cognitive concerns.  Last visit for this medication was 6+ months ago.    PDMP Review       Value Time User    State PDMP site checked  Yes 10/9/2021 10:23 AM Yarely Villagomez MD       She had been taking half a pill for a part of the summer.      Medication follow up. This summer she was changed from Wellbutrin XL to SR due to formulary issues. She doesn't think this medication worked much at all.  When she took the 300mg of XL she had side effects.  She did not have these same side effects with taking the 150mg dose x2.  On the sustained release formulation, she does not have any side effects but does not think it is as efficacious in helping her mood.    She hasn't done well with her wt either.  She has been " diagnosed with prediabetes.  She thinks her wt has gone up over the summer.    Lab Results   Component Value Date    A1C 5.4 03/17/2021    A1C 5.7 08/18/2020    A1C 5.4 02/18/2020    A1C 5.7 09/27/2019    A1C 5.6 06/19/2018     Asthma: Fairly good control over the summer and fall months.    ALLERGIES  Doxycycline, Metolazone, No clinical screening - see comments, Amoxicillin-pot clavulanate, and Sulfa drugs    Current Outpatient Medications   Medication     albuterol (PROAIR HFA/PROVENTIL HFA/VENTOLIN HFA) 108 (90 Base) MCG/ACT inhaler     buPROPion (WELLBUTRIN XL) 150 MG 24 hr tablet     zolpidem (AMBIEN) 10 MG tablet     calcium polycarbophil (FIBERCON) 625 MG tablet     cetirizine HCl 10 MG CAPS     Cholecalciferol (VITAMIN D3) 1000 UNITS CAPS     diphenhydrAMINE (BENADRYL) 25 MG capsule     fluticasone (FLONASE) 50 MCG/ACT nasal spray     fluticasone-salmeterol (ADVAIR DISKUS) 250-50 MCG/DOSE inhaler     hydrochlorothiazide (HYDRODIURIL) 12.5 MG tablet     hydrochlorothiazide (HYDRODIURIL) 25 MG tablet     losartan (COZAAR) 100 MG tablet     montelukast (SINGULAIR) 10 MG tablet     multivitamin w/minerals (MULTI-VITAMIN) tablet     potassium chloride ER (K-TAB/KLOR-CON) 10 MEQ CR tablet     SUMAtriptan (IMITREX) 20 MG/ACT nasal spray     triamcinolone (KENALOG) 0.1 % external cream     No current facility-administered medications for this visit.       Past Medical History:   Diagnosis Date     Allergic rhinitis due to animal hair and dander     1970,aspen, birch, maple, oak, grass, dust mite, ragwee, cocklebur, mugwart, lambs quarter, pigweed, fungus, molds     BCC (basal cell carcinoma), face 2020    Mohs     Calculus of gallbladder without cholecystitis without obstruction     No Comments Provided     Essential (primary) hypertension     No Comments Provided     Excessive and frequent menstruation with regular cycle     s/p CLARA     Hormone replacement therapy (postmenopausal)     2010,started ERT     Obesity      No Comments Provided     Other long term (current) drug therapy     2/11/2014,ambien #30/month     Other specified postprocedural states     nausea and severe vomiting with narcotics     Psychophysiologic insomnia     contract signed 2/2014     Tubulo-interstitial nephritis     No Comments Provided     Venous insufficiency (chronic) (peripheral)     No Comments Provided       Social History     Socioeconomic History     Marital status:      Spouse name: Carter     Number of children: 2     Years of education: 12+     Highest education level: Not on file   Occupational History     Not on file   Tobacco Use     Smoking status: Never Smoker     Smokeless tobacco: Never Used   Substance and Sexual Activity     Alcohol use: Yes     Comment: Alcoholic Drinks/day: occ     Drug use: No     Comment: Drug use: No     Sexual activity: Yes     Partners: Male   Other Topics Concern     Not on file   Social History Narrative    Patient is .  She is retired.    Two adult sons. Zack and     Social Determinants of Health     Financial Resource Strain:      Difficulty of Paying Living Expenses:    Food Insecurity:      Worried About Running Out of Food in the Last Year:      Ran Out of Food in the Last Year:    Transportation Needs:      Lack of Transportation (Medical):      Lack of Transportation (Non-Medical):    Physical Activity:      Days of Exercise per Week:      Minutes of Exercise per Session:    Stress:      Feeling of Stress :    Social Connections:      Frequency of Communication with Friends and Family:      Frequency of Social Gatherings with Friends and Family:      Attends [employer] Services:      Active Member of Clubs or Organizations:      Attends Club or Organization Meetings:      Marital Status:    Intimate Partner Violence:      Fear of Current or Ex-Partner:      Emotionally Abused:      Physically Abused:      Sexually Abused:            Reviewed and updated as needed this visit by  Provider                 Review of Systems          Objective   Reported vitals:  There were no vitals taken for this visit.   healthy, alert and no distress  Psych: Alert and oriented times 3; coherent speech, normal   rate and volume, able to articulate logical thoughts, able   to abstract reason, no tangential thoughts, no hallucinations   or delusions  Her affect is normal            Assessment/Plan:    ICD-10-CM    1. Insomnia, persistent  G47.00 Drug Confirmation Panel Urine with Creatinine     zolpidem (AMBIEN) 10 MG tablet   2. Recurrent major depression in remission (H)  F33.40 buPROPion (WELLBUTRIN XL) 150 MG 24 hr tablet   3. Prediabetes  R73.03 Hemoglobin A1c   4. Moderate persistent asthma without complication  J45.40 albuterol (PROAIR HFA/PROVENTIL HFA/VENTOLIN HFA) 108 (90 Base) MCG/ACT inhaler     1.   reviewed as noted above.  Patient education material sent to patient via [employer] regarding medication safety, risks.  Ambien refilled x6 months.  Reinforced need to try nonpharmacologic measures.  2.  We will change Wellbutrin back to Wellbutrin  mg to tablets daily.  She is aware, prior authorization for monthly allotment will be needed.  3.  Labs that are due include toxicology screen, metabolic panel and A1c.  Order is placed.  4.  Albuterol is refilled.  Continue Advair.  Flu vaccine this fall, consider Prevnar.    Follow-up in 6 months.      Phone call duration:  20 minutes    Yarely Miller MD

## 2021-10-09 NOTE — PATIENT INSTRUCTIONS
You are currently being prescribed a controlled substance.  You need to be aware of the risks of taking this medication.  By signing a medication contract, you accept these risks and side effects.      Any medical treatment is initially a trial, and that continued prescribing is based on evidence of benefit without an unacceptable risk. Understand that the goal of using this medication is to increase functional level. If your symptoms do not significantly decrease and/or function increase, the medication will be stopped.    Be aware that the use of such medicine has certain risks associated with it, including, but not limited to:  Sleepiness or drowsiness, lightheadedness, dizziness, confusion,allergic reaction, slowing of breathing rate, slowing of reflexes or reaction time, sexual dysfunction,physical dependence, tolerance to analgesia, addiction, withdrawal and the possibility that the medicine will not completely take care of your symptoms.  Usage is associate with a significantly increased risk of falls and other unintended injuries.    The overuse of this medication can result in serious health risks including respiratory depression or even death.  These risks are increased when the medication is combined with alcohol,narcotics, or other sedatives.    The use of sleeping medication long term is associated with an increased risk of dementia and an overall shorter lifespan  It is known that chronic insomnia is related to those conditions and that sleeping medication does not reverse that risk.  Therapies to treat chronic insomnia, such as cognitive behavioral therapy, have better long term outcomes.      Having these medications prescribed for you also means that your accept the risk of possible intentional or accidental use by those in your home or others with whom you come in contact.  This includes their possible diversion of your medications, overdose or death.      This medication will be strictly monitored  and all of your medications should be filled at the same pharmacy.  (Should the need arise to change pharmacies our office must be informed).    It is your responsibility to share that you are on a controlled substance contract with your other health care providers, including your dentist.    It is your responsibility to schedule timely follow up appointments for refills as the medication won't be filled outside of an appointment except in very special circumstances.  For most medication in this category, that mean at minimum visits every 6 months.

## 2021-10-15 ENCOUNTER — TELEPHONE (OUTPATIENT)
Dept: FAMILY MEDICINE | Facility: OTHER | Age: 61
End: 2021-10-15

## 2021-10-15 NOTE — TELEPHONE ENCOUNTER
Received denial from [facility] [place] for OLANZapine 10MG dispersible tablets.  Faxed the denial to the refill nurses

## 2021-10-15 NOTE — TELEPHONE ENCOUNTER
Patient doesn't take olanzapine.  Check that this is correct medication, correct patient.    Yarely Miller MD

## 2021-10-18 ENCOUNTER — TELEPHONE (OUTPATIENT)
Dept: FAMILY MEDICINE | Facility: OTHER | Age: 61
End: 2021-10-18

## 2021-10-18 RX ORDER — BUPROPION HYDROCHLORIDE 300 MG/1
300 TABLET ORAL EVERY MORNING
Qty: 90 TABLET | Refills: 3 | Status: CANCELLED | OUTPATIENT
Start: 2021-10-18

## 2021-10-18 NOTE — TELEPHONE ENCOUNTER
PA for 2- 150 mg tablets was done. Insurance denied it.     Keira Almanza, TONI on 10/18/2021 at 11:08 AM

## 2021-10-18 NOTE — TELEPHONE ENCOUNTER
Patient is taking 150 mg Bupropion- 2 tablets daily. Insurance will only cover 1 tablet daily. PA for 2 tablets daily was denied. Bupropion 300 mg tablet- 1 tablet daily pending.     Keira Almanza CMA on 10/18/2021 at 10:02 AM

## 2021-11-27 DIAGNOSIS — R21 RASH: ICD-10-CM

## 2021-11-27 RX ORDER — TRIAMCINOLONE ACETONIDE 1 MG/G
CREAM TOPICAL
Qty: 30 G | Refills: 11 | Status: SHIPPED | OUTPATIENT
Start: 2021-11-27 | End: 2023-04-19

## 2022-01-14 ENCOUNTER — HOSPITAL ENCOUNTER (OUTPATIENT)
Dept: GENERAL RADIOLOGY | Facility: OTHER | Age: 62
Discharge: HOME | End: 2022-01-14
Attending: NURSE PRACTITIONER | Admitting: NURSE PRACTITIONER
Payer: COMMERCIAL

## 2022-01-14 DIAGNOSIS — M46.1 OSTEOARTHRITIS OF BOTH SACROILIAC JOINTS (H): ICD-10-CM

## 2022-01-14 PROCEDURE — 27096 INJECT SACROILIAC JOINT: CPT | Mod: 50

## 2022-01-14 PROCEDURE — 250N000009 HC RX 250: Performed by: RADIOLOGY

## 2022-01-14 PROCEDURE — 250N000011 HC RX IP 250 OP 636: Performed by: RADIOLOGY

## 2022-01-14 PROCEDURE — 255N000002 HC RX 255 OP 636: Performed by: RADIOLOGY

## 2022-01-14 RX ORDER — LIDOCAINE HYDROCHLORIDE 10 MG/ML
5 INJECTION, SOLUTION INFILTRATION; PERINEURAL ONCE
Status: COMPLETED | OUTPATIENT
Start: 2022-01-14 | End: 2022-01-14

## 2022-01-14 RX ORDER — BUPIVACAINE HYDROCHLORIDE 5 MG/ML
3 INJECTION, SOLUTION PERINEURAL ONCE
Status: COMPLETED | OUTPATIENT
Start: 2022-01-14 | End: 2022-01-14

## 2022-01-14 RX ORDER — TRIAMCINOLONE ACETONIDE 40 MG/ML
1 INJECTION, SUSPENSION INTRA-ARTICULAR; INTRAMUSCULAR ONCE
Status: COMPLETED | OUTPATIENT
Start: 2022-01-14 | End: 2022-01-14

## 2022-01-14 RX ADMIN — IOHEXOL 2 ML: 240 INJECTION, SOLUTION INTRATHECAL; INTRAVASCULAR; INTRAVENOUS; ORAL at 14:01

## 2022-01-14 RX ADMIN — TRIAMCINOLONE ACETONIDE 80 MG: 40 INJECTION, SUSPENSION INTRA-ARTICULAR; INTRAMUSCULAR at 14:02

## 2022-01-14 RX ADMIN — BUPIVACAINE HYDROCHLORIDE 30 MG: 5 INJECTION, SOLUTION EPIDURAL; INTRACAUDAL at 14:02

## 2022-01-14 RX ADMIN — LIDOCAINE HYDROCHLORIDE 2 ML: 10 INJECTION, SOLUTION INFILTRATION; PERINEURAL at 14:02

## 2022-02-15 DIAGNOSIS — F33.40 RECURRENT MAJOR DEPRESSION IN REMISSION (H): ICD-10-CM

## 2022-02-15 RX ORDER — BUPROPION HYDROCHLORIDE 150 MG/1
150 TABLET, EXTENDED RELEASE ORAL 2 TIMES DAILY
Qty: 180 TABLET | Refills: 1 | OUTPATIENT
Start: 2022-02-15

## 2022-02-15 NOTE — TELEPHONE ENCOUNTER
[id number] ([facility] of [place] sent Rx request for the following:      Requested Prescriptions   Pending Prescriptions Disp Refills     buPROPion (WELLBUTRIN SR) 150 MG 12 hr tablet [Pharmacy Med Name: BUPROPION 150MG ER (SR) TABLET] 180 tablet 1     Sig: TAKE 1 TABLET (150 MG) BY MOUTH 2 TIMES DAILY       SSRIs Protocol Failed - 2/15/2022  1:01 AM        Failed - PHQ-9 score less than 5 in past 6 months     Please review last PHQ-9 score.             Medication discontinued on 10/9/2021   Last Office Visit:              10/9/2021   Future Office visit:           None  Routing refill request to provider for review/approval because:

## 2022-02-17 DIAGNOSIS — G47.00 INSOMNIA, PERSISTENT: ICD-10-CM

## 2022-02-17 RX ORDER — ZOLPIDEM TARTRATE 10 MG/1
10 TABLET ORAL AT BEDTIME
Qty: 30 TABLET | Refills: 4 | OUTPATIENT
Start: 2022-02-17

## 2022-02-17 NOTE — TELEPHONE ENCOUNTER
Redundant refill request refused: Too soon:    zolpidem (AMBIEN) 10 MG tablet 30 tablet 5 10/9/2021  No   Sig - Route: Take 1 tablet (10 mg) by mouth At Bedtime - Oral   Sent to pharmacy as: Zolpidem Tartrate 10 MG Oral Tablet (AMBIEN)   Class: E-Prescribe   Order: [id number]   E-Prescribing Status: Receipt confirmed by pharmacy (10/9/2021 10:21 AM CDT)     [id number] ([facility]) - [place], [place] - [address]     Unable to complete prescription refill per RN Medication Refill Policy. Gabrielle Guadrado RN .............. 2/17/2022  9:40 AM

## 2022-02-23 DIAGNOSIS — F33.40 RECURRENT MAJOR DEPRESSION IN REMISSION (H): Primary | ICD-10-CM

## 2022-02-23 RX ORDER — BUPROPION HYDROCHLORIDE 150 MG/1
150 TABLET, EXTENDED RELEASE ORAL 2 TIMES DAILY
Qty: 90 TABLET | Refills: 0 | Status: SHIPPED | OUTPATIENT
Start: 2022-02-23 | End: 2022-04-21

## 2022-02-23 NOTE — TELEPHONE ENCOUNTER
Called [facility] in request for refill on Wellbutrin  mg tablet take one tablet by mouth 2 times daily.    Current med list is   buPROPion (WELLBUTRIN XL) 150 MG 24 hr tablet 180 tablet 3 10/9/2021  No   Sig - Route: Take 2 tablets (300 mg) by mouth every morning -      Called and spoke with Peyton at [facility] and she states that the Wellbutrin  mg take 2 tablets daily was not covered by insurance and PA was denied.    So patient has been filling Wellbutrin  mg tablet take one tablet by mouth 2 times daily.    Will route to Yarely Miller MD for review and consideration.    Indu Curry RN on 2/23/2022 at 3:59 PM

## 2022-03-10 DIAGNOSIS — G47.00 INSOMNIA, PERSISTENT: ICD-10-CM

## 2022-03-11 NOTE — TELEPHONE ENCOUNTER
Routing refill request to provider for review/approval because:    LOV: 10/9/2021 VV    Indu Curry RN on 3/11/2022 at 9:20 AM

## 2022-03-13 RX ORDER — ZOLPIDEM TARTRATE 10 MG/1
10 TABLET ORAL AT BEDTIME
Qty: 30 TABLET | Refills: 0 | Status: SHIPPED | OUTPATIENT
Start: 2022-03-13 | End: 2022-04-18

## 2022-03-14 NOTE — TELEPHONE ENCOUNTER
Patient informed of Dr. Malcolm Damon's response. Transferred to scheduling to schedule appointment in April.     Keira Almanza CMA on 3/14/2022 at 1:23 PM

## 2022-04-06 ENCOUNTER — MYC MEDICAL ADVICE (OUTPATIENT)
Dept: FAMILY MEDICINE | Facility: OTHER | Age: 62
End: 2022-04-06
Payer: COMMERCIAL

## 2022-04-06 DIAGNOSIS — G47.00 INSOMNIA, PERSISTENT: ICD-10-CM

## 2022-04-06 RX ORDER — ZOLPIDEM TARTRATE 10 MG/1
10 TABLET ORAL AT BEDTIME
Qty: 30 TABLET | Refills: 5 | OUTPATIENT
Start: 2022-04-06

## 2022-04-06 NOTE — TELEPHONE ENCOUNTER
1.  Patient did not have 3/29/22 visit.  This was 2021.  2.  Patient has previously been made aware that controlled substances require an office visit.  3.  Refill denied until she is seen in clinic on the 18th.  Yarely Miller MD

## 2022-04-06 NOTE — TELEPHONE ENCOUNTER
Last Prescription Date: 3/13/22  Last Qty/Refills: 30 / 0  Last Office Visit: 3/29/21 PCP  Future Office Visit: 4/18/22 PCP     Requested Prescriptions   Pending Prescriptions Disp Refills     zolpidem (AMBIEN) 10 MG tablet [Pharmacy Med Name: ZOLPIDEM 10MG TABLET] 30 tablet 0     Sig: TAKE 1 TABLET (10 MG) BY MOUTH AT BEDTIME       There is no refill protocol information for this order

## 2022-04-18 ENCOUNTER — OFFICE VISIT (OUTPATIENT)
Dept: FAMILY MEDICINE | Facility: OTHER | Age: 62
End: 2022-04-18
Attending: FAMILY MEDICINE
Payer: COMMERCIAL

## 2022-04-18 VITALS
DIASTOLIC BLOOD PRESSURE: 72 MMHG | HEIGHT: 66 IN | WEIGHT: 220.2 LBS | TEMPERATURE: 98.5 F | BODY MASS INDEX: 35.39 KG/M2 | RESPIRATION RATE: 16 BRPM | HEART RATE: 88 BPM | SYSTOLIC BLOOD PRESSURE: 126 MMHG | OXYGEN SATURATION: 97 %

## 2022-04-18 DIAGNOSIS — J30.9 ALLERGIC RHINITIS, UNSPECIFIED SEASONALITY, UNSPECIFIED TRIGGER: ICD-10-CM

## 2022-04-18 DIAGNOSIS — F33.40 RECURRENT MAJOR DEPRESSION IN REMISSION (H): ICD-10-CM

## 2022-04-18 DIAGNOSIS — R73.03 PREDIABETES: ICD-10-CM

## 2022-04-18 DIAGNOSIS — N94.10 DYSPAREUNIA IN FEMALE: ICD-10-CM

## 2022-04-18 DIAGNOSIS — I10 ESSENTIAL HYPERTENSION: ICD-10-CM

## 2022-04-18 DIAGNOSIS — Z00.00 PHYSICAL EXAM, ANNUAL: Primary | ICD-10-CM

## 2022-04-18 DIAGNOSIS — J45.40 MODERATE PERSISTENT ASTHMA WITHOUT COMPLICATION: ICD-10-CM

## 2022-04-18 DIAGNOSIS — E66.09 CLASS 1 OBESITY DUE TO EXCESS CALORIES WITH SERIOUS COMORBIDITY AND BODY MASS INDEX (BMI) OF 31.0 TO 31.9 IN ADULT: ICD-10-CM

## 2022-04-18 DIAGNOSIS — G47.00 INSOMNIA, PERSISTENT: ICD-10-CM

## 2022-04-18 DIAGNOSIS — G43.819 OTHER MIGRAINE WITHOUT STATUS MIGRAINOSUS, INTRACTABLE: ICD-10-CM

## 2022-04-18 DIAGNOSIS — Z23 NEED FOR SHINGLES VACCINE: ICD-10-CM

## 2022-04-18 DIAGNOSIS — E66.811 CLASS 1 OBESITY DUE TO EXCESS CALORIES WITH SERIOUS COMORBIDITY AND BODY MASS INDEX (BMI) OF 31.0 TO 31.9 IN ADULT: ICD-10-CM

## 2022-04-18 LAB
ALBUMIN SERPL-MCNC: 4.8 G/DL (ref 3.5–5.7)
ALBUMIN UR-MCNC: 20 MG/DL
ALP SERPL-CCNC: 78 U/L (ref 34–104)
ALT SERPL W P-5'-P-CCNC: 16 U/L (ref 7–52)
ANION GAP SERPL CALCULATED.3IONS-SCNC: 11 MMOL/L (ref 3–14)
APPEARANCE UR: CLEAR
AST SERPL W P-5'-P-CCNC: 16 U/L (ref 13–39)
BILIRUB SERPL-MCNC: 0.6 MG/DL (ref 0.3–1)
BILIRUB UR QL STRIP: NEGATIVE
BUN SERPL-MCNC: 14 MG/DL (ref 7–25)
CALCIUM SERPL-MCNC: 10.1 MG/DL (ref 8.6–10.3)
CHLORIDE BLD-SCNC: 100 MMOL/L (ref 98–107)
CHOLEST SERPL-MCNC: 265 MG/DL
CO2 SERPL-SCNC: 29 MMOL/L (ref 21–31)
COLOR UR AUTO: YELLOW
CREAT SERPL-MCNC: 0.96 MG/DL (ref 0.6–1.2)
ERYTHROCYTE [DISTWIDTH] IN BLOOD BY AUTOMATED COUNT: 12.7 % (ref 10–15)
FASTING STATUS PATIENT QL REPORTED: YES
GFR SERPL CREATININE-BSD FRML MDRD: 67 ML/MIN/1.73M2
GLUCOSE BLD-MCNC: 102 MG/DL (ref 70–105)
GLUCOSE UR STRIP-MCNC: NEGATIVE MG/DL
HBA1C MFR BLD: 5.6 % (ref 4–6.2)
HCT VFR BLD AUTO: 43.1 % (ref 35–47)
HDLC SERPL-MCNC: 66 MG/DL (ref 23–92)
HGB BLD-MCNC: 14.3 G/DL (ref 11.7–15.7)
HGB UR QL STRIP: NEGATIVE
KETONES UR STRIP-MCNC: 40 MG/DL
LDLC SERPL CALC-MCNC: 173 MG/DL
LEUKOCYTE ESTERASE UR QL STRIP: NEGATIVE
MCH RBC QN AUTO: 28.9 PG (ref 26.5–33)
MCHC RBC AUTO-ENTMCNC: 33.2 G/DL (ref 31.5–36.5)
MCV RBC AUTO: 87 FL (ref 78–100)
MUCOUS THREADS #/AREA URNS LPF: PRESENT /LPF
NITRATE UR QL: NEGATIVE
NONHDLC SERPL-MCNC: 199 MG/DL
PH UR STRIP: 6 [PH] (ref 5–9)
PLATELET # BLD AUTO: 376 10E3/UL (ref 150–450)
POTASSIUM BLD-SCNC: 3.2 MMOL/L (ref 3.5–5.1)
PROT SERPL-MCNC: 7.8 G/DL (ref 6.4–8.9)
RBC # BLD AUTO: 4.94 10E6/UL (ref 3.8–5.2)
RBC URINE: 1 /HPF
SODIUM SERPL-SCNC: 140 MMOL/L (ref 134–144)
SP GR UR STRIP: 1.02 (ref 1–1.03)
TRIGL SERPL-MCNC: 130 MG/DL
UROBILINOGEN UR STRIP-MCNC: NORMAL MG/DL
WBC # BLD AUTO: 9.2 10E3/UL (ref 4–11)
WBC URINE: 1 /HPF

## 2022-04-18 PROCEDURE — 81003 URINALYSIS AUTO W/O SCOPE: CPT | Mod: ZL | Performed by: FAMILY MEDICINE

## 2022-04-18 PROCEDURE — 80053 COMPREHEN METABOLIC PANEL: CPT | Mod: ZL | Performed by: FAMILY MEDICINE

## 2022-04-18 PROCEDURE — 36415 COLL VENOUS BLD VENIPUNCTURE: CPT | Mod: ZL | Performed by: FAMILY MEDICINE

## 2022-04-18 PROCEDURE — 99213 OFFICE O/P EST LOW 20 MIN: CPT | Mod: 25 | Performed by: FAMILY MEDICINE

## 2022-04-18 PROCEDURE — 85027 COMPLETE CBC AUTOMATED: CPT | Mod: ZL | Performed by: FAMILY MEDICINE

## 2022-04-18 PROCEDURE — 83036 HEMOGLOBIN GLYCOSYLATED A1C: CPT | Mod: ZL | Performed by: FAMILY MEDICINE

## 2022-04-18 PROCEDURE — 99396 PREV VISIT EST AGE 40-64: CPT | Performed by: FAMILY MEDICINE

## 2022-04-18 PROCEDURE — 80061 LIPID PANEL: CPT | Mod: ZL | Performed by: FAMILY MEDICINE

## 2022-04-18 RX ORDER — LOSARTAN POTASSIUM 100 MG/1
100 TABLET ORAL DAILY
Qty: 90 TABLET | Refills: 3 | Status: SHIPPED | OUTPATIENT
Start: 2022-04-18 | End: 2022-09-07

## 2022-04-18 RX ORDER — ZOLPIDEM TARTRATE 10 MG/1
10 TABLET ORAL AT BEDTIME
Qty: 30 TABLET | Refills: 5 | Status: SHIPPED | OUTPATIENT
Start: 2022-04-18 | End: 2022-09-07

## 2022-04-18 RX ORDER — SUMATRIPTAN 20 MG/1
1 SPRAY NASAL PRN
Qty: 18 EACH | Refills: 2 | Status: SHIPPED | OUTPATIENT
Start: 2022-04-18 | End: 2023-04-19

## 2022-04-18 RX ORDER — HYDROCHLOROTHIAZIDE 12.5 MG/1
12.5 TABLET ORAL DAILY
Qty: 90 TABLET | Refills: 3 | Status: SHIPPED | OUTPATIENT
Start: 2022-04-18 | End: 2022-09-07

## 2022-04-18 RX ORDER — MONTELUKAST SODIUM 10 MG/1
1 TABLET ORAL AT BEDTIME
Qty: 90 TABLET | Refills: 3 | Status: SHIPPED | OUTPATIENT
Start: 2022-04-18 | End: 2022-09-07

## 2022-04-18 RX ORDER — POTASSIUM CHLORIDE 750 MG/1
20 TABLET, EXTENDED RELEASE ORAL
Qty: 180 TABLET | Refills: 3 | Status: SHIPPED | OUTPATIENT
Start: 2022-04-18 | End: 2022-04-20

## 2022-04-18 RX ORDER — BUPROPION HYDROCHLORIDE 150 MG/1
300 TABLET ORAL EVERY MORNING
Qty: 180 TABLET | Refills: 3 | Status: CANCELLED | OUTPATIENT
Start: 2022-04-18

## 2022-04-18 RX ORDER — FLUTICASONE PROPIONATE AND SALMETEROL 250; 50 UG/1; UG/1
1 POWDER RESPIRATORY (INHALATION) EVERY 12 HOURS
Qty: 1 EACH | Refills: 11 | Status: SHIPPED | OUTPATIENT
Start: 2022-04-18 | End: 2023-04-19

## 2022-04-18 RX ORDER — ESTRADIOL 10 UG/1
10 INSERT VAGINAL
Qty: 8 TABLET | Refills: 11 | Status: SHIPPED | OUTPATIENT
Start: 2022-04-18 | End: 2022-09-07

## 2022-04-18 RX ORDER — HYDROCHLOROTHIAZIDE 25 MG/1
25 TABLET ORAL DAILY
Qty: 90 TABLET | Refills: 3 | Status: SHIPPED | OUTPATIENT
Start: 2022-04-18 | End: 2022-09-07

## 2022-04-18 RX ORDER — FLUTICASONE PROPIONATE 50 MCG
2 SPRAY, SUSPENSION (ML) NASAL DAILY
Qty: 48 G | Refills: 3 | Status: SHIPPED | OUTPATIENT
Start: 2022-04-18 | End: 2023-04-19

## 2022-04-18 ASSESSMENT — ASTHMA QUESTIONNAIRES: ACT_TOTALSCORE: 20

## 2022-04-18 ASSESSMENT — PATIENT HEALTH QUESTIONNAIRE - PHQ9
SUM OF ALL RESPONSES TO PHQ QUESTIONS 1-9: 0
SUM OF ALL RESPONSES TO PHQ QUESTIONS 1-9: 0
10. IF YOU CHECKED OFF ANY PROBLEMS, HOW DIFFICULT HAVE THESE PROBLEMS MADE IT FOR YOU TO DO YOUR WORK, TAKE CARE OF THINGS AT HOME, OR GET ALONG WITH OTHER PEOPLE: NOT DIFFICULT AT ALL

## 2022-04-18 ASSESSMENT — PAIN SCALES - GENERAL: PAINLEVEL: NO PAIN (0)

## 2022-04-18 NOTE — PROGRESS NOTES
"   SUBJECTIVE:   CC: Yolie Bedoya is an 61 year old woman who presents for preventive health visit.   Nursing Notes:   Keira Almanza MA  4/18/2022  2:06 PM  Signed  Chief Complaint   Patient presents with     Physical     Patient is here for annual physical     Initial /72   Pulse 88   Temp 98.5  F (36.9  C) (Tympanic)   Resp 16   Ht 1.664 m (5' 5.5\")   Wt 99.9 kg (220 lb 3.2 oz)   LMP  (LMP Unknown)   SpO2 97%   Breastfeeding No   BMI 36.09 kg/m   Estimated body mass index is 36.09 kg/m  as calculated from the following:    Height as of this encounter: 1.664 m (5' 5.5\").    Weight as of this encounter: 99.9 kg (220 lb 3.2 oz).  Medication Reconciliation: complete    Keira Almanza MA       FOOD SECURITY SCREENING QUESTIONS:    The next two questions are to help us understand your food security.  If you are feeling you need any assistance in this area, we have resources available to support you today.    Hunger Vital Signs:  Within the past 12 months we worried whether our food would run out before we got money to buy more. Never  Within the past 12 months the food we bought just didn't last and we didn't have money to get more. Never  Keira Almanza MA,LPN on 4/18/2022 at 1:41 PM    Patient has been advised of split billing requirements and indicates understanding: Yes  Healthy Habits:     Getting at least 3 servings of Calcium per day:  NO    Bi-annual eye exam:  Yes    Dental care twice a year:  Yes    Sleep apnea or symptoms of sleep apnea:  None    Diet:  Regular (no restrictions)    Frequency of exercise:  None    Taking medications regularly:  Yes    Medication side effects:  None    PHQ-2 Total Score: 0    Additional concerns today:  No          PROBLEMS TO ADD ON...  1.  Knee osteoarthritis   2.  Asthma - with mold, traveling and visiting she will have increased symptoms.  Has some nocturnal coughing   3.  She is having panic attacks again   - certain things she can't do (conflict, "  job)  ; she avoids these situations now, panic attacks and anxiety will show up still   - she is uncertain which Wellbutrin formulation she is currently taking versus which she has taken in the past.  4.  Chronic insomnia - continues to use Ambien, previous recommendations for CBT  5.  Vaginal dryness, dyspareunia.  History of hysterectomy  6.  Hypertension, on Cozaar        Today's PHQ-2 Score:   PHQ-2 ( 1999 Pfizer) 4/18/2022   Q1: Little interest or pleasure in doing things 0   Q2: Feeling down, depressed or hopeless 0   PHQ-2 Score 0   PHQ-2 Total Score (12-17 Years)- Positive if 3 or more points; Administer PHQ-A if positive -   Q1: Little interest or pleasure in doing things Not at all   Q2: Feeling down, depressed or hopeless Not at all   PHQ-2 Score 0       Abuse: Current or Past (Physical, Sexual or Emotional) - No  Do you feel safe in your environment? Yes        Social History     Tobacco Use     Smoking status: Never Smoker     Smokeless tobacco: Never Used   Substance Use Topics     Alcohol use: Yes     Comment: Alcoholic Drinks/day: occ         Alcohol Use 4/18/2022   Prescreen: >3 drinks/day or >7 drinks/week? Not Applicable       Reviewed orders with patient.  Reviewed health maintenance and updated orders accordingly - Yes      Breast Cancer Screening:  Any new diagnosis of family breast, ovarian, or bowel cancer? No    FHS-7: No flowsheet data found.    Mammogram Screening: Recommended annual mammography  Pertinent mammograms are reviewed under the imaging tab.    History of abnormal Pap smear: Status post hysterectomy. Pap still indicated. NO     Reviewed and updated as needed this visit by clinical staff   Tobacco  Allergies  Meds      Soc Hx         Current Outpatient Medications   Medication     albuterol (PROAIR HFA/PROVENTIL HFA/VENTOLIN HFA) 108 (90 Base) MCG/ACT inhaler     buPROPion (WELLBUTRIN SR) 150 MG 12 hr tablet     calcium polycarbophil (FIBERCON) 625 MG tablet      cetirizine HCl 10 MG CAPS     Cholecalciferol (VITAMIN D3) 1000 UNITS CAPS     diphenhydrAMINE (BENADRYL) 25 MG capsule     estradiol (VAGIFEM) 10 MCG TABS vaginal tablet     fluticasone (FLONASE) 50 MCG/ACT nasal spray     fluticasone-salmeterol (ADVAIR DISKUS) 250-50 MCG/DOSE inhaler     fluticasone-salmeterol (ADVAIR) 250-50 MCG/DOSE inhaler     hydrochlorothiazide (HYDRODIURIL) 12.5 MG tablet     hydrochlorothiazide (HYDRODIURIL) 25 MG tablet     losartan (COZAAR) 100 MG tablet     montelukast (SINGULAIR) 10 MG tablet     multivitamin w/minerals (THERA-VIT-M) tablet     potassium chloride ER (K-TAB/KLOR-CON) 10 MEQ CR tablet     SUMAtriptan (IMITREX) 20 MG/ACT nasal spray     triamcinolone (KENALOG) 0.1 % external cream     zolpidem (AMBIEN) 10 MG tablet     No current facility-administered medications for this visit.         Reviewed and updated as needed this visit by Provider                   Past Medical History:   Diagnosis Date     Allergic rhinitis due to animal hair and dander     1970,aspen, birch, maple, oak, grass, dust mite, ragwee, cocklebur, mugwart, lambs quarter, pigweed, fungus, molds     BCC (basal cell carcinoma), face 2020    Mohs     Calculus of gallbladder without cholecystitis without obstruction     No Comments Provided     Essential (primary) hypertension     No Comments Provided     Excessive and frequent menstruation with regular cycle     s/p CLARA     Hormone replacement therapy (postmenopausal)     2010,started ERT     Obesity     No Comments Provided     Other long term (current) drug therapy     2/11/2014,ambien #30/month     Other specified postprocedural states     nausea and severe vomiting with narcotics     Psychophysiologic insomnia     contract signed 2/2014     Tubulo-interstitial nephritis     No Comments Provided     Venous insufficiency (chronic) (peripheral)     No Comments Provided      Past Surgical History:   Procedure Laterality Date     ARTHROSCOPY KNEE  10/12/2017  "   10/12/2017     BLADDER SURGERY  10/25/2017     SLING OPER STRES INCONTINENCE     COLONOSCOPY  06/2004     because of change in bowel habits     COLONOSCOPY  05/14/2014 5/14/2014,Extensive diverticulosis throughout the colon - follow up 10 years     EXTRACTION(S) DENTAL      No Comments Provided     HYSTERECTOMY TOTAL ABDOMINAL, BILATERAL SALPINGO-OOPHORECTOMY, COMBINED  03/30/2010    CLARA/BSO/Vasquez/Posterior Repair     LAPAROSCOPIC ABLATION ENDOMETRIOSIS      3/05     left total knee arthroplasty Left 10/2019    Dr. Jones.  [place]       Review of Systems  Vaginal dryness and painful intercourse    Dentist and eye visits reviewe  +GERD symptoms   Colon cancer screening up to date      OBJECTIVE:   /72   Pulse 88   Temp 98.5  F (36.9  C) (Tympanic)   Resp 16   Ht 1.664 m (5' 5.5\")   Wt 99.9 kg (220 lb 3.2 oz)   LMP  (LMP Unknown)   SpO2 97%   Breastfeeding No   BMI 36.09 kg/m    Physical Exam  GENERAL: healthy, alert and no distress  NECK: no adenopathy, no asymmetry, masses, or scars and thyroid normal to palpation  RESP: lungs clear to auscultation - no rales, rhonchi or wheezes  BREAST: normal without masses, tenderness or nipple discharge and no palpable axillary masses or adenopathy  CV: regular rate and rhythm, normal S1 S2, no S3 or S4, no murmur, click or rub, no peripheral edema and peripheral pulses strong  ABDOMEN: soft, nontender, no hepatosplenomegaly, no masses and bowel sounds normal  MS: left medial knee tenderness        ASSESSMENT/PLAN:       ICD-10-CM    1. Physical exam, annual  Z00.00    2. Moderate persistent asthma without complication  J45.40 fluticasone-salmeterol (ADVAIR) 250-50 MCG/DOSE inhaler   3. Essential hypertension  I10 hydrochlorothiazide (HYDRODIURIL) 12.5 MG tablet     hydrochlorothiazide (HYDRODIURIL) 25 MG tablet     losartan (COZAAR) 100 MG tablet     potassium chloride ER (K-TAB/KLOR-CON) 10 MEQ CR tablet     Comprehensive Metabolic Panel     CBC W PLT No " Diff     Hemoglobin A1c     Lipid Panel     UA reflex to Microscopic and Culture     UA reflex to Microscopic and Culture     Lipid Panel     Hemoglobin A1c     CBC W PLT No Diff     Comprehensive Metabolic Panel   4. Prediabetes  R73.03 Comprehensive Metabolic Panel     CBC W PLT No Diff     Hemoglobin A1c     Lipid Panel     UA reflex to Microscopic and Culture     UA reflex to Microscopic and Culture     Lipid Panel     Hemoglobin A1c     CBC W PLT No Diff     Comprehensive Metabolic Panel   5. Insomnia, persistent  G47.00 zolpidem (AMBIEN) 10 MG tablet   6. Recurrent major depression in remission (H)  F33.40    7. Class 1 obesity due to excess calories with serious comorbidity and body mass index (BMI) of 31.0 to 31.9 in adult  E66.09     Z68.31    8. Allergic rhinitis, unspecified seasonality, unspecified trigger  J30.9 fluticasone (FLONASE) 50 MCG/ACT nasal spray     montelukast (SINGULAIR) 10 MG tablet   9. Other migraine without status migrainosus, intractable  G43.819 SUMAtriptan (IMITREX) 20 MG/ACT nasal spray   10. Need for shingles vaccine  Z23    11. Dyspareunia in female  N94.10 estradiol (VAGIFEM) 10 MCG TABS vaginal tablet     PDMP Review       Value Time User    State PDMP site checked  Yes 4/6/2022  8:43 AM Yarely Villagomez MD        1.  Labs due today include lipid panel, metabolic panel, A1c, urinalysis.  2.  Patient remains at increased risk for developing type 2 diabetes  3.  Shingrix vaccine recommended  4.  Asthma care plan reviewed.  5.  Continue same treatment for hypertension  6.  After review of , review of long-term risks of sleep medicine usage, Ambien is refilled x6 months.  7.  Discussed options related to treatment of vaginal dryness/dyspareunia.  Several years ago, she was on oral estrogen replacement therapy, discontinued around time of knee surgery due to VTE risk.  We discussed options for topical treatment and she wishes to proceed with Vagifem.  Prescriptions  "given for Vagifem intravaginal tablets twice a week.    Follow-up visit in 6 months will be due.    COUNSELING:  Reviewed preventive health counseling, as reflected in patient instructions       Regular exercise       Healthy diet/nutrition       Vision screening       Shingrix vaccine    Estimated body mass index is 36.09 kg/m  as calculated from the following:    Height as of this encounter: 1.664 m (5' 5.5\").    Weight as of this encounter: 99.9 kg (220 lb 3.2 oz).    Weight management plan: Discussed healthy diet and exercise guidelines    She reports that she has never smoked. She has never used smokeless tobacco.      Counseling Resources:  ATP IV Guidelines  Pooled Cohorts Equation Calculator  Breast Cancer Risk Calculator  BRCA-Related Cancer Risk Assessment: FHS-7 Tool  FRAX Risk Assessment  ICSI Preventive Guidelines  Dietary Guidelines for Americans, 2010  USDA's MyPlate  ASA Prophylaxis  Lung CA Screening    DAVDI RAE MD  [facility]  Answers for HPI/ROS submitted by the patient on 4/18/2022  If you checked off any problems, how difficult have these problems made it for you to do your work, take care of things at home, or get along with other people?: Not difficult at all  PHQ9 TOTAL SCORE: 0      "

## 2022-04-18 NOTE — NURSING NOTE
"Chief Complaint   Patient presents with     Physical     Patient is here for annual physical     Initial /72   Pulse 88   Temp 98.5  F (36.9  C) (Tympanic)   Resp 16   Ht 1.664 m (5' 5.5\")   Wt 99.9 kg (220 lb 3.2 oz)   LMP  (LMP Unknown)   SpO2 97%   Breastfeeding No   BMI 36.09 kg/m   Estimated body mass index is 36.09 kg/m  as calculated from the following:    Height as of this encounter: 1.664 m (5' 5.5\").    Weight as of this encounter: 99.9 kg (220 lb 3.2 oz).  Medication Reconciliation: complete    Keira Almanza MA       FOOD SECURITY SCREENING QUESTIONS:    The next two questions are to help us understand your food security.  If you are feeling you need any assistance in this area, we have resources available to support you today.    Hunger Vital Signs:  Within the past 12 months we worried whether our food would run out before we got money to buy more. Never  Within the past 12 months the food we bought just didn't last and we didn't have money to get more. Never  Keira Almanza MA,LPN on 4/18/2022 at 1:41 PM      "

## 2022-04-19 ASSESSMENT — PATIENT HEALTH QUESTIONNAIRE - PHQ9: SUM OF ALL RESPONSES TO PHQ QUESTIONS 1-9: 0

## 2022-04-20 ENCOUNTER — MYC MEDICAL ADVICE (OUTPATIENT)
Dept: FAMILY MEDICINE | Facility: OTHER | Age: 62
End: 2022-04-20
Payer: COMMERCIAL

## 2022-04-20 DIAGNOSIS — E78.2 MIXED HYPERLIPIDEMIA: Primary | ICD-10-CM

## 2022-04-20 DIAGNOSIS — I10 ESSENTIAL HYPERTENSION: ICD-10-CM

## 2022-04-20 RX ORDER — POTASSIUM CHLORIDE 750 MG/1
TABLET, EXTENDED RELEASE ORAL
Qty: 270 TABLET | Refills: 4 | Status: SHIPPED | OUTPATIENT
Start: 2022-04-20 | End: 2023-04-19

## 2022-04-20 NOTE — PATIENT INSTRUCTIONS
You are currently being prescribed a controlled substance.  You need to be aware of the risks of taking this medication.  By signing a medication contract, you accept these risks and side effects.      Any medical treatment is initially a trial, and that continued prescribing is based on evidence of benefit without an unacceptable risk. Understand that the goal of using this medication is to increase functional level. If your symptoms do not significantly decrease and/or function increase, the medication will be stopped.    Be aware that the use of such medicine has certain risks associated with it, including, but not limited to:  Sleepiness or drowsiness, lightheadedness, dizziness, confusion,allergic reaction, slowing of breathing rate, slowing of reflexes or reaction time, sexual dysfunction,physical dependence, tolerance to analgesia, addiction, withdrawal and the possibility that the medicine will not completely take care of your symptoms.  Usage is associate with a significantly increased risk of falls and other unintended injuries.    The use of sleeping medication long term is associated with an increased risk of dementia and an overall shorter lifespan  It is known that chronic insomnia is related to those conditions and that sleeping medication does not reverse that risk.  Therapies to treat chronic insomnia, such as cognitive behavioral therapy, have better long term outcomes.      The overuse of this medication can result in serious health risks including respiratory depression or even death.  These risks are increased when the medication is combined with alcohol,narcotics, or other sedatives.    Having these medications prescribed for you also means that your accept the risk of possible intentional or accidental use by those in your home or others with whom you come in contact.  This includes their possible diversion of your medications, overdose or death.      This medication will be strictly monitored  and all of your medications should be filled at the same pharmacy.  (Should the need arise to change pharmacies our office must be informed).    It is your responsibility to share that you are on a controlled substance contract with your other health care providers, including your dentist.      It is your responsibility to schedule timely follow up appointments for refills as the medication won't be filled outside of an appointment except in very special circumstances.  For most medication in this category, that mean at minimum visits every 6 months.

## 2022-04-21 RX ORDER — BUPROPION HYDROCHLORIDE 150 MG/1
150 TABLET ORAL EVERY MORNING
COMMUNITY
Start: 2022-04-21 | End: 2022-09-07

## 2022-04-21 RX ORDER — ROSUVASTATIN CALCIUM 10 MG/1
10 TABLET, COATED ORAL DAILY
Qty: 90 TABLET | Refills: 3 | Status: SHIPPED | OUTPATIENT
Start: 2022-04-21 | End: 2022-09-07

## 2022-04-25 ENCOUNTER — TELEPHONE (OUTPATIENT)
Dept: FAMILY MEDICINE | Facility: OTHER | Age: 62
End: 2022-04-25
Payer: COMMERCIAL

## 2022-04-25 NOTE — TELEPHONE ENCOUNTER
Called and verified patient full name and . Patient states she saw messages. No questions.     Keira Peraza LPN on 2022 at 9:30 AM

## 2022-05-18 ENCOUNTER — ALLIED HEALTH/NURSE VISIT (OUTPATIENT)
Dept: FAMILY MEDICINE | Facility: OTHER | Age: 62
End: 2022-05-18
Attending: FAMILY MEDICINE
Payer: COMMERCIAL

## 2022-05-18 DIAGNOSIS — Z23 NEED FOR ZOSTER VACCINATION: Primary | ICD-10-CM

## 2022-05-18 DIAGNOSIS — Z23 HIGH PRIORITY FOR 2019-NCOV VACCINE: ICD-10-CM

## 2022-05-18 PROCEDURE — 91305 COVID-19,PF,PFIZER (12+ YRS): CPT

## 2022-05-18 PROCEDURE — 90471 IMMUNIZATION ADMIN: CPT

## 2022-05-18 PROCEDURE — 90750 HZV VACC RECOMBINANT IM: CPT

## 2022-05-18 PROCEDURE — 0054A COVID-19,PF,PFIZER (12+ YRS): CPT

## 2022-05-18 NOTE — PROGRESS NOTES
Immunization Documentation - Shingrix #1  Verified patient's first and last name, and . Stated reason for visit today is to receive the Shingrix vaccine. Denied any concerns with previous immunizations. Allergies reviewed. VIS handout(s) reviewed and given to take home. Shingrix prepared and administered IM per standing order. Administration documented in IMMUNIZATIONS (see flowsheet and order for further information). Patient Instructed to wait in lobby for 15 minutes post-injection and notify staff immediately of any reaction.       Pt also received her 4th Covid booster during this visit.    Maribel Verdugo RN ....................  2022   9:01 AM

## 2022-09-01 DIAGNOSIS — M62.89 HAMSTRING TIGHTNESS OF RIGHT LOWER EXTREMITY: Primary | ICD-10-CM

## 2022-09-07 ENCOUNTER — OFFICE VISIT (OUTPATIENT)
Dept: FAMILY MEDICINE | Facility: OTHER | Age: 62
End: 2022-09-07
Attending: FAMILY MEDICINE
Payer: COMMERCIAL

## 2022-09-07 VITALS
RESPIRATION RATE: 18 BRPM | TEMPERATURE: 97.3 F | HEART RATE: 79 BPM | DIASTOLIC BLOOD PRESSURE: 74 MMHG | SYSTOLIC BLOOD PRESSURE: 136 MMHG | BODY MASS INDEX: 35.4 KG/M2 | OXYGEN SATURATION: 100 % | WEIGHT: 216 LBS

## 2022-09-07 DIAGNOSIS — E78.2 MIXED HYPERLIPIDEMIA: ICD-10-CM

## 2022-09-07 DIAGNOSIS — F33.40 RECURRENT MAJOR DEPRESSION IN REMISSION (H): ICD-10-CM

## 2022-09-07 DIAGNOSIS — G47.00 INSOMNIA, PERSISTENT: ICD-10-CM

## 2022-09-07 DIAGNOSIS — I10 ESSENTIAL HYPERTENSION: ICD-10-CM

## 2022-09-07 DIAGNOSIS — J30.9 ALLERGIC RHINITIS, UNSPECIFIED SEASONALITY, UNSPECIFIED TRIGGER: ICD-10-CM

## 2022-09-07 DIAGNOSIS — R73.03 PREDIABETES: ICD-10-CM

## 2022-09-07 DIAGNOSIS — N94.10 DYSPAREUNIA IN FEMALE: ICD-10-CM

## 2022-09-07 DIAGNOSIS — Z12.31 VISIT FOR SCREENING MAMMOGRAM: ICD-10-CM

## 2022-09-07 DIAGNOSIS — E78.2 MIXED HYPERLIPIDEMIA: Primary | ICD-10-CM

## 2022-09-07 DIAGNOSIS — E66.01 MORBID OBESITY (H): ICD-10-CM

## 2022-09-07 LAB
ALBUMIN SERPL-MCNC: 4.3 G/DL (ref 3.5–5.7)
ALP SERPL-CCNC: 71 U/L (ref 34–104)
ALT SERPL W P-5'-P-CCNC: 17 U/L (ref 7–52)
ANION GAP SERPL CALCULATED.3IONS-SCNC: 6 MMOL/L (ref 3–14)
AST SERPL W P-5'-P-CCNC: 18 U/L (ref 13–39)
BILIRUB SERPL-MCNC: 0.5 MG/DL (ref 0.3–1)
BUN SERPL-MCNC: 17 MG/DL (ref 7–25)
CALCIUM SERPL-MCNC: 9.6 MG/DL (ref 8.6–10.3)
CHLORIDE BLD-SCNC: 108 MMOL/L (ref 98–107)
CHOLEST SERPL-MCNC: 143 MG/DL
CO2 SERPL-SCNC: 27 MMOL/L (ref 21–31)
CREAT SERPL-MCNC: 0.92 MG/DL (ref 0.6–1.2)
FASTING STATUS PATIENT QL REPORTED: NORMAL
GFR SERPL CREATININE-BSD FRML MDRD: 70 ML/MIN/1.73M2
GLUCOSE BLD-MCNC: 108 MG/DL (ref 70–105)
HDLC SERPL-MCNC: 68 MG/DL (ref 23–92)
HOLD SPECIMEN: NORMAL
LDLC SERPL CALC-MCNC: 59 MG/DL
NONHDLC SERPL-MCNC: 75 MG/DL
POTASSIUM BLD-SCNC: 3.9 MMOL/L (ref 3.5–5.1)
PROT SERPL-MCNC: 7.1 G/DL (ref 6.4–8.9)
SODIUM SERPL-SCNC: 141 MMOL/L (ref 134–144)
TRIGL SERPL-MCNC: 82 MG/DL

## 2022-09-07 PROCEDURE — 36415 COLL VENOUS BLD VENIPUNCTURE: CPT | Mod: ZL | Performed by: FAMILY MEDICINE

## 2022-09-07 PROCEDURE — 80053 COMPREHEN METABOLIC PANEL: CPT | Mod: ZL | Performed by: FAMILY MEDICINE

## 2022-09-07 PROCEDURE — 99214 OFFICE O/P EST MOD 30 MIN: CPT | Performed by: FAMILY MEDICINE

## 2022-09-07 PROCEDURE — 80061 LIPID PANEL: CPT | Mod: ZL | Performed by: FAMILY MEDICINE

## 2022-09-07 RX ORDER — MONTELUKAST SODIUM 10 MG/1
1 TABLET ORAL AT BEDTIME
Qty: 90 TABLET | Refills: 3 | Status: SHIPPED | OUTPATIENT
Start: 2022-09-07 | End: 2023-04-19

## 2022-09-07 RX ORDER — HYDROCHLOROTHIAZIDE 25 MG/1
25 TABLET ORAL DAILY
Qty: 90 TABLET | Refills: 3 | Status: SHIPPED | OUTPATIENT
Start: 2022-09-07 | End: 2023-04-19

## 2022-09-07 RX ORDER — BUPROPION HYDROCHLORIDE 150 MG/1
150 TABLET ORAL EVERY MORNING
Qty: 90 TABLET | Refills: 3 | Status: SHIPPED | OUTPATIENT
Start: 2022-09-07 | End: 2022-11-09

## 2022-09-07 RX ORDER — LOSARTAN POTASSIUM 100 MG/1
100 TABLET ORAL DAILY
Qty: 90 TABLET | Refills: 3 | Status: SHIPPED | OUTPATIENT
Start: 2022-09-07 | End: 2023-04-19

## 2022-09-07 RX ORDER — ZOLPIDEM TARTRATE 10 MG/1
10 TABLET ORAL AT BEDTIME
Qty: 30 TABLET | Refills: 5 | Status: SHIPPED | OUTPATIENT
Start: 2022-09-07 | End: 2023-03-05

## 2022-09-07 RX ORDER — ESTRADIOL 10 UG/1
10 INSERT VAGINAL
Qty: 8 TABLET | Refills: 11 | Status: SHIPPED | OUTPATIENT
Start: 2022-09-08 | End: 2023-04-19

## 2022-09-07 RX ORDER — ROSUVASTATIN CALCIUM 10 MG/1
10 TABLET, COATED ORAL DAILY
Qty: 90 TABLET | Refills: 3 | Status: SHIPPED | OUTPATIENT
Start: 2022-09-07 | End: 2023-04-19

## 2022-09-07 ASSESSMENT — ANXIETY QUESTIONNAIRES
8. IF YOU CHECKED OFF ANY PROBLEMS, HOW DIFFICULT HAVE THESE MADE IT FOR YOU TO DO YOUR WORK, TAKE CARE OF THINGS AT HOME, OR GET ALONG WITH OTHER PEOPLE?: NOT DIFFICULT AT ALL
6. BECOMING EASILY ANNOYED OR IRRITABLE: NOT AT ALL
GAD7 TOTAL SCORE: 3
5. BEING SO RESTLESS THAT IT IS HARD TO SIT STILL: NOT AT ALL
3. WORRYING TOO MUCH ABOUT DIFFERENT THINGS: SEVERAL DAYS
GAD7 TOTAL SCORE: 3
7. FEELING AFRAID AS IF SOMETHING AWFUL MIGHT HAPPEN: NOT AT ALL
GAD7 TOTAL SCORE: 3
7. FEELING AFRAID AS IF SOMETHING AWFUL MIGHT HAPPEN: NOT AT ALL
4. TROUBLE RELAXING: NOT AT ALL
IF YOU CHECKED OFF ANY PROBLEMS ON THIS QUESTIONNAIRE, HOW DIFFICULT HAVE THESE PROBLEMS MADE IT FOR YOU TO DO YOUR WORK, TAKE CARE OF THINGS AT HOME, OR GET ALONG WITH OTHER PEOPLE: NOT DIFFICULT AT ALL
1. FEELING NERVOUS, ANXIOUS, OR ON EDGE: SEVERAL DAYS
2. NOT BEING ABLE TO STOP OR CONTROL WORRYING: SEVERAL DAYS

## 2022-09-07 ASSESSMENT — ASTHMA QUESTIONNAIRES
QUESTION_4 LAST FOUR WEEKS HOW OFTEN HAVE YOU USED YOUR RESCUE INHALER OR NEBULIZER MEDICATION (SUCH AS ALBUTEROL): NOT AT ALL
ACT_TOTALSCORE: 21
QUESTION_1 LAST FOUR WEEKS HOW MUCH OF THE TIME DID YOUR ASTHMA KEEP YOU FROM GETTING AS MUCH DONE AT WORK, SCHOOL OR AT HOME: NONE OF THE TIME
QUESTION_2 LAST FOUR WEEKS HOW OFTEN HAVE YOU HAD SHORTNESS OF BREATH: ONCE OR TWICE A WEEK
ACT_TOTALSCORE: 21
QUESTION_5 LAST FOUR WEEKS HOW WOULD YOU RATE YOUR ASTHMA CONTROL: WELL CONTROLLED
QUESTION_3 LAST FOUR WEEKS HOW OFTEN DID YOUR ASTHMA SYMPTOMS (WHEEZING, COUGHING, SHORTNESS OF BREATH, CHEST TIGHTNESS OR PAIN) WAKE YOU UP AT NIGHT OR EARLIER THAN USUAL IN THE MORNING: ONCE A WEEK

## 2022-09-07 ASSESSMENT — PATIENT HEALTH QUESTIONNAIRE - PHQ9
SUM OF ALL RESPONSES TO PHQ QUESTIONS 1-9: 2
SUM OF ALL RESPONSES TO PHQ QUESTIONS 1-9: 2
10. IF YOU CHECKED OFF ANY PROBLEMS, HOW DIFFICULT HAVE THESE PROBLEMS MADE IT FOR YOU TO DO YOUR WORK, TAKE CARE OF THINGS AT HOME, OR GET ALONG WITH OTHER PEOPLE: NOT DIFFICULT AT ALL

## 2022-09-07 ASSESSMENT — PAIN SCALES - GENERAL: PAINLEVEL: NO PAIN (0)

## 2022-09-07 NOTE — NURSING NOTE
"Chief Complaint   Patient presents with     Recheck Medication     Patient is here for recheck on medications     Initial /74   Pulse 79   Temp 97.3  F (36.3  C) (Tympanic)   Resp 18   Wt 98 kg (216 lb)   LMP  (LMP Unknown)   SpO2 100%   Breastfeeding No   BMI 35.40 kg/m   Estimated body mass index is 35.4 kg/m  as calculated from the following:    Height as of 4/18/22: 1.664 m (5' 5.5\").    Weight as of this encounter: 98 kg (216 lb).  Medication Reconciliation: complete    Keira Almanza MA       FOOD SECURITY SCREENING QUESTIONS:    The next two questions are to help us understand your food security.  If you are feeling you need any assistance in this area, we have resources available to support you today.    Hunger Vital Signs:  Within the past 12 months we worried whether our food would run out before we got money to buy more. Never  Within the past 12 months the food we bought just didn't last and we didn't have money to get more. Never  Keira Almanza MA,LPN on 9/7/2022 at 8:58 AM      "

## 2022-09-07 NOTE — PROGRESS NOTES
Assessment & Plan       ICD-10-CM    1. Mixed hyperlipidemia  E78.2 Lipid Profile     Comprehensive Metabolic Panel     Comprehensive Metabolic Panel     Lipid Profile   2. Insomnia, persistent  G47.00 zolpidem (AMBIEN) 10 MG tablet   3. Morbid obesity (H)  E66.01    4. Dyspareunia in female  N94.10 estradiol (VAGIFEM) 10 MCG TABS vaginal tablet   5. Visit for screening mammogram  Z12.31 MA Screening Bilateral w/ Henrry   6. Essential hypertension  I10 hydrochlorothiazide (HYDRODIURIL) 25 MG tablet     losartan (COZAAR) 100 MG tablet   7. Allergic rhinitis, unspecified seasonality, unspecified trigger  J30.9 montelukast (SINGULAIR) 10 MG tablet   8. Prediabetes  R73.03    9. Recurrent major depression in remission (H)  F33.40 buPROPion (WELLBUTRIN XL) 150 MG 24 hr tablet     1.    PDMP Review       Value Time User    State PDMP site checked  Yes 9/7/2022  9:16 AM Yarely Villagomez MD        2.  Recheck lipids and CMP today related to crestor  3.  Reviewed safety with Ambien use  - long and short term risks.  Refilled x 6 months.  4.  Mammogram due - ordered place  5.  BP at goal  6.  Wt changes noted - additional goals set  7. Continue Wellbutrin  8.  Continue same hypertension treatment     Ordering of each unique test  Prescription drug management             Return in about 6 months (around 3/7/2023).    YARELY RAE MD  [facility]   Yolie is a 61 year old, presenting for the following health issues:  Recheck Medication    Wellbutrin follow up - this is going well.    Cholesterol follow up since starting on crestor:  No known side effects.      Lab Results   Component Value Date    CHOL 265 04/18/2022    CHOL 223 08/18/2020     Lab Results   Component Value Date    HDL 66 04/18/2022    HDL 65 08/18/2020     Lab Results   Component Value Date     04/18/2022     08/18/2020     Lab Results   Component Value Date    TRIG 130 04/18/2022    TRIG  134 08/18/2020     No results found for: JOSUÉ    Sleep:  Spent about 6+ weeks camping this summer.  Most nights gets 6 hours of sleep.    Allergies and asthma symptoms - coughing, always has had drainage.  Has not needed to use albuterol (this is more animal than environmental).        History of Present Illness       She eats 2-3 servings of fruits and vegetables daily.She consumes 0 sweetened beverage(s) daily.She exercises with enough effort to increase her heart rate 10 to 19 minutes per day.  She exercises with enough effort to increase her heart rate 3 or less days per week.   She is taking medications regularly.    Today's PHQ-9         PHQ-9 Total Score: 2    PHQ-9 Q9 Thoughts of better off dead/self-harm past 2 weeks :   Not at all    How difficult have these problems made it for you to do your work, take care of things at home, or get along with other people: Not difficult at all  Today's CAL-7 Score: 3     Current Outpatient Medications   Medication     albuterol (PROAIR HFA/PROVENTIL HFA/VENTOLIN HFA) 108 (90 Base) MCG/ACT inhaler     buPROPion (WELLBUTRIN XL) 150 MG 24 hr tablet     calcium polycarbophil (FIBERCON) 625 MG tablet     cetirizine HCl 10 MG CAPS     Cholecalciferol (VITAMIN D3) 1000 UNITS CAPS     diphenhydrAMINE (BENADRYL) 25 MG capsule     [START ON 9/8/2022] estradiol (VAGIFEM) 10 MCG TABS vaginal tablet     fluticasone (FLONASE) 50 MCG/ACT nasal spray     fluticasone-salmeterol (ADVAIR) 250-50 MCG/DOSE inhaler     hydrochlorothiazide (HYDRODIURIL) 25 MG tablet     losartan (COZAAR) 100 MG tablet     montelukast (SINGULAIR) 10 MG tablet     multivitamin w/minerals (THERA-VIT-M) tablet     potassium chloride ER (K-TAB/KLOR-CON) 10 MEQ CR tablet     rosuvastatin (CRESTOR) 10 MG tablet     SUMAtriptan (IMITREX) 20 MG/ACT nasal spray     triamcinolone (KENALOG) 0.1 % external cream     zolpidem (AMBIEN) 10 MG tablet     No current facility-administered medications for this visit.         Past Medical History:   Diagnosis Date     Allergic rhinitis due to animal hair and dander     1970,aspen, birch, maple, oak, grass, dust mite, ragwee, cocklebur, mugwart, lambs quarter, pigweed, fungus, molds     BCC (basal cell carcinoma), face 2020    Mohs     Calculus of gallbladder without cholecystitis without obstruction     No Comments Provided     Essential (primary) hypertension     No Comments Provided     Excessive and frequent menstruation with regular cycle     s/p CLARA     Hormone replacement therapy (postmenopausal)     2010,started ERT     Obesity     No Comments Provided     Other long term (current) drug therapy     2/11/2014,ambien #30/month     Other specified postprocedural states     nausea and severe vomiting with narcotics     Psychophysiologic insomnia     contract signed 2/2014     Tubulo-interstitial nephritis     No Comments Provided     Venous insufficiency (chronic) (peripheral)     No Comments Provided         Review of Systems   Right hamstring symptoms.   symptoms - improved including what felt like scar tissue       Objective    /74   Pulse 79   Temp 97.3  F (36.3  C) (Tympanic)   Resp 18   Wt 98 kg (216 lb)   LMP  (LMP Unknown)   SpO2 100%   Breastfeeding No   BMI 35.40 kg/m    Body mass index is 35.4 kg/m .     Wt Readings from Last 3 Encounters:   09/07/22 98 kg (216 lb)   04/18/22 99.9 kg (220 lb 3.2 oz)   03/29/21 97.1 kg (214 lb)       Physical Exam   GENERAL: healthy, alert and no distress  RESP: lungs clear to auscultation - no rales, rhonchi or wheezes  CV: regular rates and rhythm and no murmur, click or rub                    [unfilled]  @[facility]@

## 2022-09-20 ENCOUNTER — HOSPITAL ENCOUNTER (OUTPATIENT)
Dept: MAMMOGRAPHY | Facility: OTHER | Age: 62
Discharge: HOME | End: 2022-09-20
Attending: FAMILY MEDICINE | Admitting: FAMILY MEDICINE
Payer: COMMERCIAL

## 2022-09-20 DIAGNOSIS — Z12.31 VISIT FOR SCREENING MAMMOGRAM: ICD-10-CM

## 2022-09-20 PROCEDURE — 77067 SCR MAMMO BI INCL CAD: CPT

## 2022-10-05 ENCOUNTER — HOSPITAL ENCOUNTER (OUTPATIENT)
Dept: PHYSICAL THERAPY | Facility: OTHER | Age: 62
Setting detail: THERAPIES SERIES
Discharge: HOME | End: 2022-10-05
Attending: NURSE PRACTITIONER
Payer: COMMERCIAL

## 2022-10-05 DIAGNOSIS — M62.89 HAMSTRING TIGHTNESS OF RIGHT LOWER EXTREMITY: ICD-10-CM

## 2022-10-05 PROCEDURE — 97035 APP MDLTY 1+ULTRASOUND EA 15: CPT | Mod: GP,PO | Performed by: PHYSICAL THERAPIST

## 2022-10-05 PROCEDURE — 97140 MANUAL THERAPY 1/> REGIONS: CPT | Mod: GP,PO | Performed by: PHYSICAL THERAPIST

## 2022-10-05 PROCEDURE — 97161 PT EVAL LOW COMPLEX 20 MIN: CPT | Mod: GP,PO | Performed by: PHYSICAL THERAPIST

## 2022-10-05 PROCEDURE — 97110 THERAPEUTIC EXERCISES: CPT | Mod: GP,PO | Performed by: PHYSICAL THERAPIST

## 2022-10-05 NOTE — PROGRESS NOTES
10/05/22 0900   General Information   Type of Visit Initial OP Ortho PT Evaluation   Start of Care Date 10/05/22   Referring Physician CELY Garcia CNP   Orders Evaluate and Treat   Orders Comment Right distal hamstring proximal gastrics tightness, additional focus gluteus and IT Band   Date of Order 09/01/22   Certification Required? No   Medical Diagnosis Hamstring tightness of RLE   Surgical/Medical history reviewed Yes   Precautions/Limitations no known precautions/limitations   Body Part(s)   Body Part(s) Hip   Presentation and Etiology   Pertinent history of current problem (include personal factors and/or comorbidities that impact the POC) Patient reports 2 injuries to the right knee in late August.  First seemed like a sheering force (taking off quickly in a boat) and the 2nd possibly a hyperextension when hitting it on a seat in the boat.  Rates pain 6/10 worst and 0/10 least.  Pain can be sharp, shooting, and sometimes a deeper ache at night.  Points to lateral thigh and medial right knee/joint line.  No imaging completed.  No locking catching or clicks.  Significant sleep disruption due to knee pain.   Impairments A. Pain;B. Decreased WB tolerance;C. Swelling;E. Decreased flexibility   Chronicity New   Pain/symptoms exacerbated by B. Walking   Fall Risk Screen   Fall screen completed by PT   Have you fallen 2 or more times in the past year? No   Have you fallen and had an injury in the past year? No   Is patient a fall risk? No   Hip Objective Findings   Gait/Locomotion antalgic gait, right   Balance/Proprioception (Single Leg Stance) deferred due to pain with weightbearing   Side (if bilateral, select both right and left) Right;Left   Pelvic Screen right malleoli slightly elevated, ASIS level;   Palpation Pain with wincing at several points along right medial joint line and medial hamstring tendon distally.   Observation Right knee extension = -5 with pain at end range extension;    L knee extension =  -5 degrees with harder end-feel (s/p TKA)   Obers/ITB Flexibility (+) bilaterally   Right Hamstring Flexibility SLR = 49, pain   Left Hamstring Flexibility SLR = 70, stretch   Right Prone Quad Flexibility significant limitation (approximately 90 degrees), stretch felt at superior pole of patella   Left Prone Quad Flexibility significant limitation (approx 90 degrees), stretch felt in knee (s/p left TKA)   Right Hip Abduction Strength 4/5, pain in medial knee   Left Hip Abduction Strength 5/5   Right Hip Extension Strength 5/5   Left Hip Extension Strength 5/5   Right Hip ER Strength 5/5   Left Hip ER Strength 5/5   Right Knee Flexion Strength 5/5, no pain   Left Knee Flexion Strength 5/5   Right Knee Extension Strength initially patient avoids resistance and reports pain, but with slow application of resistance, patient is able to resist fully with no pain, 5/5   Left Knee Extension Strength 5/5   Planned Therapy Interventions   Planned Therapy Interventions gait training;manual therapy;neuromuscular re-education;ROM;strengthening;stretching   Planned Modality Interventions   Planned Modality Interventions Cryotherapy;Ultrasound;Hot packs   Clinical Impression   Criteria for Skilled Therapeutic Interventions Met yes, treatment indicated   PT Diagnosis impaired mobility   Influenced by the following impairments impaired flexibility, ROM, gait and balance; Pain   Functional limitations due to impairments transfers, standing, walking   Clinical Presentation Unstable/Unpredictable   Clinical Presentation Rationale clinical observation   Clinical Decision Making (Complexity) Low complexity   Therapy Frequency 2 times/Week   Predicted Duration of Therapy Intervention (days/wks) 8 weeks   Risk & Benefits of therapy have been explained Yes   Patient, Family & other staff in agreement with plan of care Yes   Clinical Impression Comments Patient presents with signs and symptoms consistent with right medial hamstring strain  and possible meniscus and/or MCL involvement.  Difficulty with static weightbearing and walking.  Sleep disruption.  Instructed initial HEP to decrease pain and promote healing.  Patient would benefit from physical therapy to progress flexibility and decrease pain for improved walking.   ORTHO GOALS   PT Ortho Eval Goals 1;2;3;4   Ortho Goal 1   Goal Identifier hamstring flexibility   Goal Description Patient will demonstrate increased SLR from 49 degrees to 60 degrees or more for decreased pain with knee straightening and swing phase of gait.   Target Date 11/30/22   Ortho Goal 2   Goal Identifier R quad flexibility   Goal Description Patient will demonstrate increased prone passive knee flexion (quad flexibility) from 90 degrees to 110 degrees for improved patellar mobility and muscle balance with walking.   Target Date 11/30/22   Ortho Goal 3   Goal Identifier R knee Ext ROM:   Goal Description Patient will demonstrate 0 degrees right knee extension with no increased pain for decreased sleep disruption.   Target Date 11/30/22   Ortho Goal 4   Goal Identifier walking   Goal Description Patient will demonstrate, symmetrical, non-antalgic gait, 5 min walking or more for ability to walk community level distances safely.   Target Date 11/30/22   Total Evaluation Time   PT Eval, Low Complexity Minutes (58500) 30

## 2022-10-07 ENCOUNTER — HOSPITAL ENCOUNTER (OUTPATIENT)
Dept: PHYSICAL THERAPY | Facility: OTHER | Age: 62
Setting detail: THERAPIES SERIES
Discharge: HOME | End: 2022-10-07
Attending: NURSE PRACTITIONER
Payer: COMMERCIAL

## 2022-10-07 PROCEDURE — 97140 MANUAL THERAPY 1/> REGIONS: CPT | Mod: GP,PO | Performed by: PHYSICAL THERAPIST

## 2022-10-07 PROCEDURE — 97110 THERAPEUTIC EXERCISES: CPT | Mod: GP,PO | Performed by: PHYSICAL THERAPIST

## 2022-10-12 ENCOUNTER — HOSPITAL ENCOUNTER (OUTPATIENT)
Dept: PHYSICAL THERAPY | Facility: OTHER | Age: 62
Setting detail: THERAPIES SERIES
Discharge: HOME | End: 2022-10-12
Attending: NURSE PRACTITIONER
Payer: COMMERCIAL

## 2022-10-12 ENCOUNTER — TELEPHONE (OUTPATIENT)
Dept: FAMILY MEDICINE | Facility: OTHER | Age: 62
End: 2022-10-12

## 2022-10-12 PROCEDURE — 97035 APP MDLTY 1+ULTRASOUND EA 15: CPT | Mod: GP,PN | Performed by: PHYSICAL THERAPIST

## 2022-10-12 PROCEDURE — 97140 MANUAL THERAPY 1/> REGIONS: CPT | Mod: GP,PN | Performed by: PHYSICAL THERAPIST

## 2022-10-12 NOTE — TELEPHONE ENCOUNTER
PT would like patient to get imaging done on her meniscus to see if there is a tear.    Please call back thank you     Andria Galo on 10/12/2022 at 2:08 PM

## 2022-10-12 NOTE — TELEPHONE ENCOUNTER
Patient referred to PT for right knee pain. Physical therapist does not want to do a whole lot until patient has imaging done on her knee to see if she has a tear in meniscus. Please order CT or MRI of right knee to rule out a meniscus tear, if appropriate.     Keira Almanza CMA on 10/12/2022 at 4:49 PM

## 2022-10-14 ENCOUNTER — HOSPITAL ENCOUNTER (OUTPATIENT)
Dept: PHYSICAL THERAPY | Facility: OTHER | Age: 62
Setting detail: THERAPIES SERIES
Discharge: HOME | End: 2022-10-14
Attending: NURSE PRACTITIONER
Payer: COMMERCIAL

## 2022-10-14 PROCEDURE — 97035 APP MDLTY 1+ULTRASOUND EA 15: CPT | Mod: GP,PN | Performed by: PHYSICAL THERAPIST

## 2022-10-14 PROCEDURE — 97140 MANUAL THERAPY 1/> REGIONS: CPT | Mod: GP,PN | Performed by: PHYSICAL THERAPIST

## 2022-10-14 NOTE — TELEPHONE ENCOUNTER
Did patient recently see Mark Garcia?  Looks like he sent in referral.  Please have patient contact him to see if MRI is appropriate.  Yarely Miller MD

## 2022-10-14 NOTE — TELEPHONE ENCOUNTER
Patient contacted and she states she will be seeing Mark Garcia and will ask him about having an MRI    Keira Almanza CMA on 10/14/2022 at 2:55 PM

## 2022-10-14 NOTE — PROGRESS NOTES
"[facility]    Outpatient Physical Therapy Progress Note    Patient: Yolie Bedoya  : 1960    Beginning/End Dates of Reporting Period:  10/5/22 to 10/14/22    Referring Provider: CELY Garcia CNP    Therapy Diagnosis: Right distal hamstring proximal gastrics tightness, additional focus gluteus and IT Band           Client Self Report: Seems to continue to feel better after ultrasound and has been able to sleep better that night.  Otherwise is up \"20-30 times\".  Continues to have sharp pain at medial right knee, unpredictable.  Was walking a little better today after minimal activity yesterday.  Increased pain and antalgic gait with increased standing and walking.    Objective Measurements:  Objective Measure: R Hamstring flexibility/SLR:  10/5 = 49    Objective Measure: R quad flexibility:  10/5 = 90    Objective Measure: R knee extension ROM:  10/5 = -5        Goals:  Goal Identifier hamstring flexibility   Goal Description Patient will demonstrate increased SLR from 49 degrees to 60 degrees or more for decreased pain with knee straightening and swing phase of gait.   Target Date 22   Date Met      Progress (detail required for progress note):  Progress made     Goal Identifier R quad flexibility   Goal Description Patient will demonstrate increased prone passive knee flexion (quad flexibility) from 90 degrees to 110 degrees for improved patellar mobility and muscle balance with walking.   Target Date 22   Date Met      Progress (detail required for progress note):  Continues to have pain with knee flexion     Goal Identifier R knee Ext ROM:   Goal Description Patient will demonstrate 0 degrees right knee extension with no increased pain for decreased sleep disruption.   Target Date 22   Date Met      Progress (detail required for progress note):  Limited - Also note increased lateral " ankle discomfort as patient is likely walking with LE externally rotated to avoid terminal knee extension.     Goal Identifier walking   Goal Description Patient will demonstrate, symmetrical, non-antalgic gait, 5 min walking or more for ability to walk community level distances safely.   Target Date 11/30/22   Date Met      Progress (detail required for progress note):  Not met           Progress:  Patient has been seen 4 visits.  Decreased pain with ultrasound and manual therapy.  Limited exercise tolerance due to pain.  Continued unpredictable sharp pain with standing and walking that cause wincing and increased Left LE support.        Discharge:  No

## 2022-10-19 ENCOUNTER — HOSPITAL ENCOUNTER (OUTPATIENT)
Dept: PHYSICAL THERAPY | Facility: OTHER | Age: 62
Setting detail: THERAPIES SERIES
Discharge: HOME | End: 2022-10-19
Attending: NURSE PRACTITIONER
Payer: COMMERCIAL

## 2022-10-19 PROCEDURE — 97035 APP MDLTY 1+ULTRASOUND EA 15: CPT | Mod: GP,PN | Performed by: PHYSICAL THERAPIST

## 2022-10-19 PROCEDURE — 97110 THERAPEUTIC EXERCISES: CPT | Mod: GP,PN | Performed by: PHYSICAL THERAPIST

## 2022-10-26 ENCOUNTER — HOSPITAL ENCOUNTER (OUTPATIENT)
Dept: PHYSICAL THERAPY | Facility: OTHER | Age: 62
Setting detail: THERAPIES SERIES
Discharge: HOME | End: 2022-10-26
Attending: NURSE PRACTITIONER
Payer: COMMERCIAL

## 2022-10-26 PROCEDURE — 97110 THERAPEUTIC EXERCISES: CPT | Mod: GP,PN | Performed by: PHYSICAL THERAPIST

## 2022-10-26 PROCEDURE — 97035 APP MDLTY 1+ULTRASOUND EA 15: CPT | Mod: GP,PN | Performed by: PHYSICAL THERAPIST

## 2022-10-26 NOTE — PROGRESS NOTES
[facility]    Outpatient Physical Therapy Progress Note    Patient: Yolie Bedoya  : 1960    Beginning/End Dates of Reporting Period:  10/5/22 to 10/26/22    Referring Provider: CELY Garcia NP    Therapy Diagnosis: Right distal hamstring proximal gastrics tightness, additional focus gluteus and IT Band           Client Self Report: Patient is feeling ok earlier in the day and can do exercises, but if she waits til later in the day, the knee is more painful and can only do a couple repetitions.  Continues to get unpredictable shooting pain in the knee.  Improved overall with decreased frequency of sharp pain causing limited weightbearing.    Objective Measurements:  Objective Measure: R Hamstring flexibility/SLR:  10/5 = 49        10/26 = 68    Objective Measure: R quad flexibility:  10/5 = 90        10/26 = 108    Objective Measure: R knee extension ROM:  10/5 = -5        10/26 = -3, no pain       Goals:  Goal Identifier hamstring flexibility   Goal Description Patient will demonstrate increased SLR from 49 degrees to 60 degrees or more for decreased pain with knee straightening and swing phase of gait.   Target Date 22   Date Met      Progress (detail required for progress note): goal met     Goal Identifier R quad flexibility   Goal Description Patient will demonstrate increased prone passive knee flexion (quad flexibility) from 90 degrees to 110 degrees for improved patellar mobility and muscle balance with walking.   Target Date 22   Date Met      Progress (detail required for progress note): Progress made (108)     Goal Identifier R knee Ext ROM:   Goal Description Patient will demonstrate 0 degrees right knee extension with no increased pain for decreased sleep disruption.   Target Date 22   Date Met      Progress (detail required for progress note): Progress made (-3)     Goal  For Your Information   If you are in need of a medication refill please use one of the following options. You can expect your medication to be filled within 2-3 business days.   1. Call your pharmacy for all medication refills and renewals.   2. [email or web address]- [email or web address]  3. Call your providers office    If your provider ordered any imaging for you today. Our pre-scheduling services will be reaching out to you within 2 business days to schedule this. [facility] can be reached by calling [phone]     If your provider ordered testing today, you will be notified of your test results within 3-5 business days unless specified otherwise. If you have not received your results within 5 business days please call your provider's office.    You may be receiving a survey.  Please take the time to complete this, as your feedback is very important to us!  We strive to make your experience exceptional and your comments help us with that goal.  We look forward to hearing from you!    For all future appointments please arrive 15 minutes prior to your scheduled visit.     Patient Contact Center Business Office: assistance with medical billing & financial inquires [phone]           Identifier walking   Goal Description Patient will demonstrate, symmetrical, non-antalgic gait, 5 min walking or more for ability to walk community level distances safely.   Target Date 11/30/22   Date Met      Progress (detail required for progress note): inconsistent, unpredictable - continue         Plan:  Continue therapy per current plan of care.    Discharge:  No

## 2022-11-02 ENCOUNTER — HOSPITAL ENCOUNTER (OUTPATIENT)
Dept: PHYSICAL THERAPY | Facility: OTHER | Age: 62
Setting detail: THERAPIES SERIES
Discharge: HOME | End: 2022-11-02
Attending: NURSE PRACTITIONER
Payer: COMMERCIAL

## 2022-11-02 PROCEDURE — 97110 THERAPEUTIC EXERCISES: CPT | Mod: GP,PN | Performed by: PHYSICAL THERAPIST

## 2022-11-02 PROCEDURE — 97140 MANUAL THERAPY 1/> REGIONS: CPT | Mod: GP,PN | Performed by: PHYSICAL THERAPIST

## 2022-11-02 PROCEDURE — 97035 APP MDLTY 1+ULTRASOUND EA 15: CPT | Mod: GP,PN | Performed by: PHYSICAL THERAPIST

## 2022-11-09 ENCOUNTER — MYC MEDICAL ADVICE (OUTPATIENT)
Dept: FAMILY MEDICINE | Facility: OTHER | Age: 62
End: 2022-11-09

## 2022-11-09 ENCOUNTER — HOSPITAL ENCOUNTER (OUTPATIENT)
Dept: PHYSICAL THERAPY | Facility: OTHER | Age: 62
Setting detail: THERAPIES SERIES
Discharge: HOME | End: 2022-11-09
Attending: NURSE PRACTITIONER
Payer: COMMERCIAL

## 2022-11-09 DIAGNOSIS — F33.40 RECURRENT MAJOR DEPRESSION IN REMISSION (H): ICD-10-CM

## 2022-11-09 PROCEDURE — 97035 APP MDLTY 1+ULTRASOUND EA 15: CPT | Mod: GP,PN | Performed by: PHYSICAL THERAPIST

## 2022-11-09 PROCEDURE — 97140 MANUAL THERAPY 1/> REGIONS: CPT | Mod: GP,PN | Performed by: PHYSICAL THERAPIST

## 2022-11-09 RX ORDER — BUPROPION HYDROCHLORIDE 150 MG/1
300 TABLET ORAL EVERY MORNING
Qty: 180 TABLET | Refills: 3 | Status: SHIPPED | OUTPATIENT
Start: 2022-11-09 | End: 2023-04-19

## 2022-11-09 NOTE — TELEPHONE ENCOUNTER
"Patient last seen 9/7/22. Per note: \"continue Wellbutrin\". I do not see any mention of increasing to 2 tablets daily or a 300 mg tablet daily. Please advise.     Keira Almanza CMA on 11/9/2022 at 11:48 AM      "

## 2022-11-09 NOTE — PROGRESS NOTES
[facility]    Outpatient Physical Therapy Discharge Note    Patient: Yolie Bedoya  : 1960    Beginning/End Dates of Reporting Period:  10/5/22 to 22    Referring Provider: CELY Garcia NP    Therapy Diagnosis: Right distal hamstring proximal gastrics tightness, additional focus gluteus and IT Band           Client Self Report: Reports doing less activity in general and knee is starting to feel better.  Rates pain 0-1/10.  Still got occasional sharp shooting pain with rotation and worse at night with sleeping.    Objective Measurements:  Objective Measure: R Hamstring flexibility/SLR:  10/5 = 49        10/26 = 68    Objective Measure: R quad flexibility:  10/5 = 90        10/26 = 108          = 116    Objective Measure: R knee extension ROM:  10/5 = -5        10/26 = -3, no pain          = -2       Goals:  Goal Identifier hamstring flexibility   Goal Description Patient will demonstrate increased SLR from 49 degrees to 60 degrees or more for decreased pain with knee straightening and swing phase of gait.   Target Date 22   Date Met  10/26/22   Progress (detail required for progress note): goal met     Goal Identifier R quad flexibility   Goal Description Patient will demonstrate increased prone passive knee flexion (quad flexibility) from 90 degrees to 110 degrees for improved patellar mobility and muscle balance with walking.   Target Date 22   Date Met  22   Progress (detail required for progress note): Goal met     Goal Identifier R knee Ext ROM:   Goal Description Patient will demonstrate 0 degrees right knee extension with no increased pain for decreased sleep disruption.   Target Date 22   Date Met      Progress (detail required for progress note): Progress made (-2)     Goal Identifier walking   Goal Description Patient will demonstrate, symmetrical, non-antalgic  gait, 5 min walking or more for ability to walk community level distances safely.   Target Date 11/30/22   Date Met      Progress (detail required for progress note): inconsistent, unpredictable         Plan:  Discharge from therapy.  Improved ROM and knee stability overall.  Patient continues to have increased pain with increased time spent weightbearing throughout the day.  Improves with rest.  Continues to have unpredictable sharp shooting pain with direction changes and difficulty going up/down steps when pain is elevated.      Reason for Discharge: No further expectation of progress.    Equipment Issued: none    Discharge Plan: Patient to continue home program.

## 2022-11-11 RX ORDER — BUPROPION HYDROCHLORIDE 150 MG/1
TABLET ORAL
Qty: 90 TABLET | Refills: 3 | OUTPATIENT
Start: 2022-11-11

## 2022-11-11 NOTE — TELEPHONE ENCOUNTER
Filled 11/09/22 #180 x 3. Pharmacy alerted. Unable to complete prescription refill per RNMedication Refill Policy.................... Susana Mesa RN ....................  11/11/2022   9:02 AM       Disp Refills Start End AR   buPROPion (WELLBUTRIN XL) 150 MG 24 hr tablet 180 tablet 3 11/9/2022  No   Sig - Route: Take 2 tablets (300 mg) by mouth every morning - Oral   Sent to pharmacy as: buPROPion HCl ER (XL) 150 MG Oral Tablet Extended Release 24 Hour (WELLBUTRIN XL)   Class: E-Prescribe     Order: [id number]   E-Prescribing Status: Receipt confirmed by pharmacy (11/9/2022 12:27 PM CST)     [id number] ([facility]) - [place], [place] - [address]

## 2023-03-05 DIAGNOSIS — G47.00 INSOMNIA, PERSISTENT: ICD-10-CM

## 2023-03-05 RX ORDER — ZOLPIDEM TARTRATE 10 MG/1
TABLET ORAL
Qty: 30 TABLET | Refills: 0 | Status: SHIPPED | OUTPATIENT
Start: 2023-03-05 | End: 2023-03-30

## 2023-03-28 DIAGNOSIS — G47.00 INSOMNIA, PERSISTENT: ICD-10-CM

## 2023-03-28 RX ORDER — ZOLPIDEM TARTRATE 10 MG/1
TABLET ORAL
Qty: 30 TABLET | Refills: 0 | OUTPATIENT
Start: 2023-03-28

## 2023-03-28 NOTE — TELEPHONE ENCOUNTER
[facility] sent Rx request for the following:    ZOLPIDEM 10MG TABLET  Last Prescription Date:   3/5/23  Last Fill Qty/Refills:         30, R-0    Last Office Visit:              9/7/22   Future Office visit:           4/19/23  Zaida Stahl RN on 3/28/2023 at 4:14 PM

## 2023-03-29 NOTE — TELEPHONE ENCOUNTER
Patient has an appointment on 4/19/23. Can she get a refill up to appointment?     Keira Almanza, CMA on 3/29/2023 at 10:46 AM

## 2023-03-30 RX ORDER — ZOLPIDEM TARTRATE 10 MG/1
10 TABLET ORAL AT BEDTIME
Qty: 15 TABLET | Refills: 0 | Status: SHIPPED | OUTPATIENT
Start: 2023-03-30 | End: 2023-04-19

## 2023-03-30 NOTE — TELEPHONE ENCOUNTER
Prescription sent until appointment.  She had refill 3/5 - should need 15 tablets.  Yarely Miller MD

## 2023-04-19 ENCOUNTER — MYC MEDICAL ADVICE (OUTPATIENT)
Dept: FAMILY MEDICINE | Facility: OTHER | Age: 63
End: 2023-04-19

## 2023-04-19 ENCOUNTER — OFFICE VISIT (OUTPATIENT)
Dept: FAMILY MEDICINE | Facility: OTHER | Age: 63
End: 2023-04-19
Attending: FAMILY MEDICINE
Payer: COMMERCIAL

## 2023-04-19 VITALS
RESPIRATION RATE: 14 BRPM | BODY MASS INDEX: 36.75 KG/M2 | WEIGHT: 220.6 LBS | SYSTOLIC BLOOD PRESSURE: 130 MMHG | OXYGEN SATURATION: 99 % | HEIGHT: 65 IN | DIASTOLIC BLOOD PRESSURE: 78 MMHG | HEART RATE: 73 BPM | TEMPERATURE: 97.9 F

## 2023-04-19 DIAGNOSIS — J30.9 ALLERGIC RHINITIS, UNSPECIFIED SEASONALITY, UNSPECIFIED TRIGGER: ICD-10-CM

## 2023-04-19 DIAGNOSIS — I10 ESSENTIAL HYPERTENSION: ICD-10-CM

## 2023-04-19 DIAGNOSIS — E78.2 MIXED HYPERLIPIDEMIA: ICD-10-CM

## 2023-04-19 DIAGNOSIS — E66.01 MORBID OBESITY (H): ICD-10-CM

## 2023-04-19 DIAGNOSIS — R21 RASH: ICD-10-CM

## 2023-04-19 DIAGNOSIS — G47.00 INSOMNIA, PERSISTENT: ICD-10-CM

## 2023-04-19 DIAGNOSIS — R73.03 PREDIABETES: ICD-10-CM

## 2023-04-19 DIAGNOSIS — Z00.00 PHYSICAL EXAM, ANNUAL: Primary | ICD-10-CM

## 2023-04-19 DIAGNOSIS — J45.40 MODERATE PERSISTENT ASTHMA WITHOUT COMPLICATION: ICD-10-CM

## 2023-04-19 DIAGNOSIS — G43.819 OTHER MIGRAINE WITHOUT STATUS MIGRAINOSUS, INTRACTABLE: ICD-10-CM

## 2023-04-19 DIAGNOSIS — Z23 NEED FOR COVID-19 VACCINE: ICD-10-CM

## 2023-04-19 DIAGNOSIS — F33.40 RECURRENT MAJOR DEPRESSION IN REMISSION (H): ICD-10-CM

## 2023-04-19 DIAGNOSIS — N94.10 DYSPAREUNIA IN FEMALE: ICD-10-CM

## 2023-04-19 DIAGNOSIS — T75.3XXA MOTION SICKNESS, INITIAL ENCOUNTER: Primary | ICD-10-CM

## 2023-04-19 LAB
ALBUMIN SERPL BCG-MCNC: 4.5 G/DL (ref 3.5–5.2)
ALBUMIN UR-MCNC: NEGATIVE MG/DL
ALP SERPL-CCNC: 103 U/L (ref 35–104)
ALT SERPL W P-5'-P-CCNC: 25 U/L (ref 10–35)
ANION GAP SERPL CALCULATED.3IONS-SCNC: 10 MMOL/L (ref 7–15)
APPEARANCE UR: CLEAR
AST SERPL W P-5'-P-CCNC: 23 U/L (ref 10–35)
BILIRUB SERPL-MCNC: 0.7 MG/DL
BILIRUB UR QL STRIP: NEGATIVE
BUN SERPL-MCNC: 17.2 MG/DL (ref 8–23)
CALCIUM SERPL-MCNC: 9.6 MG/DL (ref 8.8–10.2)
CHLORIDE SERPL-SCNC: 104 MMOL/L (ref 98–107)
CHOLEST SERPL-MCNC: 177 MG/DL
COLOR UR AUTO: YELLOW
CREAT SERPL-MCNC: 0.93 MG/DL (ref 0.51–0.95)
DEPRECATED HCO3 PLAS-SCNC: 25 MMOL/L (ref 22–29)
GFR SERPL CREATININE-BSD FRML MDRD: 69 ML/MIN/1.73M2
GLUCOSE SERPL-MCNC: 114 MG/DL (ref 70–99)
GLUCOSE UR STRIP-MCNC: NEGATIVE MG/DL
HBA1C MFR BLD: 5.6 % (ref 4–6.2)
HDLC SERPL-MCNC: 83 MG/DL
HGB UR QL STRIP: NEGATIVE
KETONES UR STRIP-MCNC: NEGATIVE MG/DL
LDLC SERPL CALC-MCNC: 76 MG/DL
LEUKOCYTE ESTERASE UR QL STRIP: NEGATIVE
NITRATE UR QL: NEGATIVE
NONHDLC SERPL-MCNC: 94 MG/DL
PH UR STRIP: 6.5 [PH] (ref 5–9)
POTASSIUM SERPL-SCNC: 4.1 MMOL/L (ref 3.4–5.3)
PROT SERPL-MCNC: 7.5 G/DL (ref 6.4–8.3)
SODIUM SERPL-SCNC: 139 MMOL/L (ref 136–145)
SP GR UR STRIP: 1.02 (ref 1–1.03)
TRIGL SERPL-MCNC: 90 MG/DL
UROBILINOGEN UR STRIP-MCNC: NORMAL MG/DL

## 2023-04-19 PROCEDURE — 81003 URINALYSIS AUTO W/O SCOPE: CPT | Mod: ZL | Performed by: FAMILY MEDICINE

## 2023-04-19 PROCEDURE — 80053 COMPREHEN METABOLIC PANEL: CPT | Mod: ZL | Performed by: FAMILY MEDICINE

## 2023-04-19 PROCEDURE — 91312 COVID-19 VACCINE BIVALENT BOOSTER 12+ (PFIZER): CPT | Performed by: FAMILY MEDICINE

## 2023-04-19 PROCEDURE — 83036 HEMOGLOBIN GLYCOSYLATED A1C: CPT | Mod: ZL | Performed by: FAMILY MEDICINE

## 2023-04-19 PROCEDURE — 36415 COLL VENOUS BLD VENIPUNCTURE: CPT | Mod: ZL | Performed by: FAMILY MEDICINE

## 2023-04-19 PROCEDURE — 99213 OFFICE O/P EST LOW 20 MIN: CPT | Mod: 25 | Performed by: FAMILY MEDICINE

## 2023-04-19 PROCEDURE — 82435 ASSAY OF BLOOD CHLORIDE: CPT | Mod: ZL | Performed by: FAMILY MEDICINE

## 2023-04-19 PROCEDURE — 99396 PREV VISIT EST AGE 40-64: CPT | Mod: 25 | Performed by: FAMILY MEDICINE

## 2023-04-19 PROCEDURE — 0124A COVID-19 VACCINE BIVALENT BOOSTER 12+ (PFIZER): CPT | Performed by: FAMILY MEDICINE

## 2023-04-19 PROCEDURE — 80061 LIPID PANEL: CPT | Mod: ZL | Performed by: FAMILY MEDICINE

## 2023-04-19 RX ORDER — MONTELUKAST SODIUM 10 MG/1
1 TABLET ORAL AT BEDTIME
Qty: 90 TABLET | Refills: 3 | Status: SHIPPED | OUTPATIENT
Start: 2023-04-19 | End: 2024-02-19

## 2023-04-19 RX ORDER — ROSUVASTATIN CALCIUM 10 MG/1
10 TABLET, COATED ORAL DAILY
Qty: 90 TABLET | Refills: 3 | Status: SHIPPED | OUTPATIENT
Start: 2023-04-19 | End: 2023-11-09

## 2023-04-19 RX ORDER — ZOLPIDEM TARTRATE 10 MG/1
10 TABLET ORAL AT BEDTIME
Qty: 30 TABLET | Refills: 5 | Status: SHIPPED | OUTPATIENT
Start: 2023-04-19 | End: 2023-10-16

## 2023-04-19 RX ORDER — SUMATRIPTAN 20 MG/1
1 SPRAY NASAL PRN
Qty: 18 EACH | Refills: 2 | Status: SHIPPED | OUTPATIENT
Start: 2023-04-19 | End: 2023-11-09

## 2023-04-19 RX ORDER — LOSARTAN POTASSIUM 100 MG/1
100 TABLET ORAL DAILY
Qty: 90 TABLET | Refills: 3 | Status: SHIPPED | OUTPATIENT
Start: 2023-04-19 | End: 2023-11-09

## 2023-04-19 RX ORDER — TRIAMCINOLONE ACETONIDE 1 MG/G
CREAM TOPICAL
Qty: 30 G | Refills: 11 | Status: SHIPPED | OUTPATIENT
Start: 2023-04-19 | End: 2024-07-24

## 2023-04-19 RX ORDER — ESTRADIOL 10 UG/1
10 INSERT VAGINAL
Qty: 8 TABLET | Refills: 11 | Status: SHIPPED | OUTPATIENT
Start: 2023-04-20 | End: 2024-02-19

## 2023-04-19 RX ORDER — HYDROCHLOROTHIAZIDE 25 MG/1
25 TABLET ORAL DAILY
Qty: 90 TABLET | Refills: 3 | Status: SHIPPED | OUTPATIENT
Start: 2023-04-19 | End: 2024-02-19

## 2023-04-19 RX ORDER — BUPROPION HYDROCHLORIDE 150 MG/1
300 TABLET ORAL EVERY MORNING
Qty: 180 TABLET | Refills: 3 | Status: SHIPPED | OUTPATIENT
Start: 2023-04-19 | End: 2024-07-24 | Stop reason: DRUGHIGH

## 2023-04-19 RX ORDER — POTASSIUM CHLORIDE 750 MG/1
TABLET, EXTENDED RELEASE ORAL
Qty: 270 TABLET | Refills: 4 | Status: SHIPPED | OUTPATIENT
Start: 2023-04-19 | End: 2023-11-09

## 2023-04-19 RX ORDER — ALBUTEROL SULFATE 90 UG/1
2 AEROSOL, METERED RESPIRATORY (INHALATION) EVERY 4 HOURS PRN
Qty: 18 G | Refills: 11 | Status: SHIPPED | OUTPATIENT
Start: 2023-04-19 | End: 2024-02-19

## 2023-04-19 RX ORDER — FLUTICASONE PROPIONATE 50 MCG
2 SPRAY, SUSPENSION (ML) NASAL DAILY
Qty: 48 G | Refills: 3 | Status: SHIPPED | OUTPATIENT
Start: 2023-04-19 | End: 2024-02-19

## 2023-04-19 RX ORDER — FLUTICASONE PROPIONATE AND SALMETEROL 250; 50 UG/1; UG/1
1 POWDER RESPIRATORY (INHALATION) EVERY 12 HOURS
Qty: 1 EACH | Refills: 11 | Status: SHIPPED | OUTPATIENT
Start: 2023-04-19 | End: 2024-02-19

## 2023-04-19 ASSESSMENT — ANXIETY QUESTIONNAIRES
GAD7 TOTAL SCORE: 4
6. BECOMING EASILY ANNOYED OR IRRITABLE: NOT AT ALL
7. FEELING AFRAID AS IF SOMETHING AWFUL MIGHT HAPPEN: NOT AT ALL
IF YOU CHECKED OFF ANY PROBLEMS ON THIS QUESTIONNAIRE, HOW DIFFICULT HAVE THESE PROBLEMS MADE IT FOR YOU TO DO YOUR WORK, TAKE CARE OF THINGS AT HOME, OR GET ALONG WITH OTHER PEOPLE: NOT DIFFICULT AT ALL
3. WORRYING TOO MUCH ABOUT DIFFERENT THINGS: SEVERAL DAYS
4. TROUBLE RELAXING: SEVERAL DAYS
GAD7 TOTAL SCORE: 4
8. IF YOU CHECKED OFF ANY PROBLEMS, HOW DIFFICULT HAVE THESE MADE IT FOR YOU TO DO YOUR WORK, TAKE CARE OF THINGS AT HOME, OR GET ALONG WITH OTHER PEOPLE?: NOT DIFFICULT AT ALL
GAD7 TOTAL SCORE: 4
5. BEING SO RESTLESS THAT IT IS HARD TO SIT STILL: NOT AT ALL
7. FEELING AFRAID AS IF SOMETHING AWFUL MIGHT HAPPEN: NOT AT ALL
1. FEELING NERVOUS, ANXIOUS, OR ON EDGE: SEVERAL DAYS
2. NOT BEING ABLE TO STOP OR CONTROL WORRYING: SEVERAL DAYS

## 2023-04-19 ASSESSMENT — PATIENT HEALTH QUESTIONNAIRE - PHQ9
10. IF YOU CHECKED OFF ANY PROBLEMS, HOW DIFFICULT HAVE THESE PROBLEMS MADE IT FOR YOU TO DO YOUR WORK, TAKE CARE OF THINGS AT HOME, OR GET ALONG WITH OTHER PEOPLE: NOT DIFFICULT AT ALL
SUM OF ALL RESPONSES TO PHQ QUESTIONS 1-9: 5
SUM OF ALL RESPONSES TO PHQ QUESTIONS 1-9: 5

## 2023-04-19 ASSESSMENT — ENCOUNTER SYMPTOMS
HEADACHES: 0
MYALGIAS: 0
JOINT SWELLING: 0
HEMATOCHEZIA: 0
DIARRHEA: 0
ARTHRALGIAS: 0
WEAKNESS: 0
CONSTIPATION: 0
PALPITATIONS: 0
COUGH: 0
FREQUENCY: 0
HEMATURIA: 0
SHORTNESS OF BREATH: 0
CHILLS: 0
FEVER: 0
DYSURIA: 0
HEARTBURN: 0
NAUSEA: 0
EYE PAIN: 0
NERVOUS/ANXIOUS: 0
ABDOMINAL PAIN: 0
SORE THROAT: 0
PARESTHESIAS: 0
DIZZINESS: 0

## 2023-04-19 ASSESSMENT — ASTHMA QUESTIONNAIRES
QUESTION_4 LAST FOUR WEEKS HOW OFTEN HAVE YOU USED YOUR RESCUE INHALER OR NEBULIZER MEDICATION (SUCH AS ALBUTEROL): NOT AT ALL
ACT_TOTALSCORE: 20
QUESTION_2 LAST FOUR WEEKS HOW OFTEN HAVE YOU HAD SHORTNESS OF BREATH: ONCE OR TWICE A WEEK
ACT_TOTALSCORE: 20
QUESTION_1 LAST FOUR WEEKS HOW MUCH OF THE TIME DID YOUR ASTHMA KEEP YOU FROM GETTING AS MUCH DONE AT WORK, SCHOOL OR AT HOME: NONE OF THE TIME
QUESTION_3 LAST FOUR WEEKS HOW OFTEN DID YOUR ASTHMA SYMPTOMS (WHEEZING, COUGHING, SHORTNESS OF BREATH, CHEST TIGHTNESS OR PAIN) WAKE YOU UP AT NIGHT OR EARLIER THAN USUAL IN THE MORNING: TWO OR THREE NIGHTS A WEEK
QUESTION_5 LAST FOUR WEEKS HOW WOULD YOU RATE YOUR ASTHMA CONTROL: WELL CONTROLLED

## 2023-04-19 ASSESSMENT — PAIN SCALES - GENERAL: PAINLEVEL: NO PAIN (0)

## 2023-04-19 NOTE — LETTER
My Asthma Action Plan    Name: Yolie Bedoya   YOB: 1960  Date: 4/19/2023   My doctor: DAVID RAE MD   My clinic: [facility]        My Control Medicine: Advair 250-50 mcg- 1 puff every 12 hours   My Rescue Medicine: Albuterol (Proair/Ventolin/Proventil HFA) 2-4 puffs EVERY 4 HOURS as needed. Use a spacer if recommended by your provider.   My Asthma Severity:   Moderate Persistent  Know your asthma triggers               GREEN ZONE   Good Control  I feel good  No cough or wheeze  Can work, sleep and play without asthma symptoms       Take your asthma control medicine every day.     If exercise triggers your asthma, take your rescue medication  15 minutes before exercise or sports, and  During exercise if you have asthma symptoms  Spacer to use with inhaler: If you have a spacer, make sure to use it with your inhaler             YELLOW ZONE Getting Worse  I have ANY of these:  I do not feel good  Cough or wheeze  Chest feels tight  Wake up at night   Keep taking your Green Zone medications  Start taking your rescue medicine:  every 20 minutes for up to 1 hour. Then every 4 hours for 24-48 hours.  If you stay in the Yellow Zone for more than 12-24 hours, contact your doctor.  If you do not return to the Green Zone in 12-24 hours or you get worse, start taking your oral steroid medicine if prescribed by your provider.           RED ZONE Medical Alert - Get Help  I have ANY of these:  I feel awful  Medicine is not helping  Breathing getting harder  Trouble walking or talking  Nose opens wide to breathe       Take your rescue medicine NOW  If your provider has prescribed an oral steroid medicine, start taking it NOW  Call your doctor NOW  If you are still in the Red Zone after 20 minutes and you have not reached your doctor:  Take your rescue medicine again and  Call 911 or go to the emergency room right away    See your regular doctor within 2 weeks of an Emergency  Room or Urgent Care visit for follow-up treatment.          Annual Reminders:  Meet with Asthma Educator,  Flu Shot in the Fall, consider Pneumonia Vaccination for patients with asthma (aged 19 and older).    Pharmacy: [id number] ([place]) - [place] [address]    Electronically signed by DAVID RAE MD   Date: 04/19/23                      Asthma Triggers  How To Control Things That Make Your Asthma Worse    Triggers are things that make your asthma worse.  Look at the list below to help you find your triggers and what you can do about them.  You can help prevent asthma flare-ups by staying away from your triggers.      Trigger                                                          What you can do   Cigarette Smoke  Tobacco smoke can make asthma worse. Do not allow smoking in your home, car or around you.  Be sure no one smokes at a child s day care or school.  If you smoke, ask your health care provider for ways to help you quit.  Ask family members to quit too.  Ask your health care provider for a referral to Quit Plan to help you quit smoking, or call 4-709-718PLAN.     Colds, Flu, Bronchitis  These are common triggers of asthma. Wash your hands often.  Don t touch your eyes, nose or mouth.  Get a flu shot every year.     Dust Mites  These are tiny bugs that live in cloth or carpet. They are too small to see. Wash sheets and blankets in hot water every week.   Encase pillows and mattress in dust mite proof covers.  Avoid having carpet if you can. If you have carpet, vacuum weekly.   Use a dust mask and HEPA vacuum.   Pollen and Outdoor Mold  Some people are allergic to trees, grass, or weed pollen, or molds. Try to keep your windows closed.  Limit time out doors when pollen count is high.   Ask you health care provider about taking medicine during allergy season.     Animal Dander  Some people are allergic to skin flakes, urine or saliva from pets with fur or  feathers. Keep pets with fur or feathers out of your home.    If you can t keep the pet outdoors, then keep the pet out of your bedroom.  Keep the bedroom door closed.  Keep pets off cloth furniture and away from stuffed toys.     Mice, Rats, and Cockroaches   Some people are allergic to the waste from these pests.   Cover food and garbage.  Clean up spills and food crumbs.  Store grease in the refrigerator.   Keep food out of the bedroom.   Indoor Mold  This can be a trigger if your home has high moisture. Fix leaking faucets, pipes, or other sources of water.   Clean moldy surfaces.  Dehumidify basement if it is damp and smelly.   Smoke, Strong Odors, and Sprays  These can reduce air quality. Stay away from strong odors and sprays, such as perfume, powder, hair spray, paints, smoke incense, paint, cleaning products, candles and new carpet.   Exercise or Sports  Some people with asthma have this trigger. Be active!  Ask your doctor about taking medicine before sports or exercise to prevent symptoms.    Warm up for 5-10 minutes before and after sports or exercise.     Other Triggers of Asthma  Cold air:  Cover your nose and mouth with a scarf.  Sometimes laughing or crying can be a trigger.  Some medicines and food can trigger asthma.

## 2023-04-19 NOTE — PROGRESS NOTES
SUBJECTIVE:   CC: Yolie is an 62 year old who presents for preventive health visit.       4/19/2023    10:03 AM   Additional Questions   Roomed by TONI Crockett   Accompanied by Self     Healthy Habits:     Getting at least 3 servings of Calcium per day:  NO    Bi-annual eye exam:  Yes    Dental care twice a year:  Yes    Sleep apnea or symptoms of sleep apnea:  None    Diet:  Regular (no restrictions)    Frequency of exercise:  4-5 days/week    Duration of exercise:  15-30 minutes    Taking medications regularly:  Yes    Medication side effects:  Not applicable    PHQ-2 Total Score: 0    Additional concerns today:  No              Today's PHQ-2 Score:       4/19/2023     9:56 AM   PHQ-2 ( 1999 Pf[employer])   Q1: Little interest or pleasure in doing things 0   Q2: Feeling down, depressed or hopeless 0   PHQ-2 Score 0   Q1: Little interest or pleasure in doing things Not at all   Q2: Feeling down, depressed or hopeless Not at all   PHQ-2 Score 0           Social History     Tobacco Use     Smoking status: Never     Smokeless tobacco: Never   Vaping Use     Vaping status: Never Used   Substance Use Topics     Alcohol use: Yes     Comment: Alcoholic Drinks/day: occ             4/19/2023     9:56 AM   Alcohol Use   Prescreen: >3 drinks/day or >7 drinks/week? No     Reviewed orders with patient.  Reviewed health maintenance and updated orders accordingly - Yes  BP Readings from Last 3 Encounters:   04/19/23 130/78   09/07/22 136/74   04/18/22 126/72    Wt Readings from Last 3 Encounters:   04/19/23 100.1 kg (220 lb 9.6 oz)   09/07/22 98 kg (216 lb)   04/18/22 99.9 kg (220 lb 3.2 oz)                  Current Outpatient Medications   Medication Sig Dispense Refill     albuterol (PROAIR HFA/PROVENTIL HFA/VENTOLIN HFA) 108 (90 Base) MCG/ACT inhaler Inhale 2 puffs into the lungs every 4 hours as needed for wheezing 18 g 11     buPROPion (WELLBUTRIN XL) 150 MG 24 hr tablet Take 2 tablets (300 mg) by mouth every morning 180 tablet  3     cetirizine HCl 10 MG CAPS Take 1 tablet by mouth daily       [START ON 4/20/2023] estradiol (VAGIFEM) 10 MCG TABS vaginal tablet Place 1 tablet (10 mcg) vaginally twice a week 8 tablet 11     fluticasone (FLONASE) 50 MCG/ACT nasal spray Spray 2 sprays into both nostrils daily 48 g 3     fluticasone-salmeterol (ADVAIR) 250-50 MCG/ACT inhaler Inhale 1 puff into the lungs every 12 hours 1 each 11     hydrochlorothiazide (HYDRODIURIL) 25 MG tablet Take 1 tablet (25 mg) by mouth daily 90 tablet 3     losartan (COZAAR) 100 MG tablet Take 1 tablet (100 mg) by mouth daily 90 tablet 3     montelukast (SINGULAIR) 10 MG tablet Take 1 tablet (10 mg) by mouth At Bedtime 90 tablet 3     potassium chloride ER (K-TAB/KLOR-CON) 10 MEQ CR tablet Take 2 tablets (20 mEq) by mouth every morning AND 1 tablet (10 mEq) daily (with dinner). 270 tablet 4     rosuvastatin (CRESTOR) 10 MG tablet Take 1 tablet (10 mg) by mouth daily 90 tablet 3     SUMAtriptan (IMITREX) 20 MG/ACT nasal spray Spray 1 spray in nostril as needed for migraine May repeat in 2-4 hours 18 each 2     triamcinolone (KENALOG) 0.1 % external cream APPLY SPARINGLY TO AFFECTEDAREA THREE TIMES DAILY AS NEEDED 30 g 11     UNABLE TO FIND MEDICATION NAME: Plexus- 1 tablet daily       UNABLE TO FIND MEDICATION NAME: Gut health- 1 tablet daily       zolpidem (AMBIEN) 10 MG tablet Take 1 tablet (10 mg) by mouth At Bedtime 30 tablet 5     calcium polycarbophil (FIBERCON) 625 MG tablet Take 1 tablet by mouth daily       Cholecalciferol (VITAMIN D3) 1000 UNITS CAPS Take 1,000 Units by mouth daily       diphenhydrAMINE (BENADRYL) 25 MG capsule Take 2 capsules by mouth At Bedtime  (Patient not taking: Reported on 4/19/2023)       multivitamin w/minerals (THERA-VIT-M) tablet Take 1 tablet by mouth       Allergies   Allergen Reactions     Doxycycline Nausea and Vomiting     Metolazone Unknown     No Clinical Screening - See Comments GI Disturbance and Nausea and Vomiting      Narcotics     Amoxicillin-Pot Clavulanate Rash     Sulfa Drugs Rash       Breast Cancer Screening:    FHS-7:       9/20/2022    10:26 AM 9/20/2022    11:01 AM 4/19/2023     9:58 AM   Breast CA Risk Assessment (FHS-7)   Did any of your first-degree relatives have breast or ovarian cancer? No Yes Yes   Did any of your relatives have bilateral breast cancer?  No Yes   Did any man in your family have breast cancer?  No No   Did any woman in your family have breast and ovarian cancer?  Yes    Did any woman in your family have breast cancer before age 50 y?  No    Do you have 2 or more relatives with breast and/or ovarian cancer?  Yes    Do you have 2 or more relatives with breast and/or bowel cancer?  No        Mammogram Screening: Recommended annual mammography  Pertinent mammograms are reviewed under the imaging tab.    History of abnormal Pap smear: Status post benign hysterectomy. Health Maintenance and Surgical History updated.     Reviewed and updated as needed this visit by clinical staff   Tobacco  Allergies  Meds              Reviewed and updated as needed this visit by Provider                 Past Medical History:   Diagnosis Date     Allergic rhinitis due to animal hair and dander     1970,aspen, birch, maple, oak, grass, dust mite, ragwee, cocklebur, mugwart, lambs quarter, pigweed, fungus, molds     BCC (basal cell carcinoma), face 2020    Mohs     Calculus of gallbladder without cholecystitis without obstruction     No Comments Provided     Essential (primary) hypertension     No Comments Provided     Excessive and frequent menstruation with regular cycle     s/p CLARA     Hormone replacement therapy (postmenopausal)     2010,started ERT     Obesity     No Comments Provided     Other long term (current) drug therapy     2/11/2014,ambien #30/month     Other specified postprocedural states     nausea and severe vomiting with narcotics     Psychophysiologic insomnia     contract signed 2/2014      Tubulo-interstitial nephritis     No Comments Provided     Venous insufficiency (chronic) (peripheral)     No Comments Provided      Past Surgical History:   Procedure Laterality Date     ARTHROSCOPY KNEE  10/12/2017    10/12/2017     BLADDER SURGERY  10/25/2017     SLING OPER STRES INCONTINENCE     COLONOSCOPY  06/2004     because of change in bowel habits     COLONOSCOPY  05/14/2014 5/14/2014,Extensive diverticulosis throughout the colon - follow up 10 years     EXTRACTION(S) DENTAL      No Comments Provided     HYSTERECTOMY TOTAL ABDOMINAL, BILATERAL SALPINGO-OOPHORECTOMY, COMBINED  03/30/2010    CLARA/BSO/Vasquez/Posterior Repair     LAPAROSCOPIC ABLATION ENDOMETRIOSIS      3/05     left total knee arthroplasty Left 10/2019    Dr. Jones.  [place]       Review of Systems   Constitutional: Negative for chills and fever.   HENT: Negative for congestion, ear pain, hearing loss and sore throat.    Eyes: Negative for pain and visual disturbance.   Respiratory: Negative for cough and shortness of breath.    Cardiovascular: Negative for chest pain, palpitations and peripheral edema.   Gastrointestinal: Negative for abdominal pain, constipation, diarrhea, heartburn, hematochezia and nausea.   Genitourinary: Negative for dysuria, frequency, genital sores, hematuria and urgency.   Musculoskeletal: Negative for arthralgias, joint swelling and myalgias.   Skin: Negative for rash.   Neurological: Negative for dizziness, weakness, headaches and paresthesias.   Psychiatric/Behavioral: Negative for mood changes. The patient is not nervous/anxious.      1.  Hyperlipidemia:  Is on statin.  At first was tired and fatigued, then that passed.  2.  Hypertension - stable   3.  Asthma and allergies- tried stopping benadryl and symptoms have come back   Mold allergy symptoms with this too - uses Singulair, benadryl, Flonase, inhaler  4.  Migraines - had to refill her medications x3 this year, more this winter ; last 2-3 days at a time  ;  "dehydration is a trigger  5.  Osteoarthritis of knees with past right TKA; fall with right shin contusion    6.  Insomnia:  On Ambien.  Risk factors regarding long term use reviewed, in particular dementia   PDMP Review       Value Time User    State PDMP site checked  Yes 4/19/2023  5:14 PM Yarely Villagomez MD        7.  Pre-diabetes -   Lab Results   Component Value Date    A1C 5.6 04/19/2023    A1C 5.6 04/18/2022    A1C 5.4 03/17/2021    A1C 5.7 08/18/2020    A1C 5.4 02/18/2020    A1C 5.7 09/27/2019    A1C 5.6 06/19/2018       8.  Depression, stable, in remission      4/18/2022     1:17 PM 9/7/2022     8:50 AM 4/19/2023     9:52 AM   PHQ   PHQ-9 Total Score 0 2 5   Q9: Thoughts of better off dead/self-harm past 2 weeks Not at all Not at all Not at all          OBJECTIVE:   /78   Pulse 73   Temp 97.9  F (36.6  C) (Tympanic)   Resp 14   Ht 1.657 m (5' 5.25\")   Wt 100.1 kg (220 lb 9.6 oz)   LMP  (LMP Unknown)   SpO2 99%   Breastfeeding No   BMI 36.43 kg/m    Physical Exam  GENERAL: healthy, alert and no distress  EYES: Eyes grossly normal to inspection, PERRL and conjunctivae and sclerae normal  HENT: normal cephalic/atraumatic, oral mucous membranes moist and mild TM retraction  NECK: no adenopathy, no asymmetry, masses, or scars and thyroid normal to palpation  RESP: lungs clear to auscultation - no rales, rhonchi or wheezes  BREAST: normal without masses, tenderness or nipple discharge and no palpable axillary masses or adenopathy  CV: regular rates and rhythm and tr LE edema  ABDOMEN: soft, nontender, no hepatosplenomegaly, no masses and bowel sounds normal  MS: tr LE edema, left knee TKA    SKIN: no suspicious lesions or rashes  NEURO: Normal strength and tone, mentation intact and speech normal  PSYCH: mentation appears normal, affect normal/bright        ASSESSMENT/PLAN:       ICD-10-CM    1. Physical exam, annual  Z00.00       2. Mixed hyperlipidemia  E78.2 Hemoglobin A1c     " rosuvastatin (CRESTOR) 10 MG tablet     Lipid Profile     Comprehensive Metabolic Panel     Comprehensive Metabolic Panel     Lipid Profile     Hemoglobin A1c     CANCELED: ALT (SGPT)     CANCELED: AST (SGOT)      3. Morbid obesity (H)  E66.01       4. Essential hypertension  I10 hydrochlorothiazide (HYDRODIURIL) 25 MG tablet     losartan (COZAAR) 100 MG tablet     potassium chloride ER (K-TAB/KLOR-CON) 10 MEQ CR tablet     UA reflex to Microscopic and Culture     UA reflex to Microscopic and Culture      5. Prediabetes  R73.03 Hemoglobin A1c     Hemoglobin A1c      6. Insomnia, persistent  G47.00 zolpidem (AMBIEN) 10 MG tablet      7. Recurrent major depression in remission (H)  F33.40 buPROPion (WELLBUTRIN XL) 150 MG 24 hr tablet      8. Moderate persistent asthma without complication  J45.40 fluticasone-salmeterol (ADVAIR) 250-50 MCG/ACT inhaler     albuterol (PROAIR HFA/PROVENTIL HFA/VENTOLIN HFA) 108 (90 Base) MCG/ACT inhaler      9. Dyspareunia in female  N94.10 estradiol (VAGIFEM) 10 MCG TABS vaginal tablet      10. Allergic rhinitis, unspecified seasonality, unspecified trigger  J30.9 fluticasone (FLONASE) 50 MCG/ACT nasal spray     montelukast (SINGULAIR) 10 MG tablet      11. Other migraine without status migrainosus, intractable  G43.819 SUMAtriptan (IMITREX) 20 MG/ACT nasal spray      12. Rash  R21 triamcinolone (KENALOG) 0.1 % external cream      13. Need for COVID-19 vaccine  Z23 COVID-19 VACCINE BIVALENT BOOSTER 12+ (PFIZER)        1.  Labs are due today, ordered.  2.  Patient wishes to receive COVID booster, updated today.  3.  Continue same asthma and allergy care plan.  4.  Continue same migraine care plan.  5.  Continue statin medication.  6.  She will follow-up with orthopedics as needed regarding knee arthritis symptoms.  7.  Continue same hypertension treatment.  8.  Diabetes prevention, weight management, consider vitamin D supplementation  9.   reviewed.  As noted above, reviewed with  "patient risks of use of Ambien, in particular dementia, long-term.  Especially as able, she should work to decrease the dose and the dosing frequency.  Consider CBT/online therapy learning for chronic insomnia.  Medication check due in 6 months time.  10.  Continue Wellbutrin for chronic depression symptoms.      COUNSELING:  Reviewed preventive health counseling, as reflected in patient instructions      BMI:   Estimated body mass index is 36.43 kg/m  as calculated from the following:    Height as of this encounter: 1.657 m (5' 5.25\").    Weight as of this encounter: 100.1 kg (220 lb 9.6 oz).   Weight management plan: Discussed healthy diet and exercise guidelines      She reports that she has never smoked. She has never used smokeless tobacco.      DAVID RAE MD  [facility] AND \[facility] [place] [facility]\""  Answers for HPI/ROS submitted by the patient on 4/19/2023  If you checked off any problems, how difficult have these problems made it for you to do your work, take care of things at home, or get along with other people?: Not difficult at all  PHQ9 TOTAL SCORE: 5  CAL 7 TOTAL SCORE: 4      "

## 2023-04-19 NOTE — PATIENT INSTRUCTIONS
You are currently being prescribed a controlled substance.  You need to be aware of the risks of taking this medication.  By signing a medication contract, you accept these risks and side effects.      Any medical treatment is initially a trial, and that continued prescribing is based on evidence of benefit without an unacceptable risk. Understand that the goal of using this medication is to increase functional level. If your symptoms do not significantly decrease and/or function increase, the medication will be stopped.    Be aware that the use of such medicine has certain risks associated with it, including, but not limited to:  Sleepiness or drowsiness, lightheadedness, dizziness, confusion,allergic reaction, slowing of breathing rate, slowing of reflexes or reaction time, sexual dysfunction,physical dependence, tolerance to analgesia, addiction, withdrawal and the possibility that the medicine will not completely take care of your symptoms.  Usage is associate with a significantly increased risk of falls and other unintended injuries.    The use of sleeping medication long term is associated with an increased risk of dementia and an overall shorter lifespan  It is known that chronic insomnia is related to those conditions and that sleeping medication does not reverse that risk.  Therapies to treat chronic insomnia, such as cognitive behavioral therapy, have better long term outcomes.      The overuse of this medication can result in serious health risks including respiratory depression or even death.  These risks are increased when the medication is combined with alcohol,narcotics, or other sedatives.    Having these medications prescribed for you also means that your accept the risk of possible intentional or accidental use by those in your home or others with whom you come in contact.  This includes their possible diversion of your medications, overdose or death.      This medication will be strictly monitored  and all of your medications should be filled at the same pharmacy.  (Should the need arise to change pharmacies our office must be informed).  .      It is your responsibility to schedule timely follow up appointments for refills as the medication won't be filled outside of an appointment except in very special circumstances.  For most medication in this category, that mean at minimum visits every 6 months.

## 2023-04-19 NOTE — NURSING NOTE
"Chief Complaint   Patient presents with     Physical     Patient is here for annual physical     Initial /78   Pulse 73   Temp 97.9  F (36.6  C) (Tympanic)   Resp 14   Ht 1.657 m (5' 5.25\")   Wt 100.1 kg (220 lb 9.6 oz)   LMP  (LMP Unknown)   SpO2 99%   Breastfeeding No   BMI 36.43 kg/m   Estimated body mass index is 36.43 kg/m  as calculated from the following:    Height as of this encounter: 1.657 m (5' 5.25\").    Weight as of this encounter: 100.1 kg (220 lb 9.6 oz).  Medication Reconciliation: complete    Keira Almanza CMA       FOOD SECURITY SCREENING QUESTIONS:    The next two questions are to help us understand your food security.  If you are feeling you need any assistance in this area, we have resources available to support you today.    Hunger Vital Signs:  Within the past 12 months we worried whether our food would run out before we got money to buy more. Never  Within the past 12 months the food we bought just didn't last and we didn't have money to get more. Never  Keira Almanza CMA,LPN on 4/19/2023 at 10:10 AM    Immunization Documentation    Prior to Immunization administration, verified patients identity using patient's name and date of birth. Please see IMMUNIZATIONS  and order for additional information.  Patient / Parent instructed to remain in clinic for 15 minutes and report any adverse reaction to staff immediately.    Was the entire amount of vaccines given used? Yes    Keira Almanza CMA  4/19/2023   10:57 AM        "

## 2023-04-20 RX ORDER — SCOLOPAMINE TRANSDERMAL SYSTEM 1 MG/1
1 PATCH, EXTENDED RELEASE TRANSDERMAL
Qty: 4 PATCH | Refills: 0 | Status: SHIPPED | OUTPATIENT
Start: 2023-04-20 | End: 2023-05-02

## 2023-05-11 DIAGNOSIS — I10 ESSENTIAL HYPERTENSION: ICD-10-CM

## 2023-05-12 RX ORDER — HYDROCHLOROTHIAZIDE 12.5 MG/1
12.5 TABLET ORAL EVERY EVENING
Qty: 90 TABLET | Refills: 4 | Status: SHIPPED | OUTPATIENT
Start: 2023-05-12 | End: 2024-02-19

## 2023-06-06 ENCOUNTER — MYC MEDICAL ADVICE (OUTPATIENT)
Dept: FAMILY MEDICINE | Facility: OTHER | Age: 63
End: 2023-06-06
Payer: COMMERCIAL

## 2023-06-07 NOTE — TELEPHONE ENCOUNTER
Environmental modification -- Patients should be advised about environmental modification measures, which include:  ?Looking at the horizon or a distant, stationary object.  ?Avoidance of reading or looking at a screen while in a moving environment, as this can increase conflict between vestibular and visual cues.  ?Selecting seats where motion is the least. In a boat, lower deck and midship cabins are recommended. In a car, the front seat is recommended. In a plane, a seat over the front edge of the wing is recommended. If travelling by train or bus, forward facing seats are recommended.    Complementary and alternative treatments -- In addition to environmental modification, we discuss the use of maykel and acupressure bands with patients prone to motion sickness, as these measures are well tolerated and may be beneficial.  Specifically, we advise patients suck on hard maykel candies if they experience or anticipate experiencing symptoms of motion sickness. Acupressure bands are typically applied to both wrists prophylactically but can also be worn after symptoms have begun.    Pulled from [facility] and shared with patient via [employer].    Radha Vee RN on 6/7/2023 at 3:48 PM

## 2023-10-02 DIAGNOSIS — G47.00 INSOMNIA, PERSISTENT: ICD-10-CM

## 2023-10-02 RX ORDER — ZOLPIDEM TARTRATE 10 MG/1
TABLET ORAL
Qty: 30 TABLET | Refills: 5 | OUTPATIENT
Start: 2023-10-02

## 2023-10-02 NOTE — TELEPHONE ENCOUNTER
PDMP Review         Value Time User    State PDMP site checked  Yes 10/2/2023  5:49 PM Yarely Villagomez MD          Follow up appointment needed for controlled substance.  Yarely Miller MD

## 2023-10-03 ENCOUNTER — OFFICE VISIT (OUTPATIENT)
Dept: FAMILY MEDICINE | Facility: OTHER | Age: 63
End: 2023-10-03
Payer: COMMERCIAL

## 2023-10-03 VITALS
DIASTOLIC BLOOD PRESSURE: 72 MMHG | WEIGHT: 204 LBS | HEIGHT: 65 IN | RESPIRATION RATE: 12 BRPM | HEART RATE: 98 BPM | OXYGEN SATURATION: 97 % | SYSTOLIC BLOOD PRESSURE: 113 MMHG | TEMPERATURE: 99.2 F | BODY MASS INDEX: 33.99 KG/M2

## 2023-10-03 DIAGNOSIS — N30.00 ACUTE CYSTITIS WITHOUT HEMATURIA: Primary | ICD-10-CM

## 2023-10-03 LAB
ALBUMIN UR-MCNC: 20 MG/DL
APPEARANCE UR: ABNORMAL
BILIRUB UR QL STRIP: NEGATIVE
COLOR UR AUTO: YELLOW
GLUCOSE UR STRIP-MCNC: NEGATIVE MG/DL
HGB UR QL STRIP: ABNORMAL
KETONES UR STRIP-MCNC: NEGATIVE MG/DL
LEUKOCYTE ESTERASE UR QL STRIP: ABNORMAL
MUCOUS THREADS #/AREA URNS LPF: PRESENT /LPF
NITRATE UR QL: NEGATIVE
PH UR STRIP: 7.5 [PH] (ref 5–9)
RBC URINE: 44 /HPF
SP GR UR STRIP: 1.02 (ref 1–1.03)
UROBILINOGEN UR STRIP-MCNC: NORMAL MG/DL
WBC CLUMPS #/AREA URNS HPF: PRESENT /HPF
WBC URINE: >182 /HPF
YEAST #/AREA URNS HPF: ABNORMAL /HPF

## 2023-10-03 PROCEDURE — 87186 SC STD MICRODIL/AGAR DIL: CPT | Mod: ZL

## 2023-10-03 PROCEDURE — 81001 URINALYSIS AUTO W/SCOPE: CPT | Mod: ZL

## 2023-10-03 PROCEDURE — 99214 OFFICE O/P EST MOD 30 MIN: CPT

## 2023-10-03 RX ORDER — CEPHALEXIN 500 MG/1
500 CAPSULE ORAL 2 TIMES DAILY
Qty: 14 CAPSULE | Refills: 0 | Status: SHIPPED | OUTPATIENT
Start: 2023-10-03 | End: 2023-10-10

## 2023-10-03 ASSESSMENT — PATIENT HEALTH QUESTIONNAIRE - PHQ9
SUM OF ALL RESPONSES TO PHQ QUESTIONS 1-9: 2
SUM OF ALL RESPONSES TO PHQ QUESTIONS 1-9: 2
10. IF YOU CHECKED OFF ANY PROBLEMS, HOW DIFFICULT HAVE THESE PROBLEMS MADE IT FOR YOU TO DO YOUR WORK, TAKE CARE OF THINGS AT HOME, OR GET ALONG WITH OTHER PEOPLE: SOMEWHAT DIFFICULT

## 2023-10-03 ASSESSMENT — PAIN SCALES - GENERAL: PAINLEVEL: NO PAIN (0)

## 2023-10-03 NOTE — PROGRESS NOTES
"ASSESSMENT/PLAN:    (N30.00) Acute cystitis without hematuria  (primary encounter diagnosis)  Comment: Presents with 4 days of urinary symptoms including frequency and urgency, she is developing dysuria as well.  No fevers or chills.  No nausea or vomiting.  On exam she does not have any CVA or suprapubic tenderness.  Urinalysis shows large amount of leukocytes with over 182 white blood cells.  She does meet criteria for treatment at this time.  She has notable allergies to sulfa, Augmentin, and doxycycline.  Most recent urine culture grew E. coli which was pansensitive.  Due to her allergies we will start her on cephalexin.  Plan: UA Macroscopic with reflex to Microscopic and         Culture, Urine Culture, cephALEXin (KEFLEX) 500        MG capsule  Vitals stable. PE available for review below. UA results: Below. Based off UA results, patient does meet criteria for antibiotic therapy. I did discuss with patient that if a urine culture was performed, that we will inform patient if a change to their treatment plan needs to occur based off culture results - with urine cultures typically returning in 1-3 days. In the meantime, recommend, alternating tylenol and ibuprofen every 4-6 hours as needed, warm heating pad, pushing fluids/hydration, cranberry juice/pills, urinating when urge arises. May also try over the counter remedies such as Azo (as long as pyridium not already prescribed). Patient aware that if they do take Azo, that this medication can change color of urine to an \"orange\" color. If fevers, chills, flank pain/back pain, inability to urinate/struggle to urinate, signs of dehydration or other worrisome signs occur, patient agreeable to follow up for reevaluation. Patient is in agreement and understanding of the above treatment plan. All questions and concerns were addressed and answered to patient's satisfaction. AVS reviewed with patient.     Discussed warning signs/symptoms indicative of need to " f/u    Follow up if symptoms persist or worsen or concerns    I have reviewed the nursing notes.  I have reviewed the findings, diagnosis, plan and need for follow up with the patient.    I explained my diagnostic considerations and recommendations to the patient, who voiced understanding and agreement with the treatment plan. All questions were answered. We discussed potential side effects of any prescribed or recommended therapies, as well as expectations for response to treatments.    DANIEL VALENZUELA JEANE, APRN CNP  10/3/2023  2:44 PM    HPI:    Yolie Bedoya is a 62 year old female  who presents to [facility] today for concerns of UTI.    possible UTI, x 4 days    Symptoms: dysuria, urgency, and frequency  No back or side pain  No hematuria/blood in urine  Last Urination: Today  YES: she is  able to completely void/empty, but gets up and feels she has to go  No fevers, chills, nausea, vomiting  YES: she endorses  change in bowel habits: diarrhea, diarrhea has improved  No vaginal/penile symptoms (discharge, pain, itching, sores, etc.)  No exposures to STIs/STDs    YES: she has a  history of UTIs; last grew e. Coli with pan sensitivity.     PCP: [facility]J    Allergy to Doxy, metolazone, Augmentin, and Sulfa.     Past Medical History:   Diagnosis Date    Allergic rhinitis due to animal hair and dander     1970,aspen, birch, maple, oak, grass, dust mite, ragwee, cocklebur, mugwart, lambs quarter, pigweed, fungus, molds    BCC (basal cell carcinoma), face 2020    Mohs    Calculus of gallbladder without cholecystitis without obstruction     No Comments Provided    Essential (primary) hypertension     No Comments Provided    Excessive and frequent menstruation with regular cycle     s/p CLARA    Hormone replacement therapy (postmenopausal)     2010,started ERT    Obesity     No Comments Provided    Other long term (current) drug therapy     2/11/2014,ambien #30/month    Other specified postprocedural states     nausea and  severe vomiting with narcotics    Psychophysiologic insomnia     contract signed 2/2014    Tubulo-interstitial nephritis     No Comments Provided    Venous insufficiency (chronic) (peripheral)     No Comments Provided     Past Surgical History:   Procedure Laterality Date    ARTHROSCOPY KNEE  10/12/2017    10/12/2017    BLADDER SURGERY  10/25/2017     SLING OPER STRES INCONTINENCE    COLONOSCOPY  06/2004     because of change in bowel habits    COLONOSCOPY  05/14/2014 5/14/2014,Extensive diverticulosis throughout the colon - follow up 10 years    EXTRACTION(S) DENTAL      No Comments Provided    HYSTERECTOMY TOTAL ABDOMINAL, BILATERAL SALPINGO-OOPHORECTOMY, COMBINED  03/30/2010    CLARA/BSO/Vasquez/Posterior Repair    LAPAROSCOPIC ABLATION ENDOMETRIOSIS      3/05    left total knee arthroplasty Left 10/2019    Dr. Jones.  [place]     Social History     Tobacco Use    Smoking status: Never    Smokeless tobacco: Never   Substance Use Topics    Alcohol use: Yes     Comment: Alcoholic Drinks/day: occ     Current Outpatient Medications   Medication Sig Dispense Refill    albuterol (PROAIR HFA/PROVENTIL HFA/VENTOLIN HFA) 108 (90 Base) MCG/ACT inhaler Inhale 2 puffs into the lungs every 4 hours as needed for wheezing 18 g 11    buPROPion (WELLBUTRIN XL) 150 MG 24 hr tablet Take 2 tablets (300 mg) by mouth every morning 180 tablet 3    cetirizine HCl 10 MG CAPS Take 1 tablet by mouth daily      estradiol (VAGIFEM) 10 MCG TABS vaginal tablet Place 1 tablet (10 mcg) vaginally twice a week 8 tablet 11    fluticasone (FLONASE) 50 MCG/ACT nasal spray Spray 2 sprays into both nostrils daily 48 g 3    fluticasone-salmeterol (ADVAIR) 250-50 MCG/ACT inhaler Inhale 1 puff into the lungs every 12 hours 1 each 11    hydrochlorothiazide (HYDRODIURIL) 12.5 MG tablet Take 1 tablet (12.5 mg) by mouth every evening 90 tablet 4    hydrochlorothiazide (HYDRODIURIL) 25 MG tablet Take 1 tablet (25 mg) by mouth daily 90 tablet 3    losartan  "(COZAAR) 100 MG tablet Take 1 tablet (100 mg) by mouth daily 90 tablet 3    montelukast (SINGULAIR) 10 MG tablet Take 1 tablet (10 mg) by mouth At Bedtime 90 tablet 3    potassium chloride ER (K-TAB/KLOR-CON) 10 MEQ CR tablet Take 2 tablets (20 mEq) by mouth every morning AND 1 tablet (10 mEq) daily (with dinner). 270 tablet 4    rosuvastatin (CRESTOR) 10 MG tablet Take 1 tablet (10 mg) by mouth daily 90 tablet 3    SUMAtriptan (IMITREX) 20 MG/ACT nasal spray Spray 1 spray in nostril as needed for migraine May repeat in 2-4 hours 18 each 2    triamcinolone (KENALOG) 0.1 % external cream APPLY SPARINGLY TO AFFECTEDAREA THREE TIMES DAILY AS NEEDED 30 g 11    UNABLE TO FIND MEDICATION NAME: Plexus- 1 tablet daily      UNABLE TO FIND MEDICATION NAME: Gut health- 1 tablet daily      zolpidem (AMBIEN) 10 MG tablet Take 1 tablet (10 mg) by mouth At Bedtime 30 tablet 5     Allergies   Allergen Reactions    Doxycycline Nausea and Vomiting    Metolazone Unknown    No Clinical Screening - See Comments GI Disturbance and Nausea and Vomiting     Narcotics    Amoxicillin-Pot Clavulanate Rash    Sulfa Antibiotics Rash     Past medical history, past surgical history, current medications and allergies reviewed and accurate to the best of my knowledge.      ROS:  Refer to HPI    /72   Pulse 98   Temp 99.2  F (37.3  C) (Tympanic)   Resp 12   Ht 1.645 m (5' 4.75\")   Wt 92.5 kg (204 lb)   LMP  (LMP Unknown)   SpO2 97%   Breastfeeding No   BMI 34.21 kg/m      EXAM:  General Appearance: Well appearing 62 year old female, appropriate appearance for age. No acute distress   Eyes: conjunctivae normal without erythema or irritation, corneas clear, no drainage or crusting, no eyelid swelling, pupils equal   Oropharynx: moist mucous membranes, voice clear.    Sinuses:  No sinus tenderness upon palpation of the frontal or maxillary sinuses  Nose:  Bilateral nares: no erythema, no edema, no drainage or congestion   Neck: supple " without adenopathy  Respiratory: normal chest wall and respirations.  Normal effort.  Clear to auscultation bilaterally, no wheezing, crackles or rhonchi.  No increased work of breathing.  No cough appreciated.  Cardiac: RRR with no murmurs  Abdomen: soft, nontender, no rigidity, no rebound tenderness or guarding, normal bowel sounds present  :  No suprapubic tenderness to palpation.  Absent CVA tenderness to palpation.    Musculoskeletal:  Equal movement of bilateral upper extremities.  Equal movement of bilateral lower extremities.  Normal gait.    Dermatological: no rashes noted of exposed skin  Neuro: Alert and oriented to person, place, and time.  Cranial nerves II-XII grossly intact with no focal or lateralizing deficits.  Muscle tone normal.  Gait normal. No tremor.   Psychological: normal affect, alert, oriented, and pleasant.     Labs:  Results for orders placed or performed in visit on 10/03/23   UA Macroscopic with reflex to Microscopic and Culture     Status: Abnormal    Specimen: Urine, Clean Catch   Result Value Ref Range    Color Urine Yellow Colorless, Straw, Light Yellow, Yellow    Appearance Urine Slightly Cloudy (A) Clear    Glucose Urine Negative Negative mg/dL    Bilirubin Urine Negative Negative    Ketones Urine Negative Negative mg/dL    Specific Gravity Urine 1.023 1.000 - 1.030    Blood Urine Trace (A) Negative    pH Urine 7.5 5.0 - 9.0    Protein Albumin Urine 20 (A) Negative mg/dL    Urobilinogen Urine Normal Normal, 2.0 mg/dL    Nitrite Urine Negative Negative    Leukocyte Esterase Urine Large (A) Negative    WBC Clumps Urine Present (A) None Seen /HPF    Budding Yeast Urine Few (A) None Seen /HPF    Mucus Urine Present (A) None Seen /LPF    RBC Urine 44 (H) <=2 /HPF    WBC Urine >182 (H) <=5 /HPF    Narrative    Urine Culture ordered based on laboratory criteria       Answers submitted by the patient for this visit:  Patient Health Questionnaire (Submitted on 10/3/2023)  If you checked  off any problems, how difficult have these problems made it for you to do your work, take care of things at home, or get along with other people?: Somewhat difficult  PHQ9 TOTAL SCORE: 2

## 2023-10-03 NOTE — NURSING NOTE
"Chief Complaint   Patient presents with    UTI   Patient presents to clinic for a UTI. Her symptoms include frequent urination, some burning while urinating, and discomfort. Symptoms started yesterday.      Lona Trimble on 10/3/2023 at 2:52 PM        Initial /72   Pulse 98   Temp 99.2  F (37.3  C) (Tympanic)   Resp 12   Ht 1.645 m (5' 4.75\")   Wt 92.5 kg (204 lb)   LMP  (LMP Unknown)   SpO2 97%   Breastfeeding No   BMI 34.21 kg/m   Estimated body mass index is 34.21 kg/m  as calculated from the following:    Height as of this encounter: 1.645 m (5' 4.75\").    Weight as of this encounter: 92.5 kg (204 lb).       FOOD SECURITY SCREENING QUESTIONS:    The next two questions are to help us understand your food security.  If you are feeling you need any assistance in this area, we have resources available to support you today.    Hunger Vital Signs:  Within the past 12 months we worried whether our food would run out before we got money to buy more. Never  Within the past 12 months the food we bought just didn't last and we didn't have money to get more. Never      Lona Trimble     "

## 2023-10-05 LAB — BACTERIA UR CULT: ABNORMAL

## 2023-10-16 ENCOUNTER — TELEPHONE (OUTPATIENT)
Dept: FAMILY MEDICINE | Facility: OTHER | Age: 63
End: 2023-10-16
Payer: COMMERCIAL

## 2023-10-16 DIAGNOSIS — G47.00 INSOMNIA, PERSISTENT: ICD-10-CM

## 2023-10-16 RX ORDER — ZOLPIDEM TARTRATE 10 MG/1
10 TABLET ORAL AT BEDTIME
Qty: 24 TABLET | Refills: 0 | Status: SHIPPED | OUTPATIENT
Start: 2023-10-16 | End: 2023-11-09

## 2023-10-16 NOTE — TELEPHONE ENCOUNTER
Patient requests to speak about their Ambien prescription  Patient stated they do have a scheduled appt but still wanted to speak to the nurse    Holden George on 10/16/2023 at 10:07 AM

## 2023-10-16 NOTE — TELEPHONE ENCOUNTER
Returned patient call; patient is out of her Ambien and requesting a refill.  Patient has a scheduled appointment 11/9/23.  Prescription pended.    Faustina Yeager LPN on 10/16/2023 at 10:39 AM

## 2023-11-06 ASSESSMENT — ASTHMA QUESTIONNAIRES: ACT_TOTALSCORE: 21

## 2023-11-06 NOTE — PROGRESS NOTES
"    Assessment & Plan       ICD-10-CM    1. Essential hypertension  I10 Lipid Profile     Comprehensive Metabolic Panel     Hemoglobin A1c     losartan (COZAAR) 100 MG tablet     potassium chloride ER (K-TAB/KLOR-CON) 10 MEQ CR tablet     US Carotid Bilateral     Nutrition Referral     Lipid Profile     Comprehensive Metabolic Panel     Hemoglobin A1c      2. Mixed hyperlipidemia  E78.2 Lipid Profile     Comprehensive Metabolic Panel     Hemoglobin A1c     rosuvastatin (CRESTOR) 10 MG tablet     US Carotid Bilateral     Nutrition Referral     Lipid Profile     Comprehensive Metabolic Panel     Hemoglobin A1c      3. Other migraine without status migrainosus, intractable  G43.819 SUMAtriptan (IMITREX) 20 MG/ACT nasal spray      4. Insomnia, persistent  G47.00 zolpidem (AMBIEN) 10 MG tablet      5. Family history of peripheral vascular disease  Z82.49       6. Flu vaccine need  Z23 INFLUENZA VACCINE 18-64Y (FLUBLOK)           I have personally reviewed the labs listed below.   Flu vaccine updated  Patient wishes to continue on Ambien for sleep.  Reviewed increased risks of memory effects, habituation, falls, parasomnias.  Prescription for 6 months sent  Continue Imitrex for headaches  Carotid ultrasound scheduled - may need vascular follow up   Continue same hypertension and lipid treatment at this time.     PDMP Review         Value Time User    State PDMP site checked  Yes 11/9/2023 11:04 AM Yarely Villagomez MD            Review of the result(s) of each unique test - yes   Ordering of each unique test  Prescription drug management         BMI:   Estimated body mass index is 34.31 kg/m  as calculated from the following:    Height as of this encounter: 1.645 m (5' 4.75\").    Weight as of this encounter: 92.8 kg (204 lb 9.6 oz).   Weight management plan: Discussed healthy diet and exercise guidelines        No follow-ups on file.    YARELY RAE MD  [facility] " "[facility]    Subjective   Yolie Bedoya is a 62 year old female  presenting for the following health issues: Nursing Notes:   Faustina Yeager LPN  11/9/2023 10:55 AM  Signed  Chief Complaint   Patient presents with    Recheck Medication     Refills, labs       Initial /80 (BP Location: Right arm, Patient Position: Sitting, Cuff Size: Adult Regular)   Pulse 79   Temp 96.8  F (36  C) (Temporal)   Resp 12   Ht 1.645 m (5' 4.75\")   Wt 92.8 kg (204 lb 9.6 oz)   LMP  (LMP Unknown)   SpO2 98%   Breastfeeding No   BMI 34.31 kg/m   Estimated body mass index is 34.31 kg/m  as calculated from the following:    Height as of this encounter: 1.645 m (5' 4.75\").    Weight as of this encounter: 92.8 kg (204 lb 9.6 oz).  Medication Review: complete    The next two questions are to help us understand your food security.  If you are feeling you need any assistance in this area, we have resources available to support you today.          11/6/2023   SDOH- Food Insecurity   Within the past 12 months, did you worry that your food would run out before you got money to buy more? N   Within the past 12 months, did the food you bought just not last and you didn t have money to get more? N     Health Care Directive:  Patient does not have a Health Care Directive or Living Will: Paperwork given to paperwork      Faustina Yeager LPN                                 HPI Yolie Bedoya is a 62 year old female presents for medication follow up.   Insomnia and long term use of Ambien.  She has failed other methods for sleep.  Osteoarthritis knee  FH of PVD and patient with increased awareness of left neck pulse/hearing pulse  Hypertension  Hyperlipidemia         Current Outpatient Medications   Medication    albuterol (PROAIR HFA/PROVENTIL HFA/VENTOLIN HFA) 108 (90 Base) MCG/ACT inhaler    buPROPion (WELLBUTRIN XL) 150 MG 24 hr tablet    buPROPion (WELLBUTRIN XL) 300 MG 24 hr tablet    cetirizine HCl 10 MG CAPS    estradiol (VAGIFEM) 10 " MCG TABS vaginal tablet    fluticasone (FLONASE) 50 MCG/ACT nasal spray    fluticasone-salmeterol (ADVAIR) 250-50 MCG/ACT inhaler    hydrochlorothiazide (HYDRODIURIL) 12.5 MG tablet    hydrochlorothiazide (HYDRODIURIL) 25 MG tablet    losartan (COZAAR) 100 MG tablet    montelukast (SINGULAIR) 10 MG tablet    potassium chloride ER (K-TAB/KLOR-CON) 10 MEQ CR tablet    rosuvastatin (CRESTOR) 10 MG tablet    SUMAtriptan (IMITREX) 20 MG/ACT nasal spray    triamcinolone (KENALOG) 0.1 % external cream    UNABLE TO FIND    UNABLE TO FIND    zolpidem (AMBIEN) 10 MG tablet     No current facility-administered medications for this visit.     Past Medical History:   Diagnosis Date    Allergic rhinitis due to animal hair and dander     1970,aspen, birch, maple, oak, grass, dust mite, ragwee, cocklebur, mugwart, lambs quarter, pigweed, fungus, molds    BCC (basal cell carcinoma), face 2020    Mohs    Calculus of gallbladder without cholecystitis without obstruction     No Comments Provided    Essential (primary) hypertension     No Comments Provided    Excessive and frequent menstruation with regular cycle     s/p CLARA    Hormone replacement therapy (postmenopausal)     2010,started ERT    Obesity     No Comments Provided    Other long term (current) drug therapy     2/11/2014,ambien #30/month    Other specified postprocedural states     nausea and severe vomiting with narcotics    Psychophysiologic insomnia     contract signed 2/2014    Tubulo-interstitial nephritis     No Comments Provided    Venous insufficiency (chronic) (peripheral)     No Comments Provided           Review of Systems   Having TKA in March, by the end of the day she is listing to the side.     She can hear her pulse in her ear - left side     6 Months - 49 years old = Fluzone  50 - 64 years old = Flublok   65+ years old = Fluzone HD           4/19/2023     9:52 AM 10/3/2023     2:46 PM 11/9/2023    10:31 AM   PHQ   PHQ-9 Total Score 5 2 3   Q9: Thoughts of  "better off dead/self-harm past 2 weeks Not at all Not at all Not at all         9/7/2022     8:51 AM 4/19/2023     9:53 AM   CAL-7 SCORE   Total Score 3 (minimal anxiety) 4 (minimal anxiety)   Total Score 3 4             Objective  /80 (BP Location: Right arm, Patient Position: Sitting, Cuff Size: Adult Regular)   Pulse 79   Temp 96.8  F (36  C) (Temporal)   Resp 12   Ht 1.645 m (5' 4.75\")   Wt 92.8 kg (204 lb 9.6 oz)   LMP  (LMP Unknown)   SpO2 98%   Breastfeeding No   BMI 34.31 kg/m      Wt Readings from Last 4 Encounters:   11/09/23 92.8 kg (204 lb 9.6 oz)   10/03/23 92.5 kg (204 lb)   04/19/23 100.1 kg (220 lb 9.6 oz)   09/07/22 98 kg (216 lb)       Physical Exam   GENERAL: healthy, alert and no distress  HENT: normal cephalic/atraumatic and ear canals and TM's normal  CV - RRR, no carotid bruit     Results for orders placed or performed during the hospital encounter of 11/09/23   MA Screen Bilateral w/Henrry     Status: None    Narrative    BILATERAL FULL FIELD DIGITAL SCREENING MAMMOGRAM WITH TOMOSYNTHESIS    Performed on: 11/9/23    Compared to: 09/20/2022, 09/07/2021, 08/18/2020, 08/05/2019, 07/06/2018,   06/19/2017, 06/07/2016, 06/01/2015, 05/01/2014, and 04/28/2014    Technique:  This study was evaluated with the assistance of Computer-Aided   Detection.  Breast Tomosynthesis was used in interpretation.    Findings: The breasts are heterogeneously dense, which may obscure small   masses.  There is no radiographic evidence of malignancy.     Impression    IMPRESSION: ACR BI-RADS Category 1: Negative    RECOMMENDED FOLLOW-UP: Annual routine screening mammogram    The results and recommendations of this examination will be communicated   to the patient.    Based on the pre-exam history questionnaire and medical history, you may   be at increased risk for developing breast cancer or other cancers in the   future. If you are interested in discussing your risks, and the   possibility of genetic " testing and/or supplemental screening, please   schedule a risk evaluation with Dr. Umana at [phone]. If you have   been evaluated or seen for a cancer risk evaluation within the last 5   years, please disregard this notice.    Wu Ventura MD     Results for orders placed or performed in visit on 11/09/23   Lipid Profile     Status: Normal   Result Value Ref Range    Cholesterol 152 <200 mg/dL    Triglycerides 102 <150 mg/dL    Direct Measure HDL 74 >=50 mg/dL    LDL Cholesterol Calculated 58 <=100 mg/dL    Non HDL Cholesterol 78 <130 mg/dL    Narrative    Cholesterol  Desirable:  <200 mg/dL    Triglycerides  Normal:  Less than 150 mg/dL  Borderline High:  150-199 mg/dL  High:  200-499 mg/dL  Very High:  Greater than or equal to 500 mg/dL    Direct Measure HDL  Female:  Greater than or equal to 50 mg/dL   Male:  Greater than or equal to 40 mg/dL    LDL Cholesterol  Desirable:  <100mg/dL  Above Desirable:  100-129 mg/dL   Borderline High:  130-159 mg/dL   High:  160-189 mg/dL   Very High:  >= 190 mg/dL    Non HDL Cholesterol  Desirable:  130 mg/dL  Above Desirable:  130-159 mg/dL  Borderline High:  160-189 mg/dL  High:  190-219 mg/dL  Very High:  Greater than or equal to 220 mg/dL   Comprehensive Metabolic Panel     Status: Abnormal   Result Value Ref Range    Sodium 141 135 - 145 mmol/L    Potassium 4.5 3.4 - 5.3 mmol/L    Carbon Dioxide (CO2) 25 22 - 29 mmol/L    Anion Gap 12 7 - 15 mmol/L    Urea Nitrogen 13.5 8.0 - 23.0 mg/dL    Creatinine 0.93 0.51 - 0.95 mg/dL    GFR Estimate 69 >60 mL/min/1.73m2    Calcium 10.1 8.8 - 10.2 mg/dL    Chloride 104 98 - 107 mmol/L    Glucose 112 (H) 70 - 99 mg/dL    Alkaline Phosphatase 96 35 - 104 U/L    AST 20 0 - 45 U/L    ALT 21 0 - 50 U/L    Protein Total 7.8 6.4 - 8.3 g/dL    Albumin 4.6 3.5 - 5.2 g/dL    Bilirubin Total 0.4 <=1.2 mg/dL   Hemoglobin A1c     Status: Normal   Result Value Ref Range    Hemoglobin A1C 5.5 4.0 - 6.2 %             Answers submitted  by the patient for this visit:  General Questionnaire (Submitted on 11/6/2023)  Chief Complaint: Chronic problems general questions HPI Form  What is the reason for your visit today? : Med check  How many servings of fruits and vegetables do you eat daily?: 2-3  On average, how many sweetened beverages do you drink each day (Examples: soda, juice, sweet tea, etc.  Do NOT count diet or artificially sweetened beverages)?: 1  How many minutes a day do you exercise enough to make your heart beat faster?: 30 to 60  How many days a week do you exercise enough to make your heart beat faster?: 5  How many days per week do you miss taking your medication?: 0

## 2023-11-09 ENCOUNTER — OFFICE VISIT (OUTPATIENT)
Dept: FAMILY MEDICINE | Facility: OTHER | Age: 63
End: 2023-11-09
Attending: FAMILY MEDICINE
Payer: COMMERCIAL

## 2023-11-09 ENCOUNTER — HOSPITAL ENCOUNTER (OUTPATIENT)
Dept: MAMMOGRAPHY | Facility: OTHER | Age: 63
Discharge: HOME | End: 2023-11-09
Attending: FAMILY MEDICINE
Payer: COMMERCIAL

## 2023-11-09 ENCOUNTER — MYC MEDICAL ADVICE (OUTPATIENT)
Dept: FAMILY MEDICINE | Facility: OTHER | Age: 63
End: 2023-11-09

## 2023-11-09 VITALS
BODY MASS INDEX: 34.09 KG/M2 | HEART RATE: 79 BPM | SYSTOLIC BLOOD PRESSURE: 132 MMHG | RESPIRATION RATE: 12 BRPM | OXYGEN SATURATION: 98 % | HEIGHT: 65 IN | DIASTOLIC BLOOD PRESSURE: 80 MMHG | WEIGHT: 204.6 LBS | TEMPERATURE: 96.8 F

## 2023-11-09 DIAGNOSIS — Z12.31 VISIT FOR SCREENING MAMMOGRAM: ICD-10-CM

## 2023-11-09 DIAGNOSIS — Z23 FLU VACCINE NEED: ICD-10-CM

## 2023-11-09 DIAGNOSIS — E78.2 MIXED HYPERLIPIDEMIA: ICD-10-CM

## 2023-11-09 DIAGNOSIS — G43.819 OTHER MIGRAINE WITHOUT STATUS MIGRAINOSUS, INTRACTABLE: ICD-10-CM

## 2023-11-09 DIAGNOSIS — I10 ESSENTIAL HYPERTENSION: Primary | ICD-10-CM

## 2023-11-09 DIAGNOSIS — G47.00 INSOMNIA, PERSISTENT: ICD-10-CM

## 2023-11-09 DIAGNOSIS — Z82.49 FAMILY HISTORY OF PERIPHERAL VASCULAR DISEASE: ICD-10-CM

## 2023-11-09 LAB
ALBUMIN SERPL BCG-MCNC: 4.6 G/DL (ref 3.5–5.2)
ALP SERPL-CCNC: 96 U/L (ref 35–104)
ALT SERPL W P-5'-P-CCNC: 21 U/L (ref 0–50)
ANION GAP SERPL CALCULATED.3IONS-SCNC: 12 MMOL/L (ref 7–15)
AST SERPL W P-5'-P-CCNC: 20 U/L (ref 0–45)
BILIRUB SERPL-MCNC: 0.4 MG/DL
BUN SERPL-MCNC: 13.5 MG/DL (ref 8–23)
CALCIUM SERPL-MCNC: 10.1 MG/DL (ref 8.8–10.2)
CHLORIDE SERPL-SCNC: 104 MMOL/L (ref 98–107)
CHOLEST SERPL-MCNC: 152 MG/DL
CREAT SERPL-MCNC: 0.93 MG/DL (ref 0.51–0.95)
DEPRECATED HCO3 PLAS-SCNC: 25 MMOL/L (ref 22–29)
EGFRCR SERPLBLD CKD-EPI 2021: 69 ML/MIN/1.73M2
GLUCOSE SERPL-MCNC: 112 MG/DL (ref 70–99)
HBA1C MFR BLD: 5.5 % (ref 4–6.2)
HDLC SERPL-MCNC: 74 MG/DL
LDLC SERPL CALC-MCNC: 58 MG/DL
NONHDLC SERPL-MCNC: 78 MG/DL
POTASSIUM SERPL-SCNC: 4.5 MMOL/L (ref 3.4–5.3)
PROT SERPL-MCNC: 7.8 G/DL (ref 6.4–8.3)
SODIUM SERPL-SCNC: 141 MMOL/L (ref 135–145)
TRIGL SERPL-MCNC: 102 MG/DL

## 2023-11-09 PROCEDURE — 36415 COLL VENOUS BLD VENIPUNCTURE: CPT | Mod: ZL | Performed by: FAMILY MEDICINE

## 2023-11-09 PROCEDURE — 77067 SCR MAMMO BI INCL CAD: CPT

## 2023-11-09 PROCEDURE — 83036 HEMOGLOBIN GLYCOSYLATED A1C: CPT | Mod: ZL | Performed by: FAMILY MEDICINE

## 2023-11-09 PROCEDURE — 90682 RIV4 VACC RECOMBINANT DNA IM: CPT | Performed by: FAMILY MEDICINE

## 2023-11-09 PROCEDURE — 90471 IMMUNIZATION ADMIN: CPT | Performed by: FAMILY MEDICINE

## 2023-11-09 PROCEDURE — 80053 COMPREHEN METABOLIC PANEL: CPT | Mod: ZL | Performed by: FAMILY MEDICINE

## 2023-11-09 PROCEDURE — 99214 OFFICE O/P EST MOD 30 MIN: CPT | Mod: 25 | Performed by: FAMILY MEDICINE

## 2023-11-09 PROCEDURE — 80061 LIPID PANEL: CPT | Mod: ZL | Performed by: FAMILY MEDICINE

## 2023-11-09 RX ORDER — POTASSIUM CHLORIDE 750 MG/1
TABLET, EXTENDED RELEASE ORAL
Qty: 270 TABLET | Refills: 4 | Status: SHIPPED | OUTPATIENT
Start: 2023-11-09 | End: 2024-02-19

## 2023-11-09 RX ORDER — LOSARTAN POTASSIUM 100 MG/1
100 TABLET ORAL DAILY
Qty: 90 TABLET | Refills: 3 | Status: SHIPPED | OUTPATIENT
Start: 2023-11-09 | End: 2024-02-19

## 2023-11-09 RX ORDER — AZITHROMYCIN 250 MG/1
TABLET, FILM COATED ORAL
COMMUNITY
Start: 2023-07-26 | End: 2023-11-09

## 2023-11-09 RX ORDER — ROSUVASTATIN CALCIUM 10 MG/1
10 TABLET, COATED ORAL DAILY
Qty: 90 TABLET | Refills: 3 | Status: SHIPPED | OUTPATIENT
Start: 2023-11-09 | End: 2024-02-19

## 2023-11-09 RX ORDER — ZOLPIDEM TARTRATE 10 MG/1
10 TABLET ORAL AT BEDTIME
Qty: 30 TABLET | Refills: 5 | Status: SHIPPED | OUTPATIENT
Start: 2023-11-09 | End: 2024-05-09

## 2023-11-09 RX ORDER — HYDROCHLOROTHIAZIDE 12.5 MG/1
12.5 TABLET ORAL EVERY EVENING
Qty: 90 TABLET | Refills: 4 | Status: CANCELLED | OUTPATIENT
Start: 2023-11-09

## 2023-11-09 RX ORDER — SUMATRIPTAN 20 MG/1
1 SPRAY NASAL PRN
Qty: 18 EACH | Refills: 2 | Status: SHIPPED | OUTPATIENT
Start: 2023-11-09 | End: 2024-02-19

## 2023-11-09 RX ORDER — BUPROPION HYDROCHLORIDE 300 MG/1
TABLET ORAL
COMMUNITY
Start: 2023-08-16 | End: 2024-02-19

## 2023-11-09 RX ORDER — BUPROPION HYDROCHLORIDE 300 MG/1
TABLET ORAL
Status: CANCELLED | OUTPATIENT
Start: 2023-11-09

## 2023-11-09 RX ORDER — HYDROCHLOROTHIAZIDE 25 MG/1
25 TABLET ORAL DAILY
Qty: 90 TABLET | Refills: 3 | Status: CANCELLED | OUTPATIENT
Start: 2023-11-09

## 2023-11-09 ASSESSMENT — PATIENT HEALTH QUESTIONNAIRE - PHQ9
10. IF YOU CHECKED OFF ANY PROBLEMS, HOW DIFFICULT HAVE THESE PROBLEMS MADE IT FOR YOU TO DO YOUR WORK, TAKE CARE OF THINGS AT HOME, OR GET ALONG WITH OTHER PEOPLE: NOT DIFFICULT AT ALL
SUM OF ALL RESPONSES TO PHQ QUESTIONS 1-9: 3
SUM OF ALL RESPONSES TO PHQ QUESTIONS 1-9: 3

## 2023-11-09 ASSESSMENT — PAIN SCALES - GENERAL: PAINLEVEL: NO PAIN (0)

## 2023-11-27 ASSESSMENT — PATIENT HEALTH QUESTIONNAIRE - PHQ9: SUM OF ALL RESPONSES TO PHQ QUESTIONS 1-9: 2

## 2023-12-01 ENCOUNTER — HOSPITAL ENCOUNTER (OUTPATIENT)
Dept: ULTRASOUND IMAGING | Facility: OTHER | Age: 63
Discharge: HOME | End: 2023-12-01
Attending: FAMILY MEDICINE | Admitting: FAMILY MEDICINE
Payer: COMMERCIAL

## 2023-12-01 DIAGNOSIS — E78.2 MIXED HYPERLIPIDEMIA: ICD-10-CM

## 2023-12-01 DIAGNOSIS — I10 ESSENTIAL HYPERTENSION: ICD-10-CM

## 2023-12-01 PROCEDURE — 93880 EXTRACRANIAL BILAT STUDY: CPT

## 2023-12-05 ENCOUNTER — OFFICE VISIT (OUTPATIENT)
Dept: FAMILY MEDICINE | Facility: OTHER | Age: 63
End: 2023-12-05
Attending: FAMILY MEDICINE
Payer: COMMERCIAL

## 2023-12-05 VITALS — BODY MASS INDEX: 33.99 KG/M2 | WEIGHT: 204 LBS | HEIGHT: 65 IN

## 2023-12-05 DIAGNOSIS — I10 ESSENTIAL HYPERTENSION: ICD-10-CM

## 2023-12-05 DIAGNOSIS — E78.2 MIXED HYPERLIPIDEMIA: ICD-10-CM

## 2023-12-05 PROCEDURE — 97802 MEDICAL NUTRITION INDIV IN: CPT | Performed by: DIETITIAN, REGISTERED

## 2023-12-05 ASSESSMENT — PATIENT HEALTH QUESTIONNAIRE - PHQ9
SUM OF ALL RESPONSES TO PHQ QUESTIONS 1-9: 2
10. IF YOU CHECKED OFF ANY PROBLEMS, HOW DIFFICULT HAVE THESE PROBLEMS MADE IT FOR YOU TO DO YOUR WORK, TAKE CARE OF THINGS AT HOME, OR GET ALONG WITH OTHER PEOPLE: NOT DIFFICULT AT ALL
SUM OF ALL RESPONSES TO PHQ QUESTIONS 1-9: 2

## 2023-12-05 NOTE — PROGRESS NOTES
Yolei is here for MNT related to HTN, BMI 34    PCP: Dr. Miller    Yolie had a carotid artery scan and well as A1C, lipids at recent visit.    Reports her goal is to prevent DM, Manage weight and HTN    Body mass index is 34.48 kg/m .  LDL Cholesterol Calculated   Date Value Ref Range Status   11/09/2023 58 <=100 mg/dL Final   08/18/2020 131 (H) <100 mg/dL Final     Comment:     Above desirable:  100-129 mg/dl  Borderline High:  130-159 mg/dL  High:             160-189 mg/dL  Very high:       >189 mg/dl       HDL Cholesterol   Date Value Ref Range Status   08/18/2020 65 23 - 92 mg/dL Final     Direct Measure HDL   Date Value Ref Range Status   11/09/2023 74 >=50 mg/dL Final   ]  Lab Results   Component Value Date    A1C 5.5 11/09/2023    A1C 5.6 04/19/2023    A1C 5.6 04/18/2022    A1C 5.4 03/17/2021    A1C 5.7 08/18/2020    A1C 5.4 02/18/2020    A1C 5.7 09/27/2019    A1C 5.6 06/19/2018     Estimated needs: 1400 calories, 75 grams protein based on goal weight 170#    Dx: elevated glucose, BMI 34, hyperlipidemia    Diet: Mediterranean diet for 1500 calories, CHO controlled.    CHO: 75 grams daily  PRO: 75 grams daily  FAT:  grams healthy fat  Veggie: 3 cups  Fruit: 2  Grain: 3-4    Reviewed portions and food lists. Gave sample menus with goal intake. Discussed healthy and effective exercise, mindset, realistic goal setting, including all food groups.     Will focus on weekly goal setting. She will also consider DPP, contact information given.     Goals:  7% weight loss in 6 months  150 min of exercise per week    Rec:  Vit D3, 2000 international unit(s) per day  Mg 200% RDA daily at HS  Calcium Citrate 500 mg daily    Plan:  2-3 meals per day, balanced meal plan    Time spent: 60 min     KRISTIN K. KLINEFELTER, RD on 12/5/2023 at 11:03 AM      Answers submitted by the patient for this visit:  Patient Health Questionnaire (Submitted on 12/5/2023)  If you checked off any problems, how difficult have these  problems made it for you to do your work, take care of things at home, or get along with other people?: Not difficult at all  PHQ9 TOTAL SCORE: 2  General Questionnaire (Submitted on 12/5/2023)  Chief Complaint: Chronic problems general questions HPI Form  What is the reason for your visit today? : Weight loss for health reasons  How many servings of fruits and vegetables do you eat daily?: 0-1  On average, how many sweetened beverages do you drink each day (Examples: soda, juice, sweet tea, etc.  Do NOT count diet or artificially sweetened beverages)?: 1  How many minutes a day do you exercise enough to make your heart beat faster?: 30 to 60  How many days a week do you exercise enough to make your heart beat faster?: 4  How many days per week do you miss taking your medication?: 0

## 2024-01-25 ENCOUNTER — OFFICE VISIT (OUTPATIENT)
Dept: FAMILY MEDICINE | Facility: OTHER | Age: 64
End: 2024-01-25
Payer: COMMERCIAL

## 2024-01-25 VITALS
TEMPERATURE: 98.7 F | SYSTOLIC BLOOD PRESSURE: 129 MMHG | RESPIRATION RATE: 16 BRPM | HEIGHT: 66 IN | OXYGEN SATURATION: 99 % | HEART RATE: 82 BPM | BODY MASS INDEX: 33.43 KG/M2 | WEIGHT: 208 LBS | DIASTOLIC BLOOD PRESSURE: 78 MMHG

## 2024-01-25 DIAGNOSIS — R30.0 DYSURIA: Primary | ICD-10-CM

## 2024-01-25 DIAGNOSIS — R39.89 URINARY PROBLEM: ICD-10-CM

## 2024-01-25 DIAGNOSIS — N30.00 ACUTE CYSTITIS WITHOUT HEMATURIA: ICD-10-CM

## 2024-01-25 LAB
ALBUMIN UR-MCNC: NEGATIVE MG/DL
APPEARANCE UR: CLEAR
BILIRUB UR QL STRIP: NEGATIVE
COLOR UR AUTO: YELLOW
GLUCOSE UR STRIP-MCNC: NEGATIVE MG/DL
HGB UR QL STRIP: NEGATIVE
HYALINE CASTS: 1 /LPF
KETONES UR STRIP-MCNC: NEGATIVE MG/DL
LEUKOCYTE ESTERASE UR QL STRIP: ABNORMAL
MUCOUS THREADS #/AREA URNS LPF: PRESENT /LPF
NITRATE UR QL: NEGATIVE
PH UR STRIP: 7 [PH] (ref 5–9)
RBC URINE: 7 /HPF
SP GR UR STRIP: 1.02 (ref 1–1.03)
UROBILINOGEN UR STRIP-MCNC: NORMAL MG/DL
WBC URINE: 20 /HPF

## 2024-01-25 PROCEDURE — 87086 URINE CULTURE/COLONY COUNT: CPT | Mod: ZL | Performed by: NURSE PRACTITIONER

## 2024-01-25 PROCEDURE — 81001 URINALYSIS AUTO W/SCOPE: CPT | Mod: ZL | Performed by: NURSE PRACTITIONER

## 2024-01-25 PROCEDURE — 87186 SC STD MICRODIL/AGAR DIL: CPT | Mod: ZL | Performed by: NURSE PRACTITIONER

## 2024-01-25 PROCEDURE — 99214 OFFICE O/P EST MOD 30 MIN: CPT | Performed by: NURSE PRACTITIONER

## 2024-01-25 RX ORDER — CEPHALEXIN 500 MG/1
500 CAPSULE ORAL 2 TIMES DAILY
Qty: 14 CAPSULE | Refills: 0 | Status: SHIPPED | OUTPATIENT
Start: 2024-01-25 | End: 2024-02-01

## 2024-01-25 ASSESSMENT — ENCOUNTER SYMPTOMS
HEADACHES: 0
DYSURIA: 1
NAUSEA: 1
DIFFICULTY URINATING: 1
FLANK PAIN: 0
ABDOMINAL PAIN: 1
FREQUENCY: 1
NEUROLOGICAL NEGATIVE: 1

## 2024-01-25 ASSESSMENT — PAIN SCALES - GENERAL: PAINLEVEL: NO PAIN (0)

## 2024-01-25 NOTE — NURSING NOTE
"Chief Complaint   Patient presents with    UTI   Patient presents to clinic for a UTI going on for about 1 week. Symptoms include burning while urinating, frequency, and urgency. She's been taking AZO for UTI's which has helped the symptoms. She also took an OTC test which showed a UTI.      Lona Swanry on 1/25/2024 at 11:01 AM        Initial /78   Pulse 82   Temp 98.7  F (37.1  C) (Tympanic)   Resp 16   Ht 1.664 m (5' 5.5\")   Wt 94.3 kg (208 lb)   LMP  (LMP Unknown)   SpO2 99%   Breastfeeding No   BMI 34.09 kg/m   Estimated body mass index is 34.09 kg/m  as calculated from the following:    Height as of this encounter: 1.664 m (5' 5.5\").    Weight as of this encounter: 94.3 kg (208 lb).       FOOD SECURITY SCREENING QUESTIONS:    The next two questions are to help us understand your food security.  If you are feeling you need any assistance in this area, we have resources available to support you today.    Hunger Vital Signs:  Within the past 12 months we worried whether our food would run out before we got money to buy more. Never  Within the past 12 months the food we bought just didn't last and we didn't have money to get more. Never      Lona Trimble     "

## 2024-01-25 NOTE — PROGRESS NOTES
Yolie Bedoya  1960    ASSESSMENT/PLAN:   1. Dysuria  with  2. Urinary problem  - UA Macroscopic with reflex to Microscopic and Culture    3. Acute cystitis without hematuria  - cephALEXin (KEFLEX) 500 MG capsule; Take 1 capsule (500 mg) by mouth 2 times daily for 7 days  Dispense: 14 capsule; Refill: 0    Patient agrees with plan of care and verbalizes understating. AVS printed. Patient education provided verbally and written instructions provided as requested. Patient made aware of emergent signs and symptoms to monitor for and when to seek additional care/follow up.     SUBJECTIVE:   CHIEF COMPLAINT/ REASON FOR VISIT  Patient presents with:  UTI     HISTORY OF PRESENT ILLNESS  Yolie Bedoya is a pleasant 63 year old female [facility] with concerns for a UTI. She has had symptoms including dysuria, frequency, and urgency for the past week.   She is able to empty her bladder.  Denies any hematuria.  She has had some waves of nausea, no vomiting.  She has had some slight right-sided low back pain however thinks this related to her chronic back pain and she has been painting her sister's house for the past 6 days.  She denies any abdominal pain.  No fever, chills or bodyaches.    She is been trying to take Azo pills to manage her symptoms.  She did have at home urinary tract infection test today that returned positive.     I have reviewed the nursing notes.  I have reviewed allergies, medication list, problem list, and past medical history.    REVIEW OF SYSTEMS  Review of Systems   Gastrointestinal:  Positive for abdominal pain and nausea.   Genitourinary:  Positive for difficulty urinating, dysuria, frequency and urgency. Negative for flank pain, pelvic pain and vaginal pain.   Skin:  Negative for rash.   Neurological: Negative.  Negative for headaches.   All other systems reviewed and are negative.       VITAL SIGNS  Vitals:    01/25/24 1059   BP: 129/78   Pulse: 82   Resp: 16   Temp:  "98.7  F (37.1  C)   TempSrc: Tympanic   SpO2: 99%   Weight: 94.3 kg (208 lb)   Height: 1.664 m (5' 5.5\")      Body mass index is 34.09 kg/m .    OBJECTIVE:   PHYSICAL EXAM  Physical Exam  Vitals reviewed.   Constitutional:       Appearance: Normal appearance. She is not ill-appearing or toxic-appearing.   HENT:      Head: Normocephalic and atraumatic.      Nose: Nose normal.   Eyes:      Conjunctiva/sclera: Conjunctivae normal.   Cardiovascular:      Rate and Rhythm: Normal rate and regular rhythm.      Pulses: Normal pulses.      Heart sounds: Normal heart sounds. No murmur heard.  Pulmonary:      Effort: Pulmonary effort is normal.      Breath sounds: Normal breath sounds. No wheezing or rhonchi.   Abdominal:      General: Bowel sounds are normal.      Palpations: Abdomen is soft.      Tenderness: There is abdominal tenderness in the suprapubic area. There is right CVA tenderness. There is no left CVA tenderness.   Neurological:      General: No focal deficit present.      Mental Status: She is alert and oriented to person, place, and time.   Psychiatric:         Mood and Affect: Mood normal.         Behavior: Behavior normal.         Thought Content: Thought content normal.         Judgment: Judgment normal.        DIAGNOSTICS  No results found for any visits on 01/25/24.     Deisy Gary NP  [facility]  "

## 2024-01-27 LAB — BACTERIA UR CULT: ABNORMAL

## 2024-01-30 ENCOUNTER — TELEPHONE (OUTPATIENT)
Dept: FAMILY MEDICINE | Facility: OTHER | Age: 64
End: 2024-01-30
Payer: COMMERCIAL

## 2024-01-30 DIAGNOSIS — G47.00 INSOMNIA, PERSISTENT: ICD-10-CM

## 2024-01-30 RX ORDER — ZOLPIDEM TARTRATE 10 MG/1
10 TABLET ORAL AT BEDTIME
Qty: 9 TABLET | Refills: 0 | Status: CANCELLED | OUTPATIENT
Start: 2024-01-30

## 2024-01-30 NOTE — TELEPHONE ENCOUNTER
zolpidem (AMBIEN) 10 MG tablet 30 tablet 5 2023 -- No   Sig - Route: Take 1 tablet (10 mg) by mouth at bedtime - Oral   Sent to pharmacy as: Zolpidem Tartrate 10 MG Oral Tablet (AMBIEN)   Class: E-Prescribe   Order: [id number]   E-Prescribing Status: Receipt confirmed by pharmacy (2023 11:15 AM CST)     [id number] ([facility] - [place], [place] - [address]     Called and spoke to Patient after verifying last name and date of birth. Pt states she is going on a trip Friday and would have to fill the prescription 9 days early. She isn't sure if the pharmacy just needs permission from the doctor to do so, or if they need a new prescription for #9 tablets. They had suggested that she transfer it along her trip, however she says she won't have a car and doesn't find this feasible.    Called [facility] and spoke with Bernarda, after verifying Pt's last name and . She states there are active refills on file. They just need permission to fill 9 days early. Pt last filled #30 on . Pt runs this through a discount card, but since it is a controlled substance, they need permission from provider.     Gabrielle Guardado RN .............. 2024  2:41 PM

## 2024-01-30 NOTE — TELEPHONE ENCOUNTER
Reason for call: Medication or medication refill    Name of medication requested: zolipidem    How many days of medication do you have left? Patient leaving on vacation but needs to get  though the 16th of February.  She would like to fill it for 9 days.      What pharmacy do you use? [facility]     Preferred method for responding to this message: Telephone Call    Phone number patient can be reached at: Cell number on file:    Telephone Information:   Mobile [phone]       If we cannot reach you directly, may we leave a detailed response at the number you provided? Yes

## 2024-01-31 NOTE — TELEPHONE ENCOUNTER
Called [facility] and spoke with Caterina, after verifying Pt's last name and . She was informed of provider response. Gabrielle Guardado RN .............. 2024  9:40 AM

## 2024-02-18 ASSESSMENT — PATIENT HEALTH QUESTIONNAIRE - PHQ9
10. IF YOU CHECKED OFF ANY PROBLEMS, HOW DIFFICULT HAVE THESE PROBLEMS MADE IT FOR YOU TO DO YOUR WORK, TAKE CARE OF THINGS AT HOME, OR GET ALONG WITH OTHER PEOPLE: NOT DIFFICULT AT ALL
SUM OF ALL RESPONSES TO PHQ QUESTIONS 1-9: 0
SUM OF ALL RESPONSES TO PHQ QUESTIONS 1-9: 0

## 2024-02-19 ENCOUNTER — OFFICE VISIT (OUTPATIENT)
Dept: FAMILY MEDICINE | Facility: OTHER | Age: 64
End: 2024-02-19
Attending: FAMILY MEDICINE
Payer: COMMERCIAL

## 2024-02-19 VITALS
TEMPERATURE: 98.1 F | HEART RATE: 66 BPM | HEIGHT: 66 IN | DIASTOLIC BLOOD PRESSURE: 72 MMHG | WEIGHT: 208 LBS | BODY MASS INDEX: 33.43 KG/M2 | RESPIRATION RATE: 16 BRPM | OXYGEN SATURATION: 96 % | SYSTOLIC BLOOD PRESSURE: 128 MMHG

## 2024-02-19 DIAGNOSIS — N94.10 DYSPAREUNIA IN FEMALE: ICD-10-CM

## 2024-02-19 DIAGNOSIS — Z01.818 PREOP GENERAL PHYSICAL EXAM: Primary | ICD-10-CM

## 2024-02-19 DIAGNOSIS — E78.2 MIXED HYPERLIPIDEMIA: ICD-10-CM

## 2024-02-19 DIAGNOSIS — F33.40 RECURRENT MAJOR DEPRESSION IN REMISSION (H): ICD-10-CM

## 2024-02-19 DIAGNOSIS — G43.819 OTHER MIGRAINE WITHOUT STATUS MIGRAINOSUS, INTRACTABLE: ICD-10-CM

## 2024-02-19 DIAGNOSIS — I10 ESSENTIAL HYPERTENSION: ICD-10-CM

## 2024-02-19 DIAGNOSIS — J30.9 ALLERGIC RHINITIS, UNSPECIFIED SEASONALITY, UNSPECIFIED TRIGGER: ICD-10-CM

## 2024-02-19 DIAGNOSIS — J45.40 MODERATE PERSISTENT ASTHMA WITHOUT COMPLICATION: ICD-10-CM

## 2024-02-19 DIAGNOSIS — M17.11 PRIMARY OSTEOARTHRITIS OF RIGHT KNEE: ICD-10-CM

## 2024-02-19 DIAGNOSIS — R73.09 ELEVATED GLUCOSE: ICD-10-CM

## 2024-02-19 LAB
ALBUMIN UR-MCNC: NEGATIVE MG/DL
ANION GAP SERPL CALCULATED.3IONS-SCNC: 11 MMOL/L (ref 7–15)
APPEARANCE UR: CLEAR
BILIRUB UR QL STRIP: NEGATIVE
BUN SERPL-MCNC: 18 MG/DL (ref 8–23)
CALCIUM SERPL-MCNC: 9.7 MG/DL (ref 8.8–10.2)
CHLORIDE SERPL-SCNC: 105 MMOL/L (ref 98–107)
COLOR UR AUTO: NORMAL
CREAT SERPL-MCNC: 0.86 MG/DL (ref 0.51–0.95)
DEPRECATED HCO3 PLAS-SCNC: 27 MMOL/L (ref 22–29)
EGFRCR SERPLBLD CKD-EPI 2021: 75 ML/MIN/1.73M2
ERYTHROCYTE [DISTWIDTH] IN BLOOD BY AUTOMATED COUNT: 13.5 % (ref 10–15)
GLUCOSE SERPL-MCNC: 114 MG/DL (ref 70–99)
GLUCOSE UR STRIP-MCNC: NEGATIVE MG/DL
HBA1C MFR BLD: 5.3 % (ref 4–6.2)
HCT VFR BLD AUTO: 39.7 % (ref 35–47)
HGB BLD-MCNC: 12.7 G/DL (ref 11.7–15.7)
HGB UR QL STRIP: NEGATIVE
KETONES UR STRIP-MCNC: NEGATIVE MG/DL
LEUKOCYTE ESTERASE UR QL STRIP: NEGATIVE
MCH RBC QN AUTO: 27.7 PG (ref 26.5–33)
MCHC RBC AUTO-ENTMCNC: 32 G/DL (ref 31.5–36.5)
MCV RBC AUTO: 87 FL (ref 78–100)
NITRATE UR QL: NEGATIVE
PH UR STRIP: 7.5 [PH] (ref 5–9)
PLATELET # BLD AUTO: 282 10E3/UL (ref 150–450)
POTASSIUM SERPL-SCNC: 3.8 MMOL/L (ref 3.4–5.3)
RBC # BLD AUTO: 4.58 10E6/UL (ref 3.8–5.2)
SODIUM SERPL-SCNC: 143 MMOL/L (ref 135–145)
SP GR UR STRIP: 1.02 (ref 1–1.03)
UROBILINOGEN UR STRIP-MCNC: NORMAL MG/DL
WBC # BLD AUTO: 8.5 10E3/UL (ref 4–11)

## 2024-02-19 PROCEDURE — 80048 BASIC METABOLIC PNL TOTAL CA: CPT | Mod: ZL | Performed by: FAMILY MEDICINE

## 2024-02-19 PROCEDURE — 99214 OFFICE O/P EST MOD 30 MIN: CPT | Performed by: FAMILY MEDICINE

## 2024-02-19 PROCEDURE — 81003 URINALYSIS AUTO W/O SCOPE: CPT | Mod: ZL | Performed by: FAMILY MEDICINE

## 2024-02-19 PROCEDURE — 36415 COLL VENOUS BLD VENIPUNCTURE: CPT | Mod: ZL | Performed by: FAMILY MEDICINE

## 2024-02-19 PROCEDURE — 93000 ELECTROCARDIOGRAM COMPLETE: CPT | Performed by: INTERNAL MEDICINE

## 2024-02-19 PROCEDURE — 85018 HEMOGLOBIN: CPT | Mod: ZL | Performed by: FAMILY MEDICINE

## 2024-02-19 PROCEDURE — 83036 HEMOGLOBIN GLYCOSYLATED A1C: CPT | Mod: ZL | Performed by: FAMILY MEDICINE

## 2024-02-19 RX ORDER — SUMATRIPTAN 20 MG/1
1 SPRAY NASAL PRN
Qty: 18 EACH | Refills: 2 | Status: SHIPPED | OUTPATIENT
Start: 2024-02-19

## 2024-02-19 RX ORDER — FLUTICASONE PROPIONATE 50 MCG
2 SPRAY, SUSPENSION (ML) NASAL DAILY
Qty: 48 G | Refills: 3 | Status: SHIPPED | OUTPATIENT
Start: 2024-02-19

## 2024-02-19 RX ORDER — ESTRADIOL 10 UG/1
10 INSERT VAGINAL
Qty: 8 TABLET | Refills: 11 | Status: SHIPPED | OUTPATIENT
Start: 2024-02-19 | End: 2024-07-24

## 2024-02-19 RX ORDER — BUPROPION HYDROCHLORIDE 300 MG/1
TABLET ORAL
Qty: 90 TABLET | Refills: 4 | Status: SHIPPED | OUTPATIENT
Start: 2024-02-19

## 2024-02-19 RX ORDER — FLUTICASONE PROPIONATE AND SALMETEROL 250; 50 UG/1; UG/1
1 POWDER RESPIRATORY (INHALATION) EVERY 12 HOURS
Qty: 1 EACH | Refills: 11 | Status: SHIPPED | OUTPATIENT
Start: 2024-02-19

## 2024-02-19 RX ORDER — HYDROCHLOROTHIAZIDE 12.5 MG/1
12.5 TABLET ORAL EVERY EVENING
Qty: 90 TABLET | Refills: 4 | Status: SHIPPED | OUTPATIENT
Start: 2024-02-19

## 2024-02-19 RX ORDER — ROSUVASTATIN CALCIUM 10 MG/1
10 TABLET, COATED ORAL DAILY
Qty: 90 TABLET | Refills: 3 | Status: SHIPPED | OUTPATIENT
Start: 2024-02-19 | End: 2024-07-24

## 2024-02-19 RX ORDER — ALBUTEROL SULFATE 90 UG/1
2 AEROSOL, METERED RESPIRATORY (INHALATION) EVERY 4 HOURS PRN
Qty: 18 G | Refills: 11 | Status: SHIPPED | OUTPATIENT
Start: 2024-02-19

## 2024-02-19 RX ORDER — POTASSIUM CHLORIDE 750 MG/1
TABLET, EXTENDED RELEASE ORAL
Qty: 270 TABLET | Refills: 4 | Status: SHIPPED | OUTPATIENT
Start: 2024-02-19

## 2024-02-19 RX ORDER — HYDROCHLOROTHIAZIDE 25 MG/1
25 TABLET ORAL DAILY
Qty: 90 TABLET | Refills: 3 | Status: SHIPPED | OUTPATIENT
Start: 2024-02-19

## 2024-02-19 RX ORDER — MONTELUKAST SODIUM 10 MG/1
1 TABLET ORAL AT BEDTIME
Qty: 90 TABLET | Refills: 3 | Status: SHIPPED | OUTPATIENT
Start: 2024-02-19

## 2024-02-19 RX ORDER — LOSARTAN POTASSIUM 100 MG/1
100 TABLET ORAL DAILY
Qty: 90 TABLET | Refills: 3 | Status: SHIPPED | OUTPATIENT
Start: 2024-02-19

## 2024-02-19 ASSESSMENT — PAIN SCALES - GENERAL: PAINLEVEL: MILD PAIN (3)

## 2024-02-19 NOTE — PROGRESS NOTES
Preoperative Evaluation  [facility]  [address]  [place] [address]  Phone: [phone]  Fax: [phone]  Primary Provider: Yarely Villagomez  Pre-op Performing Provider: YARELY VILLAGOMEZ  Feb 19, 2024       Yolie is a 63 year old, presenting for the following:  Pre-Op Exam (Right total knee, 3/5/24, Dr. Jones, [facility])        2/19/2024    10:45 AM   Additional Questions   Roomed by Faustina FLORES LPN     Surgical Information  Surgery/Procedure: Right Total Knee   Surgery Location: [facility]  Surgeon: Dr. Jones  Surgery Date: 3/5/24  Time of Surgery:   Where patient plans to recover: At home with family  Fax number for surgical facility: [phone]    Assessment & Plan     The proposed surgical procedure is considered INTERMEDIATE risk.      ICD-10-CM    1. Preop general physical exam  Z01.818 EKG 12-lead, tracing only (Same Day)     CBC W PLT No Diff     Basic Metabolic Panel     CBC W PLT No Diff     Basic Metabolic Panel      2. Primary osteoarthritis of right knee  M17.11       3. Recurrent major depression in remission (H24)  F33.40 buPROPion (WELLBUTRIN XL) 300 MG 24 hr tablet      4. Essential hypertension  I10 hydrochlorothiazide (HYDRODIURIL) 25 MG tablet     hydroCHLOROthiazide (HYDRODIURIL) 12.5 MG tablet     losartan (COZAAR) 100 MG tablet     potassium chloride ER (K-TAB/KLOR-CON) 10 MEQ CR tablet     UA Macroscopic with reflex to Microscopic and Culture     UA Macroscopic with reflex to Microscopic and Culture      5. Allergic rhinitis, unspecified seasonality, unspecified trigger  J30.9 fluticasone (FLONASE) 50 MCG/ACT nasal spray     montelukast (SINGULAIR) 10 MG tablet      6. Moderate persistent asthma without complication  J45.40 albuterol (PROAIR HFA/PROVENTIL HFA/VENTOLIN HFA) 108 (90 Base) MCG/ACT inhaler     fluticasone-salmeterol (ADVAIR) 250-50 MCG/ACT inhaler      7. Dyspareunia in female  N94.10 estradiol  (VAGIFEM) 10 MCG TABS vaginal tablet      8. Mixed hyperlipidemia  E78.2 rosuvastatin (CRESTOR) 10 MG tablet      9. Other migraine without status migrainosus, intractable  G43.819 SUMAtriptan (IMITREX) 20 MG/ACT nasal spray      10. Elevated glucose  R73.09 Hemoglobin A1c     Hemoglobin A1c        I have personally reviewed the labs listed below.            Risks and Recommendations  The patient has the following additional risks and recommendations for perioperative complications:  Pulmonary:    - asthma history, controlled     Antiplatelet or Anticoagulation Medication Instructions   - Patient is on no antiplatelet or anticoagulation medications.    Additional Medication Instructions  Patient is to take all scheduled medications on the day of surgery    Recommendation  APPROVAL GIVEN to proceed with proposed procedure, without further diagnostic evaluation.    Ordering of each unique test  Prescription drug management    Yarely Miller MD     Subjective       HPI related to upcoming procedure: Yolie Bedoya is a 63 year old female is seen today for preoperative evaluation at the request of Dr. Cesar Jones.  She will be undergoing right knee replacement.  She has a history of left knee replacement in 2019.  Right knee symptoms include pain, stiffness.  She has had previous injections done, the last one was about 9 months ago.  Knee will occasionally feel unstable, will feel more swollen.        2/18/2024     2:34 PM   Preop Questions   1. Have you ever had a heart attack or stroke? No   2. Have you ever had surgery on your heart or blood vessels, such as a stent placement, a coronary artery bypass, or surgery on an artery in your head, neck, heart, or legs? No   3. Do you have chest pain with activity? No   4. Do you have a history of  heart failure? No   5. Do you currently have a cold, bronchitis or symptoms of other infection? UNKNOWN - around the right side of her eye and face following trip to     6. Do you have a cough, shortness of breath, or wheezing? No   7. Do you or anyone in your family have previous history of blood clots? No   8. Do you or does anyone in your family have a serious bleeding problem such as prolonged bleeding following surgeries or cuts? No   9. Have you ever had problems with anemia or been told to take iron pills? No   10. Have you had any abnormal blood loss such as black, tarry or bloody stools, or abnormal vaginal bleeding? No   11. Have you ever had a blood transfusion? No   12. Are you willing to have a blood transfusion if it is medically needed before, during, or after your surgery? Yes   13. Have you or any of your relatives ever had problems with anesthesia? No   14. Do you have sleep apnea, excessive snoring or daytime drowsiness? No   15. Do you have any artifical heart valves or other implanted medical devices like a pacemaker, defibrillator, or continuous glucose monitor? No   16. Do you have artificial joints? YES - left TKA    17. Are you allergic to latex? No       Health Care Directive  Patient does not have a Health Care Directive or Living Will: Discussed advance care planning with patient; however, patient declined at this time.    Preoperative Review of    reviewed - controlled substances reflected in medication list.  PDMP Review         Value Time User    State PDMP site checked  Yes 2/19/2024 11:03 AM Yarely Villagomez MD             Status of Chronic Conditions:  ASTHMA - Patient has a longstanding history of moderate-severe Asthma . Patient has been doing well overall noting recent allergic symptoms  and continues on medication regimen consisting of Singulair and albuterol without adverse reactions or side effects.     DEPRESSION - Patient has a long history of Depression of moderate severity requiring medication for control with recent symptoms being stable..    HYPERTENSION - Patient has longstanding history of HTN , currently denies any  symptoms referable to elevated blood pressure. Specifically denies chest pain, palpitations, dyspnea, orthopnea, PND or peripheral edema. Blood pressure readings have been in normal range. Current medication regimen is as listed below. Patient denies any side effects of medication.     SLEEP PROBLEM - Patient has a longstanding history of insomnia.. Patient has tried OTC medications with limited success. Takes Ambien most nights     Patient Active Problem List    Diagnosis Date Noted    Morbid obesity (H) 09/07/2022     Priority: Medium    BCC (basal cell carcinoma), face 2020     Priority: Medium     Mohs      Moderate persistent asthma without complication 10/16/2019     Priority: Medium    Status post total left knee replacement 10/16/2019     Priority: Medium    Allergic rhinitis 02/19/2018     Priority: Medium    Family history of malignant neoplasm of breast 02/19/2018     Priority: Medium    Diverticular disease of colon 02/19/2018     Priority: Medium    Impaired fasting glucose 02/19/2018     Priority: Medium    Edema 02/19/2018     Priority: Medium    Obesity 02/19/2018     Priority: Medium    Stress incontinence in female 10/25/2017     Priority: Medium    Hypokalemia 06/30/2017     Priority: Medium    Melanocytic nevus of face 07/23/2014     Priority: Medium    Meniere's disease 04/08/2014     Priority: Medium    Controlled substance agreement signed 02/11/2014     Priority: Medium     Overview:   ambien #30/month    Formatting of this note might be different from the original.  ambien #30/month      Recurrent major depression in remission (H24) 04/16/2013     Priority: Medium    Backache 12/27/2010     Priority: Medium    Insomnia, persistent 12/27/2010     Priority: Medium    Endocrine disorder 03/27/2010     Priority: Medium    Migraine headache 01/05/2010     Priority: Medium      Past Medical History:   Diagnosis Date    Allergic rhinitis due to animal hair and dander     Dilshad,aspen, birch, maple,  oak, grass, dust mite, ragwee, cocklebur, mugwart, lambs quarter, pigweed, fungus, molds    BCC (basal cell carcinoma), face 2020    Mohs    Calculus of gallbladder without cholecystitis without obstruction     No Comments Provided    Essential (primary) hypertension     No Comments Provided    Excessive and frequent menstruation with regular cycle     s/p CLARA    Hormone replacement therapy (postmenopausal)     2010,started ERT    Obesity     No Comments Provided    Other long term (current) drug therapy     2/11/2014,ambien #30/month    Other specified postprocedural states     nausea and severe vomiting with narcotics    Psychophysiologic insomnia     contract signed 2/2014    Tubulo-interstitial nephritis     No Comments Provided    Venous insufficiency (chronic) (peripheral)     No Comments Provided     Past Surgical History:   Procedure Laterality Date    ARTHROSCOPY KNEE  10/12/2017    10/12/2017    BLADDER SURGERY  10/25/2017     SLING OPER STRES INCONTINENCE    COLONOSCOPY  06/2004     because of change in bowel habits    COLONOSCOPY  05/14/2014 5/14/2014,Extensive diverticulosis throughout the colon - follow up 10 years    EXTRACTION(S) DENTAL      No Comments Provided    HYSTERECTOMY TOTAL ABDOMINAL, BILATERAL SALPINGO-OOPHORECTOMY, COMBINED  03/30/2010    CLARA/BSO/Vasquez/Posterior Repair    LAPAROSCOPIC ABLATION ENDOMETRIOSIS      3/05    left total knee arthroplasty Left 10/2019    Dr. Jones.  [facility]    TOTAL KNEE ARTHROPLASTY Right 2023    [facility]     Current Outpatient Medications   Medication Sig Dispense Refill    albuterol (PROAIR HFA/PROVENTIL HFA/VENTOLIN HFA) 108 (90 Base) MCG/ACT inhaler Inhale 2 puffs into the lungs every 4 hours as needed for wheezing 18 g 11    buPROPion (WELLBUTRIN XL) 150 MG 24 hr tablet Take 2 tablets (300 mg) by mouth every morning 180 tablet 3    buPROPion (WELLBUTRIN XL) 300 MG 24 hr tablet TAKE 1 TABLET (300MG) BY MOUTH EVERY MORNING 90 tablet 4    cetirizine  HCl 10 MG CAPS Take 1 tablet by mouth daily      estradiol (VAGIFEM) 10 MCG TABS vaginal tablet Place 1 tablet (10 mcg) vaginally twice a week 8 tablet 11    fluticasone (FLONASE) 50 MCG/ACT nasal spray Spray 2 sprays into both nostrils daily 48 g 3    fluticasone-salmeterol (ADVAIR) 250-50 MCG/ACT inhaler Inhale 1 puff into the lungs every 12 hours 1 each 11    hydroCHLOROthiazide (HYDRODIURIL) 12.5 MG tablet Take 1 tablet (12.5 mg) by mouth every evening 90 tablet 4    hydrochlorothiazide (HYDRODIURIL) 25 MG tablet Take 1 tablet (25 mg) by mouth daily 90 tablet 3    losartan (COZAAR) 100 MG tablet Take 1 tablet (100 mg) by mouth daily 90 tablet 3    montelukast (SINGULAIR) 10 MG tablet Take 1 tablet (10 mg) by mouth at bedtime 90 tablet 3    potassium chloride ER (K-TAB/KLOR-CON) 10 MEQ CR tablet Take 2 tablets (20 mEq) by mouth every morning AND 1 tablet (10 mEq) daily (with dinner). 270 tablet 4    rosuvastatin (CRESTOR) 10 MG tablet Take 1 tablet (10 mg) by mouth daily 90 tablet 3    SUMAtriptan (IMITREX) 20 MG/ACT nasal spray Spray 1 spray in nostril as needed for migraine May repeat in 2-4 hours 18 each 2    triamcinolone (KENALOG) 0.1 % external cream APPLY SPARINGLY TO AFFECTEDAREA THREE TIMES DAILY AS NEEDED 30 g 11    UNABLE TO FIND MEDICATION NAME: Plexus- 1 tablet daily      UNABLE TO FIND MEDICATION NAME: Gut health- 1 tablet daily      zolpidem (AMBIEN) 10 MG tablet Take 1 tablet (10 mg) by mouth at bedtime 30 tablet 5       Allergies   Allergen Reactions    Doxycycline Nausea and Vomiting    Metolazone Unknown    No Clinical Screening - See Comments GI Disturbance and Nausea and Vomiting     Narcotics    Amoxicillin-Pot Clavulanate Rash    Sulfa Antibiotics Rash        Social History     Tobacco Use    Smoking status: Never     Passive exposure: Never    Smokeless tobacco: Never   Substance Use Topics    Alcohol use: Yes     Comment: Alcoholic Drinks/day: occ     Family History   Problem Relation Age  "of Onset    Other - See Comments Son             Breast Cancer Mother         Cancer-breast    Lung Cancer Mother         Cancer,lung and uterine cancer;      Uterine Cancer Mother     Breast Cancer Maternal Grandmother         Cancer-breast    Cervical Cancer Sister 28        endometrial or cervical cancer,    Allergy (Severe) Sister         Allergies, allergies/asthma    Hypertension Sister     Other - See Comments Sister          lupus    Heart Disease Brother         Heart Disease,2 heart surgeries B 1965     History   Drug Use No         Review of Systems    Review of Systems  CONSTITUTIONAL: NEGATIVE for fever, chills, change in weight  INTEGUMENTARY/SKIN: NEGATIVE for worrisome rashes, moles or lesions  EYES: NEGATIVE for vision changes or irritation  ENT/MOUTH: see above  RESP:no wheezing  CV: chronic LE edema/venous insufficiency  GI: NEGATIVE for nausea, abdominal pain, heartburn, or change in bowel habits  : NEGATIVE for frequency, dysuria, or hematuria  MUSCULOSKELETAL:see HPI  HEME: NEGATIVE for bleeding problems  PSYCHIATRIC: NEGATIVE for changes in mood or affect  Objective    /72 (BP Location: Right arm, Patient Position: Sitting, Cuff Size: Adult Regular)   Pulse 66   Temp 98.1  F (36.7  C) (Temporal)   Resp 16   Ht 1.664 m (5' 5.5\")   Wt 94.3 kg (208 lb)   LMP  (LMP Unknown)   SpO2 96%   BMI 34.09 kg/m     Estimated body mass index is 34.09 kg/m  as calculated from the following:    Height as of this encounter: 1.664 m (5' 5.5\").    Weight as of this encounter: 94.3 kg (208 lb).  Physical Exam  GENERAL: alert and no distress  EYES: Eyes grossly normal to inspection, PERRL and conjunctivae and sclerae normal  HENT: swollen nasal mucosa   NECK: no adenopathy, no asymmetry, masses, or scars  RESP: lungs clear to auscultation - no rales, rhonchi or wheezes  CV: RRR, tr-1+ LE edema   ABDOMEN: soft, nontender, no hepatosplenomegaly, no masses and bowel sounds normal  MS: " right lateral knee tenderness   SKIN: no suspicious lesions or rashes  NEURO: Normal strength and tone, mentation intact and speech normal  PSYCH: mentation appears normal, affect normal/bright    Recent Labs   Lab Test 11/09/23  1222 04/19/23  1101 09/07/22  0918 04/18/22  1444   HGB  --   --   --  14.3   PLT  --   --   --  376    139   < > 140   POTASSIUM 4.5 4.1   < > 3.2*   CR 0.93 0.93   < > 0.96   A1C 5.5 5.6  --  5.6    < > = values in this interval not displayed.        Diagnostics  Recent Results (from the past 48 hour(s))   UA Macroscopic with reflex to Microscopic and Culture    Collection Time: 02/19/24 11:24 AM    Specimen: Urine, Midstream   Result Value Ref Range    Color Urine Light Yellow Colorless, Straw, Light Yellow, Yellow    Appearance Urine Clear Clear    Glucose Urine Negative Negative mg/dL    Bilirubin Urine Negative Negative    Ketones Urine Negative Negative mg/dL    Specific Gravity Urine 1.022 1.000 - 1.030    Blood Urine Negative Negative    pH Urine 7.5 5.0 - 9.0    Protein Albumin Urine Negative Negative mg/dL    Urobilinogen Urine Normal Normal, 2.0 mg/dL    Nitrite Urine Negative Negative    Leukocyte Esterase Urine Negative Negative   CBC W PLT No Diff    Collection Time: 02/19/24 11:39 AM   Result Value Ref Range    WBC Count 8.5 4.0 - 11.0 10e3/uL    RBC Count 4.58 3.80 - 5.20 10e6/uL    Hemoglobin 12.7 11.7 - 15.7 g/dL    Hematocrit 39.7 35.0 - 47.0 %    MCV 87 78 - 100 fL    MCH 27.7 26.5 - 33.0 pg    MCHC 32.0 31.5 - 36.5 g/dL    RDW 13.5 10.0 - 15.0 %    Platelet Count 282 150 - 450 10e3/uL   Basic Metabolic Panel    Collection Time: 02/19/24 11:39 AM   Result Value Ref Range    Sodium 143 135 - 145 mmol/L    Potassium 3.8 3.4 - 5.3 mmol/L    Chloride 105 98 - 107 mmol/L    Carbon Dioxide (CO2) 27 22 - 29 mmol/L    Anion Gap 11 7 - 15 mmol/L    Urea Nitrogen 18.0 8.0 - 23.0 mg/dL    Creatinine 0.86 0.51 - 0.95 mg/dL    GFR Estimate 75 >60 mL/min/1.73m2    Calcium 9.7  8.8 - 10.2 mg/dL    Glucose 114 (H) 70 - 99 mg/dL   Hemoglobin A1c    Collection Time: 02/19/24 11:39 AM   Result Value Ref Range    Hemoglobin A1C 5.3 4.0 - 6.2 %   EKG 12-lead, tracing only (Same Day)    Collection Time: 02/19/24 12:02 PM   Result Value Ref Range    Systolic Blood Pressure  mmHg    Diastolic Blood Pressure  mmHg    Ventricular Rate 63 BPM    Atrial Rate 63 BPM    SC Interval 156 ms    QRS Duration 94 ms     ms    QTc 460 ms    P Axis 57 degrees    R AXIS -31 degrees    T Axis 28 degrees    Interpretation ECG       Sinus rhythm  Left axis deviation  Abnormal ECG  No previous ECGs available            Revised Cardiac Risk Index (RCRI)  The patient has the following serious cardiovascular risks for perioperative complications:   - No serious cardiac risks = 0 points     RCRI Interpretation: 0 points: Class I (very low risk - 0.4% complication rate)         Signed Electronically by: DAVID RAE MD  Copy of this evaluation report is provided to requesting physician.         Answers submitted by the patient for this visit:  Patient Health Questionnaire (Submitted on 2/18/2024)  If you checked off any problems, how difficult have these problems made it for you to do your work, take care of things at home, or get along with other people?: Not difficult at all  PHQ9 TOTAL SCORE: 0

## 2024-02-19 NOTE — NURSING NOTE
"Chief Complaint   Patient presents with    Pre-Op Exam     Right total knee, 3/5/24, Dr. Jones, [facility]       Initial /72 (BP Location: Right arm, Patient Position: Sitting, Cuff Size: Adult Regular)   Pulse 66   Temp 98.1  F (36.7  C) (Temporal)   Resp 16   Ht 1.664 m (5' 5.5\")   Wt 94.3 kg (208 lb)   LMP  (LMP Unknown)   SpO2 96%   BMI 34.09 kg/m   Estimated body mass index is 34.09 kg/m  as calculated from the following:    Height as of this encounter: 1.664 m (5' 5.5\").    Weight as of this encounter: 94.3 kg (208 lb).    Medication Review: complete    The next two questions are to help us understand your food security.  If you are feeling you need any assistance in this area, we have resources available to support you today.          2/18/2024   SDOH- Food Insecurity   Within the past 12 months, did you worry that your food would run out before you got money to buy more? N   Within the past 12 months, did the food you bought just not last and you didn t have money to get more? N         Health Care Directive:  Patient does not have a Health Care Directive or Living Will: Patient states has Advance Directive and will bring in a copy to clinic.    Faustina Yeager LPN      "

## 2024-02-20 LAB
ATRIAL RATE - MUSE: 63 BPM
DIASTOLIC BLOOD PRESSURE - MUSE: NORMAL MMHG
INTERPRETATION ECG - MUSE: NORMAL
P AXIS - MUSE: 57 DEGREES
PR INTERVAL - MUSE: 156 MS
QRS DURATION - MUSE: 94 MS
QT - MUSE: 450 MS
QTC - MUSE: 460 MS
R AXIS - MUSE: -31 DEGREES
SYSTOLIC BLOOD PRESSURE - MUSE: NORMAL MMHG
T AXIS - MUSE: 28 DEGREES
VENTRICULAR RATE- MUSE: 63 BPM

## 2024-03-05 ENCOUNTER — TRANSFERRED RECORDS (OUTPATIENT)
Dept: HEALTH INFORMATION MANAGEMENT | Facility: OTHER | Age: 64
End: 2024-03-05
Payer: COMMERCIAL

## 2024-03-06 ENCOUNTER — TRANSFERRED RECORDS (OUTPATIENT)
Dept: HEALTH INFORMATION MANAGEMENT | Facility: OTHER | Age: 64
End: 2024-03-06

## 2024-03-11 ENCOUNTER — THERAPY VISIT (OUTPATIENT)
Dept: PHYSICAL THERAPY | Facility: OTHER | Age: 64
End: 2024-03-11
Payer: COMMERCIAL

## 2024-03-11 DIAGNOSIS — Z96.651 S/P TOTAL KNEE ARTHROPLASTY, RIGHT: ICD-10-CM

## 2024-03-11 PROCEDURE — 97016 VASOPNEUMATIC DEVICE THERAPY: CPT | Mod: GP

## 2024-03-11 PROCEDURE — 97110 THERAPEUTIC EXERCISES: CPT | Mod: GP

## 2024-03-11 PROCEDURE — 97161 PT EVAL LOW COMPLEX 20 MIN: CPT | Mod: GP

## 2024-03-11 PROCEDURE — 97116 GAIT TRAINING THERAPY: CPT | Mod: GP

## 2024-03-11 NOTE — PROGRESS NOTES
PHYSICAL THERAPY EVALUATION  Type of Visit: Evaluation    See electronic medical record for Abuse and Falls Screening details.    Subjective       Presenting condition or subjective complaint: s/p R TKA  Date of onset: 03/05/24    Relevant medical history: Asthma; High blood pressure   Dates & types of surgery:  R TKA 3/5/23    Prior Level of Function  Transfers: Independent  Ambulation: Independent  ADL: Independent    Living Environment  Social support: With a significant other or spouse   Type of home: House   Stairs to enter the home: No       Stairs inside the home: No       Equipment owned: Walker with wheels     Hobbies/Interests: camping, walk on uneven ground    Pain assessment: Pain present     Objective   KNEE EVALUATION  INTEGUMENTARY (edema, incisions): WFL minimal redness, warmth, and moderate swelling as expected post-op  POSTURE: WFL  GAIT:  Weightbearing Status: WBAT  Assistive Device(s): Walker (front wheeled)  Gait Deviations: Antalgic  Sirena decreased  ROM:   (Degrees) Left AROM Right AROM   Knee Flexion WFL 78   Knee Extension WFL 0     STRENGTH:   Pain: - none + mild ++ moderate +++ severe  Strength Scale: 0-5/5 Left Right   Knee Flexion 5 Deferred at this time   Knee Extension 5 Deferred at this time     PALPATION: WFL    Assessment & Plan   CLINICAL IMPRESSIONS  Medical Diagnosis: S/P total knee arthroplasty, right    Treatment Diagnosis: s/p R TKA; R knee pain with strength and mobility deficits   Impression/Assessment: Patient is a 63 year old female with R knee complaints.  The following significant findings have been identified: Pain, Decreased ROM/flexibility, Decreased strength, Impaired gait, Impaired muscle performance, and Decreased activity tolerance. These impairments interfere with their ability to perform self care tasks, recreational activities, household chores, driving , household mobility, and community mobility as compared to previous level of function.     Clinical  Decision Making (Complexity):  Clinical Presentation: Stable/Uncomplicated  Clinical Presentation Rationale: based on medical and personal factors listed in PT evaluation  Clinical Decision Making (Complexity): Low complexity    PLAN OF CARE  Treatment Interventions:  Modalities: Cryotherapy, E-stim, Hot Pack, Ultrasound, Vasoneumatic Device  Interventions: Gait Training, Manual Therapy, Neuromuscular Re-education, Therapeutic Activity, Therapeutic Exercise    Long Term Goals     PT Goal 1  Goal Identifier: ROM  Goal Description: patient will demonstrate functional R knee ROM 0-120 degrees  Rationale: to maximize safety and independence with performance of ADLs and functional tasks;to maximize safety and independence within the community;to maximize safety and independence within the home  Target Date: 06/03/24  PT Goal 2  Goal Identifier: gait  Goal Description: patient will ambulate 1000ft with appropriate AD, normal gait pattern in order to demonstrate safety with community distances  Rationale: to maximize safety and independence within the home;to maximize safety and independence within the community  Target Date: 05/06/24  PT Goal 3  Goal Identifier: pain  Goal Description: patient will report <2/10 pain with all ADLs including ambulation and standing as needed to prepare meals and complete other household and recreational tasks  Rationale: to maximize safety and independence with performance of ADLs and functional tasks;to maximize safety and independence with self cares;to maximize safety and independence within the home  Target Date: 05/06/24      Frequency of Treatment: 1-2x/week  Duration of Treatment: 12 weeks    Education Assessment:   Learner/Method: Patient;Listening;Demonstration;No Barriers to Learning    Risks and benefits of evaluation/treatment have been explained.   Patient/Family/caregiver agrees with Plan of Care.     Evaluation Time:     PT Eval, Low Complexity Minutes (82582):  15      Signing Clinician: DELIA OrrT

## 2024-03-15 ENCOUNTER — THERAPY VISIT (OUTPATIENT)
Dept: PHYSICAL THERAPY | Facility: OTHER | Age: 64
End: 2024-03-15
Payer: COMMERCIAL

## 2024-03-15 DIAGNOSIS — Z96.651 S/P TOTAL KNEE ARTHROPLASTY, RIGHT: Primary | ICD-10-CM

## 2024-03-15 PROCEDURE — 97016 VASOPNEUMATIC DEVICE THERAPY: CPT | Mod: GP

## 2024-03-15 PROCEDURE — 97530 THERAPEUTIC ACTIVITIES: CPT | Mod: GP

## 2024-03-15 PROCEDURE — 97110 THERAPEUTIC EXERCISES: CPT | Mod: GP

## 2024-03-16 ENCOUNTER — MYC MEDICAL ADVICE (OUTPATIENT)
Dept: FAMILY MEDICINE | Facility: OTHER | Age: 64
End: 2024-03-16
Payer: COMMERCIAL

## 2024-03-18 ENCOUNTER — THERAPY VISIT (OUTPATIENT)
Dept: PHYSICAL THERAPY | Facility: OTHER | Age: 64
End: 2024-03-18
Payer: COMMERCIAL

## 2024-03-18 DIAGNOSIS — Z96.651 S/P TOTAL KNEE ARTHROPLASTY, RIGHT: Primary | ICD-10-CM

## 2024-03-18 PROCEDURE — 97112 NEUROMUSCULAR REEDUCATION: CPT | Mod: GP

## 2024-03-18 PROCEDURE — 97530 THERAPEUTIC ACTIVITIES: CPT | Mod: GP

## 2024-03-18 PROCEDURE — 97016 VASOPNEUMATIC DEVICE THERAPY: CPT | Mod: GP

## 2024-03-18 PROCEDURE — 97110 THERAPEUTIC EXERCISES: CPT | Mod: GP

## 2024-03-22 ENCOUNTER — THERAPY VISIT (OUTPATIENT)
Dept: PHYSICAL THERAPY | Facility: OTHER | Age: 64
End: 2024-03-22
Payer: COMMERCIAL

## 2024-03-22 DIAGNOSIS — Z96.651 S/P TOTAL KNEE ARTHROPLASTY, RIGHT: Primary | ICD-10-CM

## 2024-03-22 PROCEDURE — 97112 NEUROMUSCULAR REEDUCATION: CPT | Mod: GP

## 2024-03-22 PROCEDURE — 97016 VASOPNEUMATIC DEVICE THERAPY: CPT | Mod: GP

## 2024-03-22 PROCEDURE — 97530 THERAPEUTIC ACTIVITIES: CPT | Mod: GP

## 2024-03-22 PROCEDURE — 97110 THERAPEUTIC EXERCISES: CPT | Mod: GP

## 2024-03-22 ASSESSMENT — 6 MINUTE WALK TEST (6MWT)
OXYGEN DEVICE: NO
TOTAL DISTANCE WALKED (METERS): 259.08

## 2024-03-25 ENCOUNTER — THERAPY VISIT (OUTPATIENT)
Dept: PHYSICAL THERAPY | Facility: OTHER | Age: 64
End: 2024-03-25
Payer: COMMERCIAL

## 2024-03-25 DIAGNOSIS — Z96.651 S/P TOTAL KNEE ARTHROPLASTY, RIGHT: Primary | ICD-10-CM

## 2024-03-25 PROCEDURE — 97530 THERAPEUTIC ACTIVITIES: CPT | Mod: GP

## 2024-03-25 PROCEDURE — 97016 VASOPNEUMATIC DEVICE THERAPY: CPT | Mod: GP

## 2024-03-25 PROCEDURE — 97112 NEUROMUSCULAR REEDUCATION: CPT | Mod: GP

## 2024-03-25 PROCEDURE — 97110 THERAPEUTIC EXERCISES: CPT | Mod: GP

## 2024-03-29 ENCOUNTER — THERAPY VISIT (OUTPATIENT)
Dept: PHYSICAL THERAPY | Facility: OTHER | Age: 64
End: 2024-03-29
Payer: COMMERCIAL

## 2024-03-29 DIAGNOSIS — Z96.651 S/P TOTAL KNEE ARTHROPLASTY, RIGHT: Primary | ICD-10-CM

## 2024-03-29 PROCEDURE — 97530 THERAPEUTIC ACTIVITIES: CPT | Mod: GP,CQ

## 2024-03-29 PROCEDURE — 97110 THERAPEUTIC EXERCISES: CPT | Mod: GP,CQ

## 2024-03-29 PROCEDURE — 97112 NEUROMUSCULAR REEDUCATION: CPT | Mod: GP,CQ

## 2024-04-01 ENCOUNTER — THERAPY VISIT (OUTPATIENT)
Dept: PHYSICAL THERAPY | Facility: OTHER | Age: 64
End: 2024-04-01
Payer: COMMERCIAL

## 2024-04-01 DIAGNOSIS — Z96.651 S/P TOTAL KNEE ARTHROPLASTY, RIGHT: Primary | ICD-10-CM

## 2024-04-01 PROCEDURE — 97530 THERAPEUTIC ACTIVITIES: CPT | Mod: GP,CQ

## 2024-04-01 PROCEDURE — 97110 THERAPEUTIC EXERCISES: CPT | Mod: GP,CQ

## 2024-04-01 PROCEDURE — 97112 NEUROMUSCULAR REEDUCATION: CPT | Mod: GP,CQ

## 2024-04-05 ENCOUNTER — THERAPY VISIT (OUTPATIENT)
Dept: PHYSICAL THERAPY | Facility: OTHER | Age: 64
End: 2024-04-05
Payer: COMMERCIAL

## 2024-04-05 DIAGNOSIS — Z96.651 S/P TOTAL KNEE ARTHROPLASTY, RIGHT: Primary | ICD-10-CM

## 2024-04-05 PROCEDURE — 97112 NEUROMUSCULAR REEDUCATION: CPT | Mod: GP

## 2024-04-05 PROCEDURE — 97016 VASOPNEUMATIC DEVICE THERAPY: CPT | Mod: GP

## 2024-04-05 PROCEDURE — 97530 THERAPEUTIC ACTIVITIES: CPT | Mod: GP

## 2024-04-05 NOTE — TELEPHONE ENCOUNTER
After the patient's name and date of birth was verified, the patient was told the below information. She was transferred to the appointment line to set up an appointment.  Modesta Kent LPN..................7/21/2020   8:23 AM       No

## 2024-04-08 ENCOUNTER — THERAPY VISIT (OUTPATIENT)
Dept: PHYSICAL THERAPY | Facility: OTHER | Age: 64
End: 2024-04-08
Payer: COMMERCIAL

## 2024-04-08 ENCOUNTER — OFFICE VISIT (OUTPATIENT)
Dept: FAMILY MEDICINE | Facility: OTHER | Age: 64
End: 2024-04-08
Attending: STUDENT IN AN ORGANIZED HEALTH CARE EDUCATION/TRAINING PROGRAM
Payer: COMMERCIAL

## 2024-04-08 ENCOUNTER — TELEPHONE (OUTPATIENT)
Dept: FAMILY MEDICINE | Facility: OTHER | Age: 64
End: 2024-04-08

## 2024-04-08 VITALS
HEIGHT: 66 IN | OXYGEN SATURATION: 97 % | RESPIRATION RATE: 14 BRPM | HEART RATE: 99 BPM | BODY MASS INDEX: 33.17 KG/M2 | DIASTOLIC BLOOD PRESSURE: 77 MMHG | WEIGHT: 206.4 LBS | TEMPERATURE: 97.7 F | SYSTOLIC BLOOD PRESSURE: 127 MMHG

## 2024-04-08 DIAGNOSIS — R42 DIZZINESS: Primary | ICD-10-CM

## 2024-04-08 DIAGNOSIS — N39.0 ACUTE UTI (URINARY TRACT INFECTION): Primary | ICD-10-CM

## 2024-04-08 DIAGNOSIS — Z96.651 S/P TOTAL KNEE ARTHROPLASTY, RIGHT: Primary | ICD-10-CM

## 2024-04-08 DIAGNOSIS — R42 VERTIGO: ICD-10-CM

## 2024-04-08 DIAGNOSIS — R39.15 URGENCY OF URINATION: ICD-10-CM

## 2024-04-08 LAB
ALBUMIN UR-MCNC: 20 MG/DL
APPEARANCE UR: CLEAR
BACTERIA #/AREA URNS HPF: ABNORMAL /HPF
BILIRUB UR QL STRIP: NEGATIVE
COLOR UR AUTO: YELLOW
GLUCOSE UR STRIP-MCNC: NEGATIVE MG/DL
HGB UR QL STRIP: NEGATIVE
KETONES UR STRIP-MCNC: NEGATIVE MG/DL
LEUKOCYTE ESTERASE UR QL STRIP: ABNORMAL
MUCOUS THREADS #/AREA URNS LPF: PRESENT /LPF
NITRATE UR QL: NEGATIVE
PH UR STRIP: 6 [PH] (ref 5–9)
RBC URINE: 5 /HPF
SP GR UR STRIP: 1.03 (ref 1–1.03)
SQUAMOUS EPITHELIAL: 2 /HPF
UROBILINOGEN UR STRIP-MCNC: NORMAL MG/DL
WBC URINE: 134 /HPF

## 2024-04-08 PROCEDURE — 97530 THERAPEUTIC ACTIVITIES: CPT | Mod: GP

## 2024-04-08 PROCEDURE — 97112 NEUROMUSCULAR REEDUCATION: CPT | Mod: GP

## 2024-04-08 PROCEDURE — 81001 URINALYSIS AUTO W/SCOPE: CPT | Mod: ZL

## 2024-04-08 PROCEDURE — 97110 THERAPEUTIC EXERCISES: CPT | Mod: GP

## 2024-04-08 PROCEDURE — 99213 OFFICE O/P EST LOW 20 MIN: CPT

## 2024-04-08 PROCEDURE — 87086 URINE CULTURE/COLONY COUNT: CPT | Mod: ZL

## 2024-04-08 RX ORDER — CEFDINIR 300 MG/1
300 CAPSULE ORAL 2 TIMES DAILY
Qty: 14 CAPSULE | Refills: 0 | Status: SHIPPED | OUTPATIENT
Start: 2024-04-08 | End: 2024-04-15

## 2024-04-08 ASSESSMENT — PAIN SCALES - GENERAL: PAINLEVEL: MILD PAIN (3)

## 2024-04-08 NOTE — NURSING NOTE
"Chief Complaint   Patient presents with    UTI     Burning, frequent urination   Patient presents with ongoing lower back pain, frequent urination, and burning in urinary tract. Symptoms have been going on since last Friday.         Initial /77 (BP Location: Right arm, Patient Position: Sitting, Cuff Size: Adult Large)   Pulse 99   Temp 97.7  F (36.5  C) (Temporal)   Resp 14   Ht 1.676 m (5' 6\")   Wt 93.6 kg (206 lb 6.4 oz)   LMP  (LMP Unknown)   SpO2 97%   BMI 33.31 kg/m   Estimated body mass index is 33.31 kg/m  as calculated from the following:    Height as of this encounter: 1.676 m (5' 6\").    Weight as of this encounter: 93.6 kg (206 lb 6.4 oz).     FOOD SECURITY SCREENING QUESTIONS:    The next two questions are to help us understand your food security.  If you are feeling you need any assistance in this area, we have resources available to support you today.    Hunger Vital Signs:  Within the past 12 months we worried whether our food would run out before we got money to buy more. Never  Within the past 12 months the food we bought just didn't last and we didn't have money to get more. Never      Dinesh Tucker    "

## 2024-04-08 NOTE — TELEPHONE ENCOUNTER
Good morning. I have been working with Yolie s/p TKA, she has noted some dizziness and nausea with certain positions and movements. I think she may be a good candidate for vestibular PT to address these concerns, this would require an additional PT order specifically for vestibular. Thank you.

## 2024-04-08 NOTE — PATIENT INSTRUCTIONS
Urinary Tract Infection (UTI) in Women: Care Instructions  Overview     A urinary tract infection (UTI) is an infection caused by bacteria. It can happen anywhere in the urinary tract. A UTI can happen in the:  Kidneys.  Ureters, the tubes that connect the kidneys to the bladder.  Bladder.  Urethra, where the urine comes out.  Most UTIs are bladder infections. They often cause pain or burning when you urinate.  Most UTIs can be cured with antibiotics. If you are prescribed antibiotics, be sure to complete your treatment so that the infection does not get worse.  Follow-up care is a key part of your treatment and safety. Be sure to make and go to all appointments, and call your doctor if you are having problems. It's also a good idea to know your test results and keep a list of the medicines you take.  How can you care for yourself at home?  Take your antibiotics as directed. Do not stop taking them just because you feel better. You need to take the full course of antibiotics.  Drink extra water and other fluids for the next day or two. This will help make the urine less concentrated and help wash out the bacteria that are causing the infection. (If you have kidney, heart, or liver disease and have to limit fluids, talk with your doctor before you increase the amount of fluids you drink.)  Avoid drinks that are carbonated or have caffeine. They can irritate the bladder.  Urinate often. Try to empty your bladder each time.  To relieve pain, take a hot bath or lay a heating pad set on low over your lower belly or genital area. Never go to sleep with a heating pad in place.  To prevent UTIs  Drink plenty of water each day. This helps you urinate often, which clears bacteria from your system. (If you have kidney, heart, or liver disease and have to limit fluids, talk with your doctor before you increase the amount of fluids you drink.)  Urinate when you need to.  If you are sexually active, urinate right after you have  "sex.  Change sanitary pads often.  Avoid douches, bubble baths, feminine hygiene sprays, and other feminine hygiene products that have deodorants.  After going to the bathroom, wipe from front to back.  When should you call for help?   Call your doctor now or seek immediate medical care if:    You have new or worse fever, chills, nausea, or vomiting.     You have new pain in your back just below your rib cage. This is called flank pain.     There is new blood or pus in your urine.     You have any problems with your antibiotic medicine.   Watch closely for changes in your health, and be sure to contact your doctor if:    You are not getting better after taking an antibiotic for 2 days.     Your symptoms go away but then come back.   Where can you learn more?  Go to https://[email or web address].net/patiented  Enter K848 in the search box to learn more about \"Urinary Tract Infection (UTI) in Women: Care Instructions.\"  Current as of: November 15, 2023               Content Version: 14.0    6054-9861 [employer].   Care instructions adapted under license by your healthcare professional. If you have questions about a medical condition or this instruction, always ask your healthcare professional. [employer] disclaims any warranty or liability for your use of this information.      "

## 2024-04-08 NOTE — PROGRESS NOTES
ASSESSMENT/PLAN:    I have reviewed the nursing notes.  I have reviewed the findings, diagnosis, plan and need for follow up with the patient.    1. Acute UTI (urinary tract infection)  2. Urgency of urination  - UA Macroscopic with reflex to Microscopic and Culture  - Urine Culture  - cefdinir (OMNICEF) 300 MG capsule; Take 1 capsule (300 mg) by mouth 2 times daily for 7 days  Dispense: 14 capsule; Refill: 0    Presents with urinary symptoms.  Patient's vitals are stable and she appears nontoxic.  Urinalysis was positive for a moderate amount of leukocyte esterase.  Will treat for UTI with Omnicef as patient has allergies to sulfa antibiotics, Augmentin, and doxycycline.  Urine culture is pending and patient will be notified of the results, antibiotics will be changed if needed based off of culture and sensitivity.  Advised patient that she can take Tylenol and ibuprofen as needed and advised her to push fluids.    Discussed warning signs/symptoms indicative of need to f/u    Follow up if symptoms persist or worsen or concerns    I explained my diagnostic considerations and recommendations to the patient, who voiced understanding and agreement with the treatment plan. All questions were answered. We discussed potential side effects of any prescribed or recommended therapies, as well as expectations for response to treatments.    RANULFO Dent CNP  4/8/2024  2:28 PM    HPI:    Yolie Bedoya is a 63 year old female  who presents to [facility] today for concerns of urinary symptoms    Patient presents with concerns of possible UTI, x 3 days    Symptoms: dysuria, urgency, frequency, burning, and back pain  Flank Pain or Back Pain: Yes: middle  Blood in Urine: No  Last Urination: in clinic  Able to Completely Urinate/Void: Yes:   Prior UTIs: Yes:   Exposures to STIs/STDs: No  Fevers, chills, N/V/D: No  Change in Bowel Habits: No  Vaginal Symptoms or Discharge: No  Recent swimming/use of hot tubs/swimming  pools/lakes: No    LMP: n/a    Treatments tried: azo    Denies CP, SOB, calf tenderness. No new medications. Denies exposure to ill or COVID positive patients.     Allergies: reviewed    PCP: Malcolm Damon    Past Medical History:   Diagnosis Date    Allergic rhinitis due to animal hair and dander     1970,aspen, birch, maple, oak, grass, dust mite, ragwee, cocklebur, mugwart, lambs quarter, pigweed, fungus, molds    BCC (basal cell carcinoma), face 2020    Mohs    Calculus of gallbladder without cholecystitis without obstruction     No Comments Provided    Essential (primary) hypertension     No Comments Provided    Excessive and frequent menstruation with regular cycle     s/p CLARA    Hormone replacement therapy (postmenopausal)     2010,started ERT    Obesity     No Comments Provided    Other long term (current) drug therapy     2/11/2014,ambien #30/month    Other specified postprocedural states     nausea and severe vomiting with narcotics    Psychophysiologic insomnia     contract signed 2/2014    Tubulo-interstitial nephritis     No Comments Provided    Venous insufficiency (chronic) (peripheral)     No Comments Provided     Past Surgical History:   Procedure Laterality Date    ARTHROSCOPY KNEE  10/12/2017    10/12/2017    BLADDER SURGERY  10/25/2017     SLING OPER STRES INCONTINENCE    COLONOSCOPY  06/2004     because of change in bowel habits    COLONOSCOPY  05/14/2014 5/14/2014,Extensive diverticulosis throughout the colon - follow up 10 years    EXTRACTION(S) DENTAL      No Comments Provided    HYSTERECTOMY TOTAL ABDOMINAL, BILATERAL SALPINGO-OOPHORECTOMY, COMBINED  03/30/2010    CLARA/BSO/Vasquez/Posterior Repair    LAPAROSCOPIC ABLATION ENDOMETRIOSIS      3/05    left total knee arthroplasty Left 10/2019    Dr. Jones.  [place]    TOTAL KNEE ARTHROPLASTY Right 03/2024    Jones -[place]     Social History     Tobacco Use    Smoking status: Never     Passive exposure: Never    Smokeless tobacco: Never    Substance Use Topics    Alcohol use: Yes     Comment: Alcoholic Drinks/day: occ     Current Outpatient Medications   Medication Sig Dispense Refill    albuterol (PROAIR HFA/PROVENTIL HFA/VENTOLIN HFA) 108 (90 Base) MCG/ACT inhaler Inhale 2 puffs into the lungs every 4 hours as needed for wheezing 18 g 11    buPROPion (WELLBUTRIN XL) 150 MG 24 hr tablet Take 2 tablets (300 mg) by mouth every morning 180 tablet 3    buPROPion (WELLBUTRIN XL) 300 MG 24 hr tablet TAKE 1 TABLET (300MG) BY MOUTH EVERY MORNING 90 tablet 4    cetirizine HCl 10 MG CAPS Take 1 tablet by mouth daily      estradiol (VAGIFEM) 10 MCG TABS vaginal tablet Place 1 tablet (10 mcg) vaginally twice a week 8 tablet 11    fluticasone (FLONASE) 50 MCG/ACT nasal spray Spray 2 sprays into both nostrils daily 48 g 3    fluticasone-salmeterol (ADVAIR) 250-50 MCG/ACT inhaler Inhale 1 puff into the lungs every 12 hours 1 each 11    hydroCHLOROthiazide (HYDRODIURIL) 12.5 MG tablet Take 1 tablet (12.5 mg) by mouth every evening 90 tablet 4    hydrochlorothiazide (HYDRODIURIL) 25 MG tablet Take 1 tablet (25 mg) by mouth daily 90 tablet 3    losartan (COZAAR) 100 MG tablet Take 1 tablet (100 mg) by mouth daily 90 tablet 3    montelukast (SINGULAIR) 10 MG tablet Take 1 tablet (10 mg) by mouth at bedtime 90 tablet 3    potassium chloride ER (K-TAB/KLOR-CON) 10 MEQ CR tablet Take 2 tablets (20 mEq) by mouth every morning AND 1 tablet (10 mEq) daily (with dinner). 270 tablet 4    rosuvastatin (CRESTOR) 10 MG tablet Take 1 tablet (10 mg) by mouth daily 90 tablet 3    SUMAtriptan (IMITREX) 20 MG/ACT nasal spray Spray 1 spray in nostril as needed for migraine May repeat in 2-4 hours 18 each 2    triamcinolone (KENALOG) 0.1 % external cream APPLY SPARINGLY TO AFFECTEDAREA THREE TIMES DAILY AS NEEDED 30 g 11    UNABLE TO FIND MEDICATION NAME: Plexus- 1 tablet daily      UNABLE TO FIND MEDICATION NAME: Gut health- 1 tablet daily      zolpidem (AMBIEN) 10 MG tablet Take 1  "tablet (10 mg) by mouth at bedtime 30 tablet 5     Allergies   Allergen Reactions    Doxycycline Nausea and Vomiting    Metolazone Unknown    No Clinical Screening - See Comments GI Disturbance and Nausea and Vomiting     Narcotics    Amoxicillin-Pot Clavulanate Rash    Sulfa Antibiotics Rash     Past medical history, past surgical history, current medications and allergies reviewed and accurate to the best of my knowledge.      ROS:  Refer to HPI    /77 (BP Location: Right arm, Patient Position: Sitting, Cuff Size: Adult Large)   Pulse 99   Temp 97.7  F (36.5  C) (Temporal)   Resp 14   Ht 1.676 m (5' 6\")   Wt 93.6 kg (206 lb 6.4 oz)   LMP  (LMP Unknown)   SpO2 97%   BMI 33.31 kg/m      EXAM:  General Appearance: Well appearing 63 year old female, appropriate appearance for age. No acute distress   Respiratory: normal chest wall and respirations.  Normal effort.  Clear to auscultation bilaterally, no wheezing, crackles or rhonchi.  No increased work of breathing.  No cough appreciated.  Cardiac: RRR with no murmurs  Abdomen: soft, nontender, no rigidity, no rebound tenderness or guarding, normal bowel sounds present  :  No suprapubic tenderness to palpation.  Present middle left CVA tenderness to palpation.    Musculoskeletal:  Equal movement of bilateral upper extremities.  Equal movement of bilateral lower extremities.  Normal gait.    Dermatological: no rashes noted of exposed skin  Neuro: Alert and oriented to person, place, and time.    Psychological: normal affect, alert, oriented, and pleasant.     Labs:  Results for orders placed or performed in visit on 04/08/24   UA Macroscopic with reflex to Microscopic and Culture     Status: Abnormal    Specimen: Urine, Clean Catch   Result Value Ref Range    Color Urine Yellow Colorless, Straw, Light Yellow, Yellow    Appearance Urine Clear Clear    Glucose Urine Negative Negative mg/dL    Bilirubin Urine Negative Negative    Ketones Urine Negative " Negative mg/dL    Specific Gravity Urine 1.028 1.000 - 1.030    Blood Urine Negative Negative    pH Urine 6.0 5.0 - 9.0    Protein Albumin Urine 20 (A) Negative mg/dL    Urobilinogen Urine Normal Normal, 2.0 mg/dL    Nitrite Urine Negative Negative    Leukocyte Esterase Urine Moderate (A) Negative    Bacteria Urine Few (A) None Seen /HPF    Mucus Urine Present (A) None Seen /LPF    RBC Urine 5 (H) <=2 /HPF    WBC Urine 134 (H) <=5 /HPF    Squamous Epithelials Urine 2 (H) <=1 /HPF    Narrative    Urine Culture ordered based on laboratory criteria

## 2024-04-10 LAB — BACTERIA UR CULT: NO GROWTH

## 2024-04-12 ENCOUNTER — THERAPY VISIT (OUTPATIENT)
Dept: PHYSICAL THERAPY | Facility: OTHER | Age: 64
End: 2024-04-12
Payer: COMMERCIAL

## 2024-04-12 DIAGNOSIS — Z96.651 S/P TOTAL KNEE ARTHROPLASTY, RIGHT: Primary | ICD-10-CM

## 2024-04-12 PROCEDURE — 97016 VASOPNEUMATIC DEVICE THERAPY: CPT | Mod: GP

## 2024-04-12 PROCEDURE — 97530 THERAPEUTIC ACTIVITIES: CPT | Mod: GP

## 2024-04-12 PROCEDURE — 97140 MANUAL THERAPY 1/> REGIONS: CPT | Mod: GP

## 2024-04-12 PROCEDURE — 97110 THERAPEUTIC EXERCISES: CPT | Mod: GP

## 2024-04-19 ENCOUNTER — THERAPY VISIT (OUTPATIENT)
Dept: PHYSICAL THERAPY | Facility: OTHER | Age: 64
End: 2024-04-19
Payer: COMMERCIAL

## 2024-04-19 DIAGNOSIS — Z96.651 S/P TOTAL KNEE ARTHROPLASTY, RIGHT: Primary | ICD-10-CM

## 2024-04-19 PROCEDURE — 97110 THERAPEUTIC EXERCISES: CPT | Mod: GP

## 2024-04-19 PROCEDURE — 97530 THERAPEUTIC ACTIVITIES: CPT | Mod: GP

## 2024-04-19 PROCEDURE — 97112 NEUROMUSCULAR REEDUCATION: CPT | Mod: GP

## 2024-04-24 ENCOUNTER — THERAPY VISIT (OUTPATIENT)
Dept: PHYSICAL THERAPY | Facility: OTHER | Age: 64
End: 2024-04-24
Payer: COMMERCIAL

## 2024-04-24 DIAGNOSIS — Z96.651 S/P TOTAL KNEE ARTHROPLASTY, RIGHT: Primary | ICD-10-CM

## 2024-04-24 PROCEDURE — 97530 THERAPEUTIC ACTIVITIES: CPT | Mod: GP

## 2024-04-24 PROCEDURE — 97016 VASOPNEUMATIC DEVICE THERAPY: CPT | Mod: GP

## 2024-04-24 PROCEDURE — 97110 THERAPEUTIC EXERCISES: CPT | Mod: GP

## 2024-04-24 PROCEDURE — 97140 MANUAL THERAPY 1/> REGIONS: CPT | Mod: GP

## 2024-04-26 ENCOUNTER — THERAPY VISIT (OUTPATIENT)
Dept: PHYSICAL THERAPY | Facility: OTHER | Age: 64
End: 2024-04-26
Payer: COMMERCIAL

## 2024-04-26 DIAGNOSIS — Z96.651 S/P TOTAL KNEE ARTHROPLASTY, RIGHT: Primary | ICD-10-CM

## 2024-04-26 PROCEDURE — 97110 THERAPEUTIC EXERCISES: CPT | Mod: GP,CQ

## 2024-04-26 PROCEDURE — 97530 THERAPEUTIC ACTIVITIES: CPT | Mod: GP,CQ

## 2024-04-29 ENCOUNTER — THERAPY VISIT (OUTPATIENT)
Dept: PHYSICAL THERAPY | Facility: OTHER | Age: 64
End: 2024-04-29
Payer: COMMERCIAL

## 2024-04-29 DIAGNOSIS — Z96.651 S/P TOTAL KNEE ARTHROPLASTY, RIGHT: Primary | ICD-10-CM

## 2024-04-29 PROCEDURE — 97140 MANUAL THERAPY 1/> REGIONS: CPT | Mod: GP

## 2024-04-29 PROCEDURE — 97110 THERAPEUTIC EXERCISES: CPT | Mod: GP

## 2024-04-29 PROCEDURE — 97530 THERAPEUTIC ACTIVITIES: CPT | Mod: GP

## 2024-04-29 NOTE — PROGRESS NOTES
"   04/12/24 0500   Appointment Info   Signing clinician's name / credentials Luisana Mckenzie DPT   Visits Used 10   Medical Diagnosis S/P total knee arthroplasty, right   PT Tx Diagnosis s/p R TKA; R knee pain with strength and mobility deficits   Progress Note/Certification   Onset of illness/injury or Date of Surgery 03/05/24   Therapy Frequency 1-2x/week   Predicted Duration 12 weeks   Supervision   PT Assistant Visit Number 2   PT Goal 1   Goal Identifier ROM   Goal Description patient will demonstrate functional R knee ROM 0-120 degrees   Rationale to maximize safety and independence with performance of ADLs and functional tasks;to maximize safety and independence within the community;to maximize safety and independence within the home   Goal Progress not met - ROM 0-95   Target Date 06/03/24   PT Goal 2   Goal Identifier gait   Goal Description patient will ambulate 1000ft with appropriate AD, normal gait pattern in order to demonstrate safety with community distances   Rationale to maximize safety and independence within the home;to maximize safety and independence within the community   Target Date 05/06/24   Goal Progress not met - ambulated 850ft with antalgic gait   PT Goal 3   Goal Identifier pain   Goal Description patient will report <2/10 pain with all ADLs including ambulation and standing as needed to prepare meals and complete other household and recreational tasks   Rationale to maximize safety and independence with performance of ADLs and functional tasks;to maximize safety and independence with self cares;to maximize safety and independence within the home   Target Date 05/06/24   Goal Progress not met - pain is well managed, however more intense with activity   Subjective Report   Subjective Report Patient notes stiffness today, notes a \"hitch\" with extension today.   Objective Measure 1   Objective Measure ROM   Details 0-95   Vasopneumatic Device   Vasopneumatic device minutes (18949) 15 "   Therapeutic Procedure/Exercise   Therapeutic Procedures: strength, endurance, ROM, flexibility minutes (79675) 8   Ther Proc 1 strength, mobility   Ther Proc 1 - Details supine heel slide; prone assisted quad stretch, OPAL  (deferred: knee extension stretch with foot on towel roll, hs curl B x20 with green tband. Added: hs curl B x20 with green tband; seated heel slide; seated abd with green band, seated HS stretch with foot propped on step)   Patient Response/Progress well tolerated   Therapeutic Activity   Therapeutic Activities: dynamic activities to improve functional performance minutes (85027) 8   Ther Act 1 activity tolerance, WB tolerance   Ther Act 1 - Details scifit 6min, level 5, seat 18  (deferred: standing heel raise, toe raise, march, staggered stance weight shift forward/backward, step over-back over ~2in obstacle (half roller); sit<>stand from raised plinth; 4in step step-to, forward & lateral step up, step down; calf stretch on wedge)   Patient Response/Progress well tolerated   Manual Therapy   Manual Therapy 1 STM/IASTM   Manual Therapy 1 - Details distal HS & quads, anterior & posterior lower leg   Patient Response/Progress positive   Manual Therapy: Mobilization, MFR, MLD, friction massage minutes (49916) 20   Education   Learner/Method Patient;Listening;Demonstration;No Barriers to Learning   Plan   Home program HEP, handout given  ([employer] access code [email or web address])   Plan for next session progress WB tolerance, LE strength, knee mobility   Total Session Time   Timed Code Treatment Minutes 36   Total Treatment Time (sum of timed and untimed services) 51         PLAN  Continue therapy per current plan of care.    Beginning/End Dates of Progress Note Reporting Period:    3/11/2024 to 04/12/2024    Referring Provider:  Cesar Jones

## 2024-05-03 ENCOUNTER — THERAPY VISIT (OUTPATIENT)
Dept: PHYSICAL THERAPY | Facility: OTHER | Age: 64
End: 2024-05-03
Payer: COMMERCIAL

## 2024-05-03 DIAGNOSIS — Z96.651 S/P TOTAL KNEE ARTHROPLASTY, RIGHT: Primary | ICD-10-CM

## 2024-05-03 PROCEDURE — 97016 VASOPNEUMATIC DEVICE THERAPY: CPT | Mod: GP

## 2024-05-03 PROCEDURE — 97530 THERAPEUTIC ACTIVITIES: CPT | Mod: GP

## 2024-05-03 PROCEDURE — 97110 THERAPEUTIC EXERCISES: CPT | Mod: GP

## 2024-05-03 ASSESSMENT — 6 MINUTE WALK TEST (6MWT)
OXYGEN DEVICE: NO
TOTAL DISTANCE WALKED (METERS): 463.3

## 2024-05-06 ENCOUNTER — THERAPY VISIT (OUTPATIENT)
Dept: PHYSICAL THERAPY | Facility: OTHER | Age: 64
End: 2024-05-06
Payer: COMMERCIAL

## 2024-05-06 ENCOUNTER — OFFICE VISIT (OUTPATIENT)
Dept: FAMILY MEDICINE | Facility: OTHER | Age: 64
End: 2024-05-06
Attending: NURSE PRACTITIONER
Payer: COMMERCIAL

## 2024-05-06 VITALS
RESPIRATION RATE: 20 BRPM | BODY MASS INDEX: 33.33 KG/M2 | SYSTOLIC BLOOD PRESSURE: 152 MMHG | WEIGHT: 207.4 LBS | HEART RATE: 80 BPM | OXYGEN SATURATION: 97 % | HEIGHT: 66 IN | TEMPERATURE: 98.4 F | DIASTOLIC BLOOD PRESSURE: 86 MMHG

## 2024-05-06 DIAGNOSIS — G47.00 INSOMNIA, PERSISTENT: ICD-10-CM

## 2024-05-06 DIAGNOSIS — N95.2 ATROPHIC VAGINITIS: Primary | ICD-10-CM

## 2024-05-06 DIAGNOSIS — Z96.651 S/P TOTAL KNEE ARTHROPLASTY, RIGHT: Primary | ICD-10-CM

## 2024-05-06 DIAGNOSIS — R39.89 URINARY PROBLEM: ICD-10-CM

## 2024-05-06 LAB
ALBUMIN UR-MCNC: NEGATIVE MG/DL
APPEARANCE UR: CLEAR
BILIRUB UR QL STRIP: NEGATIVE
COLOR UR AUTO: YELLOW
GLUCOSE UR STRIP-MCNC: NEGATIVE MG/DL
HGB UR QL STRIP: NEGATIVE
KETONES UR STRIP-MCNC: NEGATIVE MG/DL
LEUKOCYTE ESTERASE UR QL STRIP: NEGATIVE
NITRATE UR QL: NEGATIVE
PH UR STRIP: 6 [PH] (ref 5–9)
SP GR UR STRIP: 1.01 (ref 1–1.03)
UROBILINOGEN UR STRIP-MCNC: NORMAL MG/DL

## 2024-05-06 PROCEDURE — 97530 THERAPEUTIC ACTIVITIES: CPT | Mod: GP

## 2024-05-06 PROCEDURE — 97110 THERAPEUTIC EXERCISES: CPT | Mod: GP

## 2024-05-06 PROCEDURE — 99213 OFFICE O/P EST LOW 20 MIN: CPT

## 2024-05-06 PROCEDURE — 97016 VASOPNEUMATIC DEVICE THERAPY: CPT | Mod: GP

## 2024-05-06 PROCEDURE — 97112 NEUROMUSCULAR REEDUCATION: CPT | Mod: GP

## 2024-05-06 PROCEDURE — 81003 URINALYSIS AUTO W/O SCOPE: CPT | Mod: ZL

## 2024-05-06 PROCEDURE — 87086 URINE CULTURE/COLONY COUNT: CPT | Mod: ZL

## 2024-05-06 ASSESSMENT — PAIN SCALES - GENERAL: PAINLEVEL: NO PAIN (0)

## 2024-05-06 NOTE — PROGRESS NOTES
ASSESSMENT/PLAN:    I have reviewed the nursing notes.  I have reviewed the findings, diagnosis, plan and need for follow up with the patient.    1. Atrophic vaginitis  2. Urinary problem  - UA Macroscopic with reflex to Microscopic and Culture  - Urine Culture    Patient presents with urinary symptoms.  Patient's vitals are stable and she appears nontoxic.  Urinalysis was negative for any signs of infection or other abnormalities.  Sent urine to be cultured due to patient's history of UTIs and current symptoms.  Will notify patient of results and if there is bacterial growth will start patient on antibiotics.  Discussed with patient that her symptoms are most likely due to her atrophic vaginitis.  Discussed that her urinary frequency can be due to overactive bladder syndrome, caffeine use, or increased fluid intake.  Patient has a prescription for estradiol that she has not taken in a while and states that she will restart this to help with her atrophic vaginitis.  Provided patient with information on other at home remedies she can do for her atrophic vaginitis.  Discussed with patient that if her symptoms worsen or persist that I recommend she follow-up with a gynecologist.    Discussed warning signs/symptoms indicative of need to f/u    Follow up if symptoms persist or worsen or concerns    I explained my diagnostic considerations and recommendations to the patient, who voiced understanding and agreement with the treatment plan. All questions were answered. We discussed potential side effects of any prescribed or recommended therapies, as well as expectations for response to treatments.    RANULFO Dent CNP  5/6/2024  12:30 PM    HPI:    Yolie Bedoya is a 63 year old female  who presents to [facility] today for concerns of urinary symptoms    Patient presents with concerns of possible UTI, x 2 days    Symptoms: dysuria, urgency, frequency, and burning  Flank Pain or Back Pain: No  Blood in Urine:  No  Last Urination: in clinic  Able to Completely Urinate/Void: Yes  Prior UTIs: Yes  Exposures to STIs/STDs: No  Fevers, chills, N/V/D: No  Change in Bowel Habits: No  Vaginal Symptoms or Discharge: No - has atrophic vaginitis  Recent swimming/use of hot tubs/swimming pools/lakes: No    Treatments tried: none    Denies CP, SOB, calf tenderness. No new medications. Denies exposure to ill or COVID positive patients.     Allergies: reviewed    PCP: Malcolm Damon    Past Medical History:   Diagnosis Date    Allergic rhinitis due to animal hair and dander     1970,aspen, birch, maple, oak, grass, dust mite, ragwee, cocklebur, mugwart, lambs quarter, pigweed, fungus, molds    BCC (basal cell carcinoma), face 2020    Mohs    Calculus of gallbladder without cholecystitis without obstruction     No Comments Provided    Essential (primary) hypertension     No Comments Provided    Excessive and frequent menstruation with regular cycle     s/p CLARA    Hormone replacement therapy (postmenopausal)     2010,started ERT    Obesity     No Comments Provided    Other long term (current) drug therapy     2/11/2014,ambien #30/month    Other specified postprocedural states     nausea and severe vomiting with narcotics    Psychophysiologic insomnia     contract signed 2/2014    Tubulo-interstitial nephritis     No Comments Provided    Venous insufficiency (chronic) (peripheral)     No Comments Provided     Past Surgical History:   Procedure Laterality Date    ARTHROSCOPY KNEE  10/12/2017    10/12/2017    BLADDER SURGERY  10/25/2017     SLING OPER STRES INCONTINENCE    COLONOSCOPY  06/2004     because of change in bowel habits    COLONOSCOPY  05/14/2014 5/14/2014,Extensive diverticulosis throughout the colon - follow up 10 years    EXTRACTION(S) DENTAL      No Comments Provided    HYSTERECTOMY TOTAL ABDOMINAL, BILATERAL SALPINGO-OOPHORECTOMY, COMBINED  03/30/2010    CLARA/BSO/Vasquez/Posterior Repair    LAPAROSCOPIC ABLATION ENDOMETRIOSIS       3/05    left total knee arthroplasty Left 10/2019    Dr. Jones.  [place]    TOTAL KNEE ARTHROPLASTY Right 03/2024    [facility]     Social History     Tobacco Use    Smoking status: Never     Passive exposure: Never    Smokeless tobacco: Never   Substance Use Topics    Alcohol use: Yes     Comment: Alcoholic Drinks/day: occ     Current Outpatient Medications   Medication Sig Dispense Refill    albuterol (PROAIR HFA/PROVENTIL HFA/VENTOLIN HFA) 108 (90 Base) MCG/ACT inhaler Inhale 2 puffs into the lungs every 4 hours as needed for wheezing 18 g 11    buPROPion (WELLBUTRIN XL) 150 MG 24 hr tablet Take 2 tablets (300 mg) by mouth every morning 180 tablet 3    buPROPion (WELLBUTRIN XL) 300 MG 24 hr tablet TAKE 1 TABLET (300MG) BY MOUTH EVERY MORNING 90 tablet 4    cetirizine HCl 10 MG CAPS Take 1 tablet by mouth daily      estradiol (VAGIFEM) 10 MCG TABS vaginal tablet Place 1 tablet (10 mcg) vaginally twice a week 8 tablet 11    fluticasone (FLONASE) 50 MCG/ACT nasal spray Spray 2 sprays into both nostrils daily 48 g 3    fluticasone-salmeterol (ADVAIR) 250-50 MCG/ACT inhaler Inhale 1 puff into the lungs every 12 hours 1 each 11    hydroCHLOROthiazide (HYDRODIURIL) 12.5 MG tablet Take 1 tablet (12.5 mg) by mouth every evening 90 tablet 4    hydrochlorothiazide (HYDRODIURIL) 25 MG tablet Take 1 tablet (25 mg) by mouth daily 90 tablet 3    losartan (COZAAR) 100 MG tablet Take 1 tablet (100 mg) by mouth daily 90 tablet 3    montelukast (SINGULAIR) 10 MG tablet Take 1 tablet (10 mg) by mouth at bedtime 90 tablet 3    potassium chloride ER (K-TAB/KLOR-CON) 10 MEQ CR tablet Take 2 tablets (20 mEq) by mouth every morning AND 1 tablet (10 mEq) daily (with dinner). 270 tablet 4    rosuvastatin (CRESTOR) 10 MG tablet Take 1 tablet (10 mg) by mouth daily 90 tablet 3    SUMAtriptan (IMITREX) 20 MG/ACT nasal spray Spray 1 spray in nostril as needed for migraine May repeat in 2-4 hours 18 each 2    triamcinolone (KENALOG) 0.1  "% external cream APPLY SPARINGLY TO AFFECTEDAREA THREE TIMES DAILY AS NEEDED 30 g 11    UNABLE TO FIND MEDICATION NAME: Plexus- 1 tablet daily      UNABLE TO FIND MEDICATION NAME: Gut health- 1 tablet daily      zolpidem (AMBIEN) 10 MG tablet Take 1 tablet (10 mg) by mouth at bedtime 30 tablet 5     Allergies   Allergen Reactions    Doxycycline Nausea and Vomiting    Metolazone Unknown    No Clinical Screening - See Comments GI Disturbance and Nausea and Vomiting     Narcotics    Amoxicillin-Pot Clavulanate Rash    Sulfa Antibiotics Rash     Past medical history, past surgical history, current medications and allergies reviewed and accurate to the best of my knowledge.      ROS:  Refer to HPI    BP (!) 152/86 (BP Location: Right arm, Patient Position: Sitting, Cuff Size: Adult Large)   Pulse 80   Temp 98.4  F (36.9  C) (Tympanic)   Resp 20   Ht 1.676 m (5' 6\")   Wt 94.1 kg (207 lb 6.4 oz)   LMP  (LMP Unknown)   SpO2 97%   BMI 33.48 kg/m      EXAM:  General Appearance: Well appearing 63 year old female, appropriate appearance for age. No acute distress   Respiratory: normal chest wall and respirations.  Normal effort.  Clear to auscultation bilaterally, no wheezing, crackles or rhonchi.  No increased work of breathing.  No cough appreciated.  Cardiac: RRR with no murmurs  :  No suprapubic tenderness to palpation.  Absent CVA tenderness to palpation.  Patient declined vaginal exam.   Neuro: Alert and oriented to person, place, and time.    Psychological: normal affect, alert, oriented, and pleasant.     Labs:  Results for orders placed or performed in visit on 05/06/24   UA Macroscopic with reflex to Microscopic and Culture     Status: Normal    Specimen: Urine, Clean Catch   Result Value Ref Range    Color Urine Yellow Colorless, Straw, Light Yellow, Yellow    Appearance Urine Clear Clear    Glucose Urine Negative Negative mg/dL    Bilirubin Urine Negative Negative    Ketones Urine Negative Negative mg/dL "    Specific Gravity Urine 1.013 1.000 - 1.030    Blood Urine Negative Negative    pH Urine 6.0 5.0 - 9.0    Protein Albumin Urine Negative Negative mg/dL    Urobilinogen Urine Normal Normal, 2.0 mg/dL    Nitrite Urine Negative Negative    Leukocyte Esterase Urine Negative Negative    Narrative    Microscopic not indicated

## 2024-05-06 NOTE — NURSING NOTE
"Pt presents to  for UTI symptoms x2 days. Pt states she has had 3 UTIs since February, and feels the last one never completley left. Pt having symptoms of general discomfort, burning with urination, frequency, and incontinence.    Chief Complaint   Patient presents with    UTI       FOOD SECURITY SCREENING QUESTIONS  Hunger Vital Signs:  Within the past 12 months we worried whether our food would run out before we got money to buy more. Never  Within the past 12 months the food we bought just didn't last and we didn't have money to get more. Never  Gabrielle Deamalindaon 5/6/2024 12:12 PM      Initial BP (!) 152/86 (BP Location: Right arm, Patient Position: Sitting, Cuff Size: Adult Large)   Pulse 80   Temp 98.4  F (36.9  C) (Tympanic)   Resp 20   Ht 1.676 m (5' 6\")   Wt 94.1 kg (207 lb 6.4 oz)   LMP  (LMP Unknown)   SpO2 97%   BMI 33.48 kg/m   Estimated body mass index is 33.48 kg/m  as calculated from the following:    Height as of this encounter: 1.676 m (5' 6\").    Weight as of this encounter: 94.1 kg (207 lb 6.4 oz).  Medication Reconciliation: complete    Gabrielle Novak  "

## 2024-05-08 LAB — BACTERIA UR CULT: NO GROWTH

## 2024-05-09 DIAGNOSIS — G47.00 INSOMNIA, PERSISTENT: ICD-10-CM

## 2024-05-09 RX ORDER — ZOLPIDEM TARTRATE 10 MG/1
10 TABLET ORAL AT BEDTIME
Qty: 30 TABLET | Refills: 5 | OUTPATIENT
Start: 2024-05-09

## 2024-05-09 RX ORDER — ZOLPIDEM TARTRATE 10 MG/1
10 TABLET ORAL AT BEDTIME
Qty: 30 TABLET | Refills: 1 | Status: SHIPPED | OUTPATIENT
Start: 2024-05-09 | End: 2024-07-08

## 2024-05-09 NOTE — TELEPHONE ENCOUNTER
Reason for call: Medication or medication refill    Have you contacted your pharmacy regarding this refill? YES    If No, direct the patient to contact the Pharmacy and discontinue telephone note using Erroneous Encounter.  If Yes, continue.    Name of medication requested: zolpidem 10 mg    How many days of medication do you have left? THREE    What pharmacy do you use? [facility]    Preferred method for responding to this message: Telephone Call    Phone number patient can be reached at: Cell number on file:    Telephone Information:   Mobile [phone]       If we cannot reach you directly, may we leave a detailed response at the number you provided? Yes      Patient stated the pharmacy has yet to hear back. Patient stated she has an annual physical and 6 month medication refill appointment setup.      Niya Byrne on 5/9/2024 at 2:40 PM

## 2024-05-09 NOTE — TELEPHONE ENCOUNTER
[facility] sent Rx request for the following:      Requested Prescriptions   Pending Prescriptions Disp Refills    zolpidem (AMBIEN) 10 MG tablet 30 tablet 5     Sig: Take 1 tablet (10 mg) by mouth at bedtime       There is no refill protocol information for this order          Last Prescription Date:   11/9/23  Last Fill Qty/Refills:         30, R-5    Last Office Visit:              2/19/24   Future Office visit:             Next 5 appointments (look out 90 days)      Jul 24, 2024  3:40 PM  (Arrive by 3:25 PM)  Adult Preventative Visit with Yarely Damon MD  [facility] ([facility] ) [address]  [place] [id number] [phone]           Routing refill request to provider for review/approval because:  Drug not on the [facility] refill protocol     Indu Curry RN on 5/9/2024 at 3:02 PM

## 2024-05-10 ENCOUNTER — THERAPY VISIT (OUTPATIENT)
Dept: PHYSICAL THERAPY | Facility: OTHER | Age: 64
End: 2024-05-10
Payer: COMMERCIAL

## 2024-05-10 DIAGNOSIS — Z96.651 S/P TOTAL KNEE ARTHROPLASTY, RIGHT: Primary | ICD-10-CM

## 2024-05-10 PROCEDURE — 97110 THERAPEUTIC EXERCISES: CPT | Mod: GP

## 2024-05-10 PROCEDURE — 97530 THERAPEUTIC ACTIVITIES: CPT | Mod: GP

## 2024-05-10 PROCEDURE — 97140 MANUAL THERAPY 1/> REGIONS: CPT | Mod: GP

## 2024-05-13 ENCOUNTER — THERAPY VISIT (OUTPATIENT)
Dept: PHYSICAL THERAPY | Facility: OTHER | Age: 64
End: 2024-05-13
Payer: COMMERCIAL

## 2024-05-13 DIAGNOSIS — Z96.651 S/P TOTAL KNEE ARTHROPLASTY, RIGHT: Primary | ICD-10-CM

## 2024-05-13 PROCEDURE — 97140 MANUAL THERAPY 1/> REGIONS: CPT | Mod: GP

## 2024-05-13 PROCEDURE — 97110 THERAPEUTIC EXERCISES: CPT | Mod: GP

## 2024-05-13 PROCEDURE — 97530 THERAPEUTIC ACTIVITIES: CPT | Mod: GP

## 2024-05-30 ENCOUNTER — THERAPY VISIT (OUTPATIENT)
Dept: PHYSICAL THERAPY | Facility: OTHER | Age: 64
End: 2024-05-30
Payer: COMMERCIAL

## 2024-05-30 DIAGNOSIS — Z96.651 S/P TOTAL KNEE ARTHROPLASTY, RIGHT: Primary | ICD-10-CM

## 2024-05-30 PROCEDURE — 97530 THERAPEUTIC ACTIVITIES: CPT | Mod: GP

## 2024-05-30 PROCEDURE — 97140 MANUAL THERAPY 1/> REGIONS: CPT | Mod: GP

## 2024-07-02 DIAGNOSIS — G47.00 INSOMNIA, PERSISTENT: ICD-10-CM

## 2024-07-08 DIAGNOSIS — G47.00 INSOMNIA, PERSISTENT: ICD-10-CM

## 2024-07-08 RX ORDER — ZOLPIDEM TARTRATE 10 MG/1
10 TABLET ORAL AT BEDTIME
Qty: 30 TABLET | Refills: 0 | Status: CANCELLED | OUTPATIENT
Start: 2024-07-08

## 2024-07-08 RX ORDER — ZOLPIDEM TARTRATE 10 MG/1
10 TABLET ORAL AT BEDTIME
Qty: 30 TABLET | Refills: 0 | Status: SHIPPED | OUTPATIENT
Start: 2024-07-08 | End: 2024-07-24

## 2024-07-08 NOTE — TELEPHONE ENCOUNTER
Request received at 12:07 PM. Prescription approved 12:35 PM today. Gabrielle Guardado RN .............. 7/8/2024  1:52 PM

## 2024-07-08 NOTE — TELEPHONE ENCOUNTER
ZOLPIDEM 10MG TABLET        Last Written Prescription Date:  5/9/24  Last Fill Quantity: 30,   # refills: 1  Last Office Visit: 2/19/24 with medication reviewed  Future Office visit:    Next 5 appointments (look out 90 days)      Jul 24, 2024 3:40 PM  (Arrive by 3:25 PM)  Adult Preventative Visit with Yarely Damon MD  [facility] ([facility] ) [address]  [place] [id number] [phone]             Routing refill request to provider for review/approval because:  Drug not on the [facility], [facility] or [facility] refill protocol or controlled substance. Unable to complete prescription refill per RNMedication Refill Policy.................... Destini Fu RN ....................  7/8/2024   11:36 AM

## 2024-07-08 NOTE — TELEPHONE ENCOUNTER
Reason for call: Medication or medication refill    Have you contacted your pharmacy regarding this refill? Yes. Pharmacy stated they sent 2 faxes.    Name of medication requested: Ambien.     How many days of medication do you have left? States she has less than one week but is going camping.    What pharmacy do you use? [facility]    Preferred method for responding to this message: Telephone Call    Phone number patient can be reached at: Cell number on file:    Telephone Information:   Mobile [phone]       If we cannot reach you directly, may we leave a detailed response at the number you provided? Yes    Rachel Quintero on 7/8/2024 at 12:11 PM

## 2024-07-13 ENCOUNTER — HEALTH MAINTENANCE LETTER (OUTPATIENT)
Age: 64
End: 2024-07-13

## 2024-07-23 ENCOUNTER — MYC MEDICAL ADVICE (OUTPATIENT)
Dept: FAMILY MEDICINE | Facility: OTHER | Age: 64
End: 2024-07-23
Payer: COMMERCIAL

## 2024-07-23 DIAGNOSIS — I10 ESSENTIAL HYPERTENSION: Primary | ICD-10-CM

## 2024-07-23 DIAGNOSIS — Z13.1 SCREENING FOR DIABETES MELLITUS: ICD-10-CM

## 2024-07-23 DIAGNOSIS — Z13.220 SCREENING FOR HYPERLIPIDEMIA: ICD-10-CM

## 2024-07-24 ENCOUNTER — OFFICE VISIT (OUTPATIENT)
Dept: FAMILY MEDICINE | Facility: OTHER | Age: 64
End: 2024-07-24
Attending: FAMILY MEDICINE
Payer: COMMERCIAL

## 2024-07-24 ENCOUNTER — LAB (OUTPATIENT)
Dept: LAB | Facility: OTHER | Age: 64
End: 2024-07-24
Attending: FAMILY MEDICINE
Payer: COMMERCIAL

## 2024-07-24 VITALS
RESPIRATION RATE: 14 BRPM | TEMPERATURE: 96.9 F | SYSTOLIC BLOOD PRESSURE: 138 MMHG | WEIGHT: 206 LBS | HEART RATE: 76 BPM | BODY MASS INDEX: 33.11 KG/M2 | DIASTOLIC BLOOD PRESSURE: 82 MMHG | OXYGEN SATURATION: 98 % | HEIGHT: 66 IN

## 2024-07-24 DIAGNOSIS — I10 ESSENTIAL HYPERTENSION: ICD-10-CM

## 2024-07-24 DIAGNOSIS — Z13.1 SCREENING FOR DIABETES MELLITUS: ICD-10-CM

## 2024-07-24 DIAGNOSIS — Z12.11 COLON CANCER SCREENING: ICD-10-CM

## 2024-07-24 DIAGNOSIS — G47.00 INSOMNIA, PERSISTENT: ICD-10-CM

## 2024-07-24 DIAGNOSIS — N95.2 ATROPHIC VAGINITIS: ICD-10-CM

## 2024-07-24 DIAGNOSIS — Z13.220 SCREENING FOR HYPERLIPIDEMIA: ICD-10-CM

## 2024-07-24 DIAGNOSIS — Z00.00 PHYSICAL EXAM, ANNUAL: Primary | ICD-10-CM

## 2024-07-24 DIAGNOSIS — Z12.31 BREAST CANCER SCREENING BY MAMMOGRAM: ICD-10-CM

## 2024-07-24 DIAGNOSIS — R21 RASH: ICD-10-CM

## 2024-07-24 DIAGNOSIS — E66.811 CLASS 1 OBESITY WITHOUT SERIOUS COMORBIDITY WITH BODY MASS INDEX (BMI) OF 33.0 TO 33.9 IN ADULT, UNSPECIFIED OBESITY TYPE: ICD-10-CM

## 2024-07-24 DIAGNOSIS — N94.10 DYSPAREUNIA IN FEMALE: ICD-10-CM

## 2024-07-24 DIAGNOSIS — E78.2 MIXED HYPERLIPIDEMIA: ICD-10-CM

## 2024-07-24 DIAGNOSIS — M17.11 PRIMARY OSTEOARTHRITIS OF RIGHT KNEE: ICD-10-CM

## 2024-07-24 LAB
ALBUMIN SERPL BCG-MCNC: 4.5 G/DL (ref 3.5–5.2)
ALP SERPL-CCNC: 95 U/L (ref 40–150)
ALT SERPL W P-5'-P-CCNC: 17 U/L (ref 0–50)
ANION GAP SERPL CALCULATED.3IONS-SCNC: 11 MMOL/L (ref 7–15)
AST SERPL W P-5'-P-CCNC: 19 U/L (ref 0–45)
BASOPHILS # BLD AUTO: 0 10E3/UL (ref 0–0.2)
BASOPHILS NFR BLD AUTO: 1 %
BILIRUB SERPL-MCNC: 0.6 MG/DL
BUN SERPL-MCNC: 19.1 MG/DL (ref 8–23)
CALCIUM SERPL-MCNC: 9.9 MG/DL (ref 8.8–10.4)
CHLORIDE SERPL-SCNC: 104 MMOL/L (ref 98–107)
CHOLEST SERPL-MCNC: 169 MG/DL
CREAT SERPL-MCNC: 0.89 MG/DL (ref 0.51–0.95)
EGFRCR SERPLBLD CKD-EPI 2021: 72 ML/MIN/1.73M2
EOSINOPHIL # BLD AUTO: 0.2 10E3/UL (ref 0–0.7)
EOSINOPHIL NFR BLD AUTO: 3 %
ERYTHROCYTE [DISTWIDTH] IN BLOOD BY AUTOMATED COUNT: 12.8 % (ref 10–15)
FASTING STATUS PATIENT QL REPORTED: YES
FASTING STATUS PATIENT QL REPORTED: YES
GLUCOSE SERPL-MCNC: 131 MG/DL (ref 70–99)
HBA1C MFR BLD: 5.4 % (ref 4–6.2)
HCO3 SERPL-SCNC: 27 MMOL/L (ref 22–29)
HCT VFR BLD AUTO: 39.3 % (ref 35–47)
HDLC SERPL-MCNC: 74 MG/DL
HGB BLD-MCNC: 12.9 G/DL (ref 11.7–15.7)
IMM GRANULOCYTES # BLD: 0 10E3/UL
IMM GRANULOCYTES NFR BLD: 0 %
LDLC SERPL CALC-MCNC: 72 MG/DL
LYMPHOCYTES # BLD AUTO: 2.8 10E3/UL (ref 0.8–5.3)
LYMPHOCYTES NFR BLD AUTO: 36 %
MCH RBC QN AUTO: 29.1 PG (ref 26.5–33)
MCHC RBC AUTO-ENTMCNC: 32.8 G/DL (ref 31.5–36.5)
MCV RBC AUTO: 89 FL (ref 78–100)
MONOCYTES # BLD AUTO: 0.5 10E3/UL (ref 0–1.3)
MONOCYTES NFR BLD AUTO: 6 %
NEUTROPHILS # BLD AUTO: 4.1 10E3/UL (ref 1.6–8.3)
NEUTROPHILS NFR BLD AUTO: 54 %
NONHDLC SERPL-MCNC: 95 MG/DL
NRBC # BLD AUTO: 0 10E3/UL
NRBC BLD AUTO-RTO: 0 /100
PLATELET # BLD AUTO: 285 10E3/UL (ref 150–450)
POTASSIUM SERPL-SCNC: 3.5 MMOL/L (ref 3.4–5.3)
PROT SERPL-MCNC: 7.5 G/DL (ref 6.4–8.3)
RBC # BLD AUTO: 4.44 10E6/UL (ref 3.8–5.2)
SODIUM SERPL-SCNC: 142 MMOL/L (ref 135–145)
TRIGL SERPL-MCNC: 115 MG/DL
WBC # BLD AUTO: 7.6 10E3/UL (ref 4–11)

## 2024-07-24 PROCEDURE — 99396 PREV VISIT EST AGE 40-64: CPT | Performed by: FAMILY MEDICINE

## 2024-07-24 PROCEDURE — 99214 OFFICE O/P EST MOD 30 MIN: CPT | Mod: 25 | Performed by: FAMILY MEDICINE

## 2024-07-24 PROCEDURE — 83036 HEMOGLOBIN GLYCOSYLATED A1C: CPT | Mod: ZL

## 2024-07-24 PROCEDURE — 36415 COLL VENOUS BLD VENIPUNCTURE: CPT | Mod: ZL

## 2024-07-24 PROCEDURE — 85025 COMPLETE CBC W/AUTO DIFF WBC: CPT | Mod: ZL

## 2024-07-24 PROCEDURE — 80053 COMPREHEN METABOLIC PANEL: CPT | Mod: ZL

## 2024-07-24 PROCEDURE — 80061 LIPID PANEL: CPT | Mod: ZL

## 2024-07-24 RX ORDER — ESTRADIOL 10 UG/1
10 INSERT VAGINAL
Qty: 8 TABLET | Refills: 11 | Status: SHIPPED | OUTPATIENT
Start: 2024-07-25

## 2024-07-24 RX ORDER — TRIAMCINOLONE ACETONIDE 1 MG/G
CREAM TOPICAL
Qty: 30 G | Refills: 11 | Status: CANCELLED | OUTPATIENT
Start: 2024-07-24

## 2024-07-24 RX ORDER — TRIAMCINOLONE ACETONIDE 5 MG/G
CREAM TOPICAL 2 TIMES DAILY PRN
Qty: 30 G | Refills: 11 | Status: SHIPPED | OUTPATIENT
Start: 2024-07-24

## 2024-07-24 RX ORDER — ROSUVASTATIN CALCIUM 10 MG/1
10 TABLET, COATED ORAL DAILY
Qty: 90 TABLET | Refills: 3 | Status: SHIPPED | OUTPATIENT
Start: 2024-07-24

## 2024-07-24 RX ORDER — ZOLPIDEM TARTRATE 10 MG/1
10 TABLET ORAL AT BEDTIME
Qty: 30 TABLET | Refills: 5 | Status: SHIPPED | OUTPATIENT
Start: 2024-07-24

## 2024-07-24 SDOH — HEALTH STABILITY: PHYSICAL HEALTH
ON AVERAGE, HOW MANY DAYS PER WEEK DO YOU ENGAGE IN MODERATE TO STRENUOUS EXERCISE (LIKE A BRISK WALK)?: PATIENT DECLINED

## 2024-07-24 ASSESSMENT — ASTHMA QUESTIONNAIRES
QUESTION_1 LAST FOUR WEEKS HOW MUCH OF THE TIME DID YOUR ASTHMA KEEP YOU FROM GETTING AS MUCH DONE AT WORK, SCHOOL OR AT HOME: NONE OF THE TIME
ACT_TOTALSCORE: 23
QUESTION_5 LAST FOUR WEEKS HOW WOULD YOU RATE YOUR ASTHMA CONTROL: WELL CONTROLLED
QUESTION_3 LAST FOUR WEEKS HOW OFTEN DID YOUR ASTHMA SYMPTOMS (WHEEZING, COUGHING, SHORTNESS OF BREATH, CHEST TIGHTNESS OR PAIN) WAKE YOU UP AT NIGHT OR EARLIER THAN USUAL IN THE MORNING: NOT AT ALL
ACT_TOTALSCORE: 23
QUESTION_4 LAST FOUR WEEKS HOW OFTEN HAVE YOU USED YOUR RESCUE INHALER OR NEBULIZER MEDICATION (SUCH AS ALBUTEROL): NOT AT ALL
QUESTION_2 LAST FOUR WEEKS HOW OFTEN HAVE YOU HAD SHORTNESS OF BREATH: ONCE OR TWICE A WEEK

## 2024-07-24 ASSESSMENT — SOCIAL DETERMINANTS OF HEALTH (SDOH): HOW OFTEN DO YOU GET TOGETHER WITH FRIENDS OR RELATIVES?: THREE TIMES A WEEK

## 2024-07-24 ASSESSMENT — PAIN SCALES - GENERAL: PAINLEVEL: NO PAIN (0)

## 2024-07-24 NOTE — PROGRESS NOTES
Preventive Care Visit  [facility]  DAVID RAE MD, Family Medicine  Jul 24, 2024      Assessment & Plan       ICD-10-CM    1. Physical exam, annual  Z00.00       2. Essential hypertension  I10       3. Screening for hyperlipidemia  Z13.220       4. Screening for diabetes mellitus  Z13.1 Hemoglobin A1c     Glucose      5. Insomnia, persistent  G47.00 zolpidem (AMBIEN) 10 MG tablet      6. Atrophic vaginitis  N95.2       7. Primary osteoarthritis of right knee  M17.11       8. Colon cancer screening  Z12.11 Colonoscopy Screening  Referral      9. Mixed hyperlipidemia  E78.2 rosuvastatin (CRESTOR) 10 MG tablet      10. Dyspareunia in female  N94.10 estradiol (VAGIFEM) 10 MCG TABS vaginal tablet      11. Rash  R21 triamcinolone (ARISTOCORT HP) 0.5 % external cream      12. Breast cancer screening by mammogram  Z12.31 MA Screening Bilateral w/ Henrry      13. Class 1 obesity without serious comorbidity with body mass index (BMI) of 33.0 to 33.9 in adult, unspecified obesity type  E66.9     Z68.33         Labs from today are reviewed.  In particular reviewed was fasting blood sugar of 131.  When she has her 6-month follow-up, we will recheck glucose and A1c at that time.  Colon cancer screening discussed, she wishes to proceed with colonoscopy, orders placed.  Mammogram is due in November, order placed.  Hypertension, controlled, continue same treatment  Osteoarthritis, status post bilateral knee replacement  Rash, itch following insect bite.  New prescription for triamcinolone 0.5% cream twice daily as needed.  Continue to work on weight loss/weight management which will also help with diabetes prevention.  Continue/resume vaginal estrogen  Asthma, stable  Hyperlipidemia, at goal, continue same dose of Crestor  Flu, COVID, RSV recommended this fall  - boostrix is also due  Continue same plan for insomnia care - reviewed short and long term cognitive risks of sleep medication.  "        PDMP Review         Value Time User    State PDMP site checked  Yes 7/24/2024  2:58 PM Yarely Villagomez MD             BMI  Estimated body mass index is 33.76 kg/m  as calculated from the following:    Height as of this encounter: 1.664 m (5' 5.5\").    Weight as of this encounter: 93.4 kg (206 lb).       Counseling  Appropriate preventive services were addressed with this patient via screening, questionnaire, or discussion as appropriate for fall prevention, nutrition, physical activity, Tobacco-use cessation, weight loss and cognition.  Checklist reviewing preventive services available has been given to the patient.  Reviewed patient's diet, addressing concerns and/or questions.           No follow-ups on file.    Huong Urbano is a 63 year old, presenting for the following:  Physical (Annual well visit)        7/24/2024     3:31 PM   Additional Questions   Roomed by Faustina FLORES LPN        Health Care Directive  Patient does not have a Health Care Directive or Living Will: Discussed advance care planning with patient; information given to patient to review.    VONNIE Bedoya is a 63 year old female in for her annual exam.      Bilateral TKA, right knee was this spring and flexion to 107 degrees.    Labs done this morning.   Rash - left upper arm, had a welt, used kenalog   Asthma - last used her inhaler in November    Lipids  Prediabetes     Diabetes Follow-up - patient has prediabetes, last fbs 131, A1C normal.    Hyperlipidemia Follow-Up    Are you regularly taking any medication or supplement to lower your cholesterol?   Yes-    Are you having muscle aches or other side effects that you think could be caused by your cholesterol lowering medication?  No    Hypertension Follow-up    Do you check your blood pressure regularly outside of the clinic? No   Are you following a low salt diet? Yes  Are your blood pressures ever more than 140 on the top number (systolic) OR more   than 90 on the " bottom number (diastolic), for example 140/90? No    BP Readings from Last 2 Encounters:   07/24/24 138/82   05/06/24 (!) 152/86     Hemoglobin A1C (%)   Date Value   07/24/2024 5.4   02/19/2024 5.3   03/17/2021 5.4   08/18/2020 5.7     LDL Cholesterol Calculated (mg/dL)   Date Value   07/24/2024 72   11/09/2023 58   08/18/2020 131 (H)   08/12/2019 147 (H)           7/24/2024   General Health   How would you rate your overall physical health? Good   Feel stress (tense, anxious, or unable to sleep) To some extent      (!) STRESS CONCERN      7/24/2024   Nutrition   Three or more servings of calcium each day? (!) NO   Diet: Regular (no restrictions)   How many servings of fruit and vegetables per day? (!) 0-1   How many sweetened beverages each day? 0-1            7/24/2024   Exercise   Days per week of moderate/strenous exercise Patient declined            7/24/2024   Social Factors   Frequency of gathering with friends or relatives Three times a week   Worry food won't last until get money to buy more No   Food not last or not have enough money for food? No   Do you have housing? (Housing is defined as stable permanent housing and does not include staying ouside in a car, in a tent, in an abandoned building, in an overnight shelter, or couch-surfing.) Yes   Are you worried about losing your housing? No   Lack of transportation? No   Unable to get utilities (heat,electricity)? No            7/24/2024   Fall Risk   Fallen 2 or more times in the past year? No   Trouble with walking or balance? No             7/24/2024   Dental   Dentist two times every year? Yes            7/24/2024   TB Screening   Were you born outside of the [place]? No            Today's PHQ-9 Score:       4/8/2024     2:19 PM   PHQ-9 SCORE   PHQ-9 Total Score MyChart 0   PHQ-9 Total Score 0           7/24/2024   Substance Use   Alcohol more than 3/day or more than 7/wk No   Do you use any other substances recreationally? No        Social History      Tobacco Use    Smoking status: Never     Passive exposure: Never    Smokeless tobacco: Never   Vaping Use    Vaping status: Never Used   Substance Use Topics    Alcohol use: Yes     Comment: Alcoholic Drinks/day: occ    Drug use: No           11/9/2023   LAST FHS-7 RESULTS   1st degree relative breast or ovarian cancer Yes   Any relative bilateral breast cancer Yes   Any male have breast cancer No   Any ONE woman have BOTH breast AND ovarian cancer Yes   Any woman with breast cancer before 50yrs No   2 or more relatives with breast AND/OR bowel cancer No           Mammogram Screening - Mammogram every 1-2 years updated in Health Maintenance based on mutual decision making          7/24/2024   One time HIV Screening   Previous HIV test? No          7/24/2024   STI Screening   New sexual partner(s) since last STI/HIV test? No        History of abnormal Pap smear: Status post hysterectomy with removal of cervix and no history of CIN2 or greater or cervical cancer. Health Maintenance and Surgical History updated.       ASCVD Risk   The 10-year ASCVD risk score (Bailey MCALLISTRE, et al., 2019) is: 5.4%    Values used to calculate the score:      Age: 63 years      Sex: Female      Is Non- : No      Diabetic: No      Tobacco smoker: No      Systolic Blood Pressure: 138 mmHg      Is BP treated: Yes      HDL Cholesterol: 74 mg/dL      Total Cholesterol: 169 mg/dL           Reviewed and updated as needed this visit by Provider                    Past Medical History:   Diagnosis Date    Allergic rhinitis due to animal hair and dander     1970,aspen, birch, maple, oak, grass, dust mite, ragwee, cocklebur, mugwart, lambs quarter, pigweed, fungus, molds    BCC (basal cell carcinoma), face 2020    Mohs    Calculus of gallbladder without cholecystitis without obstruction     No Comments Provided    Essential (primary) hypertension     No Comments Provided    Excessive and frequent menstruation with  "regular cycle     s/p CLARA    Hormone replacement therapy (postmenopausal)     2010,started ERT    Obesity     No Comments Provided    Other long term (current) drug therapy     2/11/2014,ambien #30/month    Other specified postprocedural states     nausea and severe vomiting with narcotics    Psychophysiologic insomnia     contract signed 2/2014    Tubulo-interstitial nephritis     No Comments Provided    Venous insufficiency (chronic) (peripheral)     No Comments Provided     Past Surgical History:   Procedure Laterality Date    ARTHROSCOPY KNEE  10/12/2017    10/12/2017    BLADDER SURGERY  10/25/2017     SLING OPER STRES INCONTINENCE    COLONOSCOPY  06/2004     because of change in bowel habits    COLONOSCOPY  05/14/2014 5/14/2014,Extensive diverticulosis throughout the colon - follow up 10 years    EXTRACTION(S) DENTAL      No Comments Provided    HYSTERECTOMY TOTAL ABDOMINAL, BILATERAL SALPINGO-OOPHORECTOMY, COMBINED  03/30/2010    CLARA/BSO/Vasquez/Posterior Repair    LAPAROSCOPIC ABLATION ENDOMETRIOSIS      3/05    left total knee arthroplasty Left 10/2019    Dr. Jones.  [place]    TOTAL KNEE ARTHROPLASTY Right 03/2024    [facility]     OB History   No obstetric history on file.         Review of Systems  Glasses, viscous material and not retinal tear   Last dentist - this past month  Rare headaches  Heartburn symptoms   Sleep - no change; with taking      Objective    Exam  /82 (BP Location: Right arm, Patient Position: Sitting, Cuff Size: Adult Regular)   Pulse 76   Temp 96.9  F (36.1  C) (Temporal)   Resp 14   Ht 1.664 m (5' 5.5\")   Wt 93.4 kg (206 lb)   LMP  (LMP Unknown)   SpO2 98%   Breastfeeding No   BMI 33.76 kg/m     Estimated body mass index is 33.76 kg/m  as calculated from the following:    Height as of this encounter: 1.664 m (5' 5.5\").    Weight as of this encounter: 93.4 kg (206 lb).    Physical Exam  GENERAL: alert and no distress  EYES: Eyes grossly normal to inspection, " PERRL and conjunctivae and sclerae normal  HENT: ear canals and TM's normal, nose and mouth without ulcers or lesions  NECK: no adenopathy, no asymmetry, masses, or scars  RESP: lungs clear to auscultation - no rales, rhonchi or wheezes  CV:  RRR, carotid bruit  ABDOMEN: soft, nontender, no hepatosplenomegaly, no masses and bowel sounds normal  MS: bilateral TKA, flexion to about 100 degrees   SKIN: no suspicious lesions or rashes  NEURO: Normal strength and tone, mentation intact and speech normal  PSYCH: mentation appears normal, affect normal/bright      Results for orders placed or performed in visit on 07/24/24   Hemoglobin A1c     Status: Normal   Result Value Ref Range    Hemoglobin A1C 5.4 4.0 - 6.2 %   Lipid Panel     Status: None   Result Value Ref Range    Cholesterol 169 <200 mg/dL    Triglycerides 115 <150 mg/dL    Direct Measure HDL 74 >=50 mg/dL    LDL Cholesterol Calculated 72 <=100 mg/dL    Non HDL Cholesterol 95 <130 mg/dL    Patient Fasting > 8hrs? Yes     Narrative    Cholesterol  Desirable:  <200 mg/dL    Triglycerides  Normal:  Less than 150 mg/dL  Borderline High:  150-199 mg/dL  High:  200-499 mg/dL  Very High:  Greater than or equal to 500 mg/dL    Direct Measure HDL  Female:  Greater than or equal to 50 mg/dL   Male:  Greater than or equal to 40 mg/dL    LDL Cholesterol  Desirable:  <100mg/dL  Above Desirable:  100-129 mg/dL   Borderline High:  130-159 mg/dL   High:  160-189 mg/dL   Very High:  >= 190 mg/dL    Non HDL Cholesterol  Desirable:  130 mg/dL  Above Desirable:  130-159 mg/dL  Borderline High:  160-189 mg/dL  High:  190-219 mg/dL  Very High:  Greater than or equal to 220 mg/dL   Comprehensive Metabolic Panel     Status: Abnormal   Result Value Ref Range    Sodium 142 135 - 145 mmol/L    Potassium 3.5 3.4 - 5.3 mmol/L    Carbon Dioxide (CO2) 27 22 - 29 mmol/L    Anion Gap 11 7 - 15 mmol/L    Urea Nitrogen 19.1 8.0 - 23.0 mg/dL    Creatinine 0.89 0.51 - 0.95 mg/dL    GFR Estimate 72  >60 mL/min/1.73m2    Calcium 9.9 8.8 - 10.4 mg/dL    Chloride 104 98 - 107 mmol/L    Glucose 131 (H) 70 - 99 mg/dL    Alkaline Phosphatase 95 40 - 150 U/L    AST 19 0 - 45 U/L    ALT 17 0 - 50 U/L    Protein Total 7.5 6.4 - 8.3 g/dL    Albumin 4.5 3.5 - 5.2 g/dL    Bilirubin Total 0.6 <=1.2 mg/dL    Patient Fasting > 8hrs? Yes    CBC with platelets and differential     Status: None   Result Value Ref Range    WBC Count 7.6 4.0 - 11.0 10e3/uL    RBC Count 4.44 3.80 - 5.20 10e6/uL    Hemoglobin 12.9 11.7 - 15.7 g/dL    Hematocrit 39.3 35.0 - 47.0 %    MCV 89 78 - 100 fL    MCH 29.1 26.5 - 33.0 pg    MCHC 32.8 31.5 - 36.5 g/dL    RDW 12.8 10.0 - 15.0 %    Platelet Count 285 150 - 450 10e3/uL    % Neutrophils 54 %    % Lymphocytes 36 %    % Monocytes 6 %    % Eosinophils 3 %    % Basophils 1 %    % Immature Granulocytes 0 %    NRBCs per 100 WBC 0 <1 /100    Absolute Neutrophils 4.1 1.6 - 8.3 10e3/uL    Absolute Lymphocytes 2.8 0.8 - 5.3 10e3/uL    Absolute Monocytes 0.5 0.0 - 1.3 10e3/uL    Absolute Eosinophils 0.2 0.0 - 0.7 10e3/uL    Absolute Basophils 0.0 0.0 - 0.2 10e3/uL    Absolute Immature Granulocytes 0.0 <=0.4 10e3/uL    Absolute NRBCs 0.0 10e3/uL   CBC and Differential     Status: None    Narrative    The following orders were created for panel order CBC and Differential.  Procedure                               Abnormality         Status                     ---------                               -----------         ------                     CBC with platelets and d...[[id number]]                      Final result                 Please view results for these tests on the individual orders.        Signed Electronically by: DAVID RAE MD

## 2024-07-24 NOTE — PATIENT INSTRUCTIONS
Results for orders placed or performed in visit on 07/24/24   Hemoglobin A1c     Status: Normal   Result Value Ref Range    Hemoglobin A1C 5.4 4.0 - 6.2 %   Lipid Panel     Status: None   Result Value Ref Range    Cholesterol 169 <200 mg/dL    Triglycerides 115 <150 mg/dL    Direct Measure HDL 74 >=50 mg/dL    LDL Cholesterol Calculated 72 <=100 mg/dL    Non HDL Cholesterol 95 <130 mg/dL    Patient Fasting > 8hrs? Yes     Narrative    Cholesterol  Desirable:  <200 mg/dL    Triglycerides  Normal:  Less than 150 mg/dL  Borderline High:  150-199 mg/dL  High:  200-499 mg/dL  Very High:  Greater than or equal to 500 mg/dL    Direct Measure HDL  Female:  Greater than or equal to 50 mg/dL   Male:  Greater than or equal to 40 mg/dL    LDL Cholesterol  Desirable:  <100mg/dL  Above Desirable:  100-129 mg/dL   Borderline High:  130-159 mg/dL   High:  160-189 mg/dL   Very High:  >= 190 mg/dL    Non HDL Cholesterol  Desirable:  130 mg/dL  Above Desirable:  130-159 mg/dL  Borderline High:  160-189 mg/dL  High:  190-219 mg/dL  Very High:  Greater than or equal to 220 mg/dL   Comprehensive Metabolic Panel     Status: Abnormal   Result Value Ref Range    Sodium 142 135 - 145 mmol/L    Potassium 3.5 3.4 - 5.3 mmol/L    Carbon Dioxide (CO2) 27 22 - 29 mmol/L    Anion Gap 11 7 - 15 mmol/L    Urea Nitrogen 19.1 8.0 - 23.0 mg/dL    Creatinine 0.89 0.51 - 0.95 mg/dL    GFR Estimate 72 >60 mL/min/1.73m2    Calcium 9.9 8.8 - 10.4 mg/dL    Chloride 104 98 - 107 mmol/L    Glucose 131 (H) 70 - 99 mg/dL    Alkaline Phosphatase 95 40 - 150 U/L    AST 19 0 - 45 U/L    ALT 17 0 - 50 U/L    Protein Total 7.5 6.4 - 8.3 g/dL    Albumin 4.5 3.5 - 5.2 g/dL    Bilirubin Total 0.6 <=1.2 mg/dL    Patient Fasting > 8hrs? Yes    CBC with platelets and differential     Status: None   Result Value Ref Range    WBC Count 7.6 4.0 - 11.0 10e3/uL    RBC Count 4.44 3.80 - 5.20 10e6/uL    Hemoglobin 12.9 11.7 - 15.7 g/dL    Hematocrit 39.3 35.0 - 47.0 %    MCV 89  78 - 100 fL    MCH 29.1 26.5 - 33.0 pg    MCHC 32.8 31.5 - 36.5 g/dL    RDW 12.8 10.0 - 15.0 %    Platelet Count 285 150 - 450 10e3/uL    % Neutrophils 54 %    % Lymphocytes 36 %    % Monocytes 6 %    % Eosinophils 3 %    % Basophils 1 %    % Immature Granulocytes 0 %    NRBCs per 100 WBC 0 <1 /100    Absolute Neutrophils 4.1 1.6 - 8.3 10e3/uL    Absolute Lymphocytes 2.8 0.8 - 5.3 10e3/uL    Absolute Monocytes 0.5 0.0 - 1.3 10e3/uL    Absolute Eosinophils 0.2 0.0 - 0.7 10e3/uL    Absolute Basophils 0.0 0.0 - 0.2 10e3/uL    Absolute Immature Granulocytes 0.0 <=0.4 10e3/uL    Absolute NRBCs 0.0 10e3/uL   CBC and Differential     Status: None    Narrative    The following orders were created for panel order CBC and Differential.  Procedure                               Abnormality         Status                     ---------                               -----------         ------                     CBC with platelets and d...[[id number]]                      Final result                 Please view results for these tests on the individual orders.

## 2024-07-24 NOTE — NURSING NOTE
"Chief Complaint   Patient presents with    Physical     Annual well visit       Initial /82 (BP Location: Right arm, Patient Position: Sitting, Cuff Size: Adult Regular)   Pulse 76   Temp 96.9  F (36.1  C) (Temporal)   Resp 14   Ht 1.664 m (5' 5.5\")   Wt 93.4 kg (206 lb)   LMP  (LMP Unknown)   SpO2 98%   Breastfeeding No   BMI 33.76 kg/m   Estimated body mass index is 33.76 kg/m  as calculated from the following:    Height as of this encounter: 1.664 m (5' 5.5\").    Weight as of this encounter: 93.4 kg (206 lb).    Medication Review: complete    The next two questions are to help us understand your food security.  If you are feeling you need any assistance in this area, we have resources available to support you today.          7/24/2024   SDOH- Food Insecurity   Within the past 12 months, did you worry that your food would run out before you got money to buy more? N   Within the past 12 months, did the food you bought just not last and you didn t have money to get more? N          Health Care Directive:  Patient does not have a Health Care Directive or Living Will: Discussed advance care planning with patient; information given to patient to review.    Faustina Yeager LPN      "

## 2024-07-24 NOTE — LETTER
My Asthma Action Plan    Name: Yolie Bedoya   YOB: 1960  Date: 7/24/2024   My doctor: DAVID RAE MD   My clinic: [facility]        My Rescue Medicine:   Albuterol inhaler (Proair/Ventolin/Proventil HFA)  2-4 puffs EVERY 4 HOURS as needed. Use a spacer if recommended by your provider.   My Asthma Severity:   Seasonal, environmertal  Know your asthma triggers: dust mites, pollens, animal dander, mold, and trees, grasses             GREEN ZONE   Good Control  I feel good  No cough or wheeze  Can work, sleep and play without asthma symptoms       Take your asthma control medicine every day.     If exercise triggers your asthma, take your rescue medication  15 minutes before exercise or sports, and  During exercise if you have asthma symptoms  Spacer to use with inhaler: If you have a spacer, make sure to use it with your inhaler             YELLOW ZONE Getting Worse  I have ANY of these:  I do not feel good  Cough or wheeze  Chest feels tight  Wake up at night   Keep taking your Green Zone medications  Start taking your rescue medicine:  every 20 minutes for up to 1 hour. Then every 4 hours for 24-48 hours.  If you stay in the Yellow Zone for more than 12-24 hours, contact your doctor.  If you do not return to the Green Zone in 12-24 hours or you get worse, start taking your oral steroid medicine if prescribed by your provider.           RED ZONE Medical Alert - Get Help  I have ANY of these:  I feel awful  Medicine is not helping  Breathing getting harder  Trouble walking or talking  Nose opens wide to breathe       Take your rescue medicine NOW  If your provider has prescribed an oral steroid medicine, start taking it NOW  Call your doctor NOW  If you are still in the Red Zone after 20 minutes and you have not reached your doctor:  Take your rescue medicine again and  Call 911 or go to the emergency room right away    See your regular doctor within 2 weeks of an  Emergency Room or Urgent Care visit for follow-up treatment.          Annual Reminders:  Meet with Asthma Educator,  Flu Shot in the Fall, consider Pneumonia Vaccination for patients with asthma (aged 19 and older).    Pharmacy: [id number] - [place] [id number]    Electronically signed by DAVID RAE MD   Date: 07/24/24                    Asthma Triggers  How To Control Things That Make Your Asthma Worse    Triggers are things that make your asthma worse.  Look at the list below to help you find your triggers and   what you can do about them. You can help prevent asthma flare-ups by staying away from your triggers.      Trigger                                                          What you can do   Cigarette Smoke  Tobacco smoke can make asthma worse. Do not allow smoking in your home, car or around you.  Be sure no one smokes at a child s day care or school.  If you smoke, ask your health care provider for ways to help you quit.  Ask family members to quit too.  Ask your health care provider for a referral to Quit Plan to help you quit smoking, or call 3-655-524-PLAN.     Colds, Flu, Bronchitis  These are common triggers of asthma. Wash your hands often.  Don t touch your eyes, nose or mouth.  Get a flu shot every year.     Dust Mites  These are tiny bugs that live in cloth or carpet. They are too small to see. Wash sheets and blankets in hot water every week.   Encase pillows and mattress in dust mite proof covers.  Avoid having carpet if you can. If you have carpet, vacuum weekly.   Use a dust mask and HEPA vacuum.   Pollen and Outdoor Mold  Some people are allergic to trees, grass, or weed pollen, or molds. Try to keep your windows closed.  Limit time out doors when pollen count is high.   Ask you health care provider about taking medicine during allergy season.     Animal Dander  Some people are allergic to skin flakes, urine or saliva from pets with  fur or feathers. Keep pets with fur or feathers out of your home.    If you can t keep the pet outdoors, then keep the pet out of your bedroom.  Keep the bedroom door closed.  Keep pets off cloth furniture and away from stuffed toys.     Mice, Rats, and Cockroaches  Some people are allergic to the waste from these pests.   Cover food and garbage.  Clean up spills and food crumbs.  Store grease in the refrigerator.   Keep food out of the bedroom.   Indoor Mold  This can be a trigger if your home has high moisture. Fix leaking faucets, pipes, or other sources of water.   Clean moldy surfaces.  Dehumidify basement if it is damp and smelly.   Smoke, Strong Odors, and Sprays  These can reduce air quality. Stay away from strong odors and sprays, such as perfume, powder, hair spray, paints, smoke incense, paint, cleaning products, candles and new carpet.   Exercise or Sports  Some people with asthma have this trigger. Be active!  Ask your doctor about taking medicine before sports or exercise to prevent symptoms.    Warm up for 5-10 minutes before and after sports or exercise.     Other Triggers of Asthma  Cold air:  Cover your nose and mouth with a scarf.  Sometimes laughing or crying can be a trigger.  Some medicines and food can trigger asthma.

## 2024-08-14 DIAGNOSIS — Z12.11 ENCOUNTER FOR SCREENING COLONOSCOPY: Primary | ICD-10-CM

## 2024-08-14 NOTE — TELEPHONE ENCOUNTER
Screening Questions for the Scheduling of Screening Colonoscopies   (If Colonoscopy is diagnostic, Provider should review the chart before scheduling.)  Are you younger than 45 or older than 80?  NO  Do you take aspirin or fish oil?  NO (if yes, tell patient to stop 1 week prior to Colonoscopy)  Do you take warfarin (Coumadin), clopidogrel (Plavix), apixaban (Eliquis), dabigatram (Pradaxa), rivaroxaban (Xarelto) or any blood thinner? NO  Do you take semaglutide (Ozempic or Wegovy), tirzepatide (Mounjaro or Zepbound), liraglutide (Victoza), or dulaglutide (Trulicity)? NO  Do you use oxygen or a CPAP at home?  NO  Do you have kidney disease? NO  Are you on dialysis? NO  Have you had a stroke or heart attack in the last year? NO  Have you had a stent in your heart or any blood vessel in the last year? NO  Have you had a transplant of any organ? NO  Have you had a colonoscopy or upper endoscopy (EGD) before? YES         When?  2014  Date of scheduled Colonoscopy. 10/25/2024  Provider EMELY  [facility]

## 2024-08-19 RX ORDER — POLYETHYLENE GLYCOL 3350, SODIUM CHLORIDE, SODIUM BICARBONATE, POTASSIUM CHLORIDE 420; 11.2; 5.72; 1.48 G/4L; G/4L; G/4L; G/4L
4000 POWDER, FOR SOLUTION ORAL ONCE
Qty: 4000 ML | Refills: 0 | Status: SHIPPED | OUTPATIENT
Start: 2024-10-18 | End: 2024-10-18

## 2024-08-19 RX ORDER — BISACODYL 5 MG/1
TABLET, DELAYED RELEASE ORAL
Qty: 2 TABLET | Refills: 0 | Status: SHIPPED | OUTPATIENT
Start: 2024-10-18

## 2024-10-15 PROBLEM — Z00.00 HEALTHCARE MAINTENANCE: Status: ACTIVE | Noted: 2024-10-15

## 2024-10-15 PROBLEM — E66.01 MORBID OBESITY (H): Status: RESOLVED | Noted: 2022-09-07 | Resolved: 2024-10-15

## 2024-11-01 ENCOUNTER — ANESTHESIA EVENT (OUTPATIENT)
Dept: SURGERY | Facility: OTHER | Age: 64
End: 2024-11-01
Payer: COMMERCIAL

## 2024-11-01 ENCOUNTER — HOSPITAL ENCOUNTER (OUTPATIENT)
Facility: OTHER | Age: 64
Discharge: HOME | End: 2024-11-01
Attending: SURGERY | Admitting: SURGERY
Payer: COMMERCIAL

## 2024-11-01 ENCOUNTER — ANESTHESIA (OUTPATIENT)
Dept: SURGERY | Facility: OTHER | Age: 64
End: 2024-11-01
Payer: COMMERCIAL

## 2024-11-01 VITALS
HEIGHT: 65 IN | SYSTOLIC BLOOD PRESSURE: 127 MMHG | HEART RATE: 71 BPM | DIASTOLIC BLOOD PRESSURE: 73 MMHG | TEMPERATURE: 97 F | BODY MASS INDEX: 34.32 KG/M2 | OXYGEN SATURATION: 98 % | WEIGHT: 206 LBS | RESPIRATION RATE: 18 BRPM

## 2024-11-01 PROCEDURE — G0121 COLON CA SCRN NOT HI RSK IND: HCPCS | Performed by: SURGERY

## 2024-11-01 PROCEDURE — 250N000009 HC RX 250: Performed by: NURSE ANESTHETIST, CERTIFIED REGISTERED

## 2024-11-01 PROCEDURE — 250N000011 HC RX IP 250 OP 636: Performed by: NURSE ANESTHETIST, CERTIFIED REGISTERED

## 2024-11-01 PROCEDURE — 45378 DIAGNOSTIC COLONOSCOPY: CPT | Performed by: SURGERY

## 2024-11-01 PROCEDURE — 45378 DIAGNOSTIC COLONOSCOPY: CPT | Performed by: NURSE ANESTHETIST, CERTIFIED REGISTERED

## 2024-11-01 PROCEDURE — 999N000010 HC STATISTIC ANES STAT CODE-CRNA PER MINUTE: Performed by: SURGERY

## 2024-11-01 PROCEDURE — 258N000003 HC RX IP 258 OP 636: Performed by: SURGERY

## 2024-11-01 RX ORDER — ONDANSETRON 2 MG/ML
INJECTION INTRAMUSCULAR; INTRAVENOUS PRN
Status: DISCONTINUED | OUTPATIENT
Start: 2024-11-01 | End: 2024-11-01

## 2024-11-01 RX ORDER — NALOXONE HYDROCHLORIDE 0.4 MG/ML
0.4 INJECTION, SOLUTION INTRAMUSCULAR; INTRAVENOUS; SUBCUTANEOUS
Status: DISCONTINUED | OUTPATIENT
Start: 2024-11-01 | End: 2024-11-01 | Stop reason: HOSPADM

## 2024-11-01 RX ORDER — LIDOCAINE HYDROCHLORIDE 20 MG/ML
INJECTION, SOLUTION INFILTRATION; PERINEURAL PRN
Status: DISCONTINUED | OUTPATIENT
Start: 2024-11-01 | End: 2024-11-01

## 2024-11-01 RX ORDER — LIDOCAINE 40 MG/G
CREAM TOPICAL
Status: DISCONTINUED | OUTPATIENT
Start: 2024-11-01 | End: 2024-11-01 | Stop reason: HOSPADM

## 2024-11-01 RX ORDER — NALOXONE HYDROCHLORIDE 0.4 MG/ML
0.2 INJECTION, SOLUTION INTRAMUSCULAR; INTRAVENOUS; SUBCUTANEOUS
Status: DISCONTINUED | OUTPATIENT
Start: 2024-11-01 | End: 2024-11-01 | Stop reason: HOSPADM

## 2024-11-01 RX ORDER — PROPOFOL 10 MG/ML
INJECTION, EMULSION INTRAVENOUS CONTINUOUS PRN
Status: DISCONTINUED | OUTPATIENT
Start: 2024-11-01 | End: 2024-11-01

## 2024-11-01 RX ORDER — ONDANSETRON 2 MG/ML
4 INJECTION INTRAMUSCULAR; INTRAVENOUS
Status: DISCONTINUED | OUTPATIENT
Start: 2024-11-01 | End: 2024-11-01 | Stop reason: HOSPADM

## 2024-11-01 RX ORDER — DEXAMETHASONE SODIUM PHOSPHATE 4 MG/ML
INJECTION, SOLUTION INTRA-ARTICULAR; INTRALESIONAL; INTRAMUSCULAR; INTRAVENOUS; SOFT TISSUE PRN
Status: DISCONTINUED | OUTPATIENT
Start: 2024-11-01 | End: 2024-11-01

## 2024-11-01 RX ORDER — SODIUM CHLORIDE, SODIUM LACTATE, POTASSIUM CHLORIDE, CALCIUM CHLORIDE 600; 310; 30; 20 MG/100ML; MG/100ML; MG/100ML; MG/100ML
INJECTION, SOLUTION INTRAVENOUS CONTINUOUS
Status: DISCONTINUED | OUTPATIENT
Start: 2024-11-01 | End: 2024-11-01

## 2024-11-01 RX ORDER — PROPOFOL 10 MG/ML
INJECTION, EMULSION INTRAVENOUS PRN
Status: DISCONTINUED | OUTPATIENT
Start: 2024-11-01 | End: 2024-11-01

## 2024-11-01 RX ORDER — FLUMAZENIL 0.1 MG/ML
0.2 INJECTION, SOLUTION INTRAVENOUS
Status: DISCONTINUED | OUTPATIENT
Start: 2024-11-01 | End: 2024-11-01 | Stop reason: HOSPADM

## 2024-11-01 RX ADMIN — SODIUM CHLORIDE, POTASSIUM CHLORIDE, SODIUM LACTATE AND CALCIUM CHLORIDE: 600; 310; 30; 20 INJECTION, SOLUTION INTRAVENOUS at 07:29

## 2024-11-01 RX ADMIN — LIDOCAINE HYDROCHLORIDE 40 MG: 20 INJECTION, SOLUTION INFILTRATION; PERINEURAL at 08:26

## 2024-11-01 RX ADMIN — ONDANSETRON HYDROCHLORIDE 4 MG: 2 SOLUTION INTRAMUSCULAR; INTRAVENOUS at 08:26

## 2024-11-01 RX ADMIN — DEXAMETHASONE SODIUM PHOSPHATE 8 MG: 4 INJECTION, SOLUTION INTRA-ARTICULAR; INTRALESIONAL; INTRAMUSCULAR; INTRAVENOUS; SOFT TISSUE at 08:26

## 2024-11-01 RX ADMIN — PROPOFOL 150 MG: 10 INJECTION, EMULSION INTRAVENOUS at 08:26

## 2024-11-01 RX ADMIN — PROPOFOL 150 MCG/KG/MIN: 10 INJECTION, EMULSION INTRAVENOUS at 08:26

## 2024-11-01 ASSESSMENT — ACTIVITIES OF DAILY LIVING (ADL)
ADLS_ACUITY_SCORE: 0

## 2024-11-01 NOTE — ANESTHESIA PREPROCEDURE EVALUATION
Anesthesia Pre-Procedure Evaluation    Patient: Yolie Bedoya   MRN: [id number] : 1960        Procedure : Procedure(s):  Colonoscopy          Past Medical History:   Diagnosis Date    Allergic rhinitis due to animal hair and dander     ,aspen, birch, maple, oak, grass, dust mite, ragwee, cocklebur, mugwart, lambs quarter, pigweed, fungus, molds    BCC (basal cell carcinoma), face     Mohs    Calculus of gallbladder without cholecystitis without obstruction     No Comments Provided    Essential (primary) hypertension     No Comments Provided    Excessive and frequent menstruation with regular cycle     s/p CLARA    Hormone replacement therapy (postmenopausal)     ,started ERT    Obesity     No Comments Provided    Other long term (current) drug therapy     2014,ambien #30/month    Other specified postprocedural states     nausea and severe vomiting with narcotics    PONV (postoperative nausea and vomiting)     Psychophysiologic insomnia     contract signed 2014    Tubulo-interstitial nephritis     No Comments Provided    Uncomplicated asthma     Venous insufficiency (chronic) (peripheral)     No Comments Provided      Past Surgical History:   Procedure Laterality Date    ARTHROSCOPY KNEE  10/12/2017    10/12/2017    BLADDER SURGERY  10/25/2017     SLING OPER STRES INCONTINENCE    COLONOSCOPY  2004     because of change in bowel habits    COLONOSCOPY  2014,Extensive diverticulosis throughout the colon - follow up 10 years    EXTRACTION(S) DENTAL      No Comments Provided    HYSTERECTOMY TOTAL ABDOMINAL, BILATERAL SALPINGO-OOPHORECTOMY, COMBINED  2010    CLARA/BSO/Vasquez/Posterior Repair    LAPAROSCOPIC ABLATION ENDOMETRIOSIS      3/05    left total knee arthroplasty Left 10/2019    Dr. Jones.  [place]    TOTAL KNEE ARTHROPLASTY Right 2024    [facility]      Allergies   Allergen Reactions    Doxycycline Nausea and Vomiting    Metolazone Unknown    No Clinical  Screening - See Comments GI Disturbance and Nausea and Vomiting     Narcotics    Amoxicillin-Pot Clavulanate Rash    Sulfa Antibiotics Rash      Social History     Tobacco Use    Smoking status: Never     Passive exposure: Never    Smokeless tobacco: Never   Substance Use Topics    Alcohol use: Yes     Comment: Alcoholic Drinks/day: occ/socially      Wt Readings from Last 1 Encounters:   11/01/24 93.4 kg (206 lb)        Anesthesia Evaluation   Pt has had prior anesthetic.     History of anesthetic complications  - motion sickness and PONV.      ROS/MED HX  ENT/Pulmonary:     (+)     LO risk factors, snores loudly, hypertension,              Intermittent, asthma  Treatment: Inhaler prn,                 Neurologic:     (+)      migraines,                          Cardiovascular:  - neg cardiovascular ROS   (+) Dyslipidemia hypertension- -   -  - -                                      METS/Exercise Tolerance: 3 - Able to walk 1-2 blocks without stopping Comment: Limited due to knee pain   Hematologic:  - neg hematologic  ROS     Musculoskeletal:  - neg musculoskeletal ROS     GI/Hepatic:     (+)        bowel prep,            Renal/Genitourinary:  - neg Renal ROS     Endo:     (+)               Obesity,       Psychiatric/Substance Use:  - neg psychiatric ROS     Infectious Disease:  - neg infectious disease ROS     Malignancy:       Other:            Physical Exam    Airway        Mallampati: I   TM distance: > 3 FB   Neck ROM: full   Mouth opening: > 3 cm    Respiratory Devices and Support         Dental       (+) Completely normal teeth      Cardiovascular   cardiovascular exam normal          Pulmonary   pulmonary exam normal                OUTSIDE LABS:  CBC:   Lab Results   Component Value Date    WBC 7.6 07/24/2024    WBC 8.5 02/19/2024    HGB 12.9 07/24/2024    HGB 12.7 02/19/2024    HCT 39.3 07/24/2024    HCT 39.7 02/19/2024     07/24/2024     02/19/2024     BMP:   Lab Results   Component Value  "Date     07/24/2024     02/19/2024    POTASSIUM 3.5 07/24/2024    POTASSIUM 3.8 02/19/2024    CHLORIDE 104 07/24/2024    CHLORIDE 105 02/19/2024    CO2 27 07/24/2024    CO2 27 02/19/2024    BUN 19.1 07/24/2024    BUN 18.0 02/19/2024    CR 0.89 07/24/2024    CR 0.86 02/19/2024     (H) 07/24/2024     (H) 02/19/2024     COAGS:   Lab Results   Component Value Date    INR 0.93 09/27/2019     POC: No results found for: \"BGM\", \"HCG\", \"HCGS\"  HEPATIC:   Lab Results   Component Value Date    ALBUMIN 4.5 07/24/2024    PROTTOTAL 7.5 07/24/2024    ALT 17 07/24/2024    AST 19 07/24/2024    ALKPHOS 95 07/24/2024    BILITOTAL 0.6 07/24/2024     OTHER:   Lab Results   Component Value Date    A1C 5.4 07/24/2024    MARCIA 9.9 07/24/2024    CRP 0.9 (H) 08/18/2020       Anesthesia Plan    ASA Status:  2    NPO Status:  NPO Appropriate    Anesthesia Type: MAC.              Consents    Anesthesia Plan(s) and associated risks, benefits, and realistic alternatives discussed. Questions answered and patient/representative(s) expressed understanding.     - Discussed: Risks, Benefits and Alternatives for BOTH SEDATION and the PROCEDURE were discussed     - Discussed with:  Patient            Postoperative Care       PONV prophylaxis: Ondansetron (or other 5HT-3), Dexamethasone or Solumedrol     Comments:    Other Comments: Pt would like to avoid the scopolamine patch-doesn't like what it does to her eyesight           ELSA FORTE, RANULFO CRNA    I have reviewed the pertinent notes and labs in the chart from the past 30 days and (re)examined the patient.  Any updates or changes from those notes are reflected in this note.               # Hypertension: Home medication list includes antihypertensive(s)         # Obesity: Estimated body mass index is 34.28 kg/m  as calculated from the following:    Height as of this encounter: 1.651 m (5' 5\").    Weight as of this encounter: 93.4 kg (206 lb).       # Asthma: noted on " problem list

## 2024-11-01 NOTE — OR NURSING
Patient has been discharged to home at 0940 via ambulatory accompanied by her friend    Written discharge instructions were provided to patient.  Prescriptions were n/a.  Patient states their pain is 0/10, and denies any nausea or dizziness upon discharge.    Patient and adult caring for them verbalize understanding of discharge instructions including no driving until tomorrow and no longer taking narcotic pain medications - no operating mechanical equipment and no making any important decisions.They understand reason for discharge, and necessary follow-up appointments.       Cintia Vargas RN

## 2024-11-01 NOTE — DISCHARGE INSTRUCTIONS
[facility]  Adult Discharge Orders & Instructions    ________________________________________________________________          For 12 hours after surgery  Get plenty of rest.  A responsible adult must stay with you for at least 12 hours after you leave the hospital.   You may feel lightheaded.  IF so, sit for a few minutes before standing.  Have someone help you get up.   You may have a slight fever. Call the doctor if your fever is over 101 F (38.3 C) (taken under the tongue) or lasts longer than 24 hours.  You may have a dry mouth, a sore throat, muscle aches or trouble sleeping.  These should go away after 24 hours.  Do not make important or legal decisions.  6.   Do not drive or use heavy equipment.  If you have weakness or tingling, don't drive or use heavy equipment until this feeling goes away.    To contact a doctor, call   [phone]_______________________

## 2024-11-01 NOTE — ANESTHESIA POSTPROCEDURE EVALUATION
Patient: Yolie SORENSEN [place]    Procedure: Procedure(s):  Colonoscopy       Anesthesia Type:  MAC    Note:  Disposition: Outpatient   Postop Pain Control: Uneventful            Sign Out: Well controlled pain   PONV: No   Neuro/Psych: Uneventful            Sign Out: Acceptable/Baseline neuro status   Airway/Respiratory: Uneventful            Sign Out: Acceptable/Baseline resp. status   CV/Hemodynamics: Uneventful            Sign Out: Acceptable CV status; No obvious hypovolemia; No obvious fluid overload   Other NRE: NONE   DID A NON-ROUTINE EVENT OCCUR?            Last vitals:  Vitals Value Taken Time   /73 11/01/24 0930   Temp 97  F (36.1  C) 11/01/24 0853   Pulse 71 11/01/24 0930   Resp     SpO2 98 % 11/01/24 0933   Vitals shown include unfiled device data.    Electronically Signed By: RANULFO GARCIA CRNA  November 1, 2024  9:50 AM

## 2024-11-01 NOTE — OP NOTE
PROCEDURE NOTE    DATE OF SERVICE: 11/1/2024    SURGEON: Joselo Metz MD    PRE-OP DIAGNOSIS:    Healthcare maintenance   Screening      POST-OP DIAGNOSIS:  Same    Extensive and diffuse Diverticulosis      PROCEDURE:   Colonoscopy    ANESTHESIA:  AKASH Radford CRNA    INDICATION FOR THE PROCEDURE: The patient is a 63 year old female in need of Healthcare maintenance  . The patient has no other complaints  . After explaining the risks to include bleeding, perforation, potential inability toreach the cecum, the patient wished to proceed.    PROCEDURE:After adequate sedation, the patient was in the left lateral decubitus position.  Rectal exam was performed.  There was normal tone and no palpable masses .  The colonoscope was introduced into the rectum and advanced to the cecum with Mild difficulty.  The patient's prep was good.  The terminal cecum was reached.  The cecum, ascending, transverse, descending and sigmoid colon was with extensive and diffuse diverticulosis .  The scope was retroflexed in the rectum.  The rectum was unremarkable  .  The scope was straightened and removed.  The patient tolerated the procedure well.     ESTIMATED BLOOD LOSS: none    COMPLICATIONS:  None    TISSUE REMOVED:  No    RECOMMEND:      Follow-up in 10 years  Fiber  Given literature on diverticulosis    Joselo Metz MD FACS

## 2024-11-01 NOTE — ANESTHESIA CARE TRANSFER NOTE
Patient: Yolie SORENSEN [place]    Procedure: Procedure(s):  Colonoscopy       Diagnosis: Screen for colon cancer [Z12.11]  Diagnosis Additional Information: No value filed.    Anesthesia Type:   MAC     Note:    Oropharynx: oropharynx clear of all foreign objects  Level of Consciousness: awake  Oxygen Supplementation: room air    Independent Airway: airway patency satisfactory and stable  Dentition: dentition unchanged  Vital Signs Stable: post-procedure vital signs reviewed and stable  Report to RN Given: handoff report given  Patient transferred to: Phase II    Handoff Report: Identifed the Patient, Identified the Reponsible Provider, Reviewed the pertinent medical history, Discussed the surgical course, Reviewed Intra-OP anesthesia mangement and issues during anesthesia, Set expectations for post-procedure period and Allowed opportunity for questions and acknowledgement of understanding      Vitals:  Vitals Value Taken Time   BP     Temp     Pulse     Resp     SpO2         Electronically Signed By: RANULFO JUAREZ CRNA  November 1, 2024  8:52 AM

## 2024-11-01 NOTE — H&P
History and Physical    CHIEF COMPLAINT / REASON FOR PROCEDURE:  Healthcare maintenance     PERTINENT HISTORY   Patient is a 63 year old female who presents today for screening colonoscopy for Healthcare maintenance .   Last colonoscopy 2014.  Patient has no complaints.    Past Medical History:   Diagnosis Date    Allergic rhinitis due to animal hair and dander     1970,aspen, birch, maple, oak, grass, dust mite, ragwee, cocklebur, mugwart, lambs quarter, pigweed, fungus, molds    BCC (basal cell carcinoma), face 2020    Mohs    Calculus of gallbladder without cholecystitis without obstruction     No Comments Provided    Essential (primary) hypertension     No Comments Provided    Excessive and frequent menstruation with regular cycle     s/p CLARA    Hormone replacement therapy (postmenopausal)     2010,started ERT    Obesity     No Comments Provided    Other long term (current) drug therapy     2/11/2014,ambien #30/month    Other specified postprocedural states     nausea and severe vomiting with narcotics    PONV (postoperative nausea and vomiting)     Psychophysiologic insomnia     contract signed 2/2014    Tubulo-interstitial nephritis     No Comments Provided    Uncomplicated asthma     Venous insufficiency (chronic) (peripheral)     No Comments Provided     Past Surgical History:   Procedure Laterality Date    ARTHROSCOPY KNEE  10/12/2017    10/12/2017    BLADDER SURGERY  10/25/2017     SLING OPER STRES INCONTINENCE    COLONOSCOPY  06/2004     because of change in bowel habits    COLONOSCOPY  05/14/2014 5/14/2014,Extensive diverticulosis throughout the colon - follow up 10 years    EXTRACTION(S) DENTAL      No Comments Provided    HYSTERECTOMY TOTAL ABDOMINAL, BILATERAL SALPINGO-OOPHORECTOMY, COMBINED  03/30/2010    CLARA/BSO/Vasquez/Posterior Repair    LAPAROSCOPIC ABLATION ENDOMETRIOSIS      3/05    left total knee arthroplasty Left 10/2019    Dr. Jones.  [place]    TOTAL KNEE ARTHROPLASTY Right 03/2024     [facility]       Bleeding tendencies:  No    ALLERGIES/SENSITIVITIES:   Allergies   Allergen Reactions    Doxycycline Nausea and Vomiting    Metolazone Unknown    No Clinical Screening - See Comments GI Disturbance and Nausea and Vomiting     Narcotics    Amoxicillin-Pot Clavulanate Rash    Sulfa Antibiotics Rash        CURRENT MEDICATIONS:    Prior to Admission medications    Medication Sig Start Date End Date Taking? Authorizing Provider   albuterol (PROAIR HFA/PROVENTIL HFA/VENTOLIN HFA) 108 (90 Base) MCG/ACT inhaler Inhale 2 puffs into the lungs every 4 hours as needed for wheezing 2/19/24  Yes Yarely Villagomez MD   buPROPion (WELLBUTRIN XL) 300 MG 24 hr tablet TAKE 1 TABLET (300MG) BY MOUTH EVERY MORNING 2/19/24  Yes Yarely Villagomez MD   cetirizine HCl 10 MG CAPS Take 1 tablet by mouth daily   Yes Reported, Patient   estradiol (VAGIFEM) 10 MCG TABS vaginal tablet Place 1 tablet (10 mcg) vaginally twice a week 7/25/24  Yes Yarely Villagomez MD   fluticasone (FLONASE) 50 MCG/ACT nasal spray Spray 2 sprays into both nostrils daily 2/19/24  Yes Yarely Villagomez MD   fluticasone-salmeterol (ADVAIR) 250-50 MCG/ACT inhaler Inhale 1 puff into the lungs every 12 hours 2/19/24  Yes Yarely Villagomez MD   hydroCHLOROthiazide (HYDRODIURIL) 12.5 MG tablet Take 1 tablet (12.5 mg) by mouth every evening 2/19/24  Yes Yarely Villagomez MD   hydrochlorothiazide (HYDRODIURIL) 25 MG tablet Take 1 tablet (25 mg) by mouth daily 2/19/24  Yes Yarely Villagomez MD   losartan (COZAAR) 100 MG tablet Take 1 tablet (100 mg) by mouth daily 2/19/24  Yes Yarely Villagomez MD   montelukast (SINGULAIR) 10 MG tablet Take 1 tablet (10 mg) by mouth at bedtime 2/19/24  Yes Yarely Villagomez MD   potassium chloride ER (K-TAB/KLOR-CON) 10 MEQ CR tablet Take 2 tablets (20 mEq) by mouth every morning AND 1 tablet (10 mEq) daily (with dinner). 2/19/24  Yes  "Yarely Villagomez MD   rosuvastatin (CRESTOR) 10 MG tablet Take 1 tablet (10 mg) by mouth daily 7/24/24  Yes Yarely Villagomez MD   SUMAtriptan (IMITREX) 20 MG/ACT nasal spray Spray 1 spray in nostril as needed for migraine May repeat in 2-4 hours 2/19/24  Yes Yarely Villagomez MD   triamcinolone (ARISTOCORT HP) 0.5 % external cream Apply topically 2 times daily as needed for irritation 7/24/24  Yes Yarely Villagomez MD   zolpidem (AMBIEN) 10 MG tablet Take 1 tablet (10 mg) by mouth at bedtime 7/24/24  Yes Yarely Villagomez MD   UNABLE TO FIND MEDICATION NAME: Plexus- 1 tablet daily    Reported, Patient   UNABLE TO FIND MEDICATION NAME: Gut health- 1 tablet daily    Reported, Patient       Physical Exam:  /70   Pulse 79   Temp 97.6  F (36.4  C) (Tympanic)   Resp 18   Ht 1.651 m (5' 5\")   Wt 93.4 kg (206 lb)   LMP  (LMP Unknown)   SpO2 96%   BMI 34.28 kg/m    EXAM:  Chest/Respiratory Exam: Normal - Clear to auscultation without rales, rhonchi, or wheezing.  Cardiovascular Exam: regular rate and rhythm        PLAN: COLONOSCOPY .  Patient understands risks of bleeding, perforation, potential inability to reach cecum, aspiration and wishes to proceed.      "

## 2024-11-20 ENCOUNTER — HOSPITAL ENCOUNTER (OUTPATIENT)
Dept: MAMMOGRAPHY | Facility: OTHER | Age: 64
Discharge: HOME | End: 2024-11-20
Attending: FAMILY MEDICINE
Payer: COMMERCIAL

## 2024-11-20 DIAGNOSIS — Z12.31 BREAST CANCER SCREENING BY MAMMOGRAM: ICD-10-CM

## 2024-11-20 PROCEDURE — 77063 BREAST TOMOSYNTHESIS BI: CPT

## 2024-12-13 ENCOUNTER — TRANSFERRED RECORDS (OUTPATIENT)
Dept: HEALTH INFORMATION MANAGEMENT | Facility: OTHER | Age: 64
End: 2024-12-13
Payer: COMMERCIAL

## 2024-12-28 ENCOUNTER — HOSPITAL ENCOUNTER (OUTPATIENT)
Dept: MRI IMAGING | Facility: OTHER | Age: 64
Discharge: HOME | End: 2024-12-28
Admitting: FAMILY MEDICINE
Payer: COMMERCIAL

## 2024-12-28 DIAGNOSIS — M54.10 RADICULAR SYNDROME OF LOWER LIMBS: ICD-10-CM

## 2024-12-28 PROCEDURE — 72148 MRI LUMBAR SPINE W/O DYE: CPT

## 2025-01-16 ENCOUNTER — OFFICE VISIT (OUTPATIENT)
Dept: FAMILY MEDICINE | Facility: OTHER | Age: 65
End: 2025-01-16
Attending: PHYSICIAN ASSISTANT
Payer: COMMERCIAL

## 2025-01-16 ENCOUNTER — TELEPHONE (OUTPATIENT)
Dept: FAMILY MEDICINE | Facility: OTHER | Age: 65
End: 2025-01-16

## 2025-01-16 VITALS
OXYGEN SATURATION: 96 % | RESPIRATION RATE: 18 BRPM | SYSTOLIC BLOOD PRESSURE: 134 MMHG | HEART RATE: 96 BPM | DIASTOLIC BLOOD PRESSURE: 82 MMHG | TEMPERATURE: 97.8 F | BODY MASS INDEX: 35.49 KG/M2 | HEIGHT: 65 IN | WEIGHT: 213 LBS

## 2025-01-16 DIAGNOSIS — R05.1 ACUTE COUGH: ICD-10-CM

## 2025-01-16 DIAGNOSIS — E78.2 MIXED HYPERLIPIDEMIA: Primary | ICD-10-CM

## 2025-01-16 DIAGNOSIS — R11.2 NAUSEA AND VOMITING, UNSPECIFIED VOMITING TYPE: Primary | ICD-10-CM

## 2025-01-16 LAB
FLUAV RNA SPEC QL NAA+PROBE: POSITIVE
FLUBV RNA RESP QL NAA+PROBE: NEGATIVE
RSV RNA SPEC NAA+PROBE: NEGATIVE
S PYO DNA THROAT QL NAA+PROBE: NOT DETECTED
SARS-COV-2 RNA RESP QL NAA+PROBE: NEGATIVE

## 2025-01-16 PROCEDURE — 87651 STREP A DNA AMP PROBE: CPT | Mod: ZL | Performed by: PHYSICIAN ASSISTANT

## 2025-01-16 PROCEDURE — 87637 SARSCOV2&INF A&B&RSV AMP PRB: CPT | Mod: ZL | Performed by: PHYSICIAN ASSISTANT

## 2025-01-16 RX ORDER — GABAPENTIN 300 MG/1
CAPSULE ORAL
COMMUNITY
Start: 2024-03-06

## 2025-01-16 RX ORDER — ONDANSETRON 4 MG/1
4 TABLET, ORALLY DISINTEGRATING ORAL EVERY 8 HOURS PRN
Qty: 20 TABLET | Refills: 0 | Status: SHIPPED | OUTPATIENT
Start: 2025-01-16

## 2025-01-16 RX ORDER — POLYETHYLENE GLYCOL 3350, SODIUM CHLORIDE, SODIUM BICARBONATE, POTASSIUM CHLORIDE 420; 11.2; 5.72; 1.48 G/4L; G/4L; G/4L; G/4L
4000 POWDER, FOR SOLUTION ORAL
COMMUNITY
Start: 2024-10-28

## 2025-01-16 ASSESSMENT — ENCOUNTER SYMPTOMS: DIARRHEA: 1

## 2025-01-16 ASSESSMENT — ASTHMA QUESTIONNAIRES
QUESTION_5 LAST FOUR WEEKS HOW WOULD YOU RATE YOUR ASTHMA CONTROL: WELL CONTROLLED
ACT_TOTALSCORE: 15
QUESTION_4 LAST FOUR WEEKS HOW OFTEN HAVE YOU USED YOUR RESCUE INHALER OR NEBULIZER MEDICATION (SUCH AS ALBUTEROL): THREE OR MORE TIMES PER DAY
QUESTION_3 LAST FOUR WEEKS HOW OFTEN DID YOUR ASTHMA SYMPTOMS (WHEEZING, COUGHING, SHORTNESS OF BREATH, CHEST TIGHTNESS OR PAIN) WAKE YOU UP AT NIGHT OR EARLIER THAN USUAL IN THE MORNING: TWO OR THREE NIGHTS A WEEK
ACT_TOTALSCORE: 15
QUESTION_2 LAST FOUR WEEKS HOW OFTEN HAVE YOU HAD SHORTNESS OF BREATH: ONCE OR TWICE A WEEK
QUESTION_1 LAST FOUR WEEKS HOW MUCH OF THE TIME DID YOUR ASTHMA KEEP YOU FROM GETTING AS MUCH DONE AT WORK, SCHOOL OR AT HOME: A LITTLE OF THE TIME

## 2025-01-16 ASSESSMENT — PAIN SCALES - GENERAL: PAINLEVEL_OUTOF10: MODERATE PAIN (4)

## 2025-01-16 NOTE — PROGRESS NOTES
"  Assessment & Plan   Problem List Items Addressed This Visit    None  Visit Diagnoses       Nausea and vomiting, unspecified vomiting type    -  Primary    Relevant Medications    ondansetron (ZOFRAN ODT) 4 MG ODT tab    Other Relevant Orders    Influenza A/B, RSV and SARS-CoV2 PCR (COVID-19) Nose    Group A Streptococcus PCR Throat Swab    Acute cough        Relevant Orders    Group A Streptococcus PCR Throat Swab           Nausea, vomiting, and cough: Completed influenza A/B, RSV, and COVID-19 along with a strep test to rule out infection concerns.  Labs are pending.    Patient was given Zofran to use as needed for nausea and vomiting.  Encouraged increased fluids with electrolytes and rest.  Can use over-the-counter cough and cold remedies as needed for symptomatic relief.  Encouraged bland diet.  Gave warning signs and symptoms.  Return as needed for recheck if symptoms are not improving or worsening.       BMI  Estimated body mass index is 35.45 kg/m  as calculated from the following:    Height as of this encounter: 1.651 m (5' 5\").    Weight as of this encounter: 96.6 kg (213 lb).   Weight management plan: Discussed healthy diet and exercise guidelines    See Patient Instructions    Return if symptoms worsen or fail to improve.      Huong Urbano is a 64 year old, presenting for the following health issues:  Diarrhea (Productive cough with vomiting, drainage, mucous, neck and head aches, vomiting - 3 days)        1/16/2025     3:30 PM   Additional Questions   Roomed by Faustina FLORES LPN     History of Present Illness       Reason for visit:  Cough nausia etc  Symptom onset:  1-3 days ago   She is taking medications regularly.     Patient is coming in today with increased congestion.  Has had coughing and congestion over the last few days.  Unsure if the congestion caused her to vomit.  Has constant postnasal drip.  Headache in the forehead and back of the head.  Coughing feels bronchial in origin.  Ribs also " "hurt with coughing.  No fever.  Having some chills.  Tried to eat a potato today and had liquid diarrhea.  Symptoms started 2 days ago.  Smells make her throw up.  Has thrown up approximately 4-5 times.  No abdominal pain.  Head feels well.  Has a mild sore throat and glands that are swollen however this is chronic as compared to previous.  No ear pain, lightheadedness, dizziness.  Feels exhausted.  No body aches or muscle aches.  Patient is to transfer her sister to a surgery on 1/21 and wants to be feeling better.      Review of Systems  Constitutional, HEENT, cardiovascular, pulmonary, gi and gu systems are negative, except as otherwise noted.      Objective    /82 (BP Location: Right arm, Patient Position: Sitting, Cuff Size: Adult Regular)   Pulse 96   Temp 97.8  F (36.6  C) (Temporal)   Resp 18   Ht 1.651 m (5' 5\")   Wt 96.6 kg (213 lb)   LMP  (LMP Unknown)   SpO2 96%   Breastfeeding No   BMI 35.45 kg/m    Body mass index is 35.45 kg/m .  Physical Exam  Vitals and nursing note reviewed.   Constitutional:       General: She is not in acute distress.     Appearance: Normal appearance. She is well-developed.   HENT:      Head: Normocephalic and atraumatic.      Right Ear: Tympanic membrane, ear canal and external ear normal.      Left Ear: Tympanic membrane, ear canal and external ear normal.      Mouth/Throat:      Mouth: Mucous membranes are moist.      Pharynx: Oropharynx is clear. Posterior oropharyngeal erythema present. No oropharyngeal exudate.   Cardiovascular:      Rate and Rhythm: Normal rate and regular rhythm.      Heart sounds: Normal heart sounds, S1 normal and S2 normal. No murmur heard.  Pulmonary:      Effort: Pulmonary effort is normal. No respiratory distress.      Breath sounds: Normal breath sounds. No wheezing or rales.   Abdominal:      General: Abdomen is flat. Bowel sounds are normal.      Palpations: Abdomen is soft. There is no mass.      Tenderness: There is no abdominal " tenderness. There is no right CVA tenderness, left CVA tenderness, guarding or rebound.      Hernia: No hernia is present.   Musculoskeletal:         General: Normal range of motion.      Cervical back: Normal range of motion and neck supple.   Lymphadenopathy:      Cervical: Cervical adenopathy present.   Skin:     General: Skin is warm and dry.      Findings: No rash.   Neurological:      General: No focal deficit present.      Mental Status: She is alert and oriented to person, place, and time.   Psychiatric:         Mood and Affect: Mood normal.         Behavior: Behavior normal.         Thought Content: Thought content normal.         Judgment: Judgment normal.            No results found for any visits on 01/16/25.        Signed Electronically by: Celia Blevins PA-C

## 2025-01-16 NOTE — Clinical Note
Looks like there were A1c and glucose ordered for future back in July? Carolann Degroot MA on 1/20/2025 at 9:28 AM

## 2025-01-16 NOTE — PATIENT INSTRUCTIONS
May use symptomatic care with tylenol or ibuprofen. May use cough syrup or cough drops.  Using a humidifier works well to break up the congestion. You can also sleep propped up on a couple pillows to decrease symptoms at night.    Coricidin cough medicine    Please take tylenol as needed up to 4 times daily.  Treat symptomatically with warm salt water gargles.  Lozenges, Tylenol, Advil or Aleve as needed. Frequent swallows of cool liquid.  Oatmeal coats the throat and some patients find it soothes the pain. Encouraged warm teas or fluids with honey.     If you have sinus congestion -  Use a Neti Pot/sinus flush (Christoph Med Sinus Rinse) 3 times daily to irrigate sinuses/mucosal tissue.     Monitor for any fevers or chills. Return in 7-10 days if not feeling better. Please call clinic with any questions or concerns. Return to clinic with change/worsening of symptoms.   Encouraged fluids and rest.    Call 9-1-1 or go to the emergency room if you:  Have trouble breathing   Are drooling because you cannot swallow your saliva   Have swelling of the neck or tongue   Cannot move your neck or have trouble opening your mouth

## 2025-01-16 NOTE — NURSING NOTE
"Chief Complaint   Patient presents with    Diarrhea     Productive cough with voniting, drainage, mucous, neck and head aches, vomiting - 3 days       Initial /82 (BP Location: Right arm, Patient Position: Sitting, Cuff Size: Adult Regular)   Pulse 96   Temp 97.8  F (36.6  C) (Temporal)   Resp 18   Ht 1.651 m (5' 5\")   Wt 96.6 kg (213 lb)   LMP  (LMP Unknown)   SpO2 96%   Breastfeeding No   BMI 35.45 kg/m   Estimated body mass index is 35.45 kg/m  as calculated from the following:    Height as of this encounter: 1.651 m (5' 5\").    Weight as of this encounter: 96.6 kg (213 lb).    Medication Review: complete    The next two questions are to help us understand your food security.  If you are feeling you need any assistance in this area, we have resources available to support you today.          7/24/2024   SDOH- Food Insecurity   Within the past 12 months, did you worry that your food would run out before you got money to buy more? N   Within the past 12 months, did the food you bought just not last and you didn t have money to get more? N     Health Care Directive:  Patient does not have a Health Care Directive: Discussed advance care planning with patient; however, patient declined at this time.    Faustina Yeager LPN      "

## 2025-01-16 NOTE — TELEPHONE ENCOUNTER
Patient would like to have lab orders put in so she can get her labs drawn before appointment.    Any questions please call patient.     Thank you     Andria Galo on 1/16/2025 at 5:01 PM

## 2025-01-26 DIAGNOSIS — R39.89 URINARY PROBLEM: Primary | ICD-10-CM

## 2025-01-26 RX ORDER — NITROFURANTOIN 25; 75 MG/1; MG/1
100 CAPSULE ORAL 2 TIMES DAILY
Qty: 10 CAPSULE | Refills: 0 | Status: SHIPPED | OUTPATIENT
Start: 2025-01-26 | End: 2025-01-31

## 2025-02-03 ENCOUNTER — LAB (OUTPATIENT)
Dept: LAB | Facility: OTHER | Age: 65
End: 2025-02-03
Payer: COMMERCIAL

## 2025-02-03 ENCOUNTER — TELEPHONE (OUTPATIENT)
Dept: UROLOGY | Facility: OTHER | Age: 65
End: 2025-02-03
Payer: COMMERCIAL

## 2025-02-03 ENCOUNTER — OFFICE VISIT (OUTPATIENT)
Dept: UROLOGY | Facility: OTHER | Age: 65
End: 2025-02-03
Payer: COMMERCIAL

## 2025-02-03 VITALS
TEMPERATURE: 97 F | WEIGHT: 209.8 LBS | SYSTOLIC BLOOD PRESSURE: 110 MMHG | RESPIRATION RATE: 16 BRPM | DIASTOLIC BLOOD PRESSURE: 64 MMHG | HEART RATE: 120 BPM | OXYGEN SATURATION: 95 % | BODY MASS INDEX: 34.91 KG/M2

## 2025-02-03 DIAGNOSIS — M54.50 ACUTE BILATERAL LOW BACK PAIN WITHOUT SCIATICA: ICD-10-CM

## 2025-02-03 DIAGNOSIS — N39.0 FREQUENT UTI: Primary | ICD-10-CM

## 2025-02-03 DIAGNOSIS — N39.0 FREQUENT UTI: ICD-10-CM

## 2025-02-03 DIAGNOSIS — N30.00 ACUTE CYSTITIS WITHOUT HEMATURIA: Primary | ICD-10-CM

## 2025-02-03 LAB
ALBUMIN UR-MCNC: 20 MG/DL
APPEARANCE UR: CLEAR
BACTERIA #/AREA URNS HPF: ABNORMAL /HPF
BASOPHILS # BLD AUTO: 0 10E3/UL (ref 0–0.2)
BASOPHILS NFR BLD AUTO: 0 %
BILIRUB UR QL STRIP: NEGATIVE
COLOR UR AUTO: YELLOW
EOSINOPHIL # BLD AUTO: 0.1 10E3/UL (ref 0–0.7)
EOSINOPHIL NFR BLD AUTO: 0 %
ERYTHROCYTE [DISTWIDTH] IN BLOOD BY AUTOMATED COUNT: 13 % (ref 10–15)
GLUCOSE UR STRIP-MCNC: NEGATIVE MG/DL
HCT VFR BLD AUTO: 39.1 % (ref 35–47)
HGB BLD-MCNC: 13.9 G/DL (ref 11.7–15.7)
HGB UR QL STRIP: NEGATIVE
HYALINE CASTS: 2 /LPF
IMM GRANULOCYTES # BLD: 0 10E3/UL
IMM GRANULOCYTES NFR BLD: 0 %
KETONES UR STRIP-MCNC: NEGATIVE MG/DL
LEUKOCYTE ESTERASE UR QL STRIP: ABNORMAL
LYMPHOCYTES # BLD AUTO: 1.7 10E3/UL (ref 0.8–5.3)
LYMPHOCYTES NFR BLD AUTO: 12 %
MCH RBC QN AUTO: 30.3 PG (ref 26.5–33)
MCHC RBC AUTO-ENTMCNC: 35.5 G/DL (ref 31.5–36.5)
MCV RBC AUTO: 85 FL (ref 78–100)
MONOCYTES # BLD AUTO: 0.7 10E3/UL (ref 0–1.3)
MONOCYTES NFR BLD AUTO: 5 %
MUCOUS THREADS #/AREA URNS LPF: PRESENT /LPF
NEUTROPHILS # BLD AUTO: 11.4 10E3/UL (ref 1.6–8.3)
NEUTROPHILS NFR BLD AUTO: 82 %
NITRATE UR QL: NEGATIVE
NRBC # BLD AUTO: 0 10E3/UL
NRBC BLD AUTO-RTO: 0 /100
PH UR STRIP: 7 [PH] (ref 5–9)
PLATELET # BLD AUTO: 366 10E3/UL (ref 150–450)
RBC # BLD AUTO: 4.58 10E6/UL (ref 3.8–5.2)
RBC URINE: 4 /HPF
SP GR UR STRIP: 1.03 (ref 1–1.03)
SQUAMOUS EPITHELIAL: 2 /HPF
UROBILINOGEN UR STRIP-MCNC: NORMAL MG/DL
WBC # BLD AUTO: 13.9 10E3/UL (ref 4–11)
WBC URINE: 65 /HPF

## 2025-02-03 PROCEDURE — 87086 URINE CULTURE/COLONY COUNT: CPT | Mod: ZL

## 2025-02-03 PROCEDURE — 85041 AUTOMATED RBC COUNT: CPT | Mod: ZL

## 2025-02-03 PROCEDURE — 36415 COLL VENOUS BLD VENIPUNCTURE: CPT | Mod: ZL

## 2025-02-03 PROCEDURE — 85004 AUTOMATED DIFF WBC COUNT: CPT | Mod: ZL

## 2025-02-03 PROCEDURE — 81003 URINALYSIS AUTO W/O SCOPE: CPT | Mod: ZL

## 2025-02-03 PROCEDURE — 51798 US URINE CAPACITY MEASURE: CPT

## 2025-02-03 RX ORDER — CIPROFLOXACIN 500 MG/1
500 TABLET, FILM COATED ORAL 2 TIMES DAILY
Qty: 10 TABLET | Refills: 0 | Status: SHIPPED | OUTPATIENT
Start: 2025-02-03 | End: 2025-02-08

## 2025-02-03 ASSESSMENT — PAIN SCALES - GENERAL: PAINLEVEL_OUTOF10: MILD PAIN (3)

## 2025-02-03 NOTE — PATIENT INSTRUCTIONS
Drink plenty of fluids, > 2 liters/day  Avoid constipation.  Consider Miralax Over the counter, metamucil or Citrucel with increased fiber in your diet  Void after sexual activity if still sexually active   Shower using a shower wand to decrease the bacterial counts in the perineal area. If no wand available ensure that you are using a clean cloth with each cleansing.   Cranberry tabs twice a day:  Must contain 36 mg of PAC or proanthocyanidins  Probiotics:  Yogurts that contain good bacteria.  Kefir 1% milkfat (plain), Cultured Stephenie, Leena or Culturellty or Align from your local pharmacy.   If perimenopausal or postmenopausal we will consider estrogen cream vaginally and periurethrally twice a week if there is no history of blood clots, tobacco use or breast cancer.    Start Cipro 500 twice daily, for 3 days, if symptoms are completely resolved you may stop after six doses, if still present take entire 5 days and plan to retest urine on day 6-7.  Contact our office if your symptoms do not improve or worsen. [phone]

## 2025-02-03 NOTE — NURSING NOTE
"Chief Complaint   Patient presents with    Consult     Possible UTI      Patient presents to the clinic today for a consult for Possible UTI   Post-Void Residual  A post-void residual was measured by ultrasonic bladder scanner.  0 mL  Ananya Miranda LPN  2/3/2025 11:29 AM    Initial LMP  (LMP Unknown)  Estimated body mass index is 34.78 kg/m  as calculated from the following:    Height as of 1/24/25: 1.651 m (5' 5\").    Weight as of 1/24/25: 94.8 kg (209 lb).  Meds Reconciled: complete      Ananya Miranda LPN, LPN on 2/3/2025 at 11:12 AM  Ext. [phone]        Ananya Miranda LPN  "

## 2025-02-03 NOTE — PROGRESS NOTES
Chief Complaint: Consult (Possible UTI )  .    HPI: Ms. Yolie Bedoya is a 64 year old year old female with a history of stress incontinence, and moderate persistent asthma who presents today February 3, 2025 for evaluation of possible UTI.    Patient reports that she was seen on 1/24/2025 for a medication appointment, but was symptomatic for UTI and her PCP opted to test her urine at that time which was positive for E. coli on culture.  She was ultimately treated with a course of Macrobid for 5 days.  She does not feel that symptoms resolved completely after this course.  Today she presents with suprapubic fullness, urinary frequency, and mild back pain which has been present most intensely for the last 2 to 3 days.  She has been afebrile, denies chills, nausea, or vomiting.  She does have night sweats at baseline.  She reports that she does get frequent urinary tract infections and has tried cranberry juice.  She does try to drink adequate amounts of water and does not feel that she is constipated.  She is diligent with hygiene.  She reports that she has an extensive surgical history including total abdominal hysterectomy with bilateral salpingo-oophorectomy, and bladder sling.  Most recently she reports surgical intervention to her urethra.  She cannot say that her increase in urinary tract infection or mild leakage is correlated with these procedures.  Her urinary leakage has been present for over 10 years.      Past Medical History:   Diagnosis Date    Allergic rhinitis due to animal hair and dander     1970,aspen, birch, maple, oak, grass, dust mite, ragwee, cocklebur, mugwart, lambs quarter, pigweed, fungus, molds    BCC (basal cell carcinoma), face 2020    Mohs    Calculus of gallbladder without cholecystitis without obstruction     No Comments Provided    Essential (primary) hypertension     No Comments Provided    Excessive and frequent menstruation with regular cycle     s/p CLARA    Hormone replacement  therapy (postmenopausal)     2010,started ERT    Obesity     No Comments Provided    Other long term (current) drug therapy     2/11/2014,ambien #30/month    Other specified postprocedural states     nausea and severe vomiting with narcotics    PONV (postoperative nausea and vomiting)     Psychophysiologic insomnia     contract signed 2/2014    Tubulo-interstitial nephritis     No Comments Provided    Uncomplicated asthma     Venous insufficiency (chronic) (peripheral)     No Comments Provided       Past Surgical History:   Procedure Laterality Date    ARTHROSCOPY KNEE  10/12/2017    10/12/2017    BLADDER SURGERY  10/25/2017     SLING OPER STRES INCONTINENCE    COLONOSCOPY  06/2004     because of change in bowel habits    COLONOSCOPY  05/14/2014 5/14/2014,Extensive diverticulosis throughout the colon - follow up 10 years    COLONOSCOPY  11/01/2024    F/U 2034 extensive diverticulosis    COLONOSCOPY N/A 11/1/2024    Procedure: Colonoscopy;  Surgeon: Joselo Metz MD;  Location: [facility] OR    EXTRACTION(S) DENTAL      No Comments Provided    HYSTERECTOMY TOTAL ABDOMINAL, BILATERAL SALPINGO-OOPHORECTOMY, COMBINED  03/30/2010    CLARA/BSO/Vasquez/Posterior Repair    LAPAROSCOPIC ABLATION ENDOMETRIOSIS      3/05    left total knee arthroplasty Left 10/2019    Dr. Jones.  [place]    TOTAL KNEE ARTHROPLASTY Right 03/2024    Karen Jeffrey       FAMILY HISTORY: Denies a family history of bladder cancer.    SOCIAL HISTORY:    reports that she has never smoked. She has never been exposed to tobacco smoke. She has never used smokeless tobacco.    Current Outpatient Medications   Medication Sig Dispense Refill    ciprofloxacin (CIPRO) 500 MG tablet Take 1 tablet (500 mg) by mouth 2 times daily for 5 days. 10 tablet 0    albuterol (PROAIR HFA/PROVENTIL HFA/VENTOLIN HFA) 108 (90 Base) MCG/ACT inhaler Inhale 2 puffs into the lungs every 4 hours as needed for wheezing 18 g 11    buPROPion (WELLBUTRIN XL) 300 MG 24 hr tablet TAKE 1 TABLET  (300MG) BY MOUTH EVERY MORNING 90 tablet 4    cetirizine HCl 10 MG CAPS Take 1 tablet by mouth daily      estradiol (VAGIFEM) 10 MCG TABS vaginal tablet Place 1 tablet (10 mcg) vaginally twice a week 8 tablet 11    fluticasone (FLONASE) 50 MCG/ACT nasal spray Spray 2 sprays into both nostrils daily. 48 g 4    fluticasone-salmeterol (ADVAIR) 250-50 MCG/ACT inhaler Inhale 1 puff into the lungs every 12 hours 1 each 11    gabapentin (NEURONTIN) 300 MG capsule TAKE 1 CAPSULE BY MOUTH TWICE DAILY FOR SEVEN DAYS      hydrochlorothiazide (HYDRODIURIL) 25 MG tablet Take 1 tablet (25 mg) by mouth daily 90 tablet 3    hydroCHLOROthiazide 12.5 MG tablet Take 1 tablet (12.5 mg) by mouth every evening. 90 tablet 4    losartan (COZAAR) 100 MG tablet Take 1 tablet (100 mg) by mouth daily. 90 tablet 4    montelukast (SINGULAIR) 10 MG tablet Take 1 tablet (10 mg) by mouth at bedtime. 90 tablet 4    ondansetron (ZOFRAN ODT) 4 MG ODT tab Take 1 tablet (4 mg) by mouth every 8 hours as needed for nausea. 20 tablet 0    polyethylene glycol-electrolytes (NULYTELY) 420 g solution Take 4,000 mLs by mouth daily at 2 pm.      potassium chloride ER (K-TAB/KLOR-CON) 10 MEQ CR tablet Take 2 tablets (20 mEq) by mouth every morning AND 1 tablet (10 mEq) daily (with dinner). 270 tablet 4    rosuvastatin (CRESTOR) 10 MG tablet Take 1 tablet (10 mg) by mouth daily 90 tablet 3    semaglutide-weight management (WEGOVY) 0.25 MG/0.5ML pen Inject 0.5 mLs (0.25 mg) subcutaneously once a week. 2 mL 11    SUMAtriptan (IMITREX) 20 MG/ACT nasal spray Spray 1 spray in nostril as needed for migraine. May repeat in 2-4 hours 18 each 4    triamcinolone (ARISTOCORT HP) 0.5 % external cream Apply topically 2 times daily as needed for irritation 30 g 11    zolpidem (AMBIEN) 10 MG tablet Take 1 tablet (10 mg) by mouth at bedtime. 30 tablet 5       ALLERGIES: Doxycycline, Metolazone, No clinical screening - see comments, Amoxicillin-pot clavulanate, and Sulfa  antibiotics     GENERAL PHYSICAL EXAM:   Vitals: /64 (BP Location: Right arm, Patient Position: Sitting, Cuff Size: Adult Large)   Pulse 120   Temp 97  F (36.1  C) (Temporal)   Resp 16   Wt 95.2 kg (209 lb 12.8 oz)   LMP  (LMP Unknown)   SpO2 95%   BMI 34.91 kg/m    Body mass index is 34.91 kg/m .    GENERAL: Well groomed, well developed, well nourished female in NAD.  HEENT: Normal  CV:  Warm extremities   RESPIRATORY: Normal respiratory effort.    GI:  Soft, NT, ND,   MS: Moving all four  NEURO: Alert and oriented x 3.  PSYCH: Normal mood and affect, pleasant and agreeable during interview and exam.    : Negative for CVA tenderness, pain to bilateral low back.  This pain does not radiate to her suprapubic region.     PVR: Residual urine by ultrasound was 0 ml.           RADIOLOGY: The following tests were reviewed: None.    LABS: The last test results for Ms. Yolie Bedoya were reviewed.   Results for orders placed or performed in visit on 02/03/25 (from the past 24 hours)   CBC and Differential    Narrative    The following orders were created for panel order CBC and Differential.  Procedure                               Abnormality         Status                     ---------                               -----------         ------                     CBC with platelets and d...[[id number]]  Abnormal            Final result                 Please view results for these tests on the individual orders.   CBC with platelets and differential   Result Value Ref Range    WBC Count 13.9 (H) 4.0 - 11.0 10e3/uL    RBC Count 4.58 3.80 - 5.20 10e6/uL    Hemoglobin 13.9 11.7 - 15.7 g/dL    Hematocrit 39.1 35.0 - 47.0 %    MCV 85 78 - 100 fL    MCH 30.3 26.5 - 33.0 pg    MCHC 35.5 31.5 - 36.5 g/dL    RDW 13.0 10.0 - 15.0 %    Platelet Count 366 150 - 450 10e3/uL    % Neutrophils 82 %    % Lymphocytes 12 %    % Monocytes 5 %    % Eosinophils 0 %    % Basophils 0 %    % Immature Granulocytes 0 %    NRBCs per 100  "WBC 0 <1 /100    Absolute Neutrophils 11.4 (H) 1.6 - 8.3 10e3/uL    Absolute Lymphocytes 1.7 0.8 - 5.3 10e3/uL    Absolute Monocytes 0.7 0.0 - 1.3 10e3/uL    Absolute Eosinophils 0.1 0.0 - 0.7 10e3/uL    Absolute Basophils 0.0 0.0 - 0.2 10e3/uL    Absolute Immature Granulocytes 0.0 <=0.4 10e3/uL    Absolute NRBCs 0.0 10e3/uL       BMP -   Recent Labs   Lab Test 01/24/25  1057 07/24/24  0950 02/19/24  1139    142 143   POTASSIUM 4.2 3.5 3.8   CHLORIDE 105 104 105   CO2 27 27 27   BUN 14.3 19.1 18.0   CR 0.86 0.89 0.86   * 131* 114*   MARCIA 10.1 9.9 9.7       CBC -   Recent Labs   Lab Test 02/03/25  1225 07/24/24  0950 02/19/24  1139   WBC 13.9* 7.6 8.5   HGB 13.9 12.9 12.7    285 282       ASSESSMENT:   Acute cystitis without hematuria  Acute bilateral back pain without sciatica  Frequent UTI    PLAN:   1. Frequent UTI   Extensive discussion with patient about the definition of a UTI and typical symptoms associated with one.     Discussion on prevention of UTIs including drinking plenty of fluids (2L/day), maintaining a good bowel regiment with Miralax or Metamucil.  Emphasized the important of probiotics preferably fermented types.  Cranberry tablets with PAC supplements emphasized as well as timed voiding.  Finally discussed the use of estrogen in post-menopausal elderly women to help improve vaginal tissue health provided it is safe to use.     Good hygiene encouraged with voiding after sexual intercourse and in some cases showering before and after intercourse.      Patient voiced an understanding and instructions on the \"Prevention of UTIs\" was given to patient.    - MEASUREMENT, POST-VOIDING RESIDUAL URINE &/OR BLADDER CAPACITY, US, NON-IMAGING  - UA Macroscopic with reflex to Microscopic and Culture; Future    2. Acute bilateral low back pain without sciatica  Patient has mild bilateral low back pain, is lower than CVA.  Pain does not radiate.  I would suggest rest, ice as needed.    3. Acute " cystitis without hematuria  Patient has what is likely a recurrence of her previous UTI as she does not feel symptoms previously of resolved.  Her UA is positive for leukocytes, negative for nitrites, but does have bacteria present with 4 RBC and 65 WBC.  Given her symptoms I opted to start her on empiric treatment of ciprofloxacin 500 mg twice daily x 5 days.  I did opt to obtain CBC to her low back pain and reoccurrence of UTI this is mildly elevated today.  I we will bring her back for retest of her urine on Friday to ensure that her symptoms are improving and may extend her antibiotic treatment beyond 5 days.  I did advise her that she should be evaluated immediately should she develop fever, chills, nausea or vomiting.  She should also be evaluated should she develop increase in her back pain or experience gross hematuria.  I did advise her of the risk for tendinopathy with fluoroquinolone use and advised her not to take within 2 hours of calcium supplement or calcium dense foods.  - CBC and Differential  - ciprofloxacin (CIPRO) 500 MG tablet; Take 1 tablet (500 mg) by mouth 2 times daily for 5 days.  Dispense: 10 tablet; Refill: 0      28 minutes spent on the date of this encounter doing chart review, history and exam, documentation and further activities as noted above.        RANULFO [facility]

## 2025-02-05 LAB — BACTERIA UR CULT: NO GROWTH

## 2025-02-07 ENCOUNTER — LAB (OUTPATIENT)
Dept: LAB | Facility: OTHER | Age: 65
End: 2025-02-07
Payer: COMMERCIAL

## 2025-02-07 DIAGNOSIS — N39.0 FREQUENT UTI: ICD-10-CM

## 2025-02-07 LAB
ALBUMIN UR-MCNC: 10 MG/DL
APPEARANCE UR: CLEAR
BACTERIA #/AREA URNS HPF: ABNORMAL /HPF
BILIRUB UR QL STRIP: NEGATIVE
COLOR UR AUTO: ABNORMAL
GLUCOSE UR STRIP-MCNC: NEGATIVE MG/DL
HGB UR QL STRIP: NEGATIVE
KETONES UR STRIP-MCNC: NEGATIVE MG/DL
LEUKOCYTE ESTERASE UR QL STRIP: NEGATIVE
MUCOUS THREADS #/AREA URNS LPF: PRESENT /LPF
NITRATE UR QL: NEGATIVE
PH UR STRIP: 7 [PH] (ref 5–9)
RBC URINE: 1 /HPF
SP GR UR STRIP: 1.02 (ref 1–1.03)
UROBILINOGEN UR STRIP-MCNC: NORMAL MG/DL
WBC URINE: 1 /HPF

## 2025-02-07 PROCEDURE — 81001 URINALYSIS AUTO W/SCOPE: CPT | Mod: ZL

## 2025-04-22 ENCOUNTER — MYC MEDICAL ADVICE (OUTPATIENT)
Dept: FAMILY MEDICINE | Facility: OTHER | Age: 65
End: 2025-04-22
Payer: COMMERCIAL

## 2025-04-22 DIAGNOSIS — E66.811 CLASS 1 OBESITY WITHOUT SERIOUS COMORBIDITY WITH BODY MASS INDEX (BMI) OF 34.0 TO 34.9 IN ADULT, UNSPECIFIED OBESITY TYPE: ICD-10-CM

## 2025-04-22 DIAGNOSIS — Z91.89 AT RISK FOR HEART DISEASE: ICD-10-CM

## 2025-04-22 DIAGNOSIS — R73.9 HYPERGLYCEMIA: ICD-10-CM

## 2025-04-22 NOTE — TELEPHONE ENCOUNTER
Took last dose of her Wegovy. No ongoing s/e anymore. Down 6.6 lbs.     Currently prescribed 0.25 mg dose.     Hadley'd up order for 0.5 mg dose.     Routing to provider to review and respond.  Mariluz Wang RN on 4/22/2025 at 11:53 AM

## 2025-05-01 DIAGNOSIS — I10 ESSENTIAL HYPERTENSION: ICD-10-CM

## 2025-05-01 DIAGNOSIS — J45.40 MODERATE PERSISTENT ASTHMA WITHOUT COMPLICATION: ICD-10-CM

## 2025-05-01 RX ORDER — FLUTICASONE PROPIONATE AND SALMETEROL 250; 50 UG/1; UG/1
1 POWDER RESPIRATORY (INHALATION) EVERY 12 HOURS
Qty: 3 EACH | Refills: 3 | Status: SHIPPED | OUTPATIENT
Start: 2025-05-01

## 2025-05-01 RX ORDER — POTASSIUM CHLORIDE 750 MG/1
TABLET, EXTENDED RELEASE ORAL
Qty: 270 TABLET | Refills: 4 | Status: SHIPPED | OUTPATIENT
Start: 2025-05-01

## 2025-05-01 RX ORDER — HYDROCHLOROTHIAZIDE 25 MG/1
25 TABLET ORAL DAILY
Qty: 90 TABLET | Refills: 3 | Status: SHIPPED | OUTPATIENT
Start: 2025-05-01

## 2025-05-12 ENCOUNTER — TELEPHONE (OUTPATIENT)
Dept: FAMILY MEDICINE | Facility: OTHER | Age: 65
End: 2025-05-12
Payer: COMMERCIAL

## 2025-05-12 NOTE — TELEPHONE ENCOUNTER
The patient requests a call back regarding if she will need a blood test for wegovy prescription. Otherwise the patient has an upcoming physical in September.    Reason for call: Request a lab order.    Order requested for what kind of lab? Blood test for wegovy    Who is your PCP? [facility]    Preferred method for responding to this message: Telephone Call    Phone number patient can be reached at: Cell number on file:    Telephone Information:   Mobile [phone]       If we cannot reach you directly, may we leave a detailed response at the number you provided? Yes        Niya Byrne on 5/12/2025 at 10:38 AM

## 2025-07-08 DIAGNOSIS — E66.811 CLASS 1 OBESITY WITHOUT SERIOUS COMORBIDITY WITH BODY MASS INDEX (BMI) OF 34.0 TO 34.9 IN ADULT, UNSPECIFIED OBESITY TYPE: Primary | ICD-10-CM

## 2025-07-13 RX ORDER — SEMAGLUTIDE 2.4 MG/.75ML
INJECTION, SOLUTION SUBCUTANEOUS
Qty: 3 ML | Refills: 0 | Status: SHIPPED | OUTPATIENT
Start: 2025-07-13

## 2025-07-22 ENCOUNTER — MYC MEDICAL ADVICE (OUTPATIENT)
Dept: FAMILY MEDICINE | Facility: OTHER | Age: 65
End: 2025-07-22
Payer: COMMERCIAL

## 2025-07-22 DIAGNOSIS — Z00.00 PREVENTATIVE HEALTH CARE: Primary | ICD-10-CM

## 2025-07-22 DIAGNOSIS — G47.00 INSOMNIA, PERSISTENT: ICD-10-CM

## 2025-07-22 DIAGNOSIS — Z13.1 SCREENING FOR DIABETES MELLITUS: ICD-10-CM

## 2025-07-22 DIAGNOSIS — Z13.220 SCREENING FOR HYPERLIPIDEMIA: ICD-10-CM

## 2025-07-22 SDOH — HEALTH STABILITY: PHYSICAL HEALTH: ON AVERAGE, HOW MANY MINUTES DO YOU ENGAGE IN EXERCISE AT THIS LEVEL?: PATIENT DECLINED

## 2025-07-22 ASSESSMENT — SOCIAL DETERMINANTS OF HEALTH (SDOH): HOW OFTEN DO YOU GET TOGETHER WITH FRIENDS OR RELATIVES?: TWICE A WEEK

## 2025-07-22 ASSESSMENT — ASTHMA QUESTIONNAIRES
ACT_TOTALSCORE: 21
QUESTION_1 LAST FOUR WEEKS HOW MUCH OF THE TIME DID YOUR ASTHMA KEEP YOU FROM GETTING AS MUCH DONE AT WORK, SCHOOL OR AT HOME: A LITTLE OF THE TIME
QUESTION_3 LAST FOUR WEEKS HOW OFTEN DID YOUR ASTHMA SYMPTOMS (WHEEZING, COUGHING, SHORTNESS OF BREATH, CHEST TIGHTNESS OR PAIN) WAKE YOU UP AT NIGHT OR EARLIER THAN USUAL IN THE MORNING: ONCE OR TWICE
QUESTION_4 LAST FOUR WEEKS HOW OFTEN HAVE YOU USED YOUR RESCUE INHALER OR NEBULIZER MEDICATION (SUCH AS ALBUTEROL): NOT AT ALL
QUESTION_2 LAST FOUR WEEKS HOW OFTEN HAVE YOU HAD SHORTNESS OF BREATH: ONCE OR TWICE A WEEK
QUESTION_5 LAST FOUR WEEKS HOW WOULD YOU RATE YOUR ASTHMA CONTROL: WELL CONTROLLED

## 2025-07-22 NOTE — TELEPHONE ENCOUNTER
Wanting labs done prior to upcoming appt so results can be discussed at [facility].     Hadley'd up CBC/CMP/Lipid/A1C    Appt is 7/25    Routing to provider to review and respond.  Mariluz Wang RN on 7/22/2025 at 10:34 AM

## 2025-07-25 ENCOUNTER — LAB (OUTPATIENT)
Dept: LAB | Facility: OTHER | Age: 65
End: 2025-07-25
Attending: FAMILY MEDICINE
Payer: COMMERCIAL

## 2025-07-25 ENCOUNTER — OFFICE VISIT (OUTPATIENT)
Dept: FAMILY MEDICINE | Facility: OTHER | Age: 65
End: 2025-07-25
Attending: FAMILY MEDICINE
Payer: COMMERCIAL

## 2025-07-25 VITALS
WEIGHT: 177 LBS | BODY MASS INDEX: 29.49 KG/M2 | HEART RATE: 100 BPM | SYSTOLIC BLOOD PRESSURE: 130 MMHG | OXYGEN SATURATION: 99 % | DIASTOLIC BLOOD PRESSURE: 78 MMHG | TEMPERATURE: 97.2 F | RESPIRATION RATE: 16 BRPM | HEIGHT: 65 IN

## 2025-07-25 DIAGNOSIS — Z13.220 SCREENING FOR HYPERLIPIDEMIA: ICD-10-CM

## 2025-07-25 DIAGNOSIS — E78.2 MIXED HYPERLIPIDEMIA: ICD-10-CM

## 2025-07-25 DIAGNOSIS — M47.22 OSTEOARTHRITIS OF SPINE WITH RADICULOPATHY, CERVICAL REGION: ICD-10-CM

## 2025-07-25 DIAGNOSIS — J45.40 MODERATE PERSISTENT ASTHMA WITHOUT COMPLICATION: ICD-10-CM

## 2025-07-25 DIAGNOSIS — N94.10 DYSPAREUNIA IN FEMALE: ICD-10-CM

## 2025-07-25 DIAGNOSIS — Z13.1 SCREENING FOR DIABETES MELLITUS: ICD-10-CM

## 2025-07-25 DIAGNOSIS — G47.00 INSOMNIA, PERSISTENT: ICD-10-CM

## 2025-07-25 DIAGNOSIS — Z00.00 PREVENTATIVE HEALTH CARE: ICD-10-CM

## 2025-07-25 DIAGNOSIS — I10 ESSENTIAL HYPERTENSION: ICD-10-CM

## 2025-07-25 DIAGNOSIS — Z00.00 PHYSICAL EXAM, ANNUAL: Primary | ICD-10-CM

## 2025-07-25 LAB
ALBUMIN SERPL BCG-MCNC: 4.6 G/DL (ref 3.5–5.2)
ALBUMIN UR-MCNC: 10 MG/DL
ALP SERPL-CCNC: 79 U/L (ref 40–150)
ALT SERPL W P-5'-P-CCNC: 19 U/L (ref 0–50)
AMPHETAMINES UR QL SCN: NORMAL
ANION GAP SERPL CALCULATED.3IONS-SCNC: 13 MMOL/L (ref 7–15)
APPEARANCE UR: CLEAR
AST SERPL W P-5'-P-CCNC: 22 U/L (ref 0–45)
BARBITURATES UR QL SCN: NORMAL
BASOPHILS # BLD AUTO: 0 10E3/UL (ref 0–0.2)
BASOPHILS NFR BLD AUTO: 1 %
BENZODIAZ UR QL SCN: NORMAL
BILIRUB SERPL-MCNC: 0.4 MG/DL
BILIRUB UR QL STRIP: NEGATIVE
BUN SERPL-MCNC: 14.3 MG/DL (ref 8–23)
BZE UR QL SCN: NORMAL
CALCIUM SERPL-MCNC: 9.8 MG/DL (ref 8.8–10.4)
CANNABINOIDS UR QL SCN: NORMAL
CHLORIDE SERPL-SCNC: 102 MMOL/L (ref 98–107)
CHOLEST SERPL-MCNC: 141 MG/DL
COLOR UR AUTO: YELLOW
CREAT SERPL-MCNC: 0.89 MG/DL (ref 0.51–0.95)
EGFRCR SERPLBLD CKD-EPI 2021: 72 ML/MIN/1.73M2
EOSINOPHIL # BLD AUTO: 0.2 10E3/UL (ref 0–0.7)
EOSINOPHIL NFR BLD AUTO: 3 %
ERYTHROCYTE [DISTWIDTH] IN BLOOD BY AUTOMATED COUNT: 13.5 % (ref 10–15)
EST. AVERAGE GLUCOSE BLD GHB EST-MCNC: 100 MG/DL
FASTING STATUS PATIENT QL REPORTED: ABNORMAL
FASTING STATUS PATIENT QL REPORTED: NORMAL
FENTANYL UR QL: NORMAL
GLUCOSE SERPL-MCNC: 95 MG/DL (ref 70–99)
GLUCOSE UR STRIP-MCNC: NEGATIVE MG/DL
HBA1C MFR BLD: 5.1 %
HCO3 SERPL-SCNC: 25 MMOL/L (ref 22–29)
HCT VFR BLD AUTO: 40 % (ref 35–47)
HDLC SERPL-MCNC: 70 MG/DL
HGB BLD-MCNC: 13.1 G/DL (ref 11.7–15.7)
HGB UR QL STRIP: NEGATIVE
HYALINE CASTS: 3 /LPF
IMM GRANULOCYTES # BLD: 0 10E3/UL
IMM GRANULOCYTES NFR BLD: 0 %
KETONES UR STRIP-MCNC: NEGATIVE MG/DL
LDLC SERPL CALC-MCNC: 55 MG/DL
LEUKOCYTE ESTERASE UR QL STRIP: NEGATIVE
LYMPHOCYTES # BLD AUTO: 3.1 10E3/UL (ref 0.8–5.3)
LYMPHOCYTES NFR BLD AUTO: 40 %
MCH RBC QN AUTO: 28.4 PG (ref 26.5–33)
MCHC RBC AUTO-ENTMCNC: 32.8 G/DL (ref 31.5–36.5)
MCV RBC AUTO: 87 FL (ref 78–100)
MONOCYTES # BLD AUTO: 0.6 10E3/UL (ref 0–1.3)
MONOCYTES NFR BLD AUTO: 8 %
MUCOUS THREADS #/AREA URNS LPF: PRESENT /LPF
NEUTROPHILS # BLD AUTO: 3.9 10E3/UL (ref 1.6–8.3)
NEUTROPHILS NFR BLD AUTO: 49 %
NITRATE UR QL: NEGATIVE
NONHDLC SERPL-MCNC: 71 MG/DL
NRBC # BLD AUTO: 0 10E3/UL
NRBC BLD AUTO-RTO: 0 /100
OPIATES UR QL SCN: NORMAL
PCP QUAL URINE (ROCHE): NORMAL
PH UR STRIP: 6.5 [PH] (ref 5–9)
PLATELET # BLD AUTO: 342 10E3/UL (ref 150–450)
POTASSIUM SERPL-SCNC: 3 MMOL/L (ref 3.4–5.3)
PROT SERPL-MCNC: 7.4 G/DL (ref 6.4–8.3)
RBC # BLD AUTO: 4.62 10E6/UL (ref 3.8–5.2)
RBC URINE: 1 /HPF
SODIUM SERPL-SCNC: 140 MMOL/L (ref 135–145)
SP GR UR STRIP: 1.03 (ref 1–1.03)
TRIGL SERPL-MCNC: 78 MG/DL
UROBILINOGEN UR STRIP-MCNC: 2 MG/DL
WBC # BLD AUTO: 7.8 10E3/UL (ref 4–11)
WBC URINE: 1 /HPF

## 2025-07-25 PROCEDURE — 80307 DRUG TEST PRSMV CHEM ANLYZR: CPT | Mod: ZL

## 2025-07-25 PROCEDURE — 1126F AMNT PAIN NOTED NONE PRSNT: CPT | Performed by: FAMILY MEDICINE

## 2025-07-25 PROCEDURE — 85041 AUTOMATED RBC COUNT: CPT | Mod: ZL

## 2025-07-25 PROCEDURE — 99214 OFFICE O/P EST MOD 30 MIN: CPT | Mod: 25 | Performed by: FAMILY MEDICINE

## 2025-07-25 PROCEDURE — 81001 URINALYSIS AUTO W/SCOPE: CPT | Mod: ZL

## 2025-07-25 PROCEDURE — G2211 COMPLEX E/M VISIT ADD ON: HCPCS | Performed by: FAMILY MEDICINE

## 2025-07-25 PROCEDURE — 83036 HEMOGLOBIN GLYCOSYLATED A1C: CPT | Mod: ZL

## 2025-07-25 PROCEDURE — 3078F DIAST BP <80 MM HG: CPT | Performed by: FAMILY MEDICINE

## 2025-07-25 PROCEDURE — 3075F SYST BP GE 130 - 139MM HG: CPT | Performed by: FAMILY MEDICINE

## 2025-07-25 PROCEDURE — 80053 COMPREHEN METABOLIC PANEL: CPT | Mod: ZL

## 2025-07-25 PROCEDURE — 99396 PREV VISIT EST AGE 40-64: CPT | Performed by: FAMILY MEDICINE

## 2025-07-25 PROCEDURE — 82465 ASSAY BLD/SERUM CHOLESTEROL: CPT | Mod: ZL

## 2025-07-25 PROCEDURE — 36415 COLL VENOUS BLD VENIPUNCTURE: CPT | Mod: ZL

## 2025-07-25 RX ORDER — ZOLPIDEM TARTRATE 10 MG/1
10 TABLET ORAL AT BEDTIME
Qty: 30 TABLET | Refills: 5 | Status: SHIPPED | OUTPATIENT
Start: 2025-07-25

## 2025-07-25 RX ORDER — ROSUVASTATIN CALCIUM 10 MG/1
10 TABLET, COATED ORAL DAILY
Qty: 90 TABLET | Refills: 4 | Status: SHIPPED | OUTPATIENT
Start: 2025-07-25

## 2025-07-25 RX ORDER — ALBUTEROL SULFATE 90 UG/1
2 INHALANT RESPIRATORY (INHALATION) EVERY 4 HOURS PRN
Qty: 18 G | Refills: 11 | Status: SHIPPED | OUTPATIENT
Start: 2025-07-25

## 2025-07-25 RX ORDER — ESTRADIOL 10 UG/1
10 TABLET, FILM COATED VAGINAL
Qty: 24 TABLET | Refills: 4 | Status: SHIPPED | OUTPATIENT
Start: 2025-07-28

## 2025-07-25 ASSESSMENT — PAIN SCALES - GENERAL: PAINLEVEL_OUTOF10: NO PAIN (0)

## 2025-07-25 ASSESSMENT — PATIENT HEALTH QUESTIONNAIRE - PHQ9
SUM OF ALL RESPONSES TO PHQ QUESTIONS 1-9: 1
10. IF YOU CHECKED OFF ANY PROBLEMS, HOW DIFFICULT HAVE THESE PROBLEMS MADE IT FOR YOU TO DO YOUR WORK, TAKE CARE OF THINGS AT HOME, OR GET ALONG WITH OTHER PEOPLE: NOT DIFFICULT AT ALL
SUM OF ALL RESPONSES TO PHQ QUESTIONS 1-9: 1

## 2025-07-25 NOTE — NURSING NOTE
"Chief Complaint   Patient presents with    Physical     Annual well visit       Initial /78 (BP Location: Right arm, Patient Position: Sitting, Cuff Size: Adult Regular)   Pulse 100   Temp 97.2  F (36.2  C) (Temporal)   Resp 16   Ht 1.651 m (5' 5\")   Wt 80.3 kg (177 lb)   LMP  (LMP Unknown)   SpO2 99%   Breastfeeding No   BMI 29.45 kg/m   Estimated body mass index is 29.45 kg/m  as calculated from the following:    Height as of this encounter: 1.651 m (5' 5\").    Weight as of this encounter: 80.3 kg (177 lb).    Medication Review: complete    The next two questions are to help us understand your food security.  If you are feeling you need any assistance in this area, we have resources available to support you today.          7/22/2025   SDOH- Food Insecurity   Within the past 12 months, did you worry that your food would run out before you got money to buy more? N   Within the past 12 months, did the food you bought just not last and you didn t have money to get more? N       Health Care Directive:  Patient does not have a Health Care Directive: Discussed advance care planning with patient; however, patient declined at this time.    Faustina Yeager LPN      "

## 2025-07-25 NOTE — PATIENT INSTRUCTIONS
Daily protein intake:  0.8g/kg/day  0.37g/lb/day    150#:  55gm  175#:  64gm  200#:  73gm  225#:  82gm  250#:  90gm      Calorie and protein requirements -- Calorie needs (the estimated energy requirement [EER]) can be calculated in older adults using the following equations [82]:  ?For women: 354.1 - (6.91 x age [y]) + PAC x (9.36 x weight [kg] + 726 x height [m]).  ?For men: 661.8 - (9.53 x age [y]) + PAC x (15.91 x weight [kg] + 539.6 x height [m]).  The Physical Activity Coefficient (PAC) is determined as follows:  ?Sedentary PAC = 1.0  ?Low activity PAC = 1.12  ?Active PAC = 1.27  ?Very active PAC = 1.45              You are currently being prescribed a controlled substance.  You need to be aware of the risks of taking this medication.  By signing a medication contract, you accept these risks and side effects.      Any medical treatment is initially a trial, and that continued prescribing is based on evidence of benefit without an unacceptable risk. Understand that the goal of using this medication is to increase functional level. If your symptoms do not significantly decrease and/or function increase, the medication will be stopped.    Be aware that the use of such medicine has certain risks associated with it, including, but not limited to:  Sleepiness or drowsiness, lightheadedness, dizziness, confusion,allergic reaction, slowing of breathing rate, slowing of reflexes or reaction time, sexual dysfunction,physical dependence, tolerance to analgesia, addiction, withdrawal and the possibility that the medicine will not completely take care of your symptoms.  Usage is associate with a significantly increased risk of falls and other unintended injuries.    The use of sleeping medication long term is associated with an increased risk of dementia and an overall shorter lifespan  It is known that chronic insomnia is related to those conditions and that sleeping medication does not reverse that risk.  Therapies to  treat chronic insomnia, such as cognitive behavioral therapy, have better long term outcomes.      The overuse of this medication can result in serious health risks including respiratory depression or even death.  These risks are increased when the medication is combined with alcohol,narcotics, or other sedatives.    Having these medications prescribed for you also means that your accept the risk of possible intentional or accidental use by those in your home or others with whom you come in contact.  This includes their possible diversion of your medications, overdose or death.      This medication will be strictly monitored and all of your medications should be filled at the same pharmacy.  (Should the need arise to change pharmacies our office must be informed).    It is your responsibility to share that you are on a controlled substance contract with your other health care providers, including your dentist.      It is your responsibility to schedule timely follow up appointments for refills as the medication won't be filled outside of an appointment except in very special circumstances.  For most medication in this category, that mean at minimum visits every 6 months.

## 2025-07-25 NOTE — PROGRESS NOTES
"Preventive Care Visit  [facility]  DAVID RAE MD, Family Medicine  Jul 25, 2025      Assessment & Plan       ICD-10-CM    1. Physical exam, annual  Z00.00       2. Essential hypertension  I10       3. Moderate persistent asthma without complication  J45.40 albuterol (PROAIR HFA/PROVENTIL HFA/VENTOLIN HFA) 108 (90 Base) MCG/ACT inhaler      4. Screening for hyperlipidemia  Z13.220       5. Screening for diabetes mellitus  Z13.1       6. Insomnia, persistent  G47.00 zolpidem (AMBIEN) 10 MG tablet      7. Dyspareunia in female  N94.10 estradiol (VAGIFEM) 10 MCG TABS vaginal tablet      8. Mixed hyperlipidemia  E78.2 rosuvastatin (CRESTOR) 10 MG tablet      9. Osteoarthritis of spine with radiculopathy, cervical region  M47.22 MR Cervical Spine w/o Contrast     XR Cervical Spine 2/3 Views         She will continue with glp1 for prediabetes and wt loss management.  Anticipate additional 6 months then decrease dose.  Concern for muscle loss as wt loss progresses.  She would also benefit from CBT for \"food noise\".    Continue statin for HL  LBP MRI reviewed.  Also having neck symptoms with decreased ROM and radicular symptoms.  Imaging ordered.  High risk medication use for chronic insomnia.   reviewed. Alternative plan as approaches 65 years old.    Asthma, controlled  Continue vagifem for PM vaginal dryness  Continue annual mammogram.  Colon cancer screening up to date  Immunizations this fall.    Hypertension - at goal.    I have personally reviewed the labs listed below.         BMI  Estimated body mass index is 29.45 kg/m  as calculated from the following:    Height as of this encounter: 1.651 m (5' 5\").    Weight as of this encounter: 80.3 kg (177 lb).       Counseling  Appropriate preventive services were addressed with this patient via screening, questionnaire, or discussion as appropriate for fall prevention, nutrition, physical activity, Tobacco-use cessation, social " engagement, weight loss and cognition.  Checklist reviewing preventive services available has been given to the patient.  Reviewed patient's diet, addressing concerns and/or questions.   She is at risk for psychosocial distress and has been provided with information to reduce risk.   Reviewed preventive health counseling, as reflected in patient instructions    Follow up 6 months  Yarely Miller MD     Huong Urbano is a 64 year old, presenting for the following:  Physical (Annual well visit)        7/25/2025     2:43 PM   Additional Questions   Roomed by Faustina FLORES LPN          HPI  Yolie Bedoya is a 64 year old female in for physical     Wt loss management and prediabetes ; she is on Wegovy; may get some gagging and this tells her it is time to stop.  Only had constipation once, if has a little bit bigger meal she will go the other way  ; hasn't had alcohol since March   Wt Readings from Last 4 Encounters:   07/25/25 80.3 kg (177 lb)   02/03/25 95.2 kg (209 lb 12.8 oz)   01/24/25 94.8 kg (209 lb)   01/16/25 96.6 kg (213 lb)   2. Spinal symptoms - MRI lumbar spine last winter; with her neck she has noticed a decrease in ROM, some vertigo ; gets some numbness/tingling down her arms    3.  Not as much LE edema    4.  Prediabetes   Lab Results   Component Value Date    A1C 5.1 07/25/2025    A1C 5.5 01/24/2025    A1C 5.4 07/24/2024    A1C 5.3 02/19/2024    A1C 5.5 11/09/2023    A1C 5.4 03/17/2021    A1C 5.7 08/18/2020    A1C 5.4 02/18/2020    A1C 5.7 09/27/2019    A1C 5.6 06/19/2018     ASTHMA  - air quality issues - uses inhaler if outside all day      Hyperlipidemia Follow-Up    Are you regularly taking any medication or supplement to lower your cholesterol?   Crestor   Are you having muscle aches or other side effects that you think could be caused by your cholesterol lowering medication?  No    Advance Care Planning    Discussed advance care planning with patient; informed AVS has link to [facility]  [employer].        7/22/2025   General Health   How would you rate your overall physical health? Good   Feel stress (tense, anxious, or unable to sleep) To some extent   (!) STRESS CONCERN      7/22/2025   Nutrition   Three or more servings of calcium each day? (!) I DON'T KNOW   Diet: I don't know   How many servings of fruit and vegetables per day? (!) 2-3   How many sweetened beverages each day? 0-1         7/22/2025   Exercise   Days per week of moderate/strenous exercise Patient declined   Average minutes spent exercising at this level Patient declined         7/22/2025   Social Factors   Frequency of gathering with friends or relatives Twice a week   Worry food won't last until get money to buy more No   Food not last or not have enough money for food? No   Do you have housing? (Housing is defined as stable permanent housing and does not include staying outside in a car, in a tent, in an abandoned building, in an overnight shelter, or couch-surfing.) No   Are you worried about losing your housing? No   Lack of transportation? No   Unable to get utilities (heat,electricity)? No   Want help with housing or utility concern? No   (!) HOUSING CONCERN PRESENT      7/22/2025   Fall Risk   Fallen 2 or more times in the past year? No   Trouble with walking or balance? No          7/22/2025   Dental   Dentist two times every year? Yes       Today's PHQ-9 Score:       7/25/2025     2:10 PM   PHQ-9 SCORE   PHQ-9 Total Score MyChart 1 (Minimal depression)   PHQ-9 Total Score 1        Patient-reported         7/22/2025   Substance Use   Alcohol more than 3/day or more than 7/wk Not Applicable   Do you use any other substances recreationally? No     Social History     Tobacco Use    Smoking status: Never     Passive exposure: Never    Smokeless tobacco: Never   Vaping Use    Vaping status: Never Used   Substance Use Topics    Alcohol use: Yes     Comment: Alcoholic Drinks/day: occ/socially    Drug use: No           11/20/2024    LAST FHS-7 RESULTS   1st degree relative breast or ovarian cancer Yes   Any relative bilateral breast cancer No   Any male have breast cancer No   Any ONE woman have BOTH breast AND ovarian cancer No   Any woman with breast cancer before 50yrs No   2 or more relatives with breast AND/OR ovarian cancer Yes        Mammogram Screening - Mammogram every 1-2 years updated in Health Maintenance based on mutual decision making          7/22/2025   One time HIV Screening   Previous HIV test? No         7/22/2025   STI Screening   New sexual partner(s) since last STI/HIV test? No     History of abnormal Pap smear: Status post hysterectomy with removal of cervix and no history of CIN2 or greater or cervical cancer. Health Maintenance and Surgical History updated.       ASCVD Risk   The 10-year ASCVD risk score (Bailey MCALLISTER, et al., 2019) is: 5.1%    Values used to calculate the score:      Age: 64 years      Sex: Female      Is Non- : No      Diabetic: No      Tobacco smoker: No      Systolic Blood Pressure: 130 mmHg      Is BP treated: Yes      HDL Cholesterol: 70 mg/dL      Total Cholesterol: 141 mg/dL           Reviewed and updated as needed this visit by Provider                    Past Medical History:   Diagnosis Date    Allergic rhinitis due to animal hair and dander     1970,aspen, birch, maple, oak, grass, dust mite, ragwee, cocklebur, mugwart, lambs quarter, pigweed, fungus, molds    BCC (basal cell carcinoma), face 2020    Mohs    Calculus of gallbladder without cholecystitis without obstruction     No Comments Provided    Essential (primary) hypertension     No Comments Provided    Excessive and frequent menstruation with regular cycle     s/p CLARA    Hormone replacement therapy (postmenopausal)     2010,started ERT    Obesity     No Comments Provided    Other long term (current) drug therapy     2/11/2014,ambien #30/month    Other specified postprocedural states     nausea and  "severe vomiting with narcotics    PONV (postoperative nausea and vomiting)     Psychophysiologic insomnia     contract signed 2/2014    Tubulo-interstitial nephritis     No Comments Provided    Uncomplicated asthma     Venous insufficiency (chronic) (peripheral)     No Comments Provided     Past Surgical History:   Procedure Laterality Date    ARTHROSCOPY KNEE  10/12/2017    10/12/2017    BLADDER SURGERY  10/25/2017     SLING OPER STRES INCONTINENCE    COLONOSCOPY  06/2004     because of change in bowel habits    COLONOSCOPY  05/14/2014 5/14/2014,Extensive diverticulosis throughout the colon - follow up 10 years    COLONOSCOPY  11/01/2024    F/U 2034 extensive diverticulosis    COLONOSCOPY N/A 11/1/2024    Procedure: Colonoscopy;  Surgeon: Joselo Metz MD;  Location: [facility] OR    EXTRACTION(S) DENTAL      No Comments Provided    HYSTERECTOMY TOTAL ABDOMINAL, BILATERAL SALPINGO-OOPHORECTOMY, COMBINED  03/30/2010    CLARA/BSO/Vasquez/Posterior Repair    LAPAROSCOPIC ABLATION ENDOMETRIOSIS      3/05    left total knee arthroplasty Left 10/2019    Dr. Jones.  [place]    TOTAL KNEE ARTHROPLASTY Right 03/2024    Karen Jeffrey         Review of Systems  Glasses, has had follow up for flashing  Dentist - twice a year  Bladder - fewer UTI symptoms      Can now do stairs straight on       Objective    Exam  /78 (BP Location: Right arm, Patient Position: Sitting, Cuff Size: Adult Regular)   Pulse 100   Temp 97.2  F (36.2  C) (Temporal)   Resp 16   Ht 1.651 m (5' 5\")   Wt 80.3 kg (177 lb)   LMP  (LMP Unknown)   SpO2 99%   Breastfeeding No   BMI 29.45 kg/m     Estimated body mass index is 29.45 kg/m  as calculated from the following:    Height as of this encounter: 1.651 m (5' 5\").    Weight as of this encounter: 80.3 kg (177 lb).    Physical Exam  GENERAL: alert and no distress  EYES: Eyes grossly normal to inspection, PERRL and conjunctivae and sclerae normal  HENT: ear canals and TM's normal, nose and mouth " "without ulcers or lesions  NECK: no adenopathy, no asymmetry, masses, or scars  RESP: lungs clear to auscultation - no rales, rhonchi or wheezes  CV: regular rate and rhythm, normal S1 S2, no S3 or S4, no murmur, click or rub, no peripheral edema  ABDOMEN: soft, nontender, no hepatosplenomegaly, no masses and bowel sounds normal  MS: extremities normal- no gross deformities noted  SKIN: no suspicious lesions or rashes  NEURO: Normal strength and tone, mentation intact and speech normal  PSYCH: mentation appears normal, affect normal/bright    Lab Results   Component Value Date    A1C 5.1 07/25/2025    A1C 5.5 01/24/2025    A1C 5.4 07/24/2024    A1C 5.3 02/19/2024    A1C 5.5 11/09/2023    A1C 5.4 03/17/2021    A1C 5.7 08/18/2020    A1C 5.4 02/18/2020    A1C 5.7 09/27/2019    A1C 5.6 06/19/2018     Lab Results   Component Value Date    CHOL 141 07/25/2025    CHOL 223 08/18/2020     Lab Results   Component Value Date    HDL 70 07/25/2025    HDL 65 08/18/2020     Lab Results   Component Value Date    LDL 55 07/25/2025     08/18/2020     Lab Results   Component Value Date    TRIG 78 07/25/2025    TRIG 134 08/18/2020     No results found for: \"CHOLHDLRATIO\"  Last Comprehensive Metabolic Panel:  Sodium   Date Value Ref Range Status   07/25/2025 140 135 - 145 mmol/L Final   03/17/2021 138 134 - 144 mmol/L Final     Potassium   Date Value Ref Range Status   07/25/2025 3.0 (L) 3.4 - 5.3 mmol/L Final   09/07/2022 3.9 3.5 - 5.1 mmol/L Final   03/17/2021 3.6 3.5 - 5.1 mmol/L Final     Chloride   Date Value Ref Range Status   07/25/2025 102 98 - 107 mmol/L Final   09/07/2022 108 (H) 98 - 107 mmol/L Final   03/17/2021 103 98 - 107 mmol/L Final     Carbon Dioxide   Date Value Ref Range Status   03/17/2021 27 21 - 31 mmol/L Final     Carbon Dioxide (CO2)   Date Value Ref Range Status   07/25/2025 25 22 - 29 mmol/L Final   09/07/2022 27 21 - 31 mmol/L Final     Anion Gap   Date Value Ref Range Status   07/25/2025 13 7 - 15 " mmol/L Final   09/07/2022 6 3 - 14 mmol/L Final   03/17/2021 8 3 - 14 mmol/L Final     Glucose   Date Value Ref Range Status   07/25/2025 95 70 - 99 mg/dL Final   09/07/2022 108 (H) 70 - 105 mg/dL Final   03/17/2021 108 (H) 70 - 105 mg/dL Final     Urea Nitrogen   Date Value Ref Range Status   07/25/2025 14.3 8.0 - 23.0 mg/dL Final   09/07/2022 17 7 - 25 mg/dL Final   03/17/2021 20 7 - 25 mg/dL Final     Creatinine   Date Value Ref Range Status   07/25/2025 0.89 0.51 - 0.95 mg/dL Final   03/17/2021 0.94 0.60 - 1.20 mg/dL Final     GFR Estimate   Date Value Ref Range Status   07/25/2025 72 >60 mL/min/1.73m2 Final     Comment:     eGFR calculated using 2021 CKD-EPI equation.   03/17/2021 61 >60 mL/min/[1.73_m2] Final     Calcium   Date Value Ref Range Status   07/25/2025 9.8 8.8 - 10.4 mg/dL Final   03/17/2021 10.0 8.6 - 10.3 mg/dL Final     Bilirubin Total   Date Value Ref Range Status   07/25/2025 0.4 <=1.2 mg/dL Final   03/17/2021 0.6 0.3 - 1.0 mg/dL Final     Alkaline Phosphatase   Date Value Ref Range Status   07/25/2025 79 40 - 150 U/L Final   03/17/2021 71 34 - 104 U/L Final     ALT   Date Value Ref Range Status   07/25/2025 19 0 - 50 U/L Final   03/17/2021 13 7 - 52 U/L Final     AST   Date Value Ref Range Status   07/25/2025 22 0 - 45 U/L Final   03/17/2021 14 13 - 39 U/L Final                   Signed Electronically by: DAVID RAE MD    Answers submitted by the patient for this visit:  Patient Health Questionnaire (Submitted on 7/25/2025)  If you checked off any problems, how difficult have these problems made it for you to do your work, take care of things at home, or get along with other people?: Not difficult at all  PHQ9 TOTAL SCORE: 1

## 2025-08-01 ENCOUNTER — HOSPITAL ENCOUNTER (OUTPATIENT)
Dept: MRI IMAGING | Facility: OTHER | Age: 65
Discharge: HOME | End: 2025-08-01
Attending: FAMILY MEDICINE
Payer: COMMERCIAL

## 2025-08-01 ENCOUNTER — HOSPITAL ENCOUNTER (OUTPATIENT)
Dept: GENERAL RADIOLOGY | Facility: OTHER | Age: 65
Discharge: HOME | End: 2025-08-01
Attending: FAMILY MEDICINE
Payer: COMMERCIAL

## 2025-08-01 DIAGNOSIS — M47.22 OSTEOARTHRITIS OF SPINE WITH RADICULOPATHY, CERVICAL REGION: ICD-10-CM

## 2025-08-01 PROCEDURE — 72040 X-RAY EXAM NECK SPINE 2-3 VW: CPT | Mod: 26 | Performed by: RADIOLOGY

## 2025-08-01 PROCEDURE — 72040 X-RAY EXAM NECK SPINE 2-3 VW: CPT

## 2025-08-01 PROCEDURE — 72141 MRI NECK SPINE W/O DYE: CPT

## 2025-08-01 PROCEDURE — 72141 MRI NECK SPINE W/O DYE: CPT | Mod: 26 | Performed by: RADIOLOGY

## 2025-08-13 DIAGNOSIS — E66.811 CLASS 1 OBESITY WITHOUT SERIOUS COMORBIDITY WITH BODY MASS INDEX (BMI) OF 34.0 TO 34.9 IN ADULT, UNSPECIFIED OBESITY TYPE: ICD-10-CM

## 2025-08-13 RX ORDER — SEMAGLUTIDE 2.4 MG/.75ML
INJECTION, SOLUTION SUBCUTANEOUS
Qty: 3 ML | Refills: 0 | Status: SHIPPED | OUTPATIENT
Start: 2025-08-13

## (undated) DEVICE — SUCTION MANIFOLD NEPTUNE 2 SYS 4 PORT 0702-020-000

## (undated) DEVICE — SOL WATER 1500ML

## (undated) DEVICE — ENDO KIT COMPLIANCE DYKENDOCMPLY

## (undated) DEVICE — TUBING SUCTION 10'X3/16" N510

## (undated) DEVICE — ENDO BRUSH CHANNEL MASTER CLEANING 2-4.2MM BW-412T

## (undated) RX ORDER — LIDOCAINE HYDROCHLORIDE 10 MG/ML
INJECTION, SOLUTION INFILTRATION; PERINEURAL
Status: DISPENSED
Start: 2022-01-14

## (undated) RX ORDER — TRIAMCINOLONE ACETONIDE 40 MG/ML
INJECTION, SUSPENSION INTRA-ARTICULAR; INTRAMUSCULAR
Status: DISPENSED
Start: 2022-01-14

## (undated) RX ORDER — LIDOCAINE HYDROCHLORIDE 10 MG/ML
INJECTION, SOLUTION EPIDURAL; INFILTRATION; INTRACAUDAL; PERINEURAL
Status: DISPENSED
Start: 2019-09-06

## (undated) RX ORDER — BUPIVACAINE HYDROCHLORIDE 5 MG/ML
INJECTION, SOLUTION EPIDURAL; INTRACAUDAL
Status: DISPENSED
Start: 2022-01-14

## (undated) RX ORDER — PROPOFOL 10 MG/ML
INJECTION, EMULSION INTRAVENOUS
Status: DISPENSED
Start: 2024-11-01

## (undated) RX ORDER — BUPIVACAINE HYDROCHLORIDE 5 MG/ML
INJECTION, SOLUTION EPIDURAL; INTRACAUDAL
Status: DISPENSED
Start: 2019-09-06

## (undated) RX ORDER — TRIAMCINOLONE ACETONIDE 40 MG/ML
INJECTION, SUSPENSION INTRA-ARTICULAR; INTRAMUSCULAR
Status: DISPENSED
Start: 2019-09-06

## (undated) RX ORDER — DEXAMETHASONE SODIUM PHOSPHATE 4 MG/ML
INJECTION, SOLUTION INTRA-ARTICULAR; INTRALESIONAL; INTRAMUSCULAR; INTRAVENOUS; SOFT TISSUE
Status: DISPENSED
Start: 2024-11-01

## (undated) RX ORDER — BUPIVACAINE HYDROCHLORIDE 5 MG/ML
INJECTION, SOLUTION EPIDURAL; INTRACAUDAL
Status: DISPENSED
Start: 2020-09-17

## (undated) RX ORDER — TRIAMCINOLONE ACETONIDE 40 MG/ML
INJECTION, SUSPENSION INTRA-ARTICULAR; INTRAMUSCULAR
Status: DISPENSED
Start: 2020-09-17

## (undated) RX ORDER — LIDOCAINE HYDROCHLORIDE 10 MG/ML
INJECTION, SOLUTION INFILTRATION; PERINEURAL
Status: DISPENSED
Start: 2020-09-17

## (undated) RX ORDER — ONDANSETRON 2 MG/ML
INJECTION INTRAMUSCULAR; INTRAVENOUS
Status: DISPENSED
Start: 2024-11-01